# Patient Record
Sex: MALE | Race: WHITE | NOT HISPANIC OR LATINO
[De-identification: names, ages, dates, MRNs, and addresses within clinical notes are randomized per-mention and may not be internally consistent; named-entity substitution may affect disease eponyms.]

---

## 2018-08-10 ENCOUNTER — RESULT REVIEW (OUTPATIENT)
Age: 63
End: 2018-08-10

## 2018-08-30 ENCOUNTER — APPOINTMENT (OUTPATIENT)
Dept: SURGERY | Facility: CLINIC | Age: 63
End: 2018-08-30
Payer: COMMERCIAL

## 2018-08-30 ENCOUNTER — TRANSCRIPTION ENCOUNTER (OUTPATIENT)
Age: 63
End: 2018-08-30

## 2018-08-30 VITALS
SYSTOLIC BLOOD PRESSURE: 142 MMHG | DIASTOLIC BLOOD PRESSURE: 84 MMHG | WEIGHT: 265 LBS | HEIGHT: 72 IN | BODY MASS INDEX: 35.89 KG/M2

## 2018-08-30 DIAGNOSIS — Z82.49 FAMILY HISTORY OF ISCHEMIC HEART DISEASE AND OTHER DISEASES OF THE CIRCULATORY SYSTEM: ICD-10-CM

## 2018-08-30 DIAGNOSIS — Z86.79 PERSONAL HISTORY OF OTHER DISEASES OF THE CIRCULATORY SYSTEM: ICD-10-CM

## 2018-08-30 DIAGNOSIS — Z87.39 PERSONAL HISTORY OF OTHER DISEASES OF THE MUSCULOSKELETAL SYSTEM AND CONNECTIVE TISSUE: ICD-10-CM

## 2018-08-30 DIAGNOSIS — F17.290 NICOTINE DEPENDENCE, OTHER TOBACCO PRODUCT, UNCOMPLICATED: ICD-10-CM

## 2018-08-30 DIAGNOSIS — Z86.39 PERSONAL HISTORY OF OTHER ENDOCRINE, NUTRITIONAL AND METABOLIC DISEASE: ICD-10-CM

## 2018-08-30 PROBLEM — Z00.00 ENCOUNTER FOR PREVENTIVE HEALTH EXAMINATION: Status: ACTIVE | Noted: 2018-08-30

## 2018-08-30 PROCEDURE — 99203 OFFICE O/P NEW LOW 30 MIN: CPT

## 2018-08-31 PROBLEM — Z86.39 HISTORY OF HYPOTHYROIDISM: Status: RESOLVED | Noted: 2018-08-30 | Resolved: 2018-08-31

## 2018-08-31 PROBLEM — Z82.49 FAMILY HISTORY OF CONGESTIVE HEART FAILURE: Status: ACTIVE | Noted: 2018-08-30

## 2018-08-31 PROBLEM — Z82.49 FAMILY HISTORY OF CARDIAC DISORDER: Status: ACTIVE | Noted: 2018-08-30

## 2018-08-31 PROBLEM — Z87.39 HISTORY OF ARTHRITIS: Status: RESOLVED | Noted: 2018-08-30 | Resolved: 2018-08-31

## 2018-08-31 PROBLEM — Z86.79 HISTORY OF HYPERTENSION: Status: RESOLVED | Noted: 2018-08-30 | Resolved: 2018-08-31

## 2018-08-31 PROBLEM — F17.290 CIGAR SMOKER: Status: ACTIVE | Noted: 2018-08-30

## 2018-08-31 RX ORDER — LEVOTHYROXINE SODIUM 150 UG/1
150 TABLET ORAL
Refills: 0 | Status: ACTIVE | COMMUNITY

## 2018-08-31 RX ORDER — LOSARTAN POTASSIUM 50 MG/1
50 TABLET, FILM COATED ORAL
Refills: 0 | Status: ACTIVE | COMMUNITY

## 2018-08-31 RX ORDER — TRAMADOL HYDROCHLORIDE 50 MG/1
50 TABLET, COATED ORAL
Refills: 0 | Status: ACTIVE | COMMUNITY

## 2018-09-19 ENCOUNTER — OUTPATIENT (OUTPATIENT)
Dept: OUTPATIENT SERVICES | Facility: HOSPITAL | Age: 63
LOS: 1 days | Discharge: HOME | End: 2018-09-19

## 2018-09-19 DIAGNOSIS — C43.9 MALIGNANT MELANOMA OF SKIN, UNSPECIFIED: ICD-10-CM

## 2018-09-25 ENCOUNTER — RESULT REVIEW (OUTPATIENT)
Age: 63
End: 2018-09-25

## 2018-10-02 ENCOUNTER — FORM ENCOUNTER (OUTPATIENT)
Age: 63
End: 2018-10-02

## 2018-10-03 ENCOUNTER — OUTPATIENT (OUTPATIENT)
Dept: OUTPATIENT SERVICES | Facility: HOSPITAL | Age: 63
LOS: 1 days | Discharge: HOME | End: 2018-10-03

## 2018-10-03 DIAGNOSIS — D03.8 MELANOMA IN SITU OF OTHER SITES: ICD-10-CM

## 2018-10-04 ENCOUNTER — APPOINTMENT (OUTPATIENT)
Dept: SURGERY | Facility: CLINIC | Age: 63
End: 2018-10-04
Payer: COMMERCIAL

## 2018-10-04 VITALS
BODY MASS INDEX: 36.44 KG/M2 | DIASTOLIC BLOOD PRESSURE: 86 MMHG | WEIGHT: 269 LBS | HEIGHT: 72 IN | SYSTOLIC BLOOD PRESSURE: 158 MMHG

## 2018-10-04 PROCEDURE — 99213 OFFICE O/P EST LOW 20 MIN: CPT

## 2018-10-05 ENCOUNTER — LABORATORY RESULT (OUTPATIENT)
Age: 63
End: 2018-10-05

## 2018-10-05 ENCOUNTER — OUTPATIENT (OUTPATIENT)
Dept: OUTPATIENT SERVICES | Facility: HOSPITAL | Age: 63
LOS: 1 days | Discharge: HOME | End: 2018-10-05

## 2018-10-05 DIAGNOSIS — R89.7 ABNORMAL HISTOLOGICAL FINDINGS IN SPECIMENS FROM OTHER ORGANS, SYSTEMS AND TISSUES: ICD-10-CM

## 2018-10-11 ENCOUNTER — FORM ENCOUNTER (OUTPATIENT)
Age: 63
End: 2018-10-11

## 2018-10-12 ENCOUNTER — OUTPATIENT (OUTPATIENT)
Dept: OUTPATIENT SERVICES | Facility: HOSPITAL | Age: 63
LOS: 1 days | Discharge: HOME | End: 2018-10-12

## 2018-10-12 VITALS
HEART RATE: 63 BPM | DIASTOLIC BLOOD PRESSURE: 62 MMHG | HEIGHT: 72 IN | WEIGHT: 268.96 LBS | OXYGEN SATURATION: 97 % | SYSTOLIC BLOOD PRESSURE: 122 MMHG | RESPIRATION RATE: 18 BRPM | TEMPERATURE: 98 F

## 2018-10-12 DIAGNOSIS — Z90.01 ACQUIRED ABSENCE OF EYE: Chronic | ICD-10-CM

## 2018-10-12 DIAGNOSIS — Z90.49 ACQUIRED ABSENCE OF OTHER SPECIFIED PARTS OF DIGESTIVE TRACT: Chronic | ICD-10-CM

## 2018-10-12 DIAGNOSIS — Z01.818 ENCOUNTER FOR OTHER PREPROCEDURAL EXAMINATION: ICD-10-CM

## 2018-10-12 DIAGNOSIS — Z98.890 OTHER SPECIFIED POSTPROCEDURAL STATES: Chronic | ICD-10-CM

## 2018-10-12 DIAGNOSIS — C43.61 MALIGNANT MELANOMA OF RIGHT UPPER LIMB, INCLUDING SHOULDER: ICD-10-CM

## 2018-10-12 LAB
ALBUMIN SERPL ELPH-MCNC: 4.2 G/DL — SIGNIFICANT CHANGE UP (ref 3.5–5.2)
ALP SERPL-CCNC: 60 U/L — SIGNIFICANT CHANGE UP (ref 30–115)
ALT FLD-CCNC: 14 U/L — SIGNIFICANT CHANGE UP (ref 0–41)
ANION GAP SERPL CALC-SCNC: 14 MMOL/L — SIGNIFICANT CHANGE UP (ref 7–14)
APTT BLD: 33.7 SEC — SIGNIFICANT CHANGE UP (ref 27–39.2)
AST SERPL-CCNC: 18 U/L — SIGNIFICANT CHANGE UP (ref 0–41)
BASOPHILS # BLD AUTO: 0.06 K/UL — SIGNIFICANT CHANGE UP (ref 0–0.2)
BASOPHILS NFR BLD AUTO: 0.7 % — SIGNIFICANT CHANGE UP (ref 0–1)
BILIRUB SERPL-MCNC: 0.4 MG/DL — SIGNIFICANT CHANGE UP (ref 0.2–1.2)
BUN SERPL-MCNC: 21 MG/DL — HIGH (ref 10–20)
CALCIUM SERPL-MCNC: 9.5 MG/DL — SIGNIFICANT CHANGE UP (ref 8.5–10.1)
CHLORIDE SERPL-SCNC: 101 MMOL/L — SIGNIFICANT CHANGE UP (ref 98–110)
CO2 SERPL-SCNC: 26 MMOL/L — SIGNIFICANT CHANGE UP (ref 17–32)
CREAT SERPL-MCNC: 0.8 MG/DL — SIGNIFICANT CHANGE UP (ref 0.7–1.5)
EOSINOPHIL # BLD AUTO: 0.08 K/UL — SIGNIFICANT CHANGE UP (ref 0–0.7)
EOSINOPHIL NFR BLD AUTO: 1 % — SIGNIFICANT CHANGE UP (ref 0–8)
GLUCOSE SERPL-MCNC: 82 MG/DL — SIGNIFICANT CHANGE UP (ref 70–99)
HCT VFR BLD CALC: 42.9 % — SIGNIFICANT CHANGE UP (ref 42–52)
HGB BLD-MCNC: 14.4 G/DL — SIGNIFICANT CHANGE UP (ref 14–18)
IMM GRANULOCYTES NFR BLD AUTO: 0.2 % — SIGNIFICANT CHANGE UP (ref 0.1–0.3)
INR BLD: 1 RATIO — SIGNIFICANT CHANGE UP (ref 0.65–1.3)
LYMPHOCYTES # BLD AUTO: 1.84 K/UL — SIGNIFICANT CHANGE UP (ref 1.2–3.4)
LYMPHOCYTES # BLD AUTO: 22.8 % — SIGNIFICANT CHANGE UP (ref 20.5–51.1)
MCHC RBC-ENTMCNC: 29.8 PG — SIGNIFICANT CHANGE UP (ref 27–31)
MCHC RBC-ENTMCNC: 33.6 G/DL — SIGNIFICANT CHANGE UP (ref 32–37)
MCV RBC AUTO: 88.6 FL — SIGNIFICANT CHANGE UP (ref 80–94)
MONOCYTES # BLD AUTO: 0.6 K/UL — SIGNIFICANT CHANGE UP (ref 0.1–0.6)
MONOCYTES NFR BLD AUTO: 7.4 % — SIGNIFICANT CHANGE UP (ref 1.7–9.3)
NEUTROPHILS # BLD AUTO: 5.46 K/UL — SIGNIFICANT CHANGE UP (ref 1.4–6.5)
NEUTROPHILS NFR BLD AUTO: 67.9 % — SIGNIFICANT CHANGE UP (ref 42.2–75.2)
NRBC # BLD: 0 /100 WBCS — SIGNIFICANT CHANGE UP (ref 0–0)
PLATELET # BLD AUTO: 228 K/UL — SIGNIFICANT CHANGE UP (ref 130–400)
POTASSIUM SERPL-MCNC: 4.9 MMOL/L — SIGNIFICANT CHANGE UP (ref 3.5–5)
POTASSIUM SERPL-SCNC: 4.9 MMOL/L — SIGNIFICANT CHANGE UP (ref 3.5–5)
PROT SERPL-MCNC: 7.1 G/DL — SIGNIFICANT CHANGE UP (ref 6–8)
PROTHROM AB SERPL-ACNC: 11.5 SEC — SIGNIFICANT CHANGE UP (ref 9.95–12.87)
RBC # BLD: 4.84 M/UL — SIGNIFICANT CHANGE UP (ref 4.7–6.1)
RBC # FLD: 12.8 % — SIGNIFICANT CHANGE UP (ref 11.5–14.5)
SODIUM SERPL-SCNC: 141 MMOL/L — SIGNIFICANT CHANGE UP (ref 135–146)
WBC # BLD: 8.06 K/UL — SIGNIFICANT CHANGE UP (ref 4.8–10.8)
WBC # FLD AUTO: 8.06 K/UL — SIGNIFICANT CHANGE UP (ref 4.8–10.8)

## 2018-10-12 NOTE — H&P PST ADULT - VISION (WITH CORRECTIVE LENSES IF THE PATIENT USUALLY WEARS THEM):
Normal vision: sees adequately in most situations; can see medication labels, newsprint/Left eye reddened due to allergy. Right eye removal the age of 12

## 2018-10-12 NOTE — H&P PST ADULT - NSANTHOSAYNRD_GEN_A_CORE
No. RAJI screening performed.  STOP BANG Legend: 0-2 = LOW Risk; 3-4 = INTERMEDIATE Risk; 5-8 = HIGH Risk

## 2018-10-12 NOTE — H&P PST ADULT - REASON FOR ADMISSION
64 yo male presents to PAST for wide excision of melanoma site right shoulder, right axillary sentinel node biopsy, possible right axillary node dissection under GA on 10/19/2018 at [facility] OR by DR. Melissa.  Right Eye artificial.

## 2018-10-12 NOTE — H&P PST ADULT - HISTORY OF PRESENT ILLNESS
Patient was diagnosed melanoma on left shoulder on 09/2018 and removal and biopsy done ([place] node lymphoscintigraphy for   melanoma; sentinel node injection procedure; marking of overlying skin). Patient is scheduled the above procedure. Patient denies any c/o cp, sob, palpitations fever, cough or dysuria. Ex tolerance of 2 fos walk with out sob. NO RAJI.

## 2018-10-12 NOTE — H&P PST ADULT - FAMILY HISTORY
CAD (coronary artery disease), Stent     Mother  Still living? No  CAD (coronary artery disease), Age at diagnosis: Age Unknown

## 2018-10-12 NOTE — H&P PST ADULT - PMH
HTN (hypertension)    Hypothyroidism, unspecified type    Obesity    Other secondary osteoarthritis of right hand

## 2018-10-18 ENCOUNTER — FORM ENCOUNTER (OUTPATIENT)
Age: 63
End: 2018-10-18

## 2018-10-19 ENCOUNTER — RESULT REVIEW (OUTPATIENT)
Age: 63
End: 2018-10-19

## 2018-10-19 ENCOUNTER — OUTPATIENT (OUTPATIENT)
Dept: OUTPATIENT SERVICES | Facility: HOSPITAL | Age: 63
LOS: 1 days | Discharge: HOME | End: 2018-10-19
Payer: MEDICARE

## 2018-10-19 ENCOUNTER — APPOINTMENT (OUTPATIENT)
Dept: SURGERY | Facility: AMBULATORY SURGERY CENTER | Age: 63
End: 2018-10-19

## 2018-10-19 VITALS
HEIGHT: 72 IN | RESPIRATION RATE: 18 BRPM | OXYGEN SATURATION: 97 % | WEIGHT: 268.96 LBS | SYSTOLIC BLOOD PRESSURE: 137 MMHG | HEART RATE: 66 BPM | DIASTOLIC BLOOD PRESSURE: 65 MMHG | TEMPERATURE: 98 F

## 2018-10-19 VITALS — HEART RATE: 71 BPM | DIASTOLIC BLOOD PRESSURE: 80 MMHG | RESPIRATION RATE: 918 BRPM | SYSTOLIC BLOOD PRESSURE: 121 MMHG

## 2018-10-19 DIAGNOSIS — Z90.01 ACQUIRED ABSENCE OF EYE: Chronic | ICD-10-CM

## 2018-10-19 DIAGNOSIS — Z98.890 OTHER SPECIFIED POSTPROCEDURAL STATES: Chronic | ICD-10-CM

## 2018-10-19 DIAGNOSIS — Z90.49 ACQUIRED ABSENCE OF OTHER SPECIFIED PARTS OF DIGESTIVE TRACT: Chronic | ICD-10-CM

## 2018-10-19 PROCEDURE — 11606 EXC TR-EXT MAL+MARG >4 CM: CPT

## 2018-10-19 PROCEDURE — 38900 IO MAP OF SENT LYMPH NODE: CPT | Mod: RT

## 2018-10-19 PROCEDURE — 38525 BIOPSY/REMOVAL LYMPH NODES: CPT | Mod: RT

## 2018-10-19 PROCEDURE — 12034 INTMD RPR S/TR/EXT 7.6-12.5: CPT | Mod: 59

## 2018-10-19 RX ORDER — SODIUM CHLORIDE 9 MG/ML
1000 INJECTION, SOLUTION INTRAVENOUS
Qty: 0 | Refills: 0 | Status: DISCONTINUED | OUTPATIENT
Start: 2018-10-19 | End: 2018-11-03

## 2018-10-19 RX ORDER — ONDANSETRON 8 MG/1
4 TABLET, FILM COATED ORAL ONCE
Qty: 0 | Refills: 0 | Status: DISCONTINUED | OUTPATIENT
Start: 2018-10-19 | End: 2018-11-03

## 2018-10-19 RX ORDER — OXYCODONE AND ACETAMINOPHEN 5; 325 MG/1; MG/1
1 TABLET ORAL
Qty: 15 | Refills: 0
Start: 2018-10-19

## 2018-10-19 RX ORDER — MEPERIDINE HYDROCHLORIDE 50 MG/ML
12.5 INJECTION INTRAMUSCULAR; INTRAVENOUS; SUBCUTANEOUS ONCE
Qty: 0 | Refills: 0 | Status: DISCONTINUED | OUTPATIENT
Start: 2018-10-19 | End: 2018-10-19

## 2018-10-19 RX ORDER — OXYCODONE AND ACETAMINOPHEN 5; 325 MG/1; MG/1
2 TABLET ORAL ONCE
Qty: 0 | Refills: 0 | Status: DISCONTINUED | OUTPATIENT
Start: 2018-10-19 | End: 2018-10-19

## 2018-10-19 RX ORDER — MORPHINE SULFATE 50 MG/1
4 CAPSULE, EXTENDED RELEASE ORAL
Qty: 0 | Refills: 0 | Status: DISCONTINUED | OUTPATIENT
Start: 2018-10-19 | End: 2018-10-19

## 2018-10-19 RX ADMIN — SODIUM CHLORIDE 100 MILLILITER(S): 9 INJECTION, SOLUTION INTRAVENOUS at 13:44

## 2018-10-19 NOTE — ASU DISCHARGE PLAN (ADULT/PEDIATRIC). - NOTIFY
Bleeding that does not stop/Swelling that continues/Persistent Nausea and Vomiting/Fever greater than 101

## 2018-10-19 NOTE — BRIEF OPERATIVE NOTE - PROCEDURE
Right axillary lymph node sampling  10/19/2018    Active  [id number]  Melanoma excision  10/19/2018    Active  [id number] <<-----Click on this checkbox to enter Procedure

## 2018-10-19 NOTE — CHART NOTE - NSCHARTNOTEFT_GEN_A_CORE
PACU ANESTHESIA ADMISSION NOTE      Procedure:   Post op diagnosis:  Melanoma of shoulder, right      ____  Intubated  TV:______       Rate: ______      FiO2: ______    __x__  Patent Airway    ____  Full return of protective reflexes    __x__  Full recovery from anesthesia / back to baseline status    Vitals:  T(F): 98.2  HR: 76  BP: 133/76  RR: 16  O2 sat: 96%    Mental Status:  __x__ Awake   __x___ Alert   _____ Drowsy   _____ Sedated    Nausea/Vomiting:  ___x_ NO  ______Yes,   See Post - Op Orders          Pain Scale (0-10):  _____    Treatment: ____ None    __x__ See Post - Op/PCA Orders    Post - Operative Fluids:   ____ Oral   _x__ See Post - Op Orders    Plan: Discharge:   __x__Home       _____Floor     _____Critical Care    _____  Other:_________________    Comments: patient tolerated procedure  No anesthesia complications  Transfer to PACU

## 2018-10-23 PROBLEM — I10 ESSENTIAL (PRIMARY) HYPERTENSION: Chronic | Status: ACTIVE | Noted: 2018-10-12

## 2018-10-23 PROBLEM — E66.9 OBESITY, UNSPECIFIED: Chronic | Status: ACTIVE | Noted: 2018-10-12

## 2018-10-23 PROBLEM — M19.241: Chronic | Status: ACTIVE | Noted: 2018-10-12

## 2018-10-23 PROBLEM — E03.9 HYPOTHYROIDISM, UNSPECIFIED: Chronic | Status: ACTIVE | Noted: 2018-10-12

## 2018-10-24 DIAGNOSIS — Z79.890 HORMONE REPLACEMENT THERAPY: ICD-10-CM

## 2018-10-24 DIAGNOSIS — C43.61 MALIGNANT MELANOMA OF RIGHT UPPER LIMB, INCLUDING SHOULDER: ICD-10-CM

## 2018-10-24 DIAGNOSIS — Z88.0 ALLERGY STATUS TO PENICILLIN: ICD-10-CM

## 2018-10-26 LAB — SURGICAL PATHOLOGY STUDY: SIGNIFICANT CHANGE UP

## 2018-11-08 ENCOUNTER — APPOINTMENT (OUTPATIENT)
Dept: SURGERY | Facility: CLINIC | Age: 63
End: 2018-11-08
Payer: MEDICARE

## 2018-11-08 VITALS
SYSTOLIC BLOOD PRESSURE: 129 MMHG | HEIGHT: 72 IN | BODY MASS INDEX: 36.7 KG/M2 | WEIGHT: 271 LBS | DIASTOLIC BLOOD PRESSURE: 78 MMHG

## 2018-11-08 PROCEDURE — 99024 POSTOP FOLLOW-UP VISIT: CPT

## 2019-01-31 ENCOUNTER — APPOINTMENT (OUTPATIENT)
Dept: SURGERY | Facility: CLINIC | Age: 64
End: 2019-01-31

## 2019-03-07 ENCOUNTER — TRANSCRIPTION ENCOUNTER (OUTPATIENT)
Age: 64
End: 2019-03-07

## 2021-02-16 NOTE — BRIEF OPERATIVE NOTE - ASSISTANT(S)
"Called pt via CC request Pt states exposed to covid 1/31. rcvd first vaccine  2/2. Has been sick x 2 weeks. Several underlying health conditions. Very weak, vomiting and unable to walk. States oxygen is 90 on RA when awake. Discussed care and advised to go to ER. Pt unable to drive and states too weak. Pt hesitant to go to ER d/t not wanting to sit in ER for several hours and dog relies on pt for medications. Discussed benefits and consequences if does not seek immediate medical care. Pt agreed to allow this nurse to call ambulance to get to ER. [facility] dispatched.    Reason for Disposition  • Patient sounds very sick or weak to the triager    Additional Information  • Negative: SEVERE difficulty breathing (e.g., struggling for each breath, speaks in single words)  • Negative: Difficult to awaken or acting confused (e.g., disoriented, slurred speech)  • Negative: Bluish (or gray) lips or face now  • Negative: Shock suspected (e.g., cold/pale/clammy skin, too weak to stand, low BP, rapid pulse)  • Negative: Sounds like a life-threatening emergency to the triager  • Negative: Chest pain  • Negative: MILD difficulty breathing (e.g., minimal/no SOB at rest, SOB with walking, pulse <100)  • Negative: SEVERE or constant chest pain (Exception: mild central chest pain, present only when coughing)  • Negative: MODERATE difficulty breathing (e.g., speaks in phrases, SOB even at rest, pulse 100-120)  • Negative: [1] COVID-19 suspected (e.g., cough, fever, shortness of breath) AND [2] public health department recommends testing  • Negative: [1] COVID-19 exposure AND [2] no symptoms  • Negative: COVID-19 and Breastfeeding, questions about    Answer Assessment - Initial Assessment Questions  1. SYMPTOMS: \"What is the main symptom?\" (e.g., redness, swelling, pain)       vomitting  2. ONSET: \"When was the vaccine (shot) given?\" \"How much later did the vomiting begin?\" (e.g., hours, days ago)       2 weeks  3. SEVERITY: \"How bad is " "it?\"       severe  4. FEVER: \"Is there a fever?\" If so, ask: \"What is it, how was it measured, and when did it start?\"       fever  5. IMMUNIZATIONS GIVEN: \"What shots have you recently received?\"      covid  6. PAST REACTIONS: \"Have you reacted to immunizations before?\" If so, ask: \"What happened?\"      Yes, not as bad  7. OTHER SYMPTOMS: \"Do you have any other symptoms?\"      Fatigue,    Answer Assessment - Initial Assessment Questions  1. COVID-19 DIAGNOSIS: \"Who made your Coronavirus (COVID-19) diagnosis?\" \"Was it confirmed by a positive lab test?\" If not diagnosed by a HCP, ask \"Are there lots of cases (community spread) where you live?\" (See public health department website, if unsure)    * MAJOR community spread: high number of cases; numbers of cases are increasing; many people hospitalized.    * MINOR community spread: low number of cases; not increasing; few or no people hospitalized      Major, exposed   2. ONSET: \"When did the COVID-19 symptoms start?\"       2/2  3. WORST SYMPTOM: \"What is your worst symptom?\" (e.g., cough, fever, shortness of breath, muscle aches)      Vomitting, fatigue  4. COUGH: \"How bad is the cough?\"        cough  5. FEVER: \"Do you have a fever?\" If so, ask: \"What is your temperature, how was it measured, and when did it start?\"      yes  6. RESPIRATORY STATUS: \"Describe your breathing?\" (e.g., shortness of breath, wheezing, unable to speak)       sob  7. BETTER-SAME-WORSE: \"Are you getting better, staying the same or getting worse compared to yesterday?\"  If getting worse, ask, \"In what way?\"      Same and worse  8. HIGH RISK DISEASE: \"Do you have any chronic medical problems?\" (e.g., asthma, heart or lung disease, weak immune system, etc.)      Cancer and weakened immunue system  9. PREGNANCY: \"Is there any chance you are pregnant?\" \"When was your last menstrual period?\"      No   10. OTHER SYMPTOMS: \"Do you have any other symptoms?\"  (e.g., runny nose, headache, sore throat, " loss of smell)       no    Protocols used: CORONAVIRUS (COVID-19) DIAGNOSED OR [id number], IMMUNIZATION [id number]       Jeremy Babin M.D., Orion Kimble M.D.

## 2021-04-16 ENCOUNTER — TRANSCRIPTION ENCOUNTER (OUTPATIENT)
Age: 66
End: 2021-04-16

## 2022-08-17 ENCOUNTER — EMERGENCY (EMERGENCY)
Facility: HOSPITAL | Age: 67
LOS: 0 days | Discharge: HOME | End: 2022-08-18
Attending: EMERGENCY MEDICINE | Admitting: EMERGENCY MEDICINE

## 2022-08-17 DIAGNOSIS — I10 ESSENTIAL (PRIMARY) HYPERTENSION: ICD-10-CM

## 2022-08-17 DIAGNOSIS — Z98.890 OTHER SPECIFIED POSTPROCEDURAL STATES: Chronic | ICD-10-CM

## 2022-08-17 DIAGNOSIS — R06.02 SHORTNESS OF BREATH: ICD-10-CM

## 2022-08-17 DIAGNOSIS — E66.9 OBESITY, UNSPECIFIED: ICD-10-CM

## 2022-08-17 DIAGNOSIS — M19.241 SECONDARY OSTEOARTHRITIS, RIGHT HAND: ICD-10-CM

## 2022-08-17 DIAGNOSIS — J34.89 OTHER SPECIFIED DISORDERS OF NOSE AND NASAL SINUSES: ICD-10-CM

## 2022-08-17 DIAGNOSIS — Z20.822 CONTACT WITH AND (SUSPECTED) EXPOSURE TO COVID-19: ICD-10-CM

## 2022-08-17 DIAGNOSIS — Z88.0 ALLERGY STATUS TO PENICILLIN: ICD-10-CM

## 2022-08-17 DIAGNOSIS — E03.9 HYPOTHYROIDISM, UNSPECIFIED: ICD-10-CM

## 2022-08-17 DIAGNOSIS — Z90.01 ACQUIRED ABSENCE OF EYE: Chronic | ICD-10-CM

## 2022-08-17 DIAGNOSIS — Z90.49 ACQUIRED ABSENCE OF OTHER SPECIFIED PARTS OF DIGESTIVE TRACT: Chronic | ICD-10-CM

## 2022-08-17 DIAGNOSIS — Z90.49 ACQUIRED ABSENCE OF OTHER SPECIFIED PARTS OF DIGESTIVE TRACT: ICD-10-CM

## 2022-08-17 PROCEDURE — 99285 EMERGENCY DEPT VISIT HI MDM: CPT

## 2022-08-18 VITALS
RESPIRATION RATE: 18 BRPM | SYSTOLIC BLOOD PRESSURE: 153 MMHG | WEIGHT: 270.07 LBS | DIASTOLIC BLOOD PRESSURE: 70 MMHG | HEART RATE: 89 BPM | TEMPERATURE: 99 F | HEIGHT: 72 IN | OXYGEN SATURATION: 92 %

## 2022-08-18 VITALS — RESPIRATION RATE: 18 BRPM | OXYGEN SATURATION: 98 %

## 2022-08-18 LAB
ALBUMIN SERPL ELPH-MCNC: 3.7 G/DL — SIGNIFICANT CHANGE UP (ref 3.5–5.2)
ALP SERPL-CCNC: 241 U/L — HIGH (ref 30–115)
ALT FLD-CCNC: 12 U/L — SIGNIFICANT CHANGE UP (ref 0–41)
ANION GAP SERPL CALC-SCNC: 9 MMOL/L — SIGNIFICANT CHANGE UP (ref 7–14)
AST SERPL-CCNC: 16 U/L — SIGNIFICANT CHANGE UP (ref 0–41)
BASOPHILS # BLD AUTO: 0.05 K/UL — SIGNIFICANT CHANGE UP (ref 0–0.2)
BASOPHILS NFR BLD AUTO: 0.4 % — SIGNIFICANT CHANGE UP (ref 0–1)
BILIRUB SERPL-MCNC: 0.8 MG/DL — SIGNIFICANT CHANGE UP (ref 0.2–1.2)
BUN SERPL-MCNC: 17 MG/DL — SIGNIFICANT CHANGE UP (ref 10–20)
CALCIUM SERPL-MCNC: 9 MG/DL — SIGNIFICANT CHANGE UP (ref 8.5–10.1)
CHLORIDE SERPL-SCNC: 101 MMOL/L — SIGNIFICANT CHANGE UP (ref 98–110)
CO2 SERPL-SCNC: 24 MMOL/L — SIGNIFICANT CHANGE UP (ref 17–32)
CREAT SERPL-MCNC: 0.8 MG/DL — SIGNIFICANT CHANGE UP (ref 0.7–1.5)
EGFR: 96 ML/MIN/1.73M2 — SIGNIFICANT CHANGE UP
EOSINOPHIL # BLD AUTO: 0.02 K/UL — SIGNIFICANT CHANGE UP (ref 0–0.7)
EOSINOPHIL NFR BLD AUTO: 0.2 % — SIGNIFICANT CHANGE UP (ref 0–8)
FLUAV AG NPH QL: SIGNIFICANT CHANGE UP
FLUBV AG NPH QL: SIGNIFICANT CHANGE UP
GLUCOSE SERPL-MCNC: 126 MG/DL — HIGH (ref 70–99)
HCT VFR BLD CALC: 40.8 % — LOW (ref 42–52)
HGB BLD-MCNC: 13.9 G/DL — LOW (ref 14–18)
IMM GRANULOCYTES NFR BLD AUTO: 0.3 % — SIGNIFICANT CHANGE UP (ref 0.1–0.3)
LYMPHOCYTES # BLD AUTO: 1.28 K/UL — SIGNIFICANT CHANGE UP (ref 1.2–3.4)
LYMPHOCYTES # BLD AUTO: 10.2 % — LOW (ref 20.5–51.1)
MCHC RBC-ENTMCNC: 30.7 PG — SIGNIFICANT CHANGE UP (ref 27–31)
MCHC RBC-ENTMCNC: 34.1 G/DL — SIGNIFICANT CHANGE UP (ref 32–37)
MCV RBC AUTO: 90.1 FL — SIGNIFICANT CHANGE UP (ref 80–94)
MONOCYTES # BLD AUTO: 1.03 K/UL — HIGH (ref 0.1–0.6)
MONOCYTES NFR BLD AUTO: 8.2 % — SIGNIFICANT CHANGE UP (ref 1.7–9.3)
NEUTROPHILS # BLD AUTO: 10.16 K/UL — HIGH (ref 1.4–6.5)
NEUTROPHILS NFR BLD AUTO: 80.7 % — HIGH (ref 42.2–75.2)
NRBC # BLD: 0 /100 WBCS — SIGNIFICANT CHANGE UP (ref 0–0)
NT-PROBNP SERPL-SCNC: 122 PG/ML — SIGNIFICANT CHANGE UP (ref 0–300)
PLATELET # BLD AUTO: 160 K/UL — SIGNIFICANT CHANGE UP (ref 130–400)
POTASSIUM SERPL-MCNC: 4.6 MMOL/L — SIGNIFICANT CHANGE UP (ref 3.5–5)
POTASSIUM SERPL-SCNC: 4.6 MMOL/L — SIGNIFICANT CHANGE UP (ref 3.5–5)
PROT SERPL-MCNC: 6.1 G/DL — SIGNIFICANT CHANGE UP (ref 6–8)
RBC # BLD: 4.53 M/UL — LOW (ref 4.7–6.1)
RBC # FLD: 12.6 % — SIGNIFICANT CHANGE UP (ref 11.5–14.5)
RSV RNA NPH QL NAA+NON-PROBE: SIGNIFICANT CHANGE UP
SARS-COV-2 RNA SPEC QL NAA+PROBE: SIGNIFICANT CHANGE UP
SODIUM SERPL-SCNC: 134 MMOL/L — LOW (ref 135–146)
TROPONIN T SERPL-MCNC: <0.01 NG/ML — SIGNIFICANT CHANGE UP
WBC # BLD: 12.58 K/UL — HIGH (ref 4.8–10.8)
WBC # FLD AUTO: 12.58 K/UL — HIGH (ref 4.8–10.8)

## 2022-08-18 PROCEDURE — 93010 ELECTROCARDIOGRAM REPORT: CPT

## 2022-08-18 PROCEDURE — 71045 X-RAY EXAM CHEST 1 VIEW: CPT | Mod: 26

## 2022-08-18 RX ORDER — SODIUM CHLORIDE 9 MG/ML
1000 INJECTION INTRAMUSCULAR; INTRAVENOUS; SUBCUTANEOUS ONCE
Refills: 0 | Status: COMPLETED | OUTPATIENT
Start: 2022-08-18 | End: 2022-08-18

## 2022-08-18 RX ORDER — ACETAMINOPHEN 500 MG
975 TABLET ORAL ONCE
Refills: 0 | Status: COMPLETED | OUTPATIENT
Start: 2022-08-18 | End: 2022-08-18

## 2022-08-18 RX ADMIN — SODIUM CHLORIDE 1000 MILLILITER(S): 9 INJECTION INTRAMUSCULAR; INTRAVENOUS; SUBCUTANEOUS at 00:58

## 2022-08-18 RX ADMIN — Medication 975 MILLIGRAM(S): at 00:58

## 2022-08-18 NOTE — ED PROVIDER NOTE - PHYSICAL EXAMINATION
CONSTITUTIONAL: Well-appearing; well-nourished; in no apparent distress.   EYES: PERRL; EOM intact.   ENT: + rhinorrhea; normal pharynx with no tonsillar hypertrophy.   CARDIOVASCULAR: Normal S1, S2; no murmurs, rubs, or gallops.   RESPIRATORY: Normal chest excursion with respiration; breath sounds clear and equal bilaterally; no wheezes, rhonchi, or rales.  GI/: Normal bowel sounds; non-distended; non-tender; no palpable organomegaly.   SKIN: Normal for age and race; warm; dry; good turgor; no apparent lesions or exudate.   NEURO/PSYCH: A & O x 4; grossly unremarkable.

## 2022-08-18 NOTE — ED ADULT NURSE NOTE - NSICDXPASTSURGICALHX_GEN_ALL_CORE_FT
INTERVAL HPI/OVERNIGHT EVENTS:    events noted  remains agitated        MEDICATIONS  (STANDING):  aspirin enteric coated 81 milliGRAM(s) Oral daily  chlorhexidine 2% Cloths 1 Application(s) Topical <User Schedule>  collagenase Ointment 1 Application(s) Topical daily  furosemide    Tablet 80 milliGRAM(s) Oral every 8 hours  insulin glargine Injectable (LANTUS) 16 Unit(s) SubCutaneous at bedtime  insulin lispro (HumaLOG) corrective regimen sliding scale   SubCutaneous three times a day before meals  insulin lispro (HumaLOG) corrective regimen sliding scale   SubCutaneous at bedtime  insulin lispro Injectable (HumaLOG) 10 Unit(s) SubCutaneous before dinner  insulin lispro Injectable (HumaLOG) 8 Unit(s) SubCutaneous before breakfast  insulin lispro Injectable (HumaLOG) 8 Unit(s) SubCutaneous before lunch  linezolid  IVPB 600 milliGRAM(s) IV Intermittent every 12 hours  magnesium oxide 800 milliGRAM(s) Oral two times a day with meals  meropenem  IVPB 1000 milliGRAM(s) IV Intermittent every 8 hours  nystatin    Suspension 628321 Unit(s) Swish and Swallow four times a day  nystatin Cream 1 Application(s) Topical two times a day  pantoprazole  Injectable 40 milliGRAM(s) IV Push every 12 hours  polyethylene glycol 3350 17 Gram(s) Oral daily  predniSONE   Tablet 5 milliGRAM(s) Oral daily  senna 2 Tablet(s) Oral at bedtime  tacrolimus 3 milliGRAM(s) Oral <User Schedule>  tacrolimus 2 milliGRAM(s) Oral <User Schedule>  tamsulosin 0.8 milliGRAM(s) Oral at bedtime  trimethoprim   80 mG/sulfamethoxazole 400 mG 1 Tablet(s) Oral daily  valGANciclovir 450 milliGRAM(s) Oral daily    MEDICATIONS  (PRN):  glucagon  Injectable 1 milliGRAM(s) IntraMuscular once PRN Glucose <70 milliGRAM(s)/deciLiter      Allergies    codeine (Anaphylaxis)    Intolerances        Review of Systems:    General:  No wt loss, fevers, chills, night sweats,fatigue,   Eyes:  Good vision, no reported pain  ENT:  No sore throat, pain, runny nose, dysphagia  CV:  No pain, palpitatioins, hypo/hypertension  Resp:  No dyspnea, cough, tachypnea, wheezing  GI:  No pain, No nausea, No vomiting, No diarrhea, No constipatiion, No weight loss, No fever, No pruritis, No rectal bleeding, No tarry stools, No dysphagia,  :  No pain, bleeding, incontinence, nocturia  Muscle:  No pain, weakness  Neuro:  No weakness, tingling, memory problems  Psych:  No fatigue, insomnia, mood problems, depression  Endocrine:  No polyuria, polydypsia, cold/heat intolerance  Heme:  No petechiae, ecchymosis, easy bruisability  Skin:  No rash, tattoos, scars, edema      Vital Signs Last 24 Hrs  T(C): 36.3 (12 Jul 2020 15:00), Max: 36.8 (11 Jul 2020 19:00)  T(F): 97.3 (12 Jul 2020 15:00), Max: 98.2 (11 Jul 2020 19:00)  HR: 80 (12 Jul 2020 15:00) (72 - 83)  BP: 109/59 (12 Jul 2020 15:00) (94/55 - 154/68)  BP(mean): 78 (12 Jul 2020 15:00) (70 - 125)  RR: 15 (12 Jul 2020 15:00) (9 - 28)  SpO2: 96% (12 Jul 2020 15:00) (96% - 100%)    PHYSICAL EXAM:    Constitutional: NAD  HEENT: sclera anicteric   Neck: No LAD, supple  Gastrointestinal: BS+, soft, ND, +incisional tenderness, incisions c/d/i  Extremities: +b/l peripheral edema  Neurological: awake, alert, +tremors - improving     LABS:                        9.3    5.51  )-----------( 98       ( 12 Jul 2020 01:54 )             28.6     07-12    131<L>  |  95<L>  |  31<H>  ----------------------------<  217<H>  5.0   |  27  |  1.26    Ca    8.1<L>      12 Jul 2020 12:22  Phos  4.0     07-12  Mg     2.0     07-12    TPro  4.4<L>  /  Alb  2.6<L>  /  TBili  1.1  /  DBili  0.5<H>  /  AST  64<H>  /  ALT  98<H>  /  AlkPhos  212<H>  07-12    PT/INR - ( 12 Jul 2020 01:54 )   PT: 12.7 sec;   INR: 1.12 ratio         PTT - ( 12 Jul 2020 01:54 )  PTT:28.7 sec      RADIOLOGY & ADDITIONAL TESTS: PAST SURGICAL HISTORY:  H/O melanoma excision     History of eye removal Right eye    S/P appendectomy 14 yrs old

## 2022-08-18 NOTE — ED PROVIDER NOTE - PATIENT PORTAL LINK FT
You can access the FollowMyHealth Patient Portal offered by [facility] by registering at the following website: [email or web address]. By joining [facility]’s FollowMyHealth portal, you will also be able to view your health information using other applications (apps) compatible with our system.

## 2022-08-18 NOTE — ED PROVIDER NOTE - NSFOLLOWUPINSTRUCTIONS_ED_ALL_ED_FT
follow up with your  primary doctor  2-3 day s    RETURN TO ED  FOR ANY NEW WORSENING OR CONCERNING SYMPTOMS TO YOU, ALSO AS WE DISCUSSED. WE ARE HERE AND HAPPY TO TAKE CARE OF YOU      Shortness of Breath, Adult  ImageShortness of breath is when a person has trouble breathing enough air, or when a person feels like she or he is having trouble breathing in enough air. Shortness of breath could be a sign of medical problem.    Follow these instructions at home:  Pay attention to any changes in your symptoms. Take these actions to help with your condition:    Do not smoke. Smoking is a common cause of shortness of breath. If you smoke and you need help quitting, ask your health care provider.  Avoid things that can irritate your airways, such as:    Mold.  Dust.  Air pollution.  Chemical fumes.  Things that can cause allergy symptoms (allergens), if you have allergies.    Keep your living space clean and free of mold and dust.  Rest as needed. Slowly return to your usual activities.  Take over-the-counter and prescription medicines, including oxygen and inhaled medicines, only as told by your health care provider.  Keep all follow-up visits as told by your health care provider. This is important.    Contact a health care provider if:  Your condition does not improve as soon as expected.  You have a hard time doing your normal activities, even after you rest.  You have new symptoms.  Get help right away if:  Your shortness of breath gets worse.  You have shortness of breath when you are resting.  You feel light-headed or you faint.  You have a cough that is not controlled with medicines.  You cough up blood.  You have pain with breathing.  You have pain in your chest, arms, shoulders, or abdomen.  You have a fever.  You cannot walk up stairs or exercise the way that you normally do.  This information is not intended to replace advice given to you by your health care provider. Make sure you discuss any questions you have with your health care provider

## 2022-08-18 NOTE — ED PROVIDER NOTE - CLINICAL SUMMARY MEDICAL DECISION MAKING FREE TEXT BOX
67yM htn hypothyroid  pw chills  tonight  while in bed,  checked temporal  temperature at home was febrile and felt sob.  Pt admits no longer feels  sob feels well. did not take antipyretic for fever at home.  no cp leg pain swelling or immobilization. no sick contacts  no nasal congestion  sneezing or sore throat.   Alert nontoxic, perrl eomi, no pallor CVS RRR, Resp CTA no tachypnea no hyperpnea, Abd soft nontender   nondistended , No le edema  ekg no ischemia  trop negative  cxr no opacity no ptx,  bnp negative, wbc 12. covid  negative pt 98% ra   Patient to be discharged from ED in well appearing condition. Any available test results were discussed with and printed  for patient.  Verbal instructions given, including instructions to return to ED immediately for any new, worsening, or concerning symptoms. Limitations of ED work up discussed.  Patient reports understanding of above with capacity and insight. Written discharge instructions additionally given, including follow-up plan.

## 2022-08-18 NOTE — ED PROVIDER NOTE - OBJECTIVE STATEMENT
67 years old male history of hypertension and hypothyroidism presenting complaint of fever, chills, and feeling shortness of breath that woke him up from sleep this evening.  Denies symptoms prior to sleep.  Denies sick contacts or recent travel.  Further denies headache, dizziness, chest pain, shortness of breath, coughing, sore throat, ear pain, abdominal pain, vomiting, diarrhea, urinary symptoms.

## 2022-08-18 NOTE — ED ADULT NURSE NOTE - NSICDXFAMILYHX_GEN_ALL_CORE_FT
FAMILY HISTORY:  CAD (coronary artery disease), Stent    Mother  Still living? No  CAD (coronary artery disease), Age at diagnosis: Age Unknown

## 2022-08-18 NOTE — ED PROVIDER NOTE - NSICDXPASTSURGICALHX_GEN_ALL_CORE_FT
PAST SURGICAL HISTORY:  H/O melanoma excision     History of eye removal Right eye    S/P appendectomy 14 yrs old

## 2022-08-18 NOTE — ED ADULT NURSE NOTE - NSICDXPASTMEDICALHX_GEN_ALL_CORE_FT
PAST MEDICAL HISTORY:  HTN (hypertension)     Hypothyroidism, unspecified type     Obesity     Other secondary osteoarthritis of right hand

## 2023-01-17 NOTE — ED PROVIDER NOTE - NS ED ROS FT
Constitutional: + fever, chills, no recent weight loss, change in appetite or malaise  Eyes: no redness/discharge/pain/vision changes  ENT: + rhinorrhea No ear pain/sore throat  Cardiac: No chest pain, SOB or edema.  Respiratory: See HPI  GI: No nausea, vomiting, diarrhea or abdominal pain.  : No dysuria, frequency, urgency or hematuria  MS: no pain to back or extremities, no loss of ROM, no weakness  Neuro: No headache or weakness. No LOC.  Skin: No skin rash.  Endocrine: No history of thyroid disease or diabetes.
No

## 2023-10-12 ENCOUNTER — APPOINTMENT (OUTPATIENT)
Dept: UROLOGY | Facility: CLINIC | Age: 68
End: 2023-10-12
Payer: MEDICARE

## 2023-10-12 DIAGNOSIS — N50.89 OTHER SPECIFIED DISORDERS OF THE MALE GENITAL ORGANS: ICD-10-CM

## 2023-10-12 DIAGNOSIS — E07.9 DISORDER OF THYROID, UNSPECIFIED: ICD-10-CM

## 2023-10-12 DIAGNOSIS — E78.00 PURE HYPERCHOLESTEROLEMIA, UNSPECIFIED: ICD-10-CM

## 2023-10-12 PROCEDURE — 99204 OFFICE O/P NEW MOD 45 MIN: CPT

## 2023-11-24 ENCOUNTER — OUTPATIENT (OUTPATIENT)
Dept: OUTPATIENT SERVICES | Facility: HOSPITAL | Age: 68
LOS: 1 days | End: 2023-11-24
Payer: MEDICARE

## 2023-11-24 VITALS
RESPIRATION RATE: 16 BRPM | OXYGEN SATURATION: 98 % | WEIGHT: 270.95 LBS | SYSTOLIC BLOOD PRESSURE: 152 MMHG | TEMPERATURE: 98 F | DIASTOLIC BLOOD PRESSURE: 75 MMHG | HEART RATE: 70 BPM | HEIGHT: 72 IN

## 2023-11-24 DIAGNOSIS — Z98.890 OTHER SPECIFIED POSTPROCEDURAL STATES: Chronic | ICD-10-CM

## 2023-11-24 DIAGNOSIS — N50.89 OTHER SPECIFIED DISORDERS OF THE MALE GENITAL ORGANS: ICD-10-CM

## 2023-11-24 DIAGNOSIS — Z90.49 ACQUIRED ABSENCE OF OTHER SPECIFIED PARTS OF DIGESTIVE TRACT: Chronic | ICD-10-CM

## 2023-11-24 DIAGNOSIS — Z90.01 ACQUIRED ABSENCE OF EYE: Chronic | ICD-10-CM

## 2023-11-24 DIAGNOSIS — Z01.818 ENCOUNTER FOR OTHER PREPROCEDURAL EXAMINATION: ICD-10-CM

## 2023-11-24 LAB
ALBUMIN SERPL ELPH-MCNC: 4 G/DL — SIGNIFICANT CHANGE UP (ref 3.5–5.2)
ALBUMIN SERPL ELPH-MCNC: 4 G/DL — SIGNIFICANT CHANGE UP (ref 3.5–5.2)
ALP SERPL-CCNC: 100 U/L — SIGNIFICANT CHANGE UP (ref 30–115)
ALP SERPL-CCNC: 100 U/L — SIGNIFICANT CHANGE UP (ref 30–115)
ALT FLD-CCNC: 10 U/L — SIGNIFICANT CHANGE UP (ref 0–41)
ALT FLD-CCNC: 10 U/L — SIGNIFICANT CHANGE UP (ref 0–41)
ANION GAP SERPL CALC-SCNC: 12 MMOL/L — SIGNIFICANT CHANGE UP (ref 7–14)
ANION GAP SERPL CALC-SCNC: 12 MMOL/L — SIGNIFICANT CHANGE UP (ref 7–14)
APPEARANCE UR: CLEAR — SIGNIFICANT CHANGE UP
APPEARANCE UR: CLEAR — SIGNIFICANT CHANGE UP
AST SERPL-CCNC: 16 U/L — SIGNIFICANT CHANGE UP (ref 0–41)
AST SERPL-CCNC: 16 U/L — SIGNIFICANT CHANGE UP (ref 0–41)
BASOPHILS # BLD AUTO: 0.04 K/UL — SIGNIFICANT CHANGE UP (ref 0–0.2)
BASOPHILS # BLD AUTO: 0.04 K/UL — SIGNIFICANT CHANGE UP (ref 0–0.2)
BASOPHILS NFR BLD AUTO: 0.8 % — SIGNIFICANT CHANGE UP (ref 0–1)
BASOPHILS NFR BLD AUTO: 0.8 % — SIGNIFICANT CHANGE UP (ref 0–1)
BILIRUB SERPL-MCNC: 0.5 MG/DL — SIGNIFICANT CHANGE UP (ref 0.2–1.2)
BILIRUB SERPL-MCNC: 0.5 MG/DL — SIGNIFICANT CHANGE UP (ref 0.2–1.2)
BILIRUB UR-MCNC: NEGATIVE — SIGNIFICANT CHANGE UP
BILIRUB UR-MCNC: NEGATIVE — SIGNIFICANT CHANGE UP
BUN SERPL-MCNC: 16 MG/DL — SIGNIFICANT CHANGE UP (ref 10–20)
BUN SERPL-MCNC: 16 MG/DL — SIGNIFICANT CHANGE UP (ref 10–20)
CALCIUM SERPL-MCNC: 9.7 MG/DL — SIGNIFICANT CHANGE UP (ref 8.4–10.5)
CALCIUM SERPL-MCNC: 9.7 MG/DL — SIGNIFICANT CHANGE UP (ref 8.4–10.5)
CHLORIDE SERPL-SCNC: 102 MMOL/L — SIGNIFICANT CHANGE UP (ref 98–110)
CHLORIDE SERPL-SCNC: 102 MMOL/L — SIGNIFICANT CHANGE UP (ref 98–110)
CO2 SERPL-SCNC: 25 MMOL/L — SIGNIFICANT CHANGE UP (ref 17–32)
CO2 SERPL-SCNC: 25 MMOL/L — SIGNIFICANT CHANGE UP (ref 17–32)
COLOR SPEC: YELLOW — SIGNIFICANT CHANGE UP
COLOR SPEC: YELLOW — SIGNIFICANT CHANGE UP
CREAT SERPL-MCNC: 0.8 MG/DL — SIGNIFICANT CHANGE UP (ref 0.7–1.5)
CREAT SERPL-MCNC: 0.8 MG/DL — SIGNIFICANT CHANGE UP (ref 0.7–1.5)
DIFF PNL FLD: NEGATIVE — SIGNIFICANT CHANGE UP
DIFF PNL FLD: NEGATIVE — SIGNIFICANT CHANGE UP
EGFR: 95 ML/MIN/1.73M2 — SIGNIFICANT CHANGE UP
EGFR: 95 ML/MIN/1.73M2 — SIGNIFICANT CHANGE UP
EOSINOPHIL # BLD AUTO: 0.09 K/UL — SIGNIFICANT CHANGE UP (ref 0–0.7)
EOSINOPHIL # BLD AUTO: 0.09 K/UL — SIGNIFICANT CHANGE UP (ref 0–0.7)
EOSINOPHIL NFR BLD AUTO: 1.7 % — SIGNIFICANT CHANGE UP (ref 0–8)
EOSINOPHIL NFR BLD AUTO: 1.7 % — SIGNIFICANT CHANGE UP (ref 0–8)
GLUCOSE SERPL-MCNC: 85 MG/DL — SIGNIFICANT CHANGE UP (ref 70–99)
GLUCOSE SERPL-MCNC: 85 MG/DL — SIGNIFICANT CHANGE UP (ref 70–99)
GLUCOSE UR QL: NEGATIVE MG/DL — SIGNIFICANT CHANGE UP
GLUCOSE UR QL: NEGATIVE MG/DL — SIGNIFICANT CHANGE UP
HCT VFR BLD CALC: 42.9 % — SIGNIFICANT CHANGE UP (ref 42–52)
HCT VFR BLD CALC: 42.9 % — SIGNIFICANT CHANGE UP (ref 42–52)
HGB BLD-MCNC: 14.3 G/DL — SIGNIFICANT CHANGE UP (ref 14–18)
HGB BLD-MCNC: 14.3 G/DL — SIGNIFICANT CHANGE UP (ref 14–18)
IMM GRANULOCYTES NFR BLD AUTO: 0.2 % — SIGNIFICANT CHANGE UP (ref 0.1–0.3)
IMM GRANULOCYTES NFR BLD AUTO: 0.2 % — SIGNIFICANT CHANGE UP (ref 0.1–0.3)
KETONES UR-MCNC: ABNORMAL MG/DL
KETONES UR-MCNC: ABNORMAL MG/DL
LEUKOCYTE ESTERASE UR-ACNC: NEGATIVE — SIGNIFICANT CHANGE UP
LEUKOCYTE ESTERASE UR-ACNC: NEGATIVE — SIGNIFICANT CHANGE UP
LYMPHOCYTES # BLD AUTO: 1.09 K/UL — LOW (ref 1.2–3.4)
LYMPHOCYTES # BLD AUTO: 1.09 K/UL — LOW (ref 1.2–3.4)
LYMPHOCYTES # BLD AUTO: 20.8 % — SIGNIFICANT CHANGE UP (ref 20.5–51.1)
LYMPHOCYTES # BLD AUTO: 20.8 % — SIGNIFICANT CHANGE UP (ref 20.5–51.1)
MCHC RBC-ENTMCNC: 30.3 PG — SIGNIFICANT CHANGE UP (ref 27–31)
MCHC RBC-ENTMCNC: 30.3 PG — SIGNIFICANT CHANGE UP (ref 27–31)
MCHC RBC-ENTMCNC: 33.3 G/DL — SIGNIFICANT CHANGE UP (ref 32–37)
MCHC RBC-ENTMCNC: 33.3 G/DL — SIGNIFICANT CHANGE UP (ref 32–37)
MCV RBC AUTO: 90.9 FL — SIGNIFICANT CHANGE UP (ref 80–94)
MCV RBC AUTO: 90.9 FL — SIGNIFICANT CHANGE UP (ref 80–94)
MONOCYTES # BLD AUTO: 0.44 K/UL — SIGNIFICANT CHANGE UP (ref 0.1–0.6)
MONOCYTES # BLD AUTO: 0.44 K/UL — SIGNIFICANT CHANGE UP (ref 0.1–0.6)
MONOCYTES NFR BLD AUTO: 8.4 % — SIGNIFICANT CHANGE UP (ref 1.7–9.3)
MONOCYTES NFR BLD AUTO: 8.4 % — SIGNIFICANT CHANGE UP (ref 1.7–9.3)
NEUTROPHILS # BLD AUTO: 3.58 K/UL — SIGNIFICANT CHANGE UP (ref 1.4–6.5)
NEUTROPHILS # BLD AUTO: 3.58 K/UL — SIGNIFICANT CHANGE UP (ref 1.4–6.5)
NEUTROPHILS NFR BLD AUTO: 68.1 % — SIGNIFICANT CHANGE UP (ref 42.2–75.2)
NEUTROPHILS NFR BLD AUTO: 68.1 % — SIGNIFICANT CHANGE UP (ref 42.2–75.2)
NITRITE UR-MCNC: NEGATIVE — SIGNIFICANT CHANGE UP
NITRITE UR-MCNC: NEGATIVE — SIGNIFICANT CHANGE UP
NRBC # BLD: 0 /100 WBCS — SIGNIFICANT CHANGE UP (ref 0–0)
NRBC # BLD: 0 /100 WBCS — SIGNIFICANT CHANGE UP (ref 0–0)
PH UR: 5.5 — SIGNIFICANT CHANGE UP (ref 5–8)
PH UR: 5.5 — SIGNIFICANT CHANGE UP (ref 5–8)
PLATELET # BLD AUTO: 255 K/UL — SIGNIFICANT CHANGE UP (ref 130–400)
PLATELET # BLD AUTO: 255 K/UL — SIGNIFICANT CHANGE UP (ref 130–400)
PMV BLD: 10.1 FL — SIGNIFICANT CHANGE UP (ref 7.4–10.4)
PMV BLD: 10.1 FL — SIGNIFICANT CHANGE UP (ref 7.4–10.4)
POTASSIUM SERPL-MCNC: 4.4 MMOL/L — SIGNIFICANT CHANGE UP (ref 3.5–5)
POTASSIUM SERPL-MCNC: 4.4 MMOL/L — SIGNIFICANT CHANGE UP (ref 3.5–5)
POTASSIUM SERPL-SCNC: 4.4 MMOL/L — SIGNIFICANT CHANGE UP (ref 3.5–5)
POTASSIUM SERPL-SCNC: 4.4 MMOL/L — SIGNIFICANT CHANGE UP (ref 3.5–5)
PROT SERPL-MCNC: 6.9 G/DL — SIGNIFICANT CHANGE UP (ref 6–8)
PROT SERPL-MCNC: 6.9 G/DL — SIGNIFICANT CHANGE UP (ref 6–8)
PROT UR-MCNC: NEGATIVE MG/DL — SIGNIFICANT CHANGE UP
PROT UR-MCNC: NEGATIVE MG/DL — SIGNIFICANT CHANGE UP
RBC # BLD: 4.72 M/UL — SIGNIFICANT CHANGE UP (ref 4.7–6.1)
RBC # BLD: 4.72 M/UL — SIGNIFICANT CHANGE UP (ref 4.7–6.1)
RBC # FLD: 12.8 % — SIGNIFICANT CHANGE UP (ref 11.5–14.5)
RBC # FLD: 12.8 % — SIGNIFICANT CHANGE UP (ref 11.5–14.5)
SODIUM SERPL-SCNC: 139 MMOL/L — SIGNIFICANT CHANGE UP (ref 135–146)
SODIUM SERPL-SCNC: 139 MMOL/L — SIGNIFICANT CHANGE UP (ref 135–146)
SP GR SPEC: 1.02 — SIGNIFICANT CHANGE UP (ref 1–1.03)
SP GR SPEC: 1.02 — SIGNIFICANT CHANGE UP (ref 1–1.03)
UROBILINOGEN FLD QL: 0.2 MG/DL — SIGNIFICANT CHANGE UP (ref 0.2–1)
UROBILINOGEN FLD QL: 0.2 MG/DL — SIGNIFICANT CHANGE UP (ref 0.2–1)
WBC # BLD: 5.25 K/UL — SIGNIFICANT CHANGE UP (ref 4.8–10.8)
WBC # BLD: 5.25 K/UL — SIGNIFICANT CHANGE UP (ref 4.8–10.8)
WBC # FLD AUTO: 5.25 K/UL — SIGNIFICANT CHANGE UP (ref 4.8–10.8)
WBC # FLD AUTO: 5.25 K/UL — SIGNIFICANT CHANGE UP (ref 4.8–10.8)

## 2023-11-24 PROCEDURE — 36415 COLL VENOUS BLD VENIPUNCTURE: CPT

## 2023-11-24 PROCEDURE — 87086 URINE CULTURE/COLONY COUNT: CPT

## 2023-11-24 PROCEDURE — 99214 OFFICE O/P EST MOD 30 MIN: CPT | Mod: 25

## 2023-11-24 PROCEDURE — 81003 URINALYSIS AUTO W/O SCOPE: CPT

## 2023-11-24 PROCEDURE — 93005 ELECTROCARDIOGRAM TRACING: CPT

## 2023-11-24 PROCEDURE — 85025 COMPLETE CBC W/AUTO DIFF WBC: CPT

## 2023-11-24 PROCEDURE — 80053 COMPREHEN METABOLIC PANEL: CPT

## 2023-11-24 PROCEDURE — 93010 ELECTROCARDIOGRAM REPORT: CPT

## 2023-11-24 RX ORDER — LEVOTHYROXINE SODIUM 125 MCG
1 TABLET ORAL
Qty: 0 | Refills: 0 | DISCHARGE

## 2023-11-24 NOTE — H&P PST ADULT - REASON FOR ADMISSION
69 yo male presents for PAST in preparation for right radical orchiectomy on 11/29/2023 under general anesthesia by Dr. Dixon ([facility]). 67 yo male presents for PAST in preparation for right radical orchiectomy on 11/29/2023 under general anesthesia by Dr. Dixon ([facility]). 67 yo male presents for PAST in preparation for right radical orchiectomy on 11/29/2023 under general anesthesia by Dr. Dixon ([facility]).

## 2023-11-24 NOTE — H&P PST ADULT - HISTORY OF PRESENT ILLNESS
Pt states over the last few months his right testicle has been swollen. Went for additional f/u & imaging which revealed a testicular mass. Denies any symptoms/concerns. Advised to proceed with removal.    Denies any chest pain, difficulty breathing, SOB, palpitations, dysuria, URI, or any other infections in the last 2 weeks. Denies any recent travel, contact, or exposure to any persons with known or suspected COVID-19. Denies any suicidal or homicidal ideations. RAJI reviewed with patient.     Endorses this is their full medical & surgical history including medications prescribed. Pt verbalized understanding of all pre-operative instructions and was given the opportunity to ask questions and have them answered. They were instructed to follow up with their surgeon/proceduralists office with any additional questions regarding their procedure.    Anesthesia Alert  NO--Difficult Airway  NO--History of neck surgery or radiation  NO--Limited ROM of neck  NO--History of Malignant hyperthermia  NO--No personal or family history of Pseudocholinesterase deficiency.  NO--Prior Anesthesia Complication  NO--Latex Allergy  NO--Loose teeth  NO--History of Rheumatoid Arthritis  NO--RAJI  NO--Bleeding Risk  NO--Other_____    Revised Cardiac Risk Index for Pre-Operative Risk  RESULT SUMMARY:  0 points  Class I Risk    3.9 %  30-day risk of death, MI, or cardiac arrest    From Ducpe 2017. These numbers are higher than those from the original study (Gaston 1999). See Evidence for details.    INPUTS:  Elevated-risk surgery —> 0 = No  History of ischemic heart disease —> 0 = No  History of congestive heart failure —> 0 = No  History of cerebrovascular disease —> 0 = No  Pre-operative treatment with insulin —> 0 = No  Pre-operative creatinine >2 mg/dL / 176.8 µmol/L —> 0 = No    Duke Activity Status Index (DASI)  RESULT SUMMARY:  58.2 points  The higher the score (maximum 58.2), the higher the functional status.    9.89 METs    INPUTS:  Take care of self —> 2.75 = Yes  Walk indoors —> 1.75 = Yes  Walk 1&ndash;2 blocks on level ground —> 2.75 = Yes  Climb a flight of stairs or walk up a hill —> 5.5 = Yes  Run a short distance —> 8 = Yes  Do light work around the house —> 2.7 = Yes  Do moderate work around the house —> 3.5 = Yes  Do heavy work around the house —> 8 = Yes  Do yardwork —> 4.5 = Yes  Have sexual relations —> 5.25 = Yes  Participate in moderate recreational activities —> 6 = Yes  Participate in strenuous sports —> 7.5 = Yes

## 2023-11-25 DIAGNOSIS — Z01.818 ENCOUNTER FOR OTHER PREPROCEDURAL EXAMINATION: ICD-10-CM

## 2023-11-25 DIAGNOSIS — N50.89 OTHER SPECIFIED DISORDERS OF THE MALE GENITAL ORGANS: ICD-10-CM

## 2023-11-25 LAB
CULTURE RESULTS: NO GROWTH — SIGNIFICANT CHANGE UP
CULTURE RESULTS: NO GROWTH — SIGNIFICANT CHANGE UP
SPECIMEN SOURCE: SIGNIFICANT CHANGE UP
SPECIMEN SOURCE: SIGNIFICANT CHANGE UP

## 2023-12-02 ENCOUNTER — OUTPATIENT (OUTPATIENT)
Dept: OUTPATIENT SERVICES | Facility: HOSPITAL | Age: 68
LOS: 1 days | End: 2023-12-02
Payer: MEDICARE

## 2023-12-02 ENCOUNTER — RESULT REVIEW (OUTPATIENT)
Age: 68
End: 2023-12-02

## 2023-12-02 DIAGNOSIS — Z98.890 OTHER SPECIFIED POSTPROCEDURAL STATES: Chronic | ICD-10-CM

## 2023-12-02 DIAGNOSIS — Z90.49 ACQUIRED ABSENCE OF OTHER SPECIFIED PARTS OF DIGESTIVE TRACT: Chronic | ICD-10-CM

## 2023-12-02 DIAGNOSIS — Z90.01 ACQUIRED ABSENCE OF EYE: Chronic | ICD-10-CM

## 2023-12-02 DIAGNOSIS — Z00.8 ENCOUNTER FOR OTHER GENERAL EXAMINATION: ICD-10-CM

## 2023-12-02 DIAGNOSIS — N50.89 OTHER SPECIFIED DISORDERS OF THE MALE GENITAL ORGANS: ICD-10-CM

## 2023-12-02 PROCEDURE — 71250 CT THORAX DX C-: CPT

## 2023-12-02 PROCEDURE — 74177 CT ABD & PELVIS W/CONTRAST: CPT

## 2023-12-02 PROCEDURE — 74177 CT ABD & PELVIS W/CONTRAST: CPT | Mod: 26

## 2023-12-02 PROCEDURE — 71250 CT THORAX DX C-: CPT | Mod: 26

## 2023-12-03 DIAGNOSIS — N50.89 OTHER SPECIFIED DISORDERS OF THE MALE GENITAL ORGANS: ICD-10-CM

## 2023-12-04 LAB
ANION GAP SERPL CALC-SCNC: 12 MMOL/L
BUN SERPL-MCNC: 13 MG/DL
CALCIUM SERPL-MCNC: 9.7 MG/DL
CHLORIDE SERPL-SCNC: 103 MMOL/L
CO2 SERPL-SCNC: 27 MMOL/L
CREAT SERPL-MCNC: 0.8 MG/DL
EGFR: 95 ML/MIN/1.73M2
GLUCOSE SERPL-MCNC: 117 MG/DL
LDH SERPL-CCNC: 161 U/L
POTASSIUM SERPL-SCNC: 5.8 MMOL/L
SODIUM SERPL-SCNC: 142 MMOL/L

## 2023-12-05 LAB
AFP-TM SERPL-MCNC: 2.9 NG/ML
HCG-TM SERPL-MCNC: 1 MIU/ML
PSA FREE FLD-MCNC: 27 %
PSA FREE SERPL-MCNC: 0.26 NG/ML
PSA SERPL-MCNC: 0.96 NG/ML

## 2023-12-06 NOTE — ASU PATIENT PROFILE, ADULT - MEDICATION ADMINISTRATION INFO, PROFILE
[facility] Occupational Therapy Group Intervention Note     10/18/22 1425   Group Therapy Session   Group Attendance attended group session   Time Session Began 1115   Time Session Ended 1200   Total Time (minutes) 45   Total # Attendees 2   Group Type task skill;psychoeducation   Group Topic Covered coping skills/lifestyle management;relapse prevention   Group Session Detail Topic group for general health and coping: Collage focused on insight development specifically personal development, supports/barriers, goals, and interests related to recovery.    Patient Response/Contribution cooperative with task;organized;discussed personal experience with topic   Patient Participation Detail Congruent affect. Able to organize and apply concepts to collage, sequence activity, and clean-up. Demonstrated openness with peers; Pt identified personal supports, goals, interests, 'good reminders', and coping activities related to recovery.     Madeline Rosenberg OT on 10/18/2022 at 2:26 PM       no concerns

## 2023-12-06 NOTE — ASU PATIENT PROFILE, ADULT - FALL HARM RISK - UNIVERSAL INTERVENTIONS
Bed in lowest position, wheels locked, appropriate side rails in place/Call bell, personal items and telephone in reach/Instruct patient to call for assistance before getting out of bed or chair/Non-slip footwear when patient is out of bed/[place] to call system/Physically safe environment - no spills, clutter or unnecessary equipment/Purposeful Proactive Rounding/Room/bathroom lighting operational, light cord in reach Bed in lowest position, wheels locked, appropriate side rails in place/Call bell, personal items and telephone in reach/Instruct patient to call for assistance before getting out of bed or chair/Non-slip footwear when patient is out of bed/[place] to call system/Physically safe environment - no spills, clutter or unnecessary equipment/Purposeful Proactive Rounding/Room/bathroom lighting operational, light cord in reach

## 2023-12-07 ENCOUNTER — TRANSCRIPTION ENCOUNTER (OUTPATIENT)
Age: 68
End: 2023-12-07

## 2023-12-07 ENCOUNTER — OUTPATIENT (OUTPATIENT)
Dept: OUTPATIENT SERVICES | Facility: HOSPITAL | Age: 68
LOS: 1 days | Discharge: HOME | End: 2023-12-07
Payer: MEDICARE

## 2023-12-07 ENCOUNTER — RESULT REVIEW (OUTPATIENT)
Age: 68
End: 2023-12-07

## 2023-12-07 ENCOUNTER — APPOINTMENT (OUTPATIENT)
Dept: UROLOGY | Facility: HOSPITAL | Age: 68
End: 2023-12-07

## 2023-12-07 VITALS
DIASTOLIC BLOOD PRESSURE: 68 MMHG | WEIGHT: 270.95 LBS | HEART RATE: 86 BPM | HEIGHT: 72 IN | OXYGEN SATURATION: 98 % | TEMPERATURE: 98 F | SYSTOLIC BLOOD PRESSURE: 149 MMHG | RESPIRATION RATE: 13 BRPM

## 2023-12-07 VITALS
RESPIRATION RATE: 16 BRPM | OXYGEN SATURATION: 94 % | SYSTOLIC BLOOD PRESSURE: 131 MMHG | HEART RATE: 61 BPM | DIASTOLIC BLOOD PRESSURE: 72 MMHG

## 2023-12-07 DIAGNOSIS — Z98.890 OTHER SPECIFIED POSTPROCEDURAL STATES: Chronic | ICD-10-CM

## 2023-12-07 DIAGNOSIS — Z90.49 ACQUIRED ABSENCE OF OTHER SPECIFIED PARTS OF DIGESTIVE TRACT: Chronic | ICD-10-CM

## 2023-12-07 DIAGNOSIS — Z90.01 ACQUIRED ABSENCE OF EYE: Chronic | ICD-10-CM

## 2023-12-07 DIAGNOSIS — N50.89 OTHER SPECIFIED DISORDERS OF THE MALE GENITAL ORGANS: ICD-10-CM

## 2023-12-07 PROCEDURE — 88309 TISSUE EXAM BY PATHOLOGIST: CPT

## 2023-12-07 PROCEDURE — C1713: CPT

## 2023-12-07 PROCEDURE — 88309 TISSUE EXAM BY PATHOLOGIST: CPT | Mod: 26

## 2023-12-07 PROCEDURE — 54530 REMOVAL OF TESTIS: CPT | Mod: RT

## 2023-12-07 PROCEDURE — 54530 REMOVAL OF TESTIS: CPT | Mod: AS,RT

## 2023-12-07 RX ORDER — ONDANSETRON 8 MG/1
4 TABLET, FILM COATED ORAL ONCE
Refills: 0 | Status: DISCONTINUED | OUTPATIENT
Start: 2023-12-07 | End: 2023-12-08

## 2023-12-07 RX ORDER — SODIUM CHLORIDE 9 MG/ML
1000 INJECTION, SOLUTION INTRAVENOUS
Refills: 0 | Status: DISCONTINUED | OUTPATIENT
Start: 2023-12-07 | End: 2023-12-08

## 2023-12-07 RX ORDER — HYDROMORPHONE HYDROCHLORIDE 2 MG/ML
0.5 INJECTION INTRAMUSCULAR; INTRAVENOUS; SUBCUTANEOUS
Refills: 0 | Status: DISCONTINUED | OUTPATIENT
Start: 2023-12-07 | End: 2023-12-08

## 2023-12-07 RX ORDER — OMEGA-3 ACID ETHYL ESTERS 1 G
1 CAPSULE ORAL
Qty: 0 | Refills: 0 | DISCHARGE

## 2023-12-07 NOTE — BRIEF OPERATIVE NOTE - COMMENTS
[facility] # [id number] [facility] # [id number] Dict # [id number]    Neena Harrington PA-C, performed the duties of first assist during the above listed surgery which includes, but is not limited to, retraction, suctioning and suturing. There was no competent resident available for the case. Please mirror the billing codes of the attending on file. Dict # [id number]    Neena Harrington PA-C, performed the duties of first assist during the above listed surgery which includes, but is not limited to, retraction, suctioning and suturing. There was no competent resident available for the case. Please mirror the billing codes of the attending on file.

## 2023-12-07 NOTE — CHART NOTE - NSCHARTNOTEFT_GEN_A_CORE
PACU ANESTHESIA ADMISSION NOTE      Procedure: Radical orchiectomy by inguinal approach      Post op diagnosis:  Right testicular cancer        ____  Intubated  TV:______       Rate: ______      FiO2: ______    _X___  Patent Airway    _X___  Full return of protective reflexes    ____  Full recovery from anesthesia / back to baseline status    Vitals:  T: 97.8F  HR: 77  BP: 122/70  RR: 19  SpO2: 96%    Mental Status:  __X__ Awake   _____ Alert   _____ Drowsy   _____ Sedated    Nausea/Vomiting:  _X___ NO  ______Yes,   See Post - Op Orders          Pain Scale (0-10):  __0___    Treatment: ____ None    ____ See Post - Op/PCA Orders    Post - Operative Fluids:   ____ Oral   __X__ See Post - Op Orders    Plan: Discharge:   _X___Home       _____Floor     _____Critical Care    _____  Other:_________________    Comments: NO anesthetic related complications noted. pt. transported to PACU, report endorsed to Rn

## 2023-12-07 NOTE — ASU DISCHARGE PLAN (ADULT/PEDIATRIC) - NS MD DC FALL RISK RISK
For information on Fall & Injury Prevention, visit: [email or web address]://www.[facility]/news/fall-prevention-protects-and-maintains-health-and-mobility OR  https://www.[facility]/news/fall-prevention-tips-to-avoid-injury OR  https://www.cdc.gov/steadi/patient.html For information on Fall & Injury Prevention, visit: https://www.[facility]/news/fall-prevention-protects-and-maintains-health-and-mobility OR  https://www.[facility]/news/fall-prevention-tips-to-avoid-injury OR  https://www.cdc.gov/steadi/patient.html

## 2023-12-07 NOTE — BRIEF OPERATIVE NOTE - NSICDXBRIEFOPLAUNCH_GEN_ALL_CORE
This ONN met w/ the pt while here for tx.  Pt denied any issue eating/drinking or N/V/D.  Pt shared emotional concerns, \"I am scared to go to sleep b/c I'm afraid I won't wake up.\"  Pt shared that she is seeing a Psychiatrist and has an appt later today.  This ONN spoke w/ the pt about speaking with a , [facility] [place] or [facility] and pt gently declined at this time.  Encouraged the pt to reach out if changed her mind and she verbalized understanding.   <--- Click to Launch ICDx for PreOp, PostOp and Procedure

## 2023-12-07 NOTE — ASU PREOP CHECKLIST - 1.
Type & screen and confirmation specimens needed as per blood bank; anesthesia made aware. Type & screen and confirmation specimens needed as per blood bank; anesthesia made aware, also notified of right eye prosthesis.

## 2023-12-12 LAB
SURGICAL PATHOLOGY STUDY: SIGNIFICANT CHANGE UP
SURGICAL PATHOLOGY STUDY: SIGNIFICANT CHANGE UP

## 2023-12-13 DIAGNOSIS — C62.91 MALIGNANT NEOPLASM OF RIGHT TESTIS, UNSPECIFIED WHETHER DESCENDED OR UNDESCENDED: ICD-10-CM

## 2023-12-13 DIAGNOSIS — Z85.820 PERSONAL HISTORY OF MALIGNANT MELANOMA OF SKIN: ICD-10-CM

## 2023-12-13 DIAGNOSIS — E66.9 OBESITY, UNSPECIFIED: ICD-10-CM

## 2023-12-13 DIAGNOSIS — F17.290 NICOTINE DEPENDENCE, OTHER TOBACCO PRODUCT, UNCOMPLICATED: ICD-10-CM

## 2023-12-13 DIAGNOSIS — Z88.0 ALLERGY STATUS TO PENICILLIN: ICD-10-CM

## 2023-12-13 DIAGNOSIS — E03.9 HYPOTHYROIDISM, UNSPECIFIED: ICD-10-CM

## 2023-12-13 DIAGNOSIS — I10 ESSENTIAL (PRIMARY) HYPERTENSION: ICD-10-CM

## 2023-12-18 ENCOUNTER — APPOINTMENT (OUTPATIENT)
Dept: UROLOGY | Facility: CLINIC | Age: 68
End: 2023-12-18
Payer: MEDICARE

## 2023-12-18 PROCEDURE — 99214 OFFICE O/P EST MOD 30 MIN: CPT | Mod: 24

## 2023-12-26 ENCOUNTER — LABORATORY RESULT (OUTPATIENT)
Age: 68
End: 2023-12-26

## 2023-12-26 ENCOUNTER — APPOINTMENT (OUTPATIENT)
Dept: HEMATOLOGY ONCOLOGY | Facility: CLINIC | Age: 68
End: 2023-12-26
Payer: MEDICARE

## 2023-12-26 ENCOUNTER — OUTPATIENT (OUTPATIENT)
Dept: OUTPATIENT SERVICES | Facility: HOSPITAL | Age: 68
LOS: 1 days | End: 2023-12-26
Payer: MEDICARE

## 2023-12-26 VITALS
HEART RATE: 67 BPM | TEMPERATURE: 98.2 F | HEIGHT: 72 IN | WEIGHT: 270 LBS | BODY MASS INDEX: 36.57 KG/M2 | DIASTOLIC BLOOD PRESSURE: 77 MMHG | OXYGEN SATURATION: 98 % | SYSTOLIC BLOOD PRESSURE: 153 MMHG

## 2023-12-26 DIAGNOSIS — C61 MALIGNANT NEOPLASM OF PROSTATE: ICD-10-CM

## 2023-12-26 DIAGNOSIS — Z90.01 ACQUIRED ABSENCE OF EYE: Chronic | ICD-10-CM

## 2023-12-26 DIAGNOSIS — Z80.8 FAMILY HISTORY OF MALIGNANT NEOPLASM OF OTHER ORGANS OR SYSTEMS: ICD-10-CM

## 2023-12-26 DIAGNOSIS — Z90.49 ACQUIRED ABSENCE OF OTHER SPECIFIED PARTS OF DIGESTIVE TRACT: Chronic | ICD-10-CM

## 2023-12-26 DIAGNOSIS — Z80.3 FAMILY HISTORY OF MALIGNANT NEOPLASM OF BREAST: ICD-10-CM

## 2023-12-26 DIAGNOSIS — Z98.890 OTHER SPECIFIED POSTPROCEDURAL STATES: Chronic | ICD-10-CM

## 2023-12-26 LAB
HCT VFR BLD CALC: 41 %
HGB BLD-MCNC: 13.5 G/DL
MCHC RBC-ENTMCNC: 29.3 PG
MCHC RBC-ENTMCNC: 32.9 G/DL
MCV RBC AUTO: 88.9 FL
PLATELET # BLD AUTO: 240 K/UL
PMV BLD: 9.1 FL
RBC # BLD: 4.61 M/UL
RBC # FLD: 12.8 %
WBC # FLD AUTO: 6.34 K/UL

## 2023-12-26 PROCEDURE — 80048 BASIC METABOLIC PNL TOTAL CA: CPT

## 2023-12-26 PROCEDURE — 99205 OFFICE O/P NEW HI 60 MIN: CPT

## 2023-12-26 PROCEDURE — 85610 PROTHROMBIN TIME: CPT

## 2023-12-26 PROCEDURE — 83550 IRON BINDING TEST: CPT

## 2023-12-26 PROCEDURE — 85730 THROMBOPLASTIN TIME PARTIAL: CPT

## 2023-12-26 PROCEDURE — 83540 ASSAY OF IRON: CPT

## 2023-12-26 PROCEDURE — 82105 ALPHA-FETOPROTEIN SERUM: CPT

## 2023-12-26 PROCEDURE — 85027 COMPLETE CBC AUTOMATED: CPT

## 2023-12-26 PROCEDURE — 80076 HEPATIC FUNCTION PANEL: CPT

## 2023-12-26 PROCEDURE — 84704 HCG FREE BETACHAIN TEST: CPT

## 2023-12-26 PROCEDURE — 82728 ASSAY OF FERRITIN: CPT

## 2023-12-26 PROCEDURE — 83615 LACTATE (LD) (LDH) ENZYME: CPT

## 2023-12-26 RX ORDER — PROCHLORPERAZINE MALEATE 10 MG/1
10 TABLET ORAL EVERY 6 HOURS
Qty: 120 | Refills: 1 | Status: ACTIVE | COMMUNITY
Start: 2023-12-26 | End: 1900-01-01

## 2023-12-26 RX ORDER — ONDANSETRON 8 MG/1
8 TABLET ORAL
Qty: 90 | Refills: 2 | Status: ACTIVE | COMMUNITY
Start: 2023-12-26 | End: 1900-01-01

## 2023-12-26 RX ORDER — APREPITANT 125MG-80MG
80 & 125 KIT ORAL
Qty: 1 | Refills: 5 | Status: ACTIVE | COMMUNITY
Start: 2023-12-26 | End: 1900-01-01

## 2023-12-26 NOTE — HISTORY OF PRESENT ILLNESS
[FreeTextEntry1] : 67 yo s/p radical orchiectomy 12/7/2023  Final Diagnosis Right testicle, right radical orchiectomy: -   Seminoma,  multifocal, 6 cm in maximum dimension, with additional 2 cm nodule. -  Tumor infiltrates tunica albuginea with extension to the surface of  inner leaflet of tunica vaginalis. -  Tumor infiltrates rete testis, epididymis and extends into the root of spermatic cord. -  Rare focus of vascular invasion identified (block 1C).   offers no post operative complaints feels well   CT scan 12/3/2023  COMPARISON CT: None. Correlation with PET/CT from 9/19/2018.  FINDINGS:  LOWER CHEST: Dictated separately. HEPATOBILIARY: Unremarkable. SPLEEN: Unremarkable. PANCREAS: Unremarkable. ADRENAL GLANDS: Right 1.5 cm adrenal nodule was present on the PET/CT however not directly compared due to artifacts and differences in slice thickness. KIDNEYS: Symmetric enhancement. No hydronephrosis. Bilateral cysts. Additional subcentimeter hypodensities, too small to characterize. ABDOMINOPELVIC NODES: Para-aortic and pericaval lymphadenopathy with largest conglomerate measuring approximately 5.3 x 3.4 cm on series 307 image 285. PELVIC ORGANS: Right scrotal fluid/hydrocele. Testicles not definitely well evaluated on CT. PERITONEUM/MESENTERY/BOWEL: No bowel obstruction, pneumatosis, pneumoperitoneum, or ascites. BONES/SOFT TISSUES: No acute osseous abnormality. OTHER: Normal caliber aorta.   IMPRESSION:  Intra-abdominal lymphadenopathy concerning for metastatic disease. Consider complete evaluation with a PET/CT. Large right hydrocele noted.  CT chest 12/3/2023  IMPRESSION:  3 mm left lower lobe pulmonary nodule of indeterminate clinical significance. Otherwise, no CT evidence of thoracic metastatic disease.  STM - wnl [Urinary Retention] : no urinary retention [Urinary Urgency] : no urinary urgency [Urinary Frequency] : no urinary frequency [Nocturia] : no nocturia [Straining] : no straining [Weak Stream] : no weak stream [Dysuria] : no dysuria [Hematuria - Gross] : no gross hematuria

## 2023-12-26 NOTE — HISTORY OF PRESENT ILLNESS
[de-identified] : Mr. MALIA GAUTHIER is a 68 year old male here today for evaluation and management of Testicular Seminoma and history of melanoma.     MALIA is a 68 year old M with PMHx including malignant melanoma of skin, HTN, hypothyroid, OA who presents to clinic to establish care, accompanied by sister at initial visit.  Patient states he went to PCP regarding testicular swelling a few months ago and was subsequently sent for additional workup including MR imaging which noted large right testicular mass.  CT imaging raised suspicion for enlarged lymphadenopathy.  He is presently feeling well with no new complaints.  Patient denies fever, chills, nausea, vomiting, dyspnea, unintentional weight loss or bleeding.  Patient reports family history of malignancy in his father (stomach, skin cancer), paternal uncle (skin cancer), maternal aunt + maternal grandmother (breast cancer).  Smokes cigars occasionally for social use.     RADIOLOGIC WORKUP  CT C/A/P (12.2.2023) IMPRESSION:3 mm left lower lobe pulmonary nodule of indeterminate clinical significance. Otherwise, no CT evidence of thoracic metastatic disease.Intra-abdominal lymphadenopathy concerning for metastatic disease. Consider complete evaluation with a PET/CT.Large right hydrocele noted. MR Pelvis (10.3.2023 - R) IMPRESSION: 1. Large heterogeneous mass, likely centered in the right testicle and extending into the right epididymis and right spermatic cord, highly suspicious for malignancy.  The tumor appears to extend beyond the testicle into the scrotal sac.  Surgical resection is advised.  2.  Large right hydrocele.  3 Limited views of the abdomen without definite evidence for metastatic disease, however, incomplete on the scrotal examination recommend complete characterization with CT chest abdomen and pelvis with contrast. US Scrotal (9.15.2023 - [facility]) IMPRESSION: Enlarged right testes with multiple masses highly suspicious for neoplasm.  Associated large right hydrocele.  On several images the mass in the upper pole of the right testes appears to be extended superiorly beyond the testicle.  Further evaluation with an MRI of the scrotum with and without contrast may be of diagnostic benefit. NM Lymphoscintigraphy (10.19.2018) Impression: 1. Definite demonstration of one sentinel lymph node uptake in  right axilla, by 5 minutes after injection.2. The overlying skin was marked in the  anterior position.3. The patient left the radiology department in good condition without any incident. 4. This is a preoperative marking for sentinel lymph node right axilla for wide excision of melanoma, right shoulder.  PET/CT WB FDG (9.19.2018) IMPRESSION: No evidence of pathologic FDG uptake.  LAB WORKUP (11.24.2023) WBC 5.25, Hgb 14.3, , Cr 0.8, eGFR 95, PSA 0.96, normal LFTs, , AFP 2.9, hCG TM 1   PATHOLOGY (see results section)   HCM Colonoscopy overdue   Upper Endoscopy never done

## 2023-12-26 NOTE — PHYSICAL EXAM
[Normal Appearance] : normal appearance [Well Groomed] : well groomed [General Appearance - In No Acute Distress] : no acute distress [Edema] : no peripheral edema [Respiration, Rhythm And Depth] : normal respiratory rhythm and effort [Exaggerated Use Of Accessory Muscles For Inspiration] : no accessory muscle use [Abdomen Soft] : soft [Abdomen Tenderness] : non-tender [Costovertebral Angle Tenderness] : no ~M costovertebral angle tenderness [Urinary Bladder Findings] : the bladder was normal on palpation [Normal Station and Gait] : the gait and station were normal for the patient's age [] : no rash [No Focal Deficits] : no focal deficits [Oriented To Time, Place, And Person] : oriented to person, place, and time [Affect] : the affect was normal [Mood] : the mood was normal [No Palpable Adenopathy] : no palpable adenopathy [de-identified] : right inguinal incision - well healed

## 2023-12-26 NOTE — PHYSICAL EXAM
[Restricted in physically strenuous activity but ambulatory and able to carry out work of a light or sedentary nature] : Status 1- Restricted in physically strenuous activity but ambulatory and able to carry out work of a light or sedentary nature, e.g., light house work, office work [Normal] : affect appropriate [Obese] : obese [de-identified] : wearing glasses  [de-identified] : taoos

## 2023-12-26 NOTE — ASSESSMENT
[FreeTextEntry1] : 69 yo with Stage IIC - pending brain imaging - Seminoma the pathology was reviewed the CT scan was reviewed  discussed the findings in detail discussed clearly the need for medical oncology assessment all questions answered   - I reviewed the pathology and CT scan in detail - I outlined the plan of care in detail -  f/u in 6 months

## 2023-12-26 NOTE — ASSESSMENT
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  Written information provided.  Anti-emetics (zofran + compazine) prescribed to pharmacy on file, as well as Emend which was recommended to be taken with every cycle.   - MR Brain ordered to complete initial staging, Rx given  - CT Chest with IVC ordered to complete staging, Rx given - refer to ENT for baseline audiogram prior to chemotherapy using Cisplatin; E-referral in system - refer to PULM for baseline PFTs prior to chemotherapy using Bleomycin; E-referral in system  - Faxed script from [employer] and most recent office note to Interventional Radiology at [phone] and [employer] form submitted on 12/26/2023 for port insertion for chemotherapy.  Tasked Navigators for assistance with scheduling + Rx given.  - Labwork today: CBC, BMP, LFTs, LDH, hCG TM, AFP, PT/INR, aPTT, iron studies, ferritin level   # History of Melanoma of skin, right shoulder, dx 10/2018  - requested to obtain records including surgical pathology for our review  - followup with DERM for surveillance skin exams at least every 6-12 months   Encouraged to keep up to date with age appropriate screenings including but not limited to colonoscopy and upper endoscopy.  RTC in 1-2 weeks for further discussion  seen/ examined w/ NP Demetra; note reviewed; case discussed; agree w/ plan 69 yo man with ECOG 0-1 has seminoma, good risk, [id number], stage IIC at this time 1. explained the diagnosis 2. explained the extent and the goal of therapy is cure 3. need to complete the staging : MRI DEYANIRA W/WO IVC and CT CHEST IVC 4. if stage IIC is confirmed, given the current assessment he would need THREE cycles of BEP or 4 cycles of EP - refer to PFT ASAP - audiogram - sperm banking is deferred - port placement by IR side effects of chemo expalined:  Plan NOT to utilize growth factor support plan NOT to exchange cisplatin for carboplatin plan NOT to decrease the dose of medication or change the schedule

## 2023-12-26 NOTE — REASON FOR VISIT
[Initial Consultation] : an initial consultation [Other: _____] : [unfilled] [FreeTextEntry2] : Testicular Seminoma, history of Melanoma

## 2023-12-26 NOTE — LETTER BODY
[Dear  ___] : Dear  [unfilled], [Consult Letter:] : I had the pleasure of evaluating your patient, [unfilled]. [Please see my note below.] : Please see my note below. [Sincerely,] : Sincerely, [FreeTextEntry3] : Florentino Dixon MD, FACS

## 2023-12-26 NOTE — CONSULT LETTER
[Dear  ___] : Dear  [unfilled], [Please see my note below.] : Please see my note below. [Sincerely,] : Sincerely, [FreeTextEntry3] : Demetrio Stokes NP

## 2023-12-27 DIAGNOSIS — C61 MALIGNANT NEOPLASM OF PROSTATE: ICD-10-CM

## 2023-12-27 LAB
AFP-TM SERPL-MCNC: 2.9 NG/ML
ALBUMIN SERPL ELPH-MCNC: 4 G/DL
ALP BLD-CCNC: 94 U/L
ALT SERPL-CCNC: 9 U/L
ANION GAP SERPL CALC-SCNC: 9 MMOL/L
APTT BLD: 32.7 SEC
AST SERPL-CCNC: 14 U/L
BILIRUB DIRECT SERPL-MCNC: <0.2 MG/DL
BILIRUB INDIRECT SERPL-MCNC: NORMAL MG/DL
BILIRUB SERPL-MCNC: 0.2 MG/DL
BUN SERPL-MCNC: 17 MG/DL
CALCIUM SERPL-MCNC: 9.6 MG/DL
CHLORIDE SERPL-SCNC: 105 MMOL/L
CO2 SERPL-SCNC: 28 MMOL/L
CREAT SERPL-MCNC: 0.8 MG/DL
EGFR: 95 ML/MIN/1.73M2
FERRITIN SERPL-MCNC: 214 NG/ML
GLUCOSE SERPL-MCNC: 90 MG/DL
HCG-TM SERPL-MCNC: <1 MIU/ML
INR PPP: 0.97 RATIO
IRON SATN MFR SERPL: 18 %
IRON SERPL-MCNC: 47 UG/DL
LDH SERPL-CCNC: 156 U/L
POTASSIUM SERPL-SCNC: 4.8 MMOL/L
PROT SERPL-MCNC: 7 G/DL
PT BLD: 11.1 SEC
SODIUM SERPL-SCNC: 142 MMOL/L
TIBC SERPL-MCNC: 266 UG/DL
UIBC SERPL-MCNC: 219 UG/DL

## 2023-12-28 ENCOUNTER — APPOINTMENT (OUTPATIENT)
Dept: OTOLARYNGOLOGY | Facility: CLINIC | Age: 68
End: 2023-12-28
Payer: MEDICARE

## 2023-12-28 ENCOUNTER — NON-APPOINTMENT (OUTPATIENT)
Age: 68
End: 2023-12-28

## 2023-12-28 VITALS — BODY MASS INDEX: 36.62 KG/M2 | WEIGHT: 270 LBS

## 2023-12-28 DIAGNOSIS — H93.8X3 OTHER SPECIFIED DISORDERS OF EAR, BILATERAL: ICD-10-CM

## 2023-12-28 DIAGNOSIS — H90.3 SENSORINEURAL HEARING LOSS, BILATERAL: ICD-10-CM

## 2023-12-28 DIAGNOSIS — H61.23 IMPACTED CERUMEN, BILATERAL: ICD-10-CM

## 2023-12-28 PROCEDURE — 92550 TYMPANOMETRY & REFLEX THRESH: CPT

## 2023-12-28 PROCEDURE — 92557 COMPREHENSIVE HEARING TEST: CPT

## 2023-12-28 PROCEDURE — 92588 EVOKED AUDITORY TST COMPLETE: CPT

## 2023-12-28 PROCEDURE — G0268 REMOVAL OF IMPACTED WAX MD: CPT

## 2023-12-28 PROCEDURE — 99203 OFFICE O/P NEW LOW 30 MIN: CPT | Mod: 25

## 2023-12-28 NOTE — PHYSICAL EXAM
[Normal] : mucosa is normal [Midline] : trachea located in midline position [de-identified] : B/L cerumen impaction removed with curette

## 2023-12-28 NOTE — HISTORY OF PRESENT ILLNESS
[de-identified] : Patient presents today c/o hearing test and clearance for Chemotherapy, bilateral clogged ears. Denies previous exposure to loud noises or ear injury. C/o of L ear ringing. Denies further hearing changes.

## 2023-12-28 NOTE — ASSESSMENT
[FreeTextEntry1] : Audio reviewed showing asymmetric SNHL May be noise related, but needs MRI IAC to rule out intracranial process. Ordered by Dr. Atwood and scheduled for January, will follow up after Okay for cisplatin

## 2023-12-28 NOTE — REASON FOR VISIT
[Initial Evaluation] : an initial evaluation for [FreeTextEntry2] : hearing test and clearance for Chemotherapy  , bilateral clogged ears,

## 2024-01-03 ENCOUNTER — OUTPATIENT (OUTPATIENT)
Dept: OUTPATIENT SERVICES | Facility: HOSPITAL | Age: 69
LOS: 1 days | Discharge: HOME | End: 2024-01-03
Payer: MEDICARE

## 2024-01-03 ENCOUNTER — RESULT REVIEW (OUTPATIENT)
Age: 69
End: 2024-01-03

## 2024-01-03 ENCOUNTER — TRANSCRIPTION ENCOUNTER (OUTPATIENT)
Age: 69
End: 2024-01-03

## 2024-01-03 VITALS
TEMPERATURE: 98 F | SYSTOLIC BLOOD PRESSURE: 151 MMHG | DIASTOLIC BLOOD PRESSURE: 70 MMHG | RESPIRATION RATE: 16 BRPM | HEART RATE: 65 BPM | OXYGEN SATURATION: 97 %

## 2024-01-03 VITALS
OXYGEN SATURATION: 99 % | DIASTOLIC BLOOD PRESSURE: 59 MMHG | RESPIRATION RATE: 18 BRPM | SYSTOLIC BLOOD PRESSURE: 115 MMHG | TEMPERATURE: 98 F | HEART RATE: 66 BPM

## 2024-01-03 DIAGNOSIS — Z90.01 ACQUIRED ABSENCE OF EYE: Chronic | ICD-10-CM

## 2024-01-03 DIAGNOSIS — Z98.890 OTHER SPECIFIED POSTPROCEDURAL STATES: Chronic | ICD-10-CM

## 2024-01-03 DIAGNOSIS — C62.90 MALIGNANT NEOPLASM OF UNSPECIFIED TESTIS, UNSPECIFIED WHETHER DESCENDED OR UNDESCENDED: ICD-10-CM

## 2024-01-03 DIAGNOSIS — Z90.49 ACQUIRED ABSENCE OF OTHER SPECIFIED PARTS OF DIGESTIVE TRACT: Chronic | ICD-10-CM

## 2024-01-03 DIAGNOSIS — C43.9 MALIGNANT MELANOMA OF SKIN, UNSPECIFIED: ICD-10-CM

## 2024-01-03 DIAGNOSIS — Z45.2 ENCOUNTER FOR ADJUSTMENT AND MANAGEMENT OF VASCULAR ACCESS DEVICE: ICD-10-CM

## 2024-01-03 PROCEDURE — 76937 US GUIDE VASCULAR ACCESS: CPT

## 2024-01-03 PROCEDURE — 76937 US GUIDE VASCULAR ACCESS: CPT | Mod: 26

## 2024-01-03 PROCEDURE — 36561 INSERT TUNNELED CV CATH: CPT | Mod: RT

## 2024-01-03 PROCEDURE — 77001 FLUOROGUIDE FOR VEIN DEVICE: CPT | Mod: 26

## 2024-01-03 PROCEDURE — 77001 FLUOROGUIDE FOR VEIN DEVICE: CPT

## 2024-01-03 PROCEDURE — 36561 INSERT TUNNELED CV CATH: CPT

## 2024-01-03 PROCEDURE — C1769: CPT

## 2024-01-03 PROCEDURE — C1788: CPT

## 2024-01-03 RX ORDER — VANCOMYCIN HCL 1 G
1000 VIAL (EA) INTRAVENOUS ONCE
Refills: 0 | Status: DISCONTINUED | OUTPATIENT
Start: 2024-01-03 | End: 2024-01-03

## 2024-01-03 NOTE — PROGRESS NOTE ADULT - SUBJECTIVE AND OBJECTIVE BOX
INTERVENTIONAL RADIOLOGY BRIEF PROCEDURE NOTE    Procedure: Image guided right chest port placement  Pre-op diagnosis: Melanoma, seminoma  Post-op diagnosis: Same  Attending: Kvng Johnson MD  Resident: Damion Hamilton MD    Anesthesia:  [ ] General anesthesia  [x] Deep sedation  [ ] Conscious sedation  [x] Local    Total face-to-face sedation time: See separate anesthesia record.  Total sedation: Provided by anesthesiology  Contrast: None  Estimated blood loss: Minimal    Condition:   [ ] Critical  [ ] Serious  [ ] Fair   [x] Good    Findings: Right chest port placed via right IJ access. Closed with 3-0 Vicryl, dermabond, steristrips.  Specimens removed: As above.  Implants: As above.  Complications: None immediate.  Disposition: Back to IR recovery then home.    Please call Interventional Radiology [phone] (M-F, until 5PM) or [phone] (all other hours) if questions.        INTERVENTIONAL RADIOLOGY BRIEF PROCEDURE NOTE    Procedure: Image guided right chest port placement  Pre-op diagnosis: Melanoma, seminoma  Post-op diagnosis: Same  Attending: Kvng Johnson MD  Resident: Damion Hamilton MD    Anesthesia:  [ ] General anesthesia  [x] Deep sedation  [ ] Conscious sedation  [x] Local    Total face-to-face sedation time: See separate anesthesia record.  Total sedation: Provided by anesthesiology  Contrast: None  Estimated blood loss: Minimal    Condition:   [ ] Critical  [ ] Serious  [ ] Fair   [x] Good    Findings: Right chest port placed via right IJ access. Closed with 3-0 Vicryl, dermabond, steristrips.  Specimens removed: As above.  Implants: As above.  Complications: None immediate.  Disposition: Back to IR recovery then home.    Please call Interventional Radiology [phone] (M-F, until 5PM) or [phone] (all other hours) if questions.

## 2024-01-03 NOTE — ASU DISCHARGE PLAN (ADULT/PEDIATRIC) - ASU DC SPECIAL INSTRUCTIONSFT
Do not pick at skin glue or steristrips (white tape); let them fall off on their own. We will contact you with an appointment date/time.

## 2024-01-03 NOTE — PROGRESS NOTE ADULT - SUBJECTIVE AND OBJECTIVE BOX
PREOPERATIVE DAY OF PROCEDURE EVALUATION:     I have personally seen and examined this patient. I agree with the history and physical which I have reviewed and noted any changes below:     Plan is for imgae guided right port catheter placement with conscious sedation / anesthesia today 1/3  H+P from [place] outpatient visit: 12/28   all changes noted in ASU patient profile 1/3    Procedure/ risks/ benefits/ goals/ alternatives were explained. All questions answered. Informed content obtained from patient. Consent placed in chart.  PREOPERATIVE DAY OF PROCEDURE EVALUATION:     I have personally seen and examined this patient. I agree with the history and physical which I have reviewed and noted any changes below:     Plan is for imgae guided right port catheter placement with conscious sedation / anesthesia today 1/3  H+P from [place] outpatient visit: 12/28   all changes noted in ASU patient profile 1/3    Procedure/ risks/ benefits/ goals/ alternatives were explained. All questions answered. Informed content obtained from patient. Consent placed in chart.  PREOPERATIVE DAY OF PROCEDURE EVALUATION:     I have personally seen and examined this patient. I agree with the history and physical which I have reviewed and noted any changes below:     Plan is for imgae guided right port catheter placement with conscious sedation / anesthesia today 1/3  H+P from [place] outpatient visit: 12/28   all changes noted in ASU patient profile 1/3    Procedure/ risks/ benefits/ goals/ alternatives were explained. All questions answered. Informed content obtained from patient. Consent placed in chart..  PREOPERATIVE DAY OF PROCEDURE EVALUATION:     I have personally seen and examined this patient. I agree with the history and physical which I have reviewed and noted any changes below:     Plan is for imgae guided right port catheter placement with conscious sedation / anesthesia today 1/3  H+P from [place] outpatient visit: 12/28   all changes noted in ASU patient profile 1/3    Procedure/ risks/ benefits/ goals/ alternatives were explained. All questions answered. Informed content obtained from patient. Consent placed in chart..

## 2024-01-03 NOTE — ASU DISCHARGE PLAN (ADULT/PEDIATRIC) - NS MD DC FALL RISK RISK
For information on Fall & Injury Prevention, visit: https://www.[facility]/[email or web address]-prevention-protects-and-maintains-health-and-mobility OR  https://www.[facility]/news/fall-prevention-tips-[email or web address]-injury OR  https://www.cdc.gov/steadi/patient.html For information on Fall & Injury Prevention, visit: https://www.Health system.[facility]/news/fall-prevention-protects-and-maintains-health-and-mobility OR  https://www.Health [email or web address]/news/fall-prevention-tips-to-avoid-injury OR  https://www.cdc.gov/steadi/patient.html

## 2024-01-03 NOTE — ASU PATIENT PROFILE, ADULT - FALL HARM RISK - HARM RISK INTERVENTIONS
Communicate Risk of Fall with Harm to all staff/Reinforce activity limits and safety measures with patient and family/Tailored Fall Risk Interventions/Visual Cue: Yellow wristband and red socks/Bed in lowest position, wheels locked, appropriate side rails in place/Call bell, personal items and telephone in reach/Instruct patient to call for assistance before getting out of bed or chair/Non-slip footwear when patient is out of bed/[place] to call system/Physically safe environment - no spills, clutter or unnecessary equipment/Purposeful Proactive Rounding/Room/bathroom lighting operational, light cord in reach Communicate Risk of Fall with Harm to all staff/Reinforce activity limits and safety measures with patient and family/Tailored Fall Risk Interventions/Visual Cue: Yellow wristband and red socks/Bed in lowest position, wheels locked, appropriate side rails in place/Call bell, personal items and telephone in reach/Instruct patient to call for assistance before getting out of bed or chair/Non-slip footwear when patient is out of bed/[place] to call system/Physically safe environment - no spills, clutter or unnecessary equipment/Purposeful Proactive Rounding/Room/bathroom lighting operational, light cord in reach

## 2024-01-05 DIAGNOSIS — C62.90 MALIGNANT NEOPLASM OF UNSPECIFIED TESTIS, UNSPECIFIED WHETHER DESCENDED OR UNDESCENDED: ICD-10-CM

## 2024-01-09 ENCOUNTER — OUTPATIENT (OUTPATIENT)
Dept: OUTPATIENT SERVICES | Facility: HOSPITAL | Age: 69
LOS: 1 days | End: 2024-01-09
Payer: MEDICARE

## 2024-01-09 ENCOUNTER — RESULT REVIEW (OUTPATIENT)
Age: 69
End: 2024-01-09

## 2024-01-09 DIAGNOSIS — Z90.01 ACQUIRED ABSENCE OF EYE: Chronic | ICD-10-CM

## 2024-01-09 DIAGNOSIS — Z90.49 ACQUIRED ABSENCE OF OTHER SPECIFIED PARTS OF DIGESTIVE TRACT: Chronic | ICD-10-CM

## 2024-01-09 DIAGNOSIS — Z00.8 ENCOUNTER FOR OTHER GENERAL EXAMINATION: ICD-10-CM

## 2024-01-09 DIAGNOSIS — N50.89 OTHER SPECIFIED DISORDERS OF THE MALE GENITAL ORGANS: ICD-10-CM

## 2024-01-09 PROCEDURE — 71260 CT THORAX DX C+: CPT

## 2024-01-09 PROCEDURE — 74177 CT ABD & PELVIS W/CONTRAST: CPT | Mod: 26

## 2024-01-09 PROCEDURE — 74177 CT ABD & PELVIS W/CONTRAST: CPT

## 2024-01-09 PROCEDURE — 71260 CT THORAX DX C+: CPT | Mod: 26

## 2024-01-10 ENCOUNTER — APPOINTMENT (OUTPATIENT)
Dept: PULMONOLOGY | Facility: HOSPITAL | Age: 69
End: 2024-01-10
Payer: MEDICARE

## 2024-01-10 ENCOUNTER — OUTPATIENT (OUTPATIENT)
Dept: OUTPATIENT SERVICES | Facility: HOSPITAL | Age: 69
LOS: 1 days | End: 2024-01-10
Payer: MEDICARE

## 2024-01-10 DIAGNOSIS — Z98.890 OTHER SPECIFIED POSTPROCEDURAL STATES: Chronic | ICD-10-CM

## 2024-01-10 DIAGNOSIS — N50.89 OTHER SPECIFIED DISORDERS OF THE MALE GENITAL ORGANS: ICD-10-CM

## 2024-01-10 DIAGNOSIS — C62.90 MALIGNANT NEOPLASM OF UNSPECIFIED TESTIS, UNSPECIFIED WHETHER DESCENDED OR UNDESCENDED: ICD-10-CM

## 2024-01-10 DIAGNOSIS — Z90.49 ACQUIRED ABSENCE OF OTHER SPECIFIED PARTS OF DIGESTIVE TRACT: Chronic | ICD-10-CM

## 2024-01-10 DIAGNOSIS — Z90.01 ACQUIRED ABSENCE OF EYE: Chronic | ICD-10-CM

## 2024-01-10 PROCEDURE — 94727 GAS DIL/WSHOT DETER LNG VOL: CPT | Mod: 26

## 2024-01-10 PROCEDURE — 94664 DEMO&/EVAL PT USE INHALER: CPT

## 2024-01-10 PROCEDURE — 94060 EVALUATION OF WHEEZING: CPT | Mod: 26

## 2024-01-10 PROCEDURE — 94729 DIFFUSING CAPACITY: CPT | Mod: 26

## 2024-01-10 PROCEDURE — 94070 EVALUATION OF WHEEZING: CPT

## 2024-01-10 PROCEDURE — 94726 PLETHYSMOGRAPHY LUNG VOLUMES: CPT

## 2024-01-10 PROCEDURE — 94729 DIFFUSING CAPACITY: CPT

## 2024-01-11 DIAGNOSIS — C62.90 MALIGNANT NEOPLASM OF UNSPECIFIED TESTIS, UNSPECIFIED WHETHER DESCENDED OR UNDESCENDED: ICD-10-CM

## 2024-01-12 ENCOUNTER — OUTPATIENT (OUTPATIENT)
Dept: OUTPATIENT SERVICES | Facility: HOSPITAL | Age: 69
LOS: 1 days | End: 2024-01-12
Payer: MEDICARE

## 2024-01-12 ENCOUNTER — RESULT REVIEW (OUTPATIENT)
Age: 69
End: 2024-01-12

## 2024-01-12 ENCOUNTER — APPOINTMENT (OUTPATIENT)
Dept: HEMATOLOGY ONCOLOGY | Facility: CLINIC | Age: 69
End: 2024-01-12
Payer: MEDICARE

## 2024-01-12 VITALS
TEMPERATURE: 97.9 F | SYSTOLIC BLOOD PRESSURE: 148 MMHG | WEIGHT: 267 LBS | HEIGHT: 72 IN | HEART RATE: 71 BPM | DIASTOLIC BLOOD PRESSURE: 76 MMHG | RESPIRATION RATE: 16 BRPM | BODY MASS INDEX: 36.16 KG/M2

## 2024-01-12 DIAGNOSIS — Z90.01 ACQUIRED ABSENCE OF EYE: Chronic | ICD-10-CM

## 2024-01-12 DIAGNOSIS — Z98.890 OTHER SPECIFIED POSTPROCEDURAL STATES: Chronic | ICD-10-CM

## 2024-01-12 DIAGNOSIS — C62.90 MALIGNANT NEOPLASM OF UNSPECIFIED TESTIS, UNSPECIFIED WHETHER DESCENDED OR UNDESCENDED: ICD-10-CM

## 2024-01-12 DIAGNOSIS — Z00.8 ENCOUNTER FOR OTHER GENERAL EXAMINATION: ICD-10-CM

## 2024-01-12 DIAGNOSIS — Z90.49 ACQUIRED ABSENCE OF OTHER SPECIFIED PARTS OF DIGESTIVE TRACT: Chronic | ICD-10-CM

## 2024-01-12 PROCEDURE — A9579: CPT

## 2024-01-12 PROCEDURE — 70553 MRI BRAIN STEM W/O & W/DYE: CPT | Mod: 26

## 2024-01-12 PROCEDURE — 99215 OFFICE O/P EST HI 40 MIN: CPT

## 2024-01-12 PROCEDURE — 70553 MRI BRAIN STEM W/O & W/DYE: CPT

## 2024-01-12 NOTE — REASON FOR VISIT
[Follow-Up Visit] : a follow-up [Other: _____] : [unfilled] [FreeTextEntry2] : Testicular Seminoma, history of Melanoma

## 2024-01-12 NOTE — ASSESSMENT
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  - followed by ENT; baseline audiogram completed 12.28.2023 prior to chemotherapy using Cisplatin - followed by PULM; baseline PFTs completed since we were considering using Bleomycin but opted to avoid  - MR Brain ordered to complete initial staging, performed this AM (not yet resulted) - s/p Right chest port placed via right IJ access with IR on 1.3.2024 - Discussed risks of chemotherapy using etoposide/cisplatin including but not limited to fatigue, nausea, vomiting, diarrhea, elevated liver enzymes, loss of appetite, mucositis, hair loss, neuropathy, allergic reactions, cytopenias, and infection to name a few.  Written information provided.  Anti-emetics (zofran + compazine) prescribed to pharmacy on file, as well as Emend which was recommended to be taken with every cycle.    Consent obtained.    # History of Melanoma of skin, right shoulder, dx 10/2018  - requested to obtain records including surgical pathology for our review  - followup with DERM for surveillance skin exams at least every 6-12 months   Encouraged to keep up to date with age appropriate screenings including but not limited to colonoscopy and upper endoscopy.  RTC in 3 weeks with CBC, BMP, LFTs, AFP, HCG (tumor marker), Mg level 2 days prior   seen/ examined w/ NP [facility]; note reviewed; case discussed; agree w/ plan  1. diagnosis:  testicular seminoma: pT3, cN2m, likely Stage IIC. An evaluation of M status is not completed and MRI brain w/wo IVC is done 1/12/24 and not read: contacted neuro radiology to expedite the read; so far, pt has GOOD risk disease and  the goal of treatment is to cure - MRI brain pending - PFT read pending - ENT eval of hearing is pending MRI report 2. the choice of systemic therapy is between BEP x3 vs EP x4; given the pt's stage and age, and the concern over pulmonary side effects, agreed to avoid using bleomycin and proceed with etoposide/ cisplatin for 4 cycles; supposed to start 1/15/24

## 2024-01-12 NOTE — PHYSICAL EXAM
[Restricted in physically strenuous activity but ambulatory and able to carry out work of a light or sedentary nature] : Status 1- Restricted in physically strenuous activity but ambulatory and able to carry out work of a light or sedentary nature, e.g., light house work, office work [Obese] : obese [Normal] : affect appropriate [de-identified] : wearing glasses  [de-identified] : tattoos ; s/p Right chest port placed via right IJ access on 1.3.2024

## 2024-01-13 DIAGNOSIS — C62.90 MALIGNANT NEOPLASM OF UNSPECIFIED TESTIS, UNSPECIFIED WHETHER DESCENDED OR UNDESCENDED: ICD-10-CM

## 2024-01-15 ENCOUNTER — NON-APPOINTMENT (OUTPATIENT)
Age: 69
End: 2024-01-15

## 2024-01-15 ENCOUNTER — OUTPATIENT (OUTPATIENT)
Dept: OUTPATIENT SERVICES | Facility: HOSPITAL | Age: 69
LOS: 1 days | End: 2024-01-15
Payer: MEDICARE

## 2024-01-15 ENCOUNTER — LABORATORY RESULT (OUTPATIENT)
Age: 69
End: 2024-01-15

## 2024-01-15 ENCOUNTER — APPOINTMENT (OUTPATIENT)
Dept: INFUSION THERAPY | Facility: CLINIC | Age: 69
End: 2024-01-15

## 2024-01-15 VITALS
HEART RATE: 67 BPM | RESPIRATION RATE: 13 BRPM | TEMPERATURE: 98 F | SYSTOLIC BLOOD PRESSURE: 146 MMHG | DIASTOLIC BLOOD PRESSURE: 67 MMHG

## 2024-01-15 VITALS — WEIGHT: 267.64 LBS | BODY MASS INDEX: 36.25 KG/M2 | HEIGHT: 72 IN

## 2024-01-15 DIAGNOSIS — C62.90 MALIGNANT NEOPLASM OF UNSPECIFIED TESTIS, UNSPECIFIED WHETHER DESCENDED OR UNDESCENDED: ICD-10-CM

## 2024-01-15 DIAGNOSIS — Z90.49 ACQUIRED ABSENCE OF OTHER SPECIFIED PARTS OF DIGESTIVE TRACT: Chronic | ICD-10-CM

## 2024-01-15 DIAGNOSIS — Z90.01 ACQUIRED ABSENCE OF EYE: Chronic | ICD-10-CM

## 2024-01-15 LAB
ALBUMIN SERPL ELPH-MCNC: 3.9 G/DL
ALP BLD-CCNC: 87 U/L
ALT SERPL-CCNC: 10 U/L
ANION GAP SERPL CALC-SCNC: 9 MMOL/L
AST SERPL-CCNC: 15 U/L
BILIRUB DIRECT SERPL-MCNC: <0.2 MG/DL
BILIRUB INDIRECT SERPL-MCNC: >0.3 MG/DL
BILIRUB SERPL-MCNC: 0.5 MG/DL
BUN SERPL-MCNC: 15 MG/DL
CALCIUM SERPL-MCNC: 9.2 MG/DL
CHLORIDE SERPL-SCNC: 106 MMOL/L
CO2 SERPL-SCNC: 26 MMOL/L
CREAT SERPL-MCNC: 0.8 MG/DL
EGFR: 95 ML/MIN/1.73M2
GLUCOSE SERPL-MCNC: 93 MG/DL
HCG SERPL-MCNC: <1 MIU/ML
HCT VFR BLD CALC: 38.8 %
HGB BLD-MCNC: 13.5 G/DL
LDH SERPL-CCNC: 163 U/L
MAGNESIUM SERPL-MCNC: 1.7 MG/DL
MCHC RBC-ENTMCNC: 30.5 PG
MCHC RBC-ENTMCNC: 34.8 G/DL
MCV RBC AUTO: 87.6 FL
PHOSPHATE SERPL-MCNC: 3.6 MG/DL
PLATELET # BLD AUTO: 156 K/UL
PMV BLD: 10.4 FL
POTASSIUM SERPL-SCNC: 4.3 MMOL/L
PROT SERPL-MCNC: 6.6 G/DL
RBC # BLD: 4.43 M/UL
RBC # FLD: 13 %
SODIUM SERPL-SCNC: 141 MMOL/L
WBC # FLD AUTO: 6.56 K/UL

## 2024-01-15 PROCEDURE — 84702 CHORIONIC GONADOTROPIN TEST: CPT

## 2024-01-15 PROCEDURE — 82105 ALPHA-FETOPROTEIN SERUM: CPT

## 2024-01-15 PROCEDURE — 96361 HYDRATE IV INFUSION ADD-ON: CPT

## 2024-01-15 PROCEDURE — 96375 TX/PRO/DX INJ NEW DRUG ADDON: CPT

## 2024-01-15 PROCEDURE — 83615 LACTATE (LD) (LDH) ENZYME: CPT

## 2024-01-15 PROCEDURE — 83735 ASSAY OF MAGNESIUM: CPT

## 2024-01-15 PROCEDURE — 80048 BASIC METABOLIC PNL TOTAL CA: CPT

## 2024-01-15 PROCEDURE — 96417 CHEMO IV INFUS EACH ADDL SEQ: CPT

## 2024-01-15 PROCEDURE — 96367 TX/PROPH/DG ADDL SEQ IV INF: CPT

## 2024-01-15 PROCEDURE — 96413 CHEMO IV INFUSION 1 HR: CPT

## 2024-01-15 PROCEDURE — 80076 HEPATIC FUNCTION PANEL: CPT

## 2024-01-15 PROCEDURE — 85027 COMPLETE CBC AUTOMATED: CPT

## 2024-01-15 PROCEDURE — 36415 COLL VENOUS BLD VENIPUNCTURE: CPT

## 2024-01-15 PROCEDURE — 84100 ASSAY OF PHOSPHORUS: CPT

## 2024-01-15 RX ORDER — SODIUM CHLORIDE 9 MG/ML
1000 INJECTION, SOLUTION INTRAVENOUS
Refills: 0 | Status: COMPLETED | OUTPATIENT
Start: 2024-01-15 | End: 2024-01-15

## 2024-01-15 RX ORDER — CISPLATIN 1 MG/ML
48 INJECTION, SOLUTION INTRAVENOUS ONCE
Refills: 0 | Status: COMPLETED | OUTPATIENT
Start: 2024-01-15 | End: 2024-01-15

## 2024-01-15 RX ORDER — FAMOTIDINE 10 MG/ML
20 INJECTION INTRAVENOUS ONCE
Refills: 0 | Status: COMPLETED | OUTPATIENT
Start: 2024-01-15 | End: 2024-01-15

## 2024-01-15 RX ORDER — DIPHENHYDRAMINE HCL 50 MG
50 CAPSULE ORAL ONCE
Refills: 0 | Status: COMPLETED | OUTPATIENT
Start: 2024-01-15 | End: 2024-01-15

## 2024-01-15 RX ORDER — ETOPOSIDE 20 MG/ML
240 VIAL (ML) INTRAVENOUS ONCE
Refills: 0 | Status: COMPLETED | OUTPATIENT
Start: 2024-01-15 | End: 2024-01-15

## 2024-01-15 RX ORDER — DEXAMETHASONE 0.5 MG/5ML
12 ELIXIR ORAL ONCE
Refills: 0 | Status: COMPLETED | OUTPATIENT
Start: 2024-01-15 | End: 2024-01-15

## 2024-01-15 RX ADMIN — CISPLATIN 48 MILLIGRAM(S): 1 INJECTION, SOLUTION INTRAVENOUS at 13:10

## 2024-01-15 RX ADMIN — CISPLATIN 48 MILLIGRAM(S): 1 INJECTION, SOLUTION INTRAVENOUS at 12:54

## 2024-01-15 RX ADMIN — Medication 50 MILLIGRAM(S): at 11:00

## 2024-01-15 RX ADMIN — Medication 12 MILLIGRAM(S): at 10:20

## 2024-01-15 RX ADMIN — Medication 102 MILLIGRAM(S): at 10:14

## 2024-01-15 RX ADMIN — SODIUM CHLORIDE 1000 MILLILITER(S): 9 INJECTION, SOLUTION INTRAVENOUS at 11:00

## 2024-01-15 RX ADMIN — FAMOTIDINE 104 MILLIGRAM(S): 10 INJECTION INTRAVENOUS at 10:15

## 2024-01-15 RX ADMIN — SODIUM CHLORIDE 500 MILLILITER(S): 9 INJECTION, SOLUTION INTRAVENOUS at 12:55

## 2024-01-15 RX ADMIN — SODIUM CHLORIDE 500 MILLILITER(S): 9 INJECTION, SOLUTION INTRAVENOUS at 09:00

## 2024-01-15 RX ADMIN — Medication 122 MILLIGRAM(S): at 10:15

## 2024-01-15 RX ADMIN — Medication 240 MILLIGRAM(S): at 12:38

## 2024-01-15 RX ADMIN — FAMOTIDINE 20 MILLIGRAM(S): 10 INJECTION INTRAVENOUS at 10:40

## 2024-01-15 RX ADMIN — Medication 240 MILLIGRAM(S): at 11:37

## 2024-01-15 RX ADMIN — SODIUM CHLORIDE 1000 MILLILITER(S): 9 INJECTION, SOLUTION INTRAVENOUS at 15:15

## 2024-01-16 ENCOUNTER — APPOINTMENT (OUTPATIENT)
Dept: INFUSION THERAPY | Facility: CLINIC | Age: 69
End: 2024-01-16

## 2024-01-16 ENCOUNTER — OUTPATIENT (OUTPATIENT)
Dept: OUTPATIENT SERVICES | Facility: HOSPITAL | Age: 69
LOS: 1 days | End: 2024-01-16
Payer: MEDICARE

## 2024-01-16 DIAGNOSIS — C62.90 MALIGNANT NEOPLASM OF UNSPECIFIED TESTIS, UNSPECIFIED WHETHER DESCENDED OR UNDESCENDED: ICD-10-CM

## 2024-01-16 DIAGNOSIS — Z90.01 ACQUIRED ABSENCE OF EYE: Chronic | ICD-10-CM

## 2024-01-16 DIAGNOSIS — Z90.49 ACQUIRED ABSENCE OF OTHER SPECIFIED PARTS OF DIGESTIVE TRACT: Chronic | ICD-10-CM

## 2024-01-16 DIAGNOSIS — Z98.890 OTHER SPECIFIED POSTPROCEDURAL STATES: Chronic | ICD-10-CM

## 2024-01-16 LAB — AFP-TM SERPL-MCNC: 2.6 NG/ML

## 2024-01-16 PROCEDURE — 96417 CHEMO IV INFUS EACH ADDL SEQ: CPT

## 2024-01-16 PROCEDURE — 96413 CHEMO IV INFUSION 1 HR: CPT

## 2024-01-16 PROCEDURE — 96367 TX/PROPH/DG ADDL SEQ IV INF: CPT

## 2024-01-16 PROCEDURE — 96361 HYDRATE IV INFUSION ADD-ON: CPT

## 2024-01-16 RX ORDER — SODIUM CHLORIDE 9 MG/ML
1000 INJECTION, SOLUTION INTRAVENOUS
Refills: 0 | Status: COMPLETED | OUTPATIENT
Start: 2024-01-16 | End: 2024-01-16

## 2024-01-16 RX ORDER — DEXAMETHASONE 0.5 MG/5ML
12 ELIXIR ORAL ONCE
Refills: 0 | Status: COMPLETED | OUTPATIENT
Start: 2024-01-16 | End: 2024-01-16

## 2024-01-16 RX ORDER — DIPHENHYDRAMINE HCL 50 MG
50 CAPSULE ORAL ONCE
Refills: 0 | Status: COMPLETED | OUTPATIENT
Start: 2024-01-16 | End: 2024-01-16

## 2024-01-16 RX ORDER — FAMOTIDINE 10 MG/ML
20 INJECTION INTRAVENOUS ONCE
Refills: 0 | Status: COMPLETED | OUTPATIENT
Start: 2024-01-16 | End: 2024-01-16

## 2024-01-16 RX ORDER — CISPLATIN 1 MG/ML
48 INJECTION, SOLUTION INTRAVENOUS ONCE
Refills: 0 | Status: COMPLETED | OUTPATIENT
Start: 2024-01-16 | End: 2024-01-16

## 2024-01-16 RX ORDER — ETOPOSIDE 20 MG/ML
240 VIAL (ML) INTRAVENOUS ONCE
Refills: 0 | Status: COMPLETED | OUTPATIENT
Start: 2024-01-16 | End: 2024-01-16

## 2024-01-16 RX ADMIN — Medication 240 MILLIGRAM(S): at 13:10

## 2024-01-16 RX ADMIN — FAMOTIDINE 104 MILLIGRAM(S): 10 INJECTION INTRAVENOUS at 11:31

## 2024-01-16 RX ADMIN — SODIUM CHLORIDE 500 MILLILITER(S): 9 INJECTION, SOLUTION INTRAVENOUS at 16:10

## 2024-01-16 RX ADMIN — Medication 50 MILLIGRAM(S): at 12:00

## 2024-01-16 RX ADMIN — Medication 122 MILLIGRAM(S): at 11:16

## 2024-01-16 RX ADMIN — SODIUM CHLORIDE 1000 MILLILITER(S): 9 INJECTION, SOLUTION INTRAVENOUS at 11:15

## 2024-01-16 RX ADMIN — SODIUM CHLORIDE 1000 MILLILITER(S): 9 INJECTION, SOLUTION INTRAVENOUS at 16:10

## 2024-01-16 RX ADMIN — Medication 12 MILLIGRAM(S): at 11:30

## 2024-01-16 RX ADMIN — Medication 240 MILLIGRAM(S): at 12:01

## 2024-01-16 RX ADMIN — CISPLATIN 48 MILLIGRAM(S): 1 INJECTION, SOLUTION INTRAVENOUS at 13:11

## 2024-01-16 RX ADMIN — CISPLATIN 48 MILLIGRAM(S): 1 INJECTION, SOLUTION INTRAVENOUS at 14:10

## 2024-01-16 RX ADMIN — FAMOTIDINE 20 MILLIGRAM(S): 10 INJECTION INTRAVENOUS at 11:45

## 2024-01-16 RX ADMIN — Medication 102 MILLIGRAM(S): at 11:46

## 2024-01-16 RX ADMIN — SODIUM CHLORIDE 500 MILLILITER(S): 9 INJECTION, SOLUTION INTRAVENOUS at 09:13

## 2024-01-17 ENCOUNTER — OUTPATIENT (OUTPATIENT)
Dept: OUTPATIENT SERVICES | Facility: HOSPITAL | Age: 69
LOS: 1 days | End: 2024-01-17
Payer: MEDICARE

## 2024-01-17 ENCOUNTER — APPOINTMENT (OUTPATIENT)
Dept: INFUSION THERAPY | Facility: CLINIC | Age: 69
End: 2024-01-17

## 2024-01-17 VITALS
RESPIRATION RATE: 13 BRPM | DIASTOLIC BLOOD PRESSURE: 64 MMHG | SYSTOLIC BLOOD PRESSURE: 141 MMHG | TEMPERATURE: 99 F | HEART RATE: 63 BPM

## 2024-01-17 DIAGNOSIS — C62.90 MALIGNANT NEOPLASM OF UNSPECIFIED TESTIS, UNSPECIFIED WHETHER DESCENDED OR UNDESCENDED: ICD-10-CM

## 2024-01-17 DIAGNOSIS — Z90.01 ACQUIRED ABSENCE OF EYE: Chronic | ICD-10-CM

## 2024-01-17 DIAGNOSIS — Z98.890 OTHER SPECIFIED POSTPROCEDURAL STATES: Chronic | ICD-10-CM

## 2024-01-17 DIAGNOSIS — Z90.49 ACQUIRED ABSENCE OF OTHER SPECIFIED PARTS OF DIGESTIVE TRACT: Chronic | ICD-10-CM

## 2024-01-17 PROCEDURE — 96417 CHEMO IV INFUS EACH ADDL SEQ: CPT

## 2024-01-17 PROCEDURE — 96413 CHEMO IV INFUSION 1 HR: CPT

## 2024-01-17 PROCEDURE — 96367 TX/PROPH/DG ADDL SEQ IV INF: CPT

## 2024-01-17 PROCEDURE — 96361 HYDRATE IV INFUSION ADD-ON: CPT

## 2024-01-17 RX ORDER — ETOPOSIDE 20 MG/ML
240 VIAL (ML) INTRAVENOUS ONCE
Refills: 0 | Status: COMPLETED | OUTPATIENT
Start: 2024-01-17 | End: 2024-01-17

## 2024-01-17 RX ORDER — DEXAMETHASONE 0.5 MG/5ML
12 ELIXIR ORAL ONCE
Refills: 0 | Status: COMPLETED | OUTPATIENT
Start: 2024-01-17 | End: 2024-01-17

## 2024-01-17 RX ORDER — CISPLATIN 1 MG/ML
48 INJECTION, SOLUTION INTRAVENOUS ONCE
Refills: 0 | Status: COMPLETED | OUTPATIENT
Start: 2024-01-17 | End: 2024-01-17

## 2024-01-17 RX ORDER — FAMOTIDINE 10 MG/ML
20 INJECTION INTRAVENOUS ONCE
Refills: 0 | Status: COMPLETED | OUTPATIENT
Start: 2024-01-17 | End: 2024-01-17

## 2024-01-17 RX ORDER — SODIUM CHLORIDE 9 MG/ML
1000 INJECTION, SOLUTION INTRAVENOUS
Refills: 0 | Status: COMPLETED | OUTPATIENT
Start: 2024-01-17 | End: 2024-01-17

## 2024-01-17 RX ORDER — DIPHENHYDRAMINE HCL 50 MG
50 CAPSULE ORAL ONCE
Refills: 0 | Status: COMPLETED | OUTPATIENT
Start: 2024-01-17 | End: 2024-01-17

## 2024-01-17 RX ADMIN — SODIUM CHLORIDE 1000 MILLILITER(S): 9 INJECTION, SOLUTION INTRAVENOUS at 14:45

## 2024-01-17 RX ADMIN — SODIUM CHLORIDE 1000 MILLILITER(S): 9 INJECTION, SOLUTION INTRAVENOUS at 11:20

## 2024-01-17 RX ADMIN — FAMOTIDINE 20 MILLIGRAM(S): 10 INJECTION INTRAVENOUS at 11:00

## 2024-01-17 RX ADMIN — Medication 102 MILLIGRAM(S): at 09:19

## 2024-01-17 RX ADMIN — CISPLATIN 48 MILLIGRAM(S): 1 INJECTION, SOLUTION INTRAVENOUS at 13:30

## 2024-01-17 RX ADMIN — SODIUM CHLORIDE 500 MILLILITER(S): 9 INJECTION, SOLUTION INTRAVENOUS at 09:19

## 2024-01-17 RX ADMIN — Medication 12 MILLIGRAM(S): at 10:40

## 2024-01-17 RX ADMIN — FAMOTIDINE 104 MILLIGRAM(S): 10 INJECTION INTRAVENOUS at 09:19

## 2024-01-17 RX ADMIN — Medication 240 MILLIGRAM(S): at 10:15

## 2024-01-17 RX ADMIN — Medication 240 MILLIGRAM(S): at 12:25

## 2024-01-17 RX ADMIN — Medication 50 MILLIGRAM(S): at 11:20

## 2024-01-17 RX ADMIN — SODIUM CHLORIDE 500 MILLILITER(S): 9 INJECTION, SOLUTION INTRAVENOUS at 09:18

## 2024-01-17 RX ADMIN — CISPLATIN 48 MILLIGRAM(S): 1 INJECTION, SOLUTION INTRAVENOUS at 10:16

## 2024-01-17 RX ADMIN — Medication 122 MILLIGRAM(S): at 09:20

## 2024-01-18 ENCOUNTER — APPOINTMENT (OUTPATIENT)
Dept: INFUSION THERAPY | Facility: CLINIC | Age: 69
End: 2024-01-18

## 2024-01-18 ENCOUNTER — OUTPATIENT (OUTPATIENT)
Dept: OUTPATIENT SERVICES | Facility: HOSPITAL | Age: 69
LOS: 1 days | End: 2024-01-18
Payer: MEDICARE

## 2024-01-18 VITALS — DIASTOLIC BLOOD PRESSURE: 63 MMHG | HEART RATE: 58 BPM | TEMPERATURE: 98 F | SYSTOLIC BLOOD PRESSURE: 145 MMHG

## 2024-01-18 DIAGNOSIS — C62.90 MALIGNANT NEOPLASM OF UNSPECIFIED TESTIS, UNSPECIFIED WHETHER DESCENDED OR UNDESCENDED: ICD-10-CM

## 2024-01-18 DIAGNOSIS — Z90.01 ACQUIRED ABSENCE OF EYE: Chronic | ICD-10-CM

## 2024-01-18 DIAGNOSIS — Z90.49 ACQUIRED ABSENCE OF OTHER SPECIFIED PARTS OF DIGESTIVE TRACT: Chronic | ICD-10-CM

## 2024-01-18 DIAGNOSIS — Z98.890 OTHER SPECIFIED POSTPROCEDURAL STATES: Chronic | ICD-10-CM

## 2024-01-18 PROCEDURE — 96367 TX/PROPH/DG ADDL SEQ IV INF: CPT

## 2024-01-18 PROCEDURE — 96413 CHEMO IV INFUSION 1 HR: CPT

## 2024-01-18 PROCEDURE — 96375 TX/PRO/DX INJ NEW DRUG ADDON: CPT

## 2024-01-18 PROCEDURE — 96361 HYDRATE IV INFUSION ADD-ON: CPT

## 2024-01-18 PROCEDURE — 96417 CHEMO IV INFUS EACH ADDL SEQ: CPT

## 2024-01-18 RX ORDER — ETOPOSIDE 20 MG/ML
240 VIAL (ML) INTRAVENOUS ONCE
Refills: 0 | Status: COMPLETED | OUTPATIENT
Start: 2024-01-18 | End: 2024-01-18

## 2024-01-18 RX ORDER — SODIUM CHLORIDE 9 MG/ML
1000 INJECTION, SOLUTION INTRAVENOUS
Refills: 0 | Status: DISCONTINUED | OUTPATIENT
Start: 2024-01-18 | End: 2024-04-18

## 2024-01-18 RX ORDER — FAMOTIDINE 10 MG/ML
20 INJECTION INTRAVENOUS ONCE
Refills: 0 | Status: COMPLETED | OUTPATIENT
Start: 2024-01-18 | End: 2024-01-18

## 2024-01-18 RX ORDER — CISPLATIN 1 MG/ML
48 INJECTION, SOLUTION INTRAVENOUS ONCE
Refills: 0 | Status: COMPLETED | OUTPATIENT
Start: 2024-01-18 | End: 2024-01-18

## 2024-01-18 RX ORDER — SODIUM CHLORIDE 9 MG/ML
1000 INJECTION, SOLUTION INTRAVENOUS
Refills: 0 | Status: COMPLETED | OUTPATIENT
Start: 2024-01-18 | End: 2024-01-18

## 2024-01-18 RX ORDER — DEXAMETHASONE 0.5 MG/5ML
12 ELIXIR ORAL ONCE
Refills: 0 | Status: COMPLETED | OUTPATIENT
Start: 2024-01-18 | End: 2024-01-18

## 2024-01-18 RX ORDER — DIPHENHYDRAMINE HCL 50 MG
50 CAPSULE ORAL ONCE
Refills: 0 | Status: COMPLETED | OUTPATIENT
Start: 2024-01-18 | End: 2024-01-18

## 2024-01-18 RX ADMIN — SODIUM CHLORIDE 500 MILLILITER(S): 9 INJECTION, SOLUTION INTRAVENOUS at 08:55

## 2024-01-18 RX ADMIN — SODIUM CHLORIDE 1000 MILLILITER(S): 9 INJECTION, SOLUTION INTRAVENOUS at 11:00

## 2024-01-18 RX ADMIN — SODIUM CHLORIDE 1000 MILLILITER(S): 9 INJECTION, SOLUTION INTRAVENOUS at 15:45

## 2024-01-18 RX ADMIN — Medication 50 MILLIGRAM(S): at 11:45

## 2024-01-18 RX ADMIN — Medication 102 MILLIGRAM(S): at 11:15

## 2024-01-18 RX ADMIN — FAMOTIDINE 20 MILLIGRAM(S): 10 INJECTION INTRAVENOUS at 11:15

## 2024-01-18 RX ADMIN — CISPLATIN 48 MILLIGRAM(S): 1 INJECTION, SOLUTION INTRAVENOUS at 13:47

## 2024-01-18 RX ADMIN — Medication 12 MILLIGRAM(S): at 11:00

## 2024-01-18 RX ADMIN — CISPLATIN 48 MILLIGRAM(S): 1 INJECTION, SOLUTION INTRAVENOUS at 12:45

## 2024-01-18 RX ADMIN — Medication 240 MILLIGRAM(S): at 12:45

## 2024-01-18 RX ADMIN — SODIUM CHLORIDE 500 MILLILITER(S): 9 INJECTION, SOLUTION INTRAVENOUS at 15:45

## 2024-01-18 RX ADMIN — Medication 240 MILLIGRAM(S): at 11:45

## 2024-01-18 RX ADMIN — FAMOTIDINE 104 MILLIGRAM(S): 10 INJECTION INTRAVENOUS at 11:00

## 2024-01-18 RX ADMIN — Medication 122 MILLIGRAM(S): at 10:45

## 2024-01-19 ENCOUNTER — OUTPATIENT (OUTPATIENT)
Dept: OUTPATIENT SERVICES | Facility: HOSPITAL | Age: 69
LOS: 1 days | End: 2024-01-19
Payer: MEDICARE

## 2024-01-19 ENCOUNTER — APPOINTMENT (OUTPATIENT)
Dept: INFUSION THERAPY | Facility: CLINIC | Age: 69
End: 2024-01-19

## 2024-01-19 VITALS — HEART RATE: 56 BPM | SYSTOLIC BLOOD PRESSURE: 140 MMHG | DIASTOLIC BLOOD PRESSURE: 80 MMHG | TEMPERATURE: 98 F

## 2024-01-19 DIAGNOSIS — Z90.01 ACQUIRED ABSENCE OF EYE: Chronic | ICD-10-CM

## 2024-01-19 DIAGNOSIS — Z90.49 ACQUIRED ABSENCE OF OTHER SPECIFIED PARTS OF DIGESTIVE TRACT: Chronic | ICD-10-CM

## 2024-01-19 DIAGNOSIS — C62.90 MALIGNANT NEOPLASM OF UNSPECIFIED TESTIS, UNSPECIFIED WHETHER DESCENDED OR UNDESCENDED: ICD-10-CM

## 2024-01-19 DIAGNOSIS — Z98.890 OTHER SPECIFIED POSTPROCEDURAL STATES: Chronic | ICD-10-CM

## 2024-01-19 PROCEDURE — 96417 CHEMO IV INFUS EACH ADDL SEQ: CPT

## 2024-01-19 PROCEDURE — 96361 HYDRATE IV INFUSION ADD-ON: CPT

## 2024-01-19 PROCEDURE — 96367 TX/PROPH/DG ADDL SEQ IV INF: CPT

## 2024-01-19 PROCEDURE — 96413 CHEMO IV INFUSION 1 HR: CPT

## 2024-01-19 RX ORDER — SODIUM CHLORIDE 9 MG/ML
1000 INJECTION, SOLUTION INTRAVENOUS
Refills: 0 | Status: COMPLETED | OUTPATIENT
Start: 2024-01-19 | End: 2024-01-19

## 2024-01-19 RX ORDER — FAMOTIDINE 10 MG/ML
20 INJECTION INTRAVENOUS ONCE
Refills: 0 | Status: COMPLETED | OUTPATIENT
Start: 2024-01-19 | End: 2024-01-19

## 2024-01-19 RX ORDER — CISPLATIN 1 MG/ML
48 INJECTION, SOLUTION INTRAVENOUS ONCE
Refills: 0 | Status: COMPLETED | OUTPATIENT
Start: 2024-01-19 | End: 2024-01-19

## 2024-01-19 RX ORDER — DEXAMETHASONE 0.5 MG/5ML
12 ELIXIR ORAL ONCE
Refills: 0 | Status: COMPLETED | OUTPATIENT
Start: 2024-01-19 | End: 2024-01-19

## 2024-01-19 RX ORDER — ETOPOSIDE 20 MG/ML
240 VIAL (ML) INTRAVENOUS ONCE
Refills: 0 | Status: COMPLETED | OUTPATIENT
Start: 2024-01-19 | End: 2024-01-19

## 2024-01-19 RX ORDER — DIPHENHYDRAMINE HCL 50 MG
50 CAPSULE ORAL ONCE
Refills: 0 | Status: COMPLETED | OUTPATIENT
Start: 2024-01-19 | End: 2024-01-19

## 2024-01-19 RX ADMIN — Medication 122 MILLIGRAM(S): at 11:10

## 2024-01-19 RX ADMIN — Medication 240 MILLIGRAM(S): at 13:15

## 2024-01-19 RX ADMIN — SODIUM CHLORIDE 500 MILLILITER(S): 9 INJECTION, SOLUTION INTRAVENOUS at 09:07

## 2024-01-19 RX ADMIN — Medication 102 MILLIGRAM(S): at 11:50

## 2024-01-19 RX ADMIN — SODIUM CHLORIDE 1000 MILLILITER(S): 9 INJECTION, SOLUTION INTRAVENOUS at 16:25

## 2024-01-19 RX ADMIN — Medication 240 MILLIGRAM(S): at 12:15

## 2024-01-19 RX ADMIN — Medication 50 MILLIGRAM(S): at 12:10

## 2024-01-19 RX ADMIN — FAMOTIDINE 20 MILLIGRAM(S): 10 INJECTION INTRAVENOUS at 11:50

## 2024-01-19 RX ADMIN — CISPLATIN 48 MILLIGRAM(S): 1 INJECTION, SOLUTION INTRAVENOUS at 13:20

## 2024-01-19 RX ADMIN — FAMOTIDINE 104 MILLIGRAM(S): 10 INJECTION INTRAVENOUS at 11:30

## 2024-01-19 RX ADMIN — Medication 12 MILLIGRAM(S): at 11:30

## 2024-01-19 RX ADMIN — SODIUM CHLORIDE 1000 MILLILITER(S): 9 INJECTION, SOLUTION INTRAVENOUS at 11:07

## 2024-01-22 ENCOUNTER — APPOINTMENT (OUTPATIENT)
Dept: INTERVENTIONAL RADIOLOGY/VASCULAR | Facility: CLINIC | Age: 69
End: 2024-01-22
Payer: MEDICARE

## 2024-01-22 VITALS
HEART RATE: 75 BPM | OXYGEN SATURATION: 92 % | SYSTOLIC BLOOD PRESSURE: 128 MMHG | TEMPERATURE: 98.7 F | DIASTOLIC BLOOD PRESSURE: 85 MMHG

## 2024-01-22 PROCEDURE — 99212 OFFICE O/P EST SF 10 MIN: CPT

## 2024-01-22 NOTE — HISTORY OF PRESENT ILLNESS
[FreeTextEntry1] : 68-year-old male with melanoma and seminoma status post right-sided chest port placement on 1/3/2024. Patient reports that he has used report multiple times the port has functioned appropriately. On physical examination there is no swelling, erythema, tenderness to palpation. The chest wall scar as well as a neck scar healing as expected. The port may continue to be used. Return to clinic as needed.

## 2024-01-22 NOTE — PHYSICAL EXAM
[0] : ~His/Her~ pain was 0 out of 10 [Fever] : no fever [N/A] : N/A [No] : no [A & O x 3] : alert and oriented to person, place, and time [Even/ Unlabored] : even and unlabored [Right] : right [No Erythema] : no erythema [No Swelling] : no swelling [No Eccymosis] : no eccymosis [No Discharge] : no discharge [No Hematoma] : no hematoma [No Increased Warmth] : no increased warmth [FreeTextEntry1] : seminoma [FreeTextEntry2] : 1/3/24

## 2024-01-31 ENCOUNTER — APPOINTMENT (OUTPATIENT)
Dept: HEMATOLOGY ONCOLOGY | Facility: CLINIC | Age: 69
End: 2024-01-31

## 2024-01-31 ENCOUNTER — OUTPATIENT (OUTPATIENT)
Dept: OUTPATIENT SERVICES | Facility: HOSPITAL | Age: 69
LOS: 1 days | End: 2024-01-31
Payer: MEDICARE

## 2024-01-31 DIAGNOSIS — Z98.890 OTHER SPECIFIED POSTPROCEDURAL STATES: Chronic | ICD-10-CM

## 2024-01-31 DIAGNOSIS — Z90.01 ACQUIRED ABSENCE OF EYE: Chronic | ICD-10-CM

## 2024-01-31 DIAGNOSIS — C43.61 MALIGNANT MELANOMA OF RIGHT UPPER LIMB, INCLUDING SHOULDER: ICD-10-CM

## 2024-01-31 DIAGNOSIS — Z90.49 ACQUIRED ABSENCE OF OTHER SPECIFIED PARTS OF DIGESTIVE TRACT: Chronic | ICD-10-CM

## 2024-01-31 DIAGNOSIS — C62.90 MALIGNANT NEOPLASM OF UNSPECIFIED TESTIS, UNSPECIFIED WHETHER DESCENDED OR UNDESCENDED: ICD-10-CM

## 2024-01-31 LAB
ALBUMIN SERPL ELPH-MCNC: 3 G/DL
ALP BLD-CCNC: 66 U/L
ALT SERPL-CCNC: 28 U/L
ANION GAP SERPL CALC-SCNC: 12 MMOL/L
AST SERPL-CCNC: 35 U/L
BILIRUB DIRECT SERPL-MCNC: 0.2 MG/DL
BILIRUB INDIRECT SERPL-MCNC: 0.1 MG/DL
BILIRUB SERPL-MCNC: 0.3 MG/DL
BUN SERPL-MCNC: 38 MG/DL
CALCIUM SERPL-MCNC: 8.8 MG/DL
CHLORIDE SERPL-SCNC: 101 MMOL/L
CO2 SERPL-SCNC: 25 MMOL/L
CREAT SERPL-MCNC: 1.2 MG/DL
EGFR: 65 ML/MIN/1.73M2
GLUCOSE SERPL-MCNC: 164 MG/DL
HCG SERPL-MCNC: <1 MIU/ML
LDH SERPL-CCNC: 318 U/L
MAGNESIUM SERPL-MCNC: 2.1 MG/DL
PHOSPHATE SERPL-MCNC: 1.9 MG/DL
POTASSIUM SERPL-SCNC: 4.2 MMOL/L
PROT SERPL-MCNC: 5.5 G/DL
SODIUM SERPL-SCNC: 138 MMOL/L

## 2024-01-31 PROCEDURE — 80048 BASIC METABOLIC PNL TOTAL CA: CPT

## 2024-01-31 PROCEDURE — 83615 LACTATE (LD) (LDH) ENZYME: CPT

## 2024-01-31 PROCEDURE — 83735 ASSAY OF MAGNESIUM: CPT

## 2024-01-31 PROCEDURE — 36415 COLL VENOUS BLD VENIPUNCTURE: CPT

## 2024-01-31 PROCEDURE — 84100 ASSAY OF PHOSPHORUS: CPT

## 2024-01-31 PROCEDURE — 84702 CHORIONIC GONADOTROPIN TEST: CPT

## 2024-01-31 PROCEDURE — 80076 HEPATIC FUNCTION PANEL: CPT

## 2024-01-31 PROCEDURE — 82105 ALPHA-FETOPROTEIN SERUM: CPT

## 2024-02-01 DIAGNOSIS — C43.61 MALIGNANT MELANOMA OF RIGHT UPPER LIMB, INCLUDING SHOULDER: ICD-10-CM

## 2024-02-01 LAB — AFP-TM SERPL-MCNC: 3.6 NG/ML

## 2024-02-02 ENCOUNTER — OUTPATIENT (OUTPATIENT)
Dept: OUTPATIENT SERVICES | Facility: HOSPITAL | Age: 69
LOS: 1 days | End: 2024-02-02
Payer: MEDICARE

## 2024-02-02 ENCOUNTER — LABORATORY RESULT (OUTPATIENT)
Age: 69
End: 2024-02-02

## 2024-02-02 ENCOUNTER — APPOINTMENT (OUTPATIENT)
Dept: HEMATOLOGY ONCOLOGY | Facility: CLINIC | Age: 69
End: 2024-02-02
Payer: MEDICARE

## 2024-02-02 VITALS
DIASTOLIC BLOOD PRESSURE: 70 MMHG | HEIGHT: 72 IN | TEMPERATURE: 98.6 F | HEART RATE: 73 BPM | BODY MASS INDEX: 36.03 KG/M2 | SYSTOLIC BLOOD PRESSURE: 145 MMHG | RESPIRATION RATE: 16 BRPM | WEIGHT: 266 LBS

## 2024-02-02 DIAGNOSIS — Z98.890 OTHER SPECIFIED POSTPROCEDURAL STATES: Chronic | ICD-10-CM

## 2024-02-02 DIAGNOSIS — C43.61 MALIGNANT MELANOMA OF RIGHT UPPER LIMB, INCLUDING SHOULDER: ICD-10-CM

## 2024-02-02 DIAGNOSIS — Z90.49 ACQUIRED ABSENCE OF OTHER SPECIFIED PARTS OF DIGESTIVE TRACT: Chronic | ICD-10-CM

## 2024-02-02 DIAGNOSIS — Z90.01 ACQUIRED ABSENCE OF EYE: Chronic | ICD-10-CM

## 2024-02-02 LAB
ALBUMIN SERPL ELPH-MCNC: 3.3 G/DL
ALP BLD-CCNC: 79 U/L
ALT SERPL-CCNC: 27 U/L
ANION GAP SERPL CALC-SCNC: 11 MMOL/L
AST SERPL-CCNC: 30 U/L
BILIRUB DIRECT SERPL-MCNC: <0.2 MG/DL
BILIRUB INDIRECT SERPL-MCNC: NORMAL MG/DL
BILIRUB SERPL-MCNC: <0.2 MG/DL
BUN SERPL-MCNC: 21 MG/DL
CALCIUM SERPL-MCNC: 8.9 MG/DL
CHLORIDE SERPL-SCNC: 101 MMOL/L
CO2 SERPL-SCNC: 27 MMOL/L
CREAT SERPL-MCNC: 0.9 MG/DL
EGFR: 92 ML/MIN/1.73M2
GLUCOSE SERPL-MCNC: 128 MG/DL
HCT VFR BLD CALC: 36.8 %
HGB BLD-MCNC: 12.7 G/DL
MCHC RBC-ENTMCNC: 29.7 PG
MCHC RBC-ENTMCNC: 34.5 G/DL
MCV RBC AUTO: 86 FL
PLATELET # BLD AUTO: 352 K/UL
PMV BLD: 9.9 FL
POTASSIUM SERPL-SCNC: 3.8 MMOL/L
PROT SERPL-MCNC: 5.9 G/DL
RBC # BLD: 4.28 M/UL
RBC # FLD: 12.2 %
SODIUM SERPL-SCNC: 139 MMOL/L
WBC # FLD AUTO: 15.54 K/UL

## 2024-02-02 PROCEDURE — 80048 BASIC METABOLIC PNL TOTAL CA: CPT

## 2024-02-02 PROCEDURE — 99214 OFFICE O/P EST MOD 30 MIN: CPT

## 2024-02-02 PROCEDURE — 80076 HEPATIC FUNCTION PANEL: CPT

## 2024-02-02 PROCEDURE — 85027 COMPLETE CBC AUTOMATED: CPT

## 2024-02-02 PROCEDURE — 84704 HCG FREE BETACHAIN TEST: CPT

## 2024-02-02 RX ORDER — ESOMEPRAZOLE MAGNESIUM 40 MG/1
40 CAPSULE, DELAYED RELEASE ORAL
Qty: 30 | Refills: 2 | Status: COMPLETED | COMMUNITY
Start: 2024-02-02 | End: 2024-05-02

## 2024-02-02 NOTE — PHYSICAL EXAM
[Restricted in physically strenuous activity but ambulatory and able to carry out work of a light or sedentary nature] : Status 1- Restricted in physically strenuous activity but ambulatory and able to carry out work of a light or sedentary nature, e.g., light house work, office work [Obese] : obese [Normal] : affect appropriate [de-identified] : wearing glasses  [de-identified] : tattoos ; s/p Right chest port placed via right IJ access on 1.3.2024

## 2024-02-02 NOTE — ASSESSMENT
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  - followed by ENT; baseline audiogram completed 12.28.2023 prior to chemotherapy using Cisplatin - followed by PULM; baseline PFTs completed since we were considering using Bleomycin but opted to avoid  - MR Brain 1.12.2024 no evidence of intracranial metastasis or acute intracranial pathology, paranasal sinus mucosal disease  - s/p Right chest port placed via right IJ  - c/w cycle 2 of Cisplatin + Etoposide on 2/5, initiated on 1.15.2024  - c/w Emend Tri-Bradley to be taken as follows: Emend 125mg by mouth prior to chemotherapy on Day 1 and 80mg on Days 2 and 3 of each cycle of chemotherapy.  - Labwork today: CBC, BMP STAT, LFTs, Mg, HCG TM level   # History of Melanoma of skin, right shoulder, dx 10/2018  - requested to obtain records including surgical pathology for our review  - followup with DERM for surveillance skin exams at least every 6-12 months   Will send Nexium 40mg once daily due to gassiness/discomfort, Rx sent + possible side effects discussed.    Encouraged to keep up to date with age appropriate screenings including but not limited to colonoscopy and upper endoscopy.  RTC in 3 weeks with CBC, BMP, LFTs, AFP, HCG (tumor marker), Mg level 2 days prior   seen/ examined w/ NP C.Smart; note reviewed; case discussed; agree w/ plan  1. diagnosis:  testicular seminoma: pT3, cN2m, likely Stage IIC. An evaluation of M status is not completed and MRI brain w/wo IVC is done 1/12/24 and not read: contacted neuro radiology to expedite the read; so far, pt has GOOD risk disease and  the goal of treatment is to cure - MRI brain: reviewed; no brain mets; in addition, revieweed IC with ENT - PFT read pending - ENT eval of hearing is pending MRI report 2. the choice of systemic therapy is  EP x4; PROCEED w/ c2 on 2/5/24

## 2024-02-02 NOTE — REVIEW OF SYSTEMS
[Diarrhea: Grade 0] : Diarrhea: Grade 0 [Negative] : Allergic/Immunologic [Recent Change In Weight] : ~T recent weight change [Vomiting] : no vomiting [FreeTextEntry2] : lost about 10lbs since last visit

## 2024-02-02 NOTE — HISTORY OF PRESENT ILLNESS
[Therapy: ___] : Therapy: [unfilled] [Cycle: ___] : Cycle: [unfilled] [de-identified] : Mr. MALIA GAUTHIER is a 68 year old male here today for evaluation and management of Testicular Seminoma and history of melanoma.     MALIA is a 68 year old M with PMHx including malignant melanoma of skin, HTN, hypothyroid, OA who presents to clinic to establish care, accompanied by sister at initial visit.  Patient states he went to PCP regarding testicular swelling a few months ago and was subsequently sent for additional workup including MR imaging which noted large right testicular mass.  CT imaging raised suspicion for enlarged lymphadenopathy.  He is presently feeling well with no new complaints.  Patient denies fever, chills, nausea, vomiting, dyspnea, unintentional weight loss or bleeding.  Patient reports family history of malignancy in his father (stomach, skin cancer), paternal uncle (skin cancer), maternal aunt + maternal grandmother (breast cancer).  Smokes cigars occasionally for social use.     RADIOLOGIC WORKUP  CT C/A/P (12.2.2023) IMPRESSION:3 mm left lower lobe pulmonary nodule of indeterminate clinical significance. Otherwise, no CT evidence of thoracic metastatic disease.Intra-abdominal lymphadenopathy concerning for metastatic disease. Consider complete evaluation with a PET/CT.Large right hydrocele noted. MR Pelvis (10.3.2023 - R) IMPRESSION: 1. Large heterogeneous mass, likely centered in the right testicle and extending into the right epididymis and right spermatic cord, highly suspicious for malignancy.  The tumor appears to extend beyond the testicle into the scrotal sac.  Surgical resection is advised.  2.  Large right hydrocele.  3 Limited views of the abdomen without definite evidence for metastatic disease, however, incomplete on the scrotal examination recommend complete characterization with CT chest abdomen and pelvis with contrast. US Scrotal (9.15.2023 - [facility]) IMPRESSION: Enlarged right testes with multiple masses highly suspicious for neoplasm.  Associated large right hydrocele.  On several images the mass in the upper pole of the right testes appears to be extended superiorly beyond the testicle.  Further evaluation with an MRI of the scrotum with and without contrast may be of diagnostic benefit. NM Lymphoscintigraphy (10.19.2018) Impression: 1. Definite demonstration of one sentinel lymph node uptake in  right axilla, by 5 minutes after injection.2. The overlying skin was marked in the  anterior position.3. The patient left the radiology department in good condition without any incident. 4. This is a preoperative marking for sentinel lymph node right axilla for wide excision of melanoma, right shoulder.  PET/CT WB FDG (9.19.2018) IMPRESSION: No evidence of pathologic FDG uptake.  LAB WORKUP (11.24.2023) WBC 5.25, Hgb 14.3, , Cr 0.8, eGFR 95, PSA 0.96, normal LFTs, , AFP 2.9, hCG TM 1   PATHOLOGY (see results section)   HCM Colonoscopy overdue   Upper Endoscopy never done  [FreeTextEntry1] : Started EP (Etoposide, Cisplatin) on 1.15.2023 [de-identified] : 1/12/24 Patient is here for a follow-up visit for Testicular Seminoma and history of melanoma.  He is feeling well with no new complaints.  Reviewed most recent CBC, which is stable with mild anemia, hgb 13.5g/dL.  Patient denies fever, chills, nausea, vomiting, dyspnea or bleeding.  He completed PFTs on Wednesday.  He also completed audiogram recently.  Patient went for MR imaging this morning; not yet resulted.  Patient is s/p Right chest port placed via right IJ access on 1.3.2024.  Reviewed most recent CT imaging which shows increase in size of abdominal pelvic lymph nodes, stable left lung nodule and indeterminate right adrenal gland lesion.  CT C/A/P (1.9.2024) Impression:Stable left lung nodule. No new nodules or lymphadenopathy.Increase in size of abdominal pelvic lymph nodes as described.Stable indeterminate right adrenal gland lesion.  2/2/24 Patient is here for a follow-up visit for Testicular Seminoma and history of melanoma.  He is due for cycle 2 of Cisplatin + Etoposide on 2/5, initiation on 1.15.2024.  He lost about 10lbs since last visit since he has been adjusting portion size.  He reports some hair thinning/loss.  Patient denies fever, chills, nausea, vomiting, dyspnea, worsening of tinnitus (present prior to initiation of chemo) or bleeding.  Reviewed most recent MR imaging which shows no evidence of intracranial metastasis or acute intracranial pathology, paranasal sinus mucosal disease.   MR Head (1.12.2024) IMPRESSION:No evidence of intracranial metastasis or acute intracranial pathology.Mild chronic microvascular ischemic changes.Paranasal sinus mucosal disease.

## 2024-02-03 DIAGNOSIS — C43.61 MALIGNANT MELANOMA OF RIGHT UPPER LIMB, INCLUDING SHOULDER: ICD-10-CM

## 2024-02-05 ENCOUNTER — APPOINTMENT (OUTPATIENT)
Dept: INFUSION THERAPY | Facility: CLINIC | Age: 69
End: 2024-02-05

## 2024-02-05 ENCOUNTER — OUTPATIENT (OUTPATIENT)
Dept: OUTPATIENT SERVICES | Facility: HOSPITAL | Age: 69
LOS: 1 days | End: 2024-02-05
Payer: MEDICARE

## 2024-02-05 VITALS
HEART RATE: 84 BPM | TEMPERATURE: 98 F | DIASTOLIC BLOOD PRESSURE: 79 MMHG | SYSTOLIC BLOOD PRESSURE: 130 MMHG | RESPIRATION RATE: 14 BRPM

## 2024-02-05 DIAGNOSIS — C43.61 MALIGNANT MELANOMA OF RIGHT UPPER LIMB, INCLUDING SHOULDER: ICD-10-CM

## 2024-02-05 DIAGNOSIS — Z90.01 ACQUIRED ABSENCE OF EYE: Chronic | ICD-10-CM

## 2024-02-05 DIAGNOSIS — Z98.890 OTHER SPECIFIED POSTPROCEDURAL STATES: Chronic | ICD-10-CM

## 2024-02-05 DIAGNOSIS — Z90.49 ACQUIRED ABSENCE OF OTHER SPECIFIED PARTS OF DIGESTIVE TRACT: Chronic | ICD-10-CM

## 2024-02-05 LAB — HCG-TM SERPL-MCNC: <1 MIU/ML

## 2024-02-05 PROCEDURE — 96367 TX/PROPH/DG ADDL SEQ IV INF: CPT

## 2024-02-05 PROCEDURE — 83735 ASSAY OF MAGNESIUM: CPT

## 2024-02-05 PROCEDURE — 96361 HYDRATE IV INFUSION ADD-ON: CPT

## 2024-02-05 PROCEDURE — 96417 CHEMO IV INFUS EACH ADDL SEQ: CPT

## 2024-02-05 PROCEDURE — 96413 CHEMO IV INFUSION 1 HR: CPT

## 2024-02-05 PROCEDURE — 36415 COLL VENOUS BLD VENIPUNCTURE: CPT

## 2024-02-05 RX ORDER — DEXAMETHASONE 0.5 MG/5ML
12 ELIXIR ORAL ONCE
Refills: 0 | Status: COMPLETED | OUTPATIENT
Start: 2024-02-05 | End: 2024-02-05

## 2024-02-05 RX ORDER — FAMOTIDINE 10 MG/ML
20 INJECTION INTRAVENOUS ONCE
Refills: 0 | Status: COMPLETED | OUTPATIENT
Start: 2024-02-05 | End: 2024-02-05

## 2024-02-05 RX ORDER — CISPLATIN 1 MG/ML
47 INJECTION, SOLUTION INTRAVENOUS ONCE
Refills: 0 | Status: COMPLETED | OUTPATIENT
Start: 2024-02-05 | End: 2024-02-05

## 2024-02-05 RX ORDER — SODIUM CHLORIDE 9 MG/ML
1000 INJECTION, SOLUTION INTRAVENOUS
Refills: 0 | Status: COMPLETED | OUTPATIENT
Start: 2024-02-05 | End: 2024-02-05

## 2024-02-05 RX ORDER — DIPHENHYDRAMINE HCL 50 MG
50 CAPSULE ORAL ONCE
Refills: 0 | Status: COMPLETED | OUTPATIENT
Start: 2024-02-05 | End: 2024-02-05

## 2024-02-05 RX ORDER — ETOPOSIDE 20 MG/ML
235 VIAL (ML) INTRAVENOUS ONCE
Refills: 0 | Status: COMPLETED | OUTPATIENT
Start: 2024-02-05 | End: 2024-02-05

## 2024-02-05 RX ORDER — SODIUM CHLORIDE 9 MG/ML
1000 INJECTION, SOLUTION INTRAVENOUS
Refills: 0 | Status: DISCONTINUED | OUTPATIENT
Start: 2024-02-05 | End: 2024-05-06

## 2024-02-05 RX ADMIN — CISPLATIN 47 MILLIGRAM(S): 1 INJECTION, SOLUTION INTRAVENOUS at 11:39

## 2024-02-05 RX ADMIN — SODIUM CHLORIDE 500 MILLILITER(S): 9 INJECTION, SOLUTION INTRAVENOUS at 09:43

## 2024-02-05 RX ADMIN — Medication 235 MILLIGRAM(S): at 13:05

## 2024-02-05 RX ADMIN — Medication 235 MILLIGRAM(S): at 11:39

## 2024-02-05 RX ADMIN — CISPLATIN 47 MILLIGRAM(S): 1 INJECTION, SOLUTION INTRAVENOUS at 14:10

## 2024-02-05 RX ADMIN — Medication 122 MILLIGRAM(S): at 09:43

## 2024-02-05 RX ADMIN — SODIUM CHLORIDE 1000 MILLILITER(S): 9 INJECTION, SOLUTION INTRAVENOUS at 15:20

## 2024-02-05 RX ADMIN — Medication 50 MILLIGRAM(S): at 12:00

## 2024-02-05 RX ADMIN — FAMOTIDINE 104 MILLIGRAM(S): 10 INJECTION INTRAVENOUS at 09:43

## 2024-02-05 RX ADMIN — FAMOTIDINE 20 MILLIGRAM(S): 10 INJECTION INTRAVENOUS at 11:40

## 2024-02-05 RX ADMIN — SODIUM CHLORIDE 1000 MILLILITER(S): 9 INJECTION, SOLUTION INTRAVENOUS at 11:45

## 2024-02-05 RX ADMIN — Medication 102 MILLIGRAM(S): at 09:44

## 2024-02-05 RX ADMIN — Medication 12 MILLIGRAM(S): at 11:20

## 2024-02-06 ENCOUNTER — OUTPATIENT (OUTPATIENT)
Dept: OUTPATIENT SERVICES | Facility: HOSPITAL | Age: 69
LOS: 1 days | End: 2024-02-06
Payer: MEDICARE

## 2024-02-06 ENCOUNTER — APPOINTMENT (OUTPATIENT)
Dept: INFUSION THERAPY | Facility: CLINIC | Age: 69
End: 2024-02-06

## 2024-02-06 ENCOUNTER — NON-APPOINTMENT (OUTPATIENT)
Age: 69
End: 2024-02-06

## 2024-02-06 VITALS — HEART RATE: 81 BPM | TEMPERATURE: 98 F | DIASTOLIC BLOOD PRESSURE: 58 MMHG | SYSTOLIC BLOOD PRESSURE: 137 MMHG

## 2024-02-06 DIAGNOSIS — Z90.01 ACQUIRED ABSENCE OF EYE: Chronic | ICD-10-CM

## 2024-02-06 DIAGNOSIS — Z90.49 ACQUIRED ABSENCE OF OTHER SPECIFIED PARTS OF DIGESTIVE TRACT: Chronic | ICD-10-CM

## 2024-02-06 DIAGNOSIS — C43.61 MALIGNANT MELANOMA OF RIGHT UPPER LIMB, INCLUDING SHOULDER: ICD-10-CM

## 2024-02-06 DIAGNOSIS — Z98.890 OTHER SPECIFIED POSTPROCEDURAL STATES: Chronic | ICD-10-CM

## 2024-02-06 LAB — MAGNESIUM SERPL-MCNC: 1.7 MG/DL

## 2024-02-06 PROCEDURE — 96367 TX/PROPH/DG ADDL SEQ IV INF: CPT

## 2024-02-06 PROCEDURE — 96417 CHEMO IV INFUS EACH ADDL SEQ: CPT

## 2024-02-06 PROCEDURE — 96413 CHEMO IV INFUSION 1 HR: CPT

## 2024-02-06 PROCEDURE — 96361 HYDRATE IV INFUSION ADD-ON: CPT

## 2024-02-06 RX ORDER — DIPHENHYDRAMINE HCL 50 MG
50 CAPSULE ORAL ONCE
Refills: 0 | Status: COMPLETED | OUTPATIENT
Start: 2024-02-06 | End: 2024-02-06

## 2024-02-06 RX ORDER — ETOPOSIDE 20 MG/ML
235 VIAL (ML) INTRAVENOUS ONCE
Refills: 0 | Status: COMPLETED | OUTPATIENT
Start: 2024-02-06 | End: 2024-02-06

## 2024-02-06 RX ORDER — CISPLATIN 1 MG/ML
47 INJECTION, SOLUTION INTRAVENOUS ONCE
Refills: 0 | Status: COMPLETED | OUTPATIENT
Start: 2024-02-06 | End: 2024-02-06

## 2024-02-06 RX ORDER — FAMOTIDINE 10 MG/ML
20 INJECTION INTRAVENOUS ONCE
Refills: 0 | Status: COMPLETED | OUTPATIENT
Start: 2024-02-06 | End: 2024-02-06

## 2024-02-06 RX ORDER — DEXAMETHASONE 0.5 MG/5ML
12 ELIXIR ORAL ONCE
Refills: 0 | Status: COMPLETED | OUTPATIENT
Start: 2024-02-06 | End: 2024-02-06

## 2024-02-06 RX ORDER — SODIUM CHLORIDE 9 MG/ML
1000 INJECTION, SOLUTION INTRAVENOUS
Refills: 0 | Status: COMPLETED | OUTPATIENT
Start: 2024-02-06 | End: 2024-02-06

## 2024-02-06 RX ADMIN — Medication 50 MILLIGRAM(S): at 11:00

## 2024-02-06 RX ADMIN — FAMOTIDINE 104 MILLIGRAM(S): 10 INJECTION INTRAVENOUS at 10:20

## 2024-02-06 RX ADMIN — SODIUM CHLORIDE 1000 MILLILITER(S): 9 INJECTION, SOLUTION INTRAVENOUS at 14:35

## 2024-02-06 RX ADMIN — SODIUM CHLORIDE 1000 MILLILITER(S): 9 INJECTION, SOLUTION INTRAVENOUS at 10:54

## 2024-02-06 RX ADMIN — Medication 235 MILLIGRAM(S): at 12:00

## 2024-02-06 RX ADMIN — Medication 235 MILLIGRAM(S): at 11:00

## 2024-02-06 RX ADMIN — FAMOTIDINE 20 MILLIGRAM(S): 10 INJECTION INTRAVENOUS at 10:40

## 2024-02-06 RX ADMIN — CISPLATIN 47 MILLIGRAM(S): 1 INJECTION, SOLUTION INTRAVENOUS at 13:05

## 2024-02-06 RX ADMIN — SODIUM CHLORIDE 500 MILLILITER(S): 9 INJECTION, SOLUTION INTRAVENOUS at 12:05

## 2024-02-06 RX ADMIN — Medication 122 MILLIGRAM(S): at 10:00

## 2024-02-06 RX ADMIN — SODIUM CHLORIDE 500 MILLILITER(S): 9 INJECTION, SOLUTION INTRAVENOUS at 08:54

## 2024-02-06 RX ADMIN — CISPLATIN 47 MILLIGRAM(S): 1 INJECTION, SOLUTION INTRAVENOUS at 12:05

## 2024-02-06 RX ADMIN — Medication 102 MILLIGRAM(S): at 10:40

## 2024-02-06 RX ADMIN — Medication 12 MILLIGRAM(S): at 10:20

## 2024-02-07 ENCOUNTER — APPOINTMENT (OUTPATIENT)
Dept: INFUSION THERAPY | Facility: CLINIC | Age: 69
End: 2024-02-07

## 2024-02-07 ENCOUNTER — OUTPATIENT (OUTPATIENT)
Dept: OUTPATIENT SERVICES | Facility: HOSPITAL | Age: 69
LOS: 1 days | End: 2024-02-07
Payer: MEDICARE

## 2024-02-07 DIAGNOSIS — Z90.01 ACQUIRED ABSENCE OF EYE: Chronic | ICD-10-CM

## 2024-02-07 DIAGNOSIS — Z98.890 OTHER SPECIFIED POSTPROCEDURAL STATES: Chronic | ICD-10-CM

## 2024-02-07 DIAGNOSIS — Z90.49 ACQUIRED ABSENCE OF OTHER SPECIFIED PARTS OF DIGESTIVE TRACT: Chronic | ICD-10-CM

## 2024-02-07 DIAGNOSIS — C43.61 MALIGNANT MELANOMA OF RIGHT UPPER LIMB, INCLUDING SHOULDER: ICD-10-CM

## 2024-02-07 PROCEDURE — 96367 TX/PROPH/DG ADDL SEQ IV INF: CPT

## 2024-02-07 PROCEDURE — 96361 HYDRATE IV INFUSION ADD-ON: CPT

## 2024-02-07 PROCEDURE — 96413 CHEMO IV INFUSION 1 HR: CPT

## 2024-02-07 PROCEDURE — 96417 CHEMO IV INFUS EACH ADDL SEQ: CPT

## 2024-02-07 PROCEDURE — 96375 TX/PRO/DX INJ NEW DRUG ADDON: CPT

## 2024-02-07 RX ORDER — FAMOTIDINE 10 MG/ML
20 INJECTION INTRAVENOUS ONCE
Refills: 0 | Status: COMPLETED | OUTPATIENT
Start: 2024-02-07 | End: 2024-02-07

## 2024-02-07 RX ORDER — DIPHENHYDRAMINE HCL 50 MG
50 CAPSULE ORAL ONCE
Refills: 0 | Status: COMPLETED | OUTPATIENT
Start: 2024-02-07 | End: 2024-02-07

## 2024-02-07 RX ORDER — SODIUM CHLORIDE 9 MG/ML
1000 INJECTION, SOLUTION INTRAVENOUS
Refills: 0 | Status: COMPLETED | OUTPATIENT
Start: 2024-02-07 | End: 2024-02-07

## 2024-02-07 RX ORDER — CISPLATIN 1 MG/ML
47 INJECTION, SOLUTION INTRAVENOUS ONCE
Refills: 0 | Status: COMPLETED | OUTPATIENT
Start: 2024-02-07 | End: 2024-02-07

## 2024-02-07 RX ORDER — DEXAMETHASONE 0.5 MG/5ML
12 ELIXIR ORAL ONCE
Refills: 0 | Status: COMPLETED | OUTPATIENT
Start: 2024-02-07 | End: 2024-02-07

## 2024-02-07 RX ORDER — ETOPOSIDE 20 MG/ML
235 VIAL (ML) INTRAVENOUS ONCE
Refills: 0 | Status: COMPLETED | OUTPATIENT
Start: 2024-02-07 | End: 2024-02-07

## 2024-02-07 RX ADMIN — Medication 235 MILLIGRAM(S): at 11:40

## 2024-02-07 RX ADMIN — Medication 235 MILLIGRAM(S): at 12:40

## 2024-02-07 RX ADMIN — Medication 50 MILLIGRAM(S): at 11:15

## 2024-02-07 RX ADMIN — FAMOTIDINE 20 MILLIGRAM(S): 10 INJECTION INTRAVENOUS at 10:50

## 2024-02-07 RX ADMIN — SODIUM CHLORIDE 1000 MILLILITER(S): 9 INJECTION, SOLUTION INTRAVENOUS at 11:05

## 2024-02-07 RX ADMIN — Medication 102 MILLIGRAM(S): at 10:50

## 2024-02-07 RX ADMIN — Medication 122 MILLIGRAM(S): at 10:20

## 2024-02-07 RX ADMIN — SODIUM CHLORIDE 1000 MILLILITER(S): 9 INJECTION, SOLUTION INTRAVENOUS at 16:00

## 2024-02-07 RX ADMIN — FAMOTIDINE 104 MILLIGRAM(S): 10 INJECTION INTRAVENOUS at 10:35

## 2024-02-07 RX ADMIN — Medication 12 MILLIGRAM(S): at 10:35

## 2024-02-07 RX ADMIN — CISPLATIN 47 MILLIGRAM(S): 1 INJECTION, SOLUTION INTRAVENOUS at 13:50

## 2024-02-07 RX ADMIN — CISPLATIN 47 MILLIGRAM(S): 1 INJECTION, SOLUTION INTRAVENOUS at 12:50

## 2024-02-07 RX ADMIN — SODIUM CHLORIDE 500 MILLILITER(S): 9 INJECTION, SOLUTION INTRAVENOUS at 09:05

## 2024-02-07 RX ADMIN — SODIUM CHLORIDE 500 MILLILITER(S): 9 INJECTION, SOLUTION INTRAVENOUS at 16:00

## 2024-02-08 ENCOUNTER — OUTPATIENT (OUTPATIENT)
Dept: OUTPATIENT SERVICES | Facility: HOSPITAL | Age: 69
LOS: 1 days | End: 2024-02-08
Payer: MEDICARE

## 2024-02-08 ENCOUNTER — APPOINTMENT (OUTPATIENT)
Dept: INFUSION THERAPY | Facility: CLINIC | Age: 69
End: 2024-02-08

## 2024-02-08 VITALS — TEMPERATURE: 98 F | SYSTOLIC BLOOD PRESSURE: 161 MMHG | HEART RATE: 57 BPM | DIASTOLIC BLOOD PRESSURE: 70 MMHG

## 2024-02-08 VITALS — TEMPERATURE: 98.3 F | HEART RATE: 57 BPM | SYSTOLIC BLOOD PRESSURE: 161 MMHG | DIASTOLIC BLOOD PRESSURE: 70 MMHG

## 2024-02-08 DIAGNOSIS — C43.61 MALIGNANT MELANOMA OF RIGHT UPPER LIMB, INCLUDING SHOULDER: ICD-10-CM

## 2024-02-08 DIAGNOSIS — Z90.01 ACQUIRED ABSENCE OF EYE: Chronic | ICD-10-CM

## 2024-02-08 DIAGNOSIS — Z90.49 ACQUIRED ABSENCE OF OTHER SPECIFIED PARTS OF DIGESTIVE TRACT: Chronic | ICD-10-CM

## 2024-02-08 DIAGNOSIS — Z98.890 OTHER SPECIFIED POSTPROCEDURAL STATES: Chronic | ICD-10-CM

## 2024-02-08 PROCEDURE — 96375 TX/PRO/DX INJ NEW DRUG ADDON: CPT

## 2024-02-08 PROCEDURE — 96367 TX/PROPH/DG ADDL SEQ IV INF: CPT

## 2024-02-08 PROCEDURE — 96361 HYDRATE IV INFUSION ADD-ON: CPT

## 2024-02-08 PROCEDURE — 96413 CHEMO IV INFUSION 1 HR: CPT

## 2024-02-08 PROCEDURE — 96417 CHEMO IV INFUS EACH ADDL SEQ: CPT

## 2024-02-08 RX ORDER — SODIUM CHLORIDE 9 MG/ML
1000 INJECTION, SOLUTION INTRAVENOUS
Refills: 0 | Status: COMPLETED | OUTPATIENT
Start: 2024-02-08 | End: 2024-02-08

## 2024-02-08 RX ORDER — SODIUM CHLORIDE 9 MG/ML
1000 INJECTION, SOLUTION INTRAVENOUS
Refills: 0 | Status: DISCONTINUED | OUTPATIENT
Start: 2024-02-08 | End: 2024-02-08

## 2024-02-08 RX ORDER — DEXAMETHASONE 0.5 MG/5ML
12 ELIXIR ORAL ONCE
Refills: 0 | Status: COMPLETED | OUTPATIENT
Start: 2024-02-08 | End: 2024-02-08

## 2024-02-08 RX ORDER — SODIUM CHLORIDE 9 MG/ML
1000 INJECTION, SOLUTION INTRAVENOUS
Refills: 0 | Status: DISCONTINUED | OUTPATIENT
Start: 2024-02-08 | End: 2024-05-09

## 2024-02-08 RX ORDER — CISPLATIN 1 MG/ML
47 INJECTION, SOLUTION INTRAVENOUS ONCE
Refills: 0 | Status: COMPLETED | OUTPATIENT
Start: 2024-02-08 | End: 2024-02-08

## 2024-02-08 RX ORDER — ETOPOSIDE 20 MG/ML
235 VIAL (ML) INTRAVENOUS ONCE
Refills: 0 | Status: COMPLETED | OUTPATIENT
Start: 2024-02-08 | End: 2024-02-08

## 2024-02-08 RX ORDER — DIPHENHYDRAMINE HCL 50 MG
50 CAPSULE ORAL ONCE
Refills: 0 | Status: COMPLETED | OUTPATIENT
Start: 2024-02-08 | End: 2024-02-08

## 2024-02-08 RX ORDER — FAMOTIDINE 10 MG/ML
20 INJECTION INTRAVENOUS ONCE
Refills: 0 | Status: COMPLETED | OUTPATIENT
Start: 2024-02-08 | End: 2024-02-08

## 2024-02-08 RX ADMIN — Medication 235 MILLIGRAM(S): at 13:15

## 2024-02-08 RX ADMIN — Medication 102 MILLIGRAM(S): at 11:30

## 2024-02-08 RX ADMIN — Medication 122 MILLIGRAM(S): at 11:00

## 2024-02-08 RX ADMIN — SODIUM CHLORIDE 1000 MILLILITER(S): 9 INJECTION, SOLUTION INTRAVENOUS at 16:20

## 2024-02-08 RX ADMIN — Medication 235 MILLIGRAM(S): at 12:15

## 2024-02-08 RX ADMIN — SODIUM CHLORIDE 500 MILLILITER(S): 9 INJECTION, SOLUTION INTRAVENOUS at 10:00

## 2024-02-08 RX ADMIN — Medication 50 MILLIGRAM(S): at 12:00

## 2024-02-08 RX ADMIN — CISPLATIN 47 MILLIGRAM(S): 1 INJECTION, SOLUTION INTRAVENOUS at 13:20

## 2024-02-08 RX ADMIN — Medication 12 MILLIGRAM(S): at 11:15

## 2024-02-08 RX ADMIN — FAMOTIDINE 20 MILLIGRAM(S): 10 INJECTION INTRAVENOUS at 11:30

## 2024-02-08 RX ADMIN — CISPLATIN 47 MILLIGRAM(S): 1 INJECTION, SOLUTION INTRAVENOUS at 14:20

## 2024-02-08 RX ADMIN — FAMOTIDINE 104 MILLIGRAM(S): 10 INJECTION INTRAVENOUS at 11:15

## 2024-02-08 RX ADMIN — SODIUM CHLORIDE 500 MILLILITER(S): 9 INJECTION, SOLUTION INTRAVENOUS at 16:20

## 2024-02-08 RX ADMIN — SODIUM CHLORIDE 1000 MILLILITER(S): 9 INJECTION, SOLUTION INTRAVENOUS at 12:00

## 2024-02-09 ENCOUNTER — OUTPATIENT (OUTPATIENT)
Dept: OUTPATIENT SERVICES | Facility: HOSPITAL | Age: 69
LOS: 1 days | End: 2024-02-09
Payer: MEDICARE

## 2024-02-09 ENCOUNTER — APPOINTMENT (OUTPATIENT)
Dept: INFUSION THERAPY | Facility: CLINIC | Age: 69
End: 2024-02-09

## 2024-02-09 VITALS — HEART RATE: 63 BPM | SYSTOLIC BLOOD PRESSURE: 130 MMHG | TEMPERATURE: 98 F | DIASTOLIC BLOOD PRESSURE: 76 MMHG

## 2024-02-09 DIAGNOSIS — Z90.01 ACQUIRED ABSENCE OF EYE: Chronic | ICD-10-CM

## 2024-02-09 DIAGNOSIS — C43.61 MALIGNANT MELANOMA OF RIGHT UPPER LIMB, INCLUDING SHOULDER: ICD-10-CM

## 2024-02-09 DIAGNOSIS — Z90.49 ACQUIRED ABSENCE OF OTHER SPECIFIED PARTS OF DIGESTIVE TRACT: Chronic | ICD-10-CM

## 2024-02-09 DIAGNOSIS — Z98.890 OTHER SPECIFIED POSTPROCEDURAL STATES: Chronic | ICD-10-CM

## 2024-02-09 PROCEDURE — 96417 CHEMO IV INFUS EACH ADDL SEQ: CPT

## 2024-02-09 PROCEDURE — 96361 HYDRATE IV INFUSION ADD-ON: CPT

## 2024-02-09 PROCEDURE — 96413 CHEMO IV INFUSION 1 HR: CPT

## 2024-02-09 PROCEDURE — 96367 TX/PROPH/DG ADDL SEQ IV INF: CPT

## 2024-02-09 RX ORDER — FAMOTIDINE 10 MG/ML
20 INJECTION INTRAVENOUS ONCE
Refills: 0 | Status: COMPLETED | OUTPATIENT
Start: 2024-02-09 | End: 2024-02-09

## 2024-02-09 RX ORDER — ETOPOSIDE 20 MG/ML
235 VIAL (ML) INTRAVENOUS ONCE
Refills: 0 | Status: COMPLETED | OUTPATIENT
Start: 2024-02-09 | End: 2024-02-09

## 2024-02-09 RX ORDER — DIPHENHYDRAMINE HCL 50 MG
50 CAPSULE ORAL ONCE
Refills: 0 | Status: COMPLETED | OUTPATIENT
Start: 2024-02-09 | End: 2024-02-09

## 2024-02-09 RX ORDER — DEXAMETHASONE 0.5 MG/5ML
12 ELIXIR ORAL ONCE
Refills: 0 | Status: COMPLETED | OUTPATIENT
Start: 2024-02-09 | End: 2024-02-09

## 2024-02-09 RX ORDER — SODIUM CHLORIDE 9 MG/ML
1000 INJECTION, SOLUTION INTRAVENOUS
Refills: 0 | Status: COMPLETED | OUTPATIENT
Start: 2024-02-09 | End: 2024-02-09

## 2024-02-09 RX ORDER — CISPLATIN 1 MG/ML
47 INJECTION, SOLUTION INTRAVENOUS ONCE
Refills: 0 | Status: COMPLETED | OUTPATIENT
Start: 2024-02-09 | End: 2024-02-09

## 2024-02-09 RX ADMIN — SODIUM CHLORIDE 1000 MILLILITER(S): 9 INJECTION, SOLUTION INTRAVENOUS at 15:55

## 2024-02-09 RX ADMIN — Medication 12 MILLIGRAM(S): at 10:58

## 2024-02-09 RX ADMIN — SODIUM CHLORIDE 500 MILLILITER(S): 9 INJECTION, SOLUTION INTRAVENOUS at 13:55

## 2024-02-09 RX ADMIN — Medication 50 MILLIGRAM(S): at 11:38

## 2024-02-09 RX ADMIN — Medication 235 MILLIGRAM(S): at 12:48

## 2024-02-09 RX ADMIN — CISPLATIN 47 MILLIGRAM(S): 1 INJECTION, SOLUTION INTRAVENOUS at 13:55

## 2024-02-09 RX ADMIN — Medication 102 MILLIGRAM(S): at 11:18

## 2024-02-09 RX ADMIN — FAMOTIDINE 104 MILLIGRAM(S): 10 INJECTION INTRAVENOUS at 10:58

## 2024-02-09 RX ADMIN — FAMOTIDINE 20 MILLIGRAM(S): 10 INJECTION INTRAVENOUS at 11:18

## 2024-02-09 RX ADMIN — CISPLATIN 47 MILLIGRAM(S): 1 INJECTION, SOLUTION INTRAVENOUS at 12:55

## 2024-02-09 RX ADMIN — SODIUM CHLORIDE 1000 MILLILITER(S): 9 INJECTION, SOLUTION INTRAVENOUS at 11:32

## 2024-02-09 RX ADMIN — SODIUM CHLORIDE 500 MILLILITER(S): 9 INJECTION, SOLUTION INTRAVENOUS at 09:32

## 2024-02-09 RX ADMIN — Medication 122 MILLIGRAM(S): at 10:32

## 2024-02-09 RX ADMIN — Medication 235 MILLIGRAM(S): at 11:38

## 2024-02-11 ENCOUNTER — INPATIENT (INPATIENT)
Facility: HOSPITAL | Age: 69
LOS: 2 days | Discharge: HOME HEALTH | DRG: 872 | End: 2024-02-14
Attending: INTERNAL MEDICINE | Admitting: HOSPITALIST
Payer: MEDICARE

## 2024-02-11 VITALS
HEART RATE: 102 BPM | DIASTOLIC BLOOD PRESSURE: 88 MMHG | WEIGHT: 270.07 LBS | RESPIRATION RATE: 18 BRPM | TEMPERATURE: 98 F | SYSTOLIC BLOOD PRESSURE: 130 MMHG | OXYGEN SATURATION: 95 %

## 2024-02-11 DIAGNOSIS — Z90.01 ACQUIRED ABSENCE OF EYE: Chronic | ICD-10-CM

## 2024-02-11 DIAGNOSIS — L03.90 CELLULITIS, UNSPECIFIED: ICD-10-CM

## 2024-02-11 DIAGNOSIS — Z98.890 OTHER SPECIFIED POSTPROCEDURAL STATES: Chronic | ICD-10-CM

## 2024-02-11 DIAGNOSIS — Z90.49 ACQUIRED ABSENCE OF OTHER SPECIFIED PARTS OF DIGESTIVE TRACT: Chronic | ICD-10-CM

## 2024-02-11 LAB
ALBUMIN SERPL ELPH-MCNC: 3.2 G/DL — LOW (ref 3.5–5.2)
ALP SERPL-CCNC: 77 U/L — SIGNIFICANT CHANGE UP (ref 30–115)
ALT FLD-CCNC: 11 U/L — SIGNIFICANT CHANGE UP (ref 0–41)
ANION GAP SERPL CALC-SCNC: 10 MMOL/L — SIGNIFICANT CHANGE UP (ref 7–14)
AST SERPL-CCNC: 13 U/L — SIGNIFICANT CHANGE UP (ref 0–41)
BASOPHILS # BLD AUTO: 0.06 K/UL — SIGNIFICANT CHANGE UP (ref 0–0.2)
BASOPHILS NFR BLD AUTO: 0.4 % — SIGNIFICANT CHANGE UP (ref 0–1)
BILIRUB SERPL-MCNC: 0.4 MG/DL — SIGNIFICANT CHANGE UP (ref 0.2–1.2)
BUN SERPL-MCNC: 19 MG/DL — SIGNIFICANT CHANGE UP (ref 10–20)
CALCIUM SERPL-MCNC: 8.3 MG/DL — LOW (ref 8.4–10.5)
CHLORIDE SERPL-SCNC: 94 MMOL/L — LOW (ref 98–110)
CO2 SERPL-SCNC: 29 MMOL/L — SIGNIFICANT CHANGE UP (ref 17–32)
CREAT SERPL-MCNC: 0.9 MG/DL — SIGNIFICANT CHANGE UP (ref 0.7–1.5)
EGFR: 92 ML/MIN/1.73M2 — SIGNIFICANT CHANGE UP
EOSINOPHIL # BLD AUTO: 0 K/UL — SIGNIFICANT CHANGE UP (ref 0–0.7)
EOSINOPHIL NFR BLD AUTO: 0 % — SIGNIFICANT CHANGE UP (ref 0–8)
GLUCOSE SERPL-MCNC: 135 MG/DL — HIGH (ref 70–99)
HCT VFR BLD CALC: 33.7 % — LOW (ref 42–52)
HGB BLD-MCNC: 11.8 G/DL — LOW (ref 14–18)
IMM GRANULOCYTES NFR BLD AUTO: 2.4 % — HIGH (ref 0.1–0.3)
LACTATE SERPL-SCNC: 1.5 MMOL/L — SIGNIFICANT CHANGE UP (ref 0.7–2)
LYMPHOCYTES # BLD AUTO: 0.51 K/UL — LOW (ref 1.2–3.4)
LYMPHOCYTES # BLD AUTO: 3.3 % — LOW (ref 20.5–51.1)
MCHC RBC-ENTMCNC: 30.1 PG — SIGNIFICANT CHANGE UP (ref 27–31)
MCHC RBC-ENTMCNC: 35 G/DL — SIGNIFICANT CHANGE UP (ref 32–37)
MCV RBC AUTO: 86 FL — SIGNIFICANT CHANGE UP (ref 80–94)
MONOCYTES # BLD AUTO: 0.01 K/UL — LOW (ref 0.1–0.6)
MONOCYTES NFR BLD AUTO: 0.1 % — LOW (ref 1.7–9.3)
MRSA PCR RESULT.: POSITIVE
NEUTROPHILS # BLD AUTO: 14.33 K/UL — HIGH (ref 1.4–6.5)
NEUTROPHILS NFR BLD AUTO: 93.8 % — HIGH (ref 42.2–75.2)
NRBC # BLD: 0 /100 WBCS — SIGNIFICANT CHANGE UP (ref 0–0)
PLATELET # BLD AUTO: 420 K/UL — HIGH (ref 130–400)
PMV BLD: 9.3 FL — SIGNIFICANT CHANGE UP (ref 7.4–10.4)
POTASSIUM SERPL-MCNC: 4.7 MMOL/L — SIGNIFICANT CHANGE UP (ref 3.5–5)
POTASSIUM SERPL-SCNC: 4.7 MMOL/L — SIGNIFICANT CHANGE UP (ref 3.5–5)
PROT SERPL-MCNC: 5.6 G/DL — LOW (ref 6–8)
RBC # BLD: 3.92 M/UL — LOW (ref 4.7–6.1)
RBC # FLD: 12.3 % — SIGNIFICANT CHANGE UP (ref 11.5–14.5)
SODIUM SERPL-SCNC: 133 MMOL/L — LOW (ref 135–146)
WBC # BLD: 15.28 K/UL — HIGH (ref 4.8–10.8)
WBC # FLD AUTO: 15.28 K/UL — HIGH (ref 4.8–10.8)

## 2024-02-11 PROCEDURE — 85027 COMPLETE CBC AUTOMATED: CPT

## 2024-02-11 PROCEDURE — 86140 C-REACTIVE PROTEIN: CPT

## 2024-02-11 PROCEDURE — 85025 COMPLETE CBC W/AUTO DIFF WBC: CPT

## 2024-02-11 PROCEDURE — 88304 TISSUE EXAM BY PATHOLOGIST: CPT

## 2024-02-11 PROCEDURE — 80048 BASIC METABOLIC PNL TOTAL CA: CPT

## 2024-02-11 PROCEDURE — 87070 CULTURE OTHR SPECIMN AEROBIC: CPT

## 2024-02-11 PROCEDURE — 99222 1ST HOSP IP/OBS MODERATE 55: CPT

## 2024-02-11 PROCEDURE — 87077 CULTURE AEROBIC IDENTIFY: CPT

## 2024-02-11 PROCEDURE — 80053 COMPREHEN METABOLIC PANEL: CPT

## 2024-02-11 PROCEDURE — 87640 STAPH A DNA AMP PROBE: CPT

## 2024-02-11 PROCEDURE — 99285 EMERGENCY DEPT VISIT HI MDM: CPT

## 2024-02-11 PROCEDURE — 36415 COLL VENOUS BLD VENIPUNCTURE: CPT

## 2024-02-11 PROCEDURE — 87641 MR-STAPH DNA AMP PROBE: CPT

## 2024-02-11 PROCEDURE — 86803 HEPATITIS C AB TEST: CPT

## 2024-02-11 PROCEDURE — 87186 SC STD MICRODIL/AGAR DIL: CPT

## 2024-02-11 RX ORDER — SODIUM CHLORIDE 9 MG/ML
1000 INJECTION INTRAMUSCULAR; INTRAVENOUS; SUBCUTANEOUS ONCE
Refills: 0 | Status: COMPLETED | OUTPATIENT
Start: 2024-02-11 | End: 2024-02-11

## 2024-02-11 RX ORDER — LEVOTHYROXINE SODIUM 125 MCG
1 TABLET ORAL
Refills: 0 | DISCHARGE

## 2024-02-11 RX ORDER — SODIUM CHLORIDE 9 MG/ML
1000 INJECTION INTRAMUSCULAR; INTRAVENOUS; SUBCUTANEOUS
Refills: 0 | Status: DISCONTINUED | OUTPATIENT
Start: 2024-02-11 | End: 2024-02-12

## 2024-02-11 RX ORDER — ENOXAPARIN SODIUM 100 MG/ML
40 INJECTION SUBCUTANEOUS EVERY 24 HOURS
Refills: 0 | Status: DISCONTINUED | OUTPATIENT
Start: 2024-02-11 | End: 2024-02-13

## 2024-02-11 RX ORDER — TRAMADOL HYDROCHLORIDE 50 MG/1
0 TABLET ORAL
Qty: 0 | Refills: 0 | DISCHARGE

## 2024-02-11 RX ORDER — LOSARTAN POTASSIUM 100 MG/1
50 TABLET, FILM COATED ORAL AT BEDTIME
Refills: 0 | Status: DISCONTINUED | OUTPATIENT
Start: 2024-02-11 | End: 2024-02-13

## 2024-02-11 RX ORDER — VANCOMYCIN HCL 1 G
1750 VIAL (EA) INTRAVENOUS EVERY 12 HOURS
Refills: 0 | Status: DISCONTINUED | OUTPATIENT
Start: 2024-02-11 | End: 2024-02-12

## 2024-02-11 RX ORDER — VANCOMYCIN HCL 1 G
1000 VIAL (EA) INTRAVENOUS ONCE
Refills: 0 | Status: COMPLETED | OUTPATIENT
Start: 2024-02-11 | End: 2024-02-11

## 2024-02-11 RX ORDER — AZTREONAM 2 G
1000 VIAL (EA) INJECTION EVERY 8 HOURS
Refills: 0 | Status: DISCONTINUED | OUTPATIENT
Start: 2024-02-11 | End: 2024-02-13

## 2024-02-11 RX ORDER — LOSARTAN POTASSIUM 100 MG/1
1 TABLET, FILM COATED ORAL
Refills: 0 | DISCHARGE

## 2024-02-11 RX ORDER — CHOLECALCIFEROL (VITAMIN D3) 125 MCG
1000 CAPSULE ORAL DAILY
Refills: 0 | Status: DISCONTINUED | OUTPATIENT
Start: 2024-02-12 | End: 2024-02-13

## 2024-02-11 RX ORDER — CHOLECALCIFEROL (VITAMIN D3) 125 MCG
1 CAPSULE ORAL
Qty: 0 | Refills: 0 | DISCHARGE

## 2024-02-11 RX ORDER — LEVOTHYROXINE SODIUM 125 MCG
137 TABLET ORAL DAILY
Refills: 0 | Status: DISCONTINUED | OUTPATIENT
Start: 2024-02-12 | End: 2024-02-13

## 2024-02-11 RX ORDER — ACETAMINOPHEN 500 MG
650 TABLET ORAL EVERY 6 HOURS
Refills: 0 | Status: DISCONTINUED | OUTPATIENT
Start: 2024-02-11 | End: 2024-02-13

## 2024-02-11 RX ORDER — SIMETHICONE 80 MG/1
80 TABLET, CHEWABLE ORAL ONCE
Refills: 0 | Status: COMPLETED | OUTPATIENT
Start: 2024-02-11 | End: 2024-02-11

## 2024-02-11 RX ADMIN — SIMETHICONE 80 MILLIGRAM(S): 80 TABLET, CHEWABLE ORAL at 20:36

## 2024-02-11 RX ADMIN — Medication 50 MILLIGRAM(S): at 17:46

## 2024-02-11 RX ADMIN — Medication 250 MILLIGRAM(S): at 09:04

## 2024-02-11 RX ADMIN — Medication 50 MILLIGRAM(S): at 21:13

## 2024-02-11 RX ADMIN — SODIUM CHLORIDE 75 MILLILITER(S): 9 INJECTION INTRAMUSCULAR; INTRAVENOUS; SUBCUTANEOUS at 13:03

## 2024-02-11 RX ADMIN — SODIUM CHLORIDE 2000 MILLILITER(S): 9 INJECTION INTRAMUSCULAR; INTRAVENOUS; SUBCUTANEOUS at 12:58

## 2024-02-11 RX ADMIN — SODIUM CHLORIDE 75 MILLILITER(S): 9 INJECTION INTRAMUSCULAR; INTRAVENOUS; SUBCUTANEOUS at 21:13

## 2024-02-11 RX ADMIN — ENOXAPARIN SODIUM 40 MILLIGRAM(S): 100 INJECTION SUBCUTANEOUS at 12:57

## 2024-02-11 RX ADMIN — Medication 250 MILLIGRAM(S): at 17:53

## 2024-02-11 RX ADMIN — Medication 100 MILLIGRAM(S): at 08:12

## 2024-02-11 RX ADMIN — Medication 1 TABLET(S): at 12:58

## 2024-02-11 RX ADMIN — LOSARTAN POTASSIUM 50 MILLIGRAM(S): 100 TABLET, FILM COATED ORAL at 21:14

## 2024-02-11 NOTE — ED PROVIDER NOTE - PHYSICAL EXAMINATION
Gen: Alert, NAD, well appearing  Head: NC, AT, +right eye prosthesis  ENT: normal hearing  Mskel:  Right upper extremity: +redness and swelling to radial aspect of distal wrist/forearm with drainage. FROM intact. n/v intact. No cyanosis  Skin: See MSK.  warm/dry  Neuro: AAOx3, no sensory/motor deficits

## 2024-02-11 NOTE — ED ADULT NURSE NOTE - NSICDXPASTMEDICALHX_GEN_ALL_CORE_FT
no paresthesias/no generalized seizures/no vertigo/no loss of consciousness/no tremors/no headache/no hemiparesis/no weakness PAST MEDICAL HISTORY:  HTN (hypertension)     Hypothyroidism, unspecified type     Obesity     Other secondary osteoarthritis of right hand

## 2024-02-11 NOTE — H&P ADULT - NSHPPHYSICALEXAM_GEN_ALL_CORE
VITALS:     ICU Vital Signs Last 24 Hrs  T(C): 36.8 (11 Feb 2024 07:07), Max: 36.8 (11 Feb 2024 07:07)  T(F): 98.3 (11 Feb 2024 07:07), Max: 98.3 (11 Feb 2024 07:07)  HR: 102 (11 Feb 2024 07:07) (102 - 102)  BP: 130/88 (11 Feb 2024 07:07) (130/88 - 130/88)  RR: 18 (11 Feb 2024 07:07) (18 - 18)  SpO2: 95% (11 Feb 2024 07:07) (95% - 95%)    O2 Parameters below as of 11 Feb 2024 07:07  Patient On (Oxygen Delivery Method): room air      GENERAL: NAD, lying in bed comfortably  HEAD:  Atraumatic, Normocephalic  EYES: EOMI,  L PERRLA, conjunctiva and sclera clear, R eye prosthesis   ENT: Moist mucous membranes  NECK: Supple, No JVD  CHEST/LUNG: Clear to auscultation bilaterally; No rales, rhonchi, wheezing, or rubs. Unlabored respirations, R side chemo port in place , no tenderness   HEART: Regular rate and rhythm; No murmurs, rubs, or gallops  ABDOMEN: Bowel sounds present; Soft, Nontender, Nondistended. No hepatomegally  EXTREMITIES:  pitting edema, L dorsal foot blister s/p skin removal   NERVOUS SYSTEM:  Alert & Oriented X3, speech clear. No deficits   MSK: R forearm carbuncle with  2-3 small abscess , puss expressed no bleeding ,erythema, hand edema, sensation intact, radial pulse present hand  5/5

## 2024-02-11 NOTE — ED PROVIDER NOTE - ATTENDING APP SHARED VISIT CONTRIBUTION OF CARE
Pt has a history of testicular cancer about two months ago. S/p orchiectomy and currently on chemo. Last chemo(second cycle) completed this past Friday. Pt reports two days of infection right arm. On exam three abscess and erythema to the radial aspect of the right distal forearm. Drainage noted. S1S2 rrr, lungs clear, right chest portacath. right hand is swollen, basilateral lower ext edema. AAO3.

## 2024-02-11 NOTE — H&P ADULT - ASSESSMENT
A 68-year-old male history of Melanoma, R eye removal,  hypothyroid, hypertension, testicular CA S/p R  orchiectomy  12/7/23 currently on chemo sine 1/15/23  completed last chemo  Etoposide and Cisplatin (second cycle) past Friday presents with R arm infection.    #R  forearm cellulitis  vs abscess   Sepsis on admission   -cw IV abx   -follow up bcx   -monitor for fever  -ID  consult   -pain meds prn      #hx testicular CA S/p R  orchiectomy  12/7/23  -on chemo since 1/15/23  completed last chemo  Etoposide and Cisplatin   -outpt follow up with Dr. Burton      #hx Hypothyroidism   cw levothyroxine    #hx HTN  cw losartan   -dash diet    DVT prophylaxis   Code statu FULL           A 68-year-old male history of Melanoma, R eye removal,  hypothyroid, hypertension, testicular CA S/p R  orchiectomy  12/7/23 currently on chemo sine 1/15/23  completed last chemo  Etoposide and Cisplatin (second cycle) past Friday presents with R arm infection.    #R  forearm cellulitis  vs abscess   Sepsis on admission   -cw IV abx   -follow up bcx   -monitor for fever  -ID  consult   -pain meds prn     # L foot blister s.p skin removal in ED   -local wound care   -wound care nurse consult      #hx testicular CA S/p R  orchiectomy  12/7/23  -on chemo since 1/15/23  completed last chemo  Etoposide and Cisplatin   -outpt follow up with Dr. Burton      #hx Hypothyroidism   cw levothyroxine    #hx HTN  cw losartan   -dash diet    DVT prophylaxis   Code statu FULL           A 68-year-old male history of Melanoma, R eye removal,  hypothyroid, hypertension, testicular CA S/p R  orchiectomy  12/7/23 currently on chemo sine 1/15/23  completed last chemo  Etoposide and Cisplatin (second cycle) past Friday presents with R arm infection.    #R  forearm cellulitis  vs abscess   Sepsis on admission   will give 1 L bolus and maintenance fluids   -cw IV abx   -follow up bcx   -monitor for fever  - will  consider ID  consult  if cellulitis does not improve   -wound culture   -pain meds prn   -MRSA    # L foot blister s.p skin removal in ED   -local wound care   -wound care nurse consult      #hx testicular CA S/p R  orchiectomy  12/7/23  -on chemo since 1/15/23  completed last chemo  Etoposide and Cisplatin   -outpt follow up with Dr. Burton      #hx Hypothyroidism   cw levothyroxine    #hx HTN  cw losartan   -dash diet    DVT prophylaxis   Code statu FULL           A 68-year-old male history of Melanoma, R eye removal,  hypothyroid, hypertension, testicular CA S/p R  orchiectomy  12/7/23 currently on chemo sine 1/15/23  completed last chemo  Etoposide and Cisplatin (second cycle) past Friday presents with R arm infection.    #R  forearm cellulitis  vs abscess   Sepsis on admission   will give 1 L bolus and maintenance fluids   -cw IV abx   -follow up bcx   -monitor for fever  - will  consider ID  consult  if cellulitis does not improve   -wound culture   -pain meds prn   -MRSA    # L foot blister s.p skin removal in ED   -local wound care   -wound care nurse consult      #hx testicular CA S/p R  orchiectomy  12/7/23  -on chemo since 1/15/23  completed last chemo  Etoposide and Cisplatin   -outpt follow up with Dr. Atwood       #hx Hypothyroidism   cw levothyroxine    #hx HTN  cw losartan   -dash diet    DVT prophylaxis   Code statu FULL

## 2024-02-11 NOTE — ED ADULT NURSE NOTE - NSFALLHARMRISKINTERV_ED_ALL_ED

## 2024-02-11 NOTE — H&P ADULT - NS ATTEND AMEND GEN_ALL_CORE FT
A 68-year-old male history of Melanoma, R eye removal,  hypothyroid, hypertension, testicular CA S/p R  orchiectomy  12/7/23 currently on chemo sine 1/15/23  completed last chemo  Etoposide and Cisplatin (second cycle) past Friday presents with R arm infection.    1. Sepsis (POA) secondary to R  forearm cellulitis and small abscess spontaneously draining  in an immunocompromise pt (on chemo)   - Admit to medicine   - Baseline CRP         -LA:1.5    - Received 1 liter bolus and maintanace IVF NS at 75ml/hr   - Cont vancomycin   - hx of PCN allergy. Start Aztreonam   - Blood cx:pending              -Wound cx:pending            - MRSA:Pending   - Keep right arm elevated /Warm compress to help drainage   - ID consult:pending     2.  L foot blister s.p skin removal in ED   -local wound care   -wound care nurse consult     3.hx testicular CA S/p R  orchiectomy  12/7/23  -on chemo since 1/15/23  completed last chemo  Etoposide and Cisplatin   -outpt follow up with Dr. Atwood       4. hx Hypothyroidism   - cw levothyroxine    5. hx HTN  - cw losartan   -dash diet    DVT prophylaxis   Code statu FULL

## 2024-02-11 NOTE — PATIENT PROFILE ADULT - TRANSPORTATION
[email or web address]
no

## 2024-02-11 NOTE — PATIENT PROFILE ADULT - FALL HARM RISK - HARM RISK INTERVENTIONS

## 2024-02-11 NOTE — H&P ADULT - HISTORY OF PRESENT ILLNESS
A 68-year-old male history of Melanoma, R eye removal,  hypothyroid, hypertension, testicular CA S/p R  orchiectomy  12/7/23 currently on chemo since 1/15/23  completed last chemo  Etoposide and Cisplatin (second cycle) past Friday presents with R arm infection. Pt states had a boil on his R forearm 2 days ago, applied war compress it opened up, puss and bleeding yesterday.  PT states swelling of his R hand since yesterday. Pt denies pain on his hand. Pt denies fever at home.  Pt reports had a blister on his L foot dorsal side, skin removed in ED. PT denies nausea, vomiting, diarrhea. PT follows up with  oncology Dr. CARUSO. PT denies chest pain, shortness of breath, burning with urination,

## 2024-02-11 NOTE — H&P ADULT - NSHPLABSRESULTS_GEN_ALL_CORE
11.8   15.28 )-----------( 420      ( 11 Feb 2024 07:30 )             33.7       02-11    133<L>  |  94<L>  |  19  ----------------------------<  135<H>  4.7   |  29  |  0.9    Ca    8.3<L>      11 Feb 2024 07:30    TPro  5.6<L>  /  Alb  3.2<L>  /  TBili  0.4  /  DBili  x   /  AST  13  /  ALT  11  /  AlkPhos  77  02-11                  Urinalysis Basic - ( 11 Feb 2024 07:30 )    Color: x / Appearance: x / SG: x / pH: x  Gluc: 135 mg/dL / Ketone: x  / Bili: x / Urobili: x   Blood: x / Protein: x / Nitrite: x   Leuk Esterase: x / RBC: x / WBC x   Sq Epi: x / Non Sq Epi: x / Bacteria: x    Lactate Trend  02-11 @ 07:30 Lactate:1.5

## 2024-02-11 NOTE — PATIENT PROFILE ADULT - LEGAL HELP
Attempted to contact the patient to make sure the fax number was correct or that there was another number, each time the fax has tried, its busy or no answer. LMOVM with all of this information and to let us know.
no

## 2024-02-11 NOTE — ED PROVIDER NOTE - OBJECTIVE STATEMENT
68-year-old male history of hypothyroid, hypertension, testicular CA on chemo complaining of right wrist infection.  Patient states the boil started 2 days ago and got worse.  Patient finished his second round of chemo on Friday.  No fevers.

## 2024-02-12 LAB
ALBUMIN SERPL ELPH-MCNC: 2.7 G/DL — LOW (ref 3.5–5.2)
ALP SERPL-CCNC: 70 U/L — SIGNIFICANT CHANGE UP (ref 30–115)
ALT FLD-CCNC: 9 U/L — SIGNIFICANT CHANGE UP (ref 0–41)
ANION GAP SERPL CALC-SCNC: 9 MMOL/L — SIGNIFICANT CHANGE UP (ref 7–14)
AST SERPL-CCNC: 13 U/L — SIGNIFICANT CHANGE UP (ref 0–41)
BASOPHILS # BLD AUTO: 0.07 K/UL — SIGNIFICANT CHANGE UP (ref 0–0.2)
BASOPHILS NFR BLD AUTO: 0.7 % — SIGNIFICANT CHANGE UP (ref 0–1)
BILIRUB SERPL-MCNC: 0.4 MG/DL — SIGNIFICANT CHANGE UP (ref 0.2–1.2)
BUN SERPL-MCNC: 15 MG/DL — SIGNIFICANT CHANGE UP (ref 10–20)
CALCIUM SERPL-MCNC: 8 MG/DL — LOW (ref 8.4–10.5)
CHLORIDE SERPL-SCNC: 98 MMOL/L — SIGNIFICANT CHANGE UP (ref 98–110)
CO2 SERPL-SCNC: 27 MMOL/L — SIGNIFICANT CHANGE UP (ref 17–32)
CREAT SERPL-MCNC: 0.9 MG/DL — SIGNIFICANT CHANGE UP (ref 0.7–1.5)
CRP SERPL-MCNC: 92.6 MG/L — HIGH
EGFR: 92 ML/MIN/1.73M2 — SIGNIFICANT CHANGE UP
EOSINOPHIL # BLD AUTO: 0 K/UL — SIGNIFICANT CHANGE UP (ref 0–0.7)
EOSINOPHIL NFR BLD AUTO: 0 % — SIGNIFICANT CHANGE UP (ref 0–8)
GLUCOSE SERPL-MCNC: 115 MG/DL — HIGH (ref 70–99)
HCT VFR BLD CALC: 30.2 % — LOW (ref 42–52)
HCV AB S/CO SERPL IA: 0.03 COI — SIGNIFICANT CHANGE UP
HCV AB SERPL-IMP: SIGNIFICANT CHANGE UP
HGB BLD-MCNC: 10.6 G/DL — LOW (ref 14–18)
IMM GRANULOCYTES NFR BLD AUTO: 4.8 % — HIGH (ref 0.1–0.3)
LYMPHOCYTES # BLD AUTO: 0.59 K/UL — LOW (ref 1.2–3.4)
LYMPHOCYTES # BLD AUTO: 6.3 % — LOW (ref 20.5–51.1)
MCHC RBC-ENTMCNC: 29.9 PG — SIGNIFICANT CHANGE UP (ref 27–31)
MCHC RBC-ENTMCNC: 35.1 G/DL — SIGNIFICANT CHANGE UP (ref 32–37)
MCV RBC AUTO: 85.1 FL — SIGNIFICANT CHANGE UP (ref 80–94)
MONOCYTES # BLD AUTO: 0.03 K/UL — LOW (ref 0.1–0.6)
MONOCYTES NFR BLD AUTO: 0.3 % — LOW (ref 1.7–9.3)
NEUTROPHILS # BLD AUTO: 8.3 K/UL — HIGH (ref 1.4–6.5)
NEUTROPHILS NFR BLD AUTO: 87.9 % — HIGH (ref 42.2–75.2)
NRBC # BLD: 0 /100 WBCS — SIGNIFICANT CHANGE UP (ref 0–0)
PLATELET # BLD AUTO: 303 K/UL — SIGNIFICANT CHANGE UP (ref 130–400)
PMV BLD: 9.7 FL — SIGNIFICANT CHANGE UP (ref 7.4–10.4)
POTASSIUM SERPL-MCNC: 4.1 MMOL/L — SIGNIFICANT CHANGE UP (ref 3.5–5)
POTASSIUM SERPL-SCNC: 4.1 MMOL/L — SIGNIFICANT CHANGE UP (ref 3.5–5)
PROT SERPL-MCNC: 5 G/DL — LOW (ref 6–8)
RBC # BLD: 3.55 M/UL — LOW (ref 4.7–6.1)
RBC # FLD: 12.2 % — SIGNIFICANT CHANGE UP (ref 11.5–14.5)
SODIUM SERPL-SCNC: 134 MMOL/L — LOW (ref 135–146)
WBC # BLD: 9.44 K/UL — SIGNIFICANT CHANGE UP (ref 4.8–10.8)
WBC # FLD AUTO: 9.44 K/UL — SIGNIFICANT CHANGE UP (ref 4.8–10.8)

## 2024-02-12 PROCEDURE — 99223 1ST HOSP IP/OBS HIGH 75: CPT | Mod: 57

## 2024-02-12 PROCEDURE — 99232 SBSQ HOSP IP/OBS MODERATE 35: CPT

## 2024-02-12 PROCEDURE — 99221 1ST HOSP IP/OBS SF/LOW 40: CPT

## 2024-02-12 RX ORDER — CHLORHEXIDINE GLUCONATE 213 G/1000ML
1 SOLUTION TOPICAL
Refills: 0 | Status: DISCONTINUED | OUTPATIENT
Start: 2024-02-12 | End: 2024-02-13

## 2024-02-12 RX ORDER — SIMETHICONE 80 MG/1
80 TABLET, CHEWABLE ORAL THREE TIMES A DAY
Refills: 0 | Status: DISCONTINUED | OUTPATIENT
Start: 2024-02-12 | End: 2024-02-13

## 2024-02-12 RX ORDER — LINEZOLID 600 MG/300ML
600 INJECTION, SOLUTION INTRAVENOUS EVERY 12 HOURS
Refills: 0 | Status: DISCONTINUED | OUTPATIENT
Start: 2024-02-12 | End: 2024-02-13

## 2024-02-12 RX ADMIN — Medication 1000 UNIT(S): at 11:07

## 2024-02-12 RX ADMIN — SODIUM CHLORIDE 75 MILLILITER(S): 9 INJECTION INTRAMUSCULAR; INTRAVENOUS; SUBCUTANEOUS at 10:43

## 2024-02-12 RX ADMIN — LINEZOLID 600 MILLIGRAM(S): 600 INJECTION, SOLUTION INTRAVENOUS at 18:22

## 2024-02-12 RX ADMIN — Medication 50 MILLIGRAM(S): at 21:25

## 2024-02-12 RX ADMIN — Medication 1 TABLET(S): at 11:07

## 2024-02-12 RX ADMIN — Medication 50 MILLIGRAM(S): at 14:11

## 2024-02-12 RX ADMIN — SODIUM CHLORIDE 75 MILLILITER(S): 9 INJECTION INTRAMUSCULAR; INTRAVENOUS; SUBCUTANEOUS at 16:12

## 2024-02-12 RX ADMIN — Medication 50 MILLIGRAM(S): at 05:36

## 2024-02-12 RX ADMIN — Medication 137 MICROGRAM(S): at 05:43

## 2024-02-12 RX ADMIN — Medication 250 MILLIGRAM(S): at 05:36

## 2024-02-12 RX ADMIN — ENOXAPARIN SODIUM 40 MILLIGRAM(S): 100 INJECTION SUBCUTANEOUS at 11:07

## 2024-02-12 RX ADMIN — SODIUM CHLORIDE 75 MILLILITER(S): 9 INJECTION INTRAMUSCULAR; INTRAVENOUS; SUBCUTANEOUS at 05:36

## 2024-02-12 RX ADMIN — LOSARTAN POTASSIUM 50 MILLIGRAM(S): 100 TABLET, FILM COATED ORAL at 21:26

## 2024-02-12 NOTE — PHARMACOTHERAPY INTERVENTION NOTE - COMMENTS
Consistent with ID note, recommended to change vancomycin to linezolid for the treatment of SSTI.    Thomas Bourgeois, PharmD, BCIDP  Clinical Pharmacy Specialist, Infectious Diseases  Tele-Antimicrobial Stewardship Program (Tele-ASP)  Tele-ASP Phone: [phone]

## 2024-02-12 NOTE — CONSULT NOTE ADULT - ASSESSMENT
68-year-old male history of Melanoma, R eye removal,  hypothyroid, hypertension, testicular CA S/p R  orchiectomy  12/7/23 currently on chemo sine 1/15/23  completed last chemo  Etoposide and Cisplatin (second cycle) past Friday presents with R arm infection.    Sepsis (POA) secondary to R  forearm cellulitis and small abscess spontaneously draining  in an immunocompromise pt (on chemo) seems MRSA related   - WILL NEED BEDSIDE I+d  - WILL D/W SURGERY TEAM      68-year-old male history of Melanoma, R eye removal,  hypothyroid, hypertension, testicular CA S/p R  orchiectomy  12/7/23 currently on chemo sine 1/15/23  completed last chemo  Etoposide and Cisplatin (second cycle) past Friday presents with R arm infection.    Sepsis (POA) secondary to R  forearm cellulitis and small abscess spontaneously draining  in an immunocompromise pt (on chemo) seems MRSA related   - I&D in OR per Dr. Tylor LAINEZ D/W SURGERY TEAM

## 2024-02-12 NOTE — CONSULT NOTE ADULT - SUBJECTIVE AND OBJECTIVE BOX
68-year-old male history of Melanoma, R eye removal,  hypothyroid, hypertension, testicular CA S/p R  orchiectomy  12/7/23 currently on chemo since 1/15/23  completed last chemo  Etoposide and Cisplatin (second cycle) past Friday presents with R arm infection. Pt states had a boil on his R forearm 2 days ago, applied war compress it opened up, puss and bleeding yesterday.  PT states swelling of his R hand since yesterday. Pt denies pain on his hand. Pt denies fever at home.  Pt reports had a blister on his L foot dorsal side, skin removed in ED. PT denies nausea, vomiting, diarrhea. PT follows up with  oncology Dr. CARUSO. PT denies chest pain, shortness of breath, burning with urination,     SURGERY EVAL FOR THE NEED OF I&D OF RIGHT FOREARM FLATULANCE REGION   DENIES ASA/ PLAVIX OR DOAC   GETTING BETTER WHILE INPT.     PAST MEDICAL & SURGICAL HISTORY:  HTN (hypertension)  Hypothyroidism, unspecified type  Obesity  Other secondary osteoarthritis of right hand  History of eye removal  Right eye  H/O melanoma excision  S/P appendectomy  14 yrs old    REVIEW OF SYSTEMS:  All other systems negative, unless indicated in  HPI    MEDICATIONS  (STANDING):  aztreonam  IVPB 1000 milliGRAM(s) IV Intermittent every 8 hours  chlorhexidine 2% Cloths 1 Application(s) Topical <User Schedule>  cholecalciferol 1000 Unit(s) Oral daily  enoxaparin Injectable 40 milliGRAM(s) SubCutaneous every 24 hours  levothyroxine 137 MICROGram(s) Oral daily  losartan 50 milliGRAM(s) Oral at bedtime  multivitamin 1 Tablet(s) Oral daily  sodium chloride 0.9%. 1000 milliLiter(s) (75 mL/Hr) IV Continuous <Continuous>  vancomycin  IVPB 1750 milliGRAM(s) IV Intermittent every 12 hours    MEDICATIONS  (PRN):  acetaminophen     Tablet .. 650 milliGRAM(s) Oral every 6 hours PRN Temp greater or equal to 38C (100.4F), Mild Pain (1 - 3)      Allergies  penicillin (Rash)  penicillin G benzathine (Rash)  Intolerances    SOCIAL HISTORY:   From Home     FAMILY HISTORY:  CAD (coronary artery disease) (Mother)  CAD (coronary artery disease)  Stent       PHYSICAL EXAM:  Vital Signs Last 24 Hrs  T(C): 36.6 (12 Feb 2024 04:59), Max: 36.6 (12 Feb 2024 04:59)  T(F): 97.9 (12 Feb 2024 04:59), Max: 97.9 (12 Feb 2024 04:59)  HR: 73 (12 Feb 2024 04:59) (73 - 76)  BP: 136/81 (12 Feb 2024 04:59) (136/67 - 136/81)  BP(mean): --  RR: 18 (12 Feb 2024 04:59) (18 - 18)  SpO2: 96% (12 Feb 2024 04:59) (96% - 96%)    Parameters below as of 12 Feb 2024 04:59  Patient On (Oxygen Delivery Method): room air      General : NAD    HEENT:  NC/AT, PERRL, EOMI, sclera clear, mucosa moist, throat clear, trachea midline, neck supple  Cardiovascular: RRR   Respiratory:  Equal chest rise   Gastrointestinal:   Soft NT/ND (+)BS    Neurology:  Weakened strength & sensation  Psych: Calm   Musculoskeletal:  From   Vascular: B/ L LE equally warm  Skin:  L E wound        LABS/ CULTURES/ RADIOLOGY:                        10.6   9.44  )-----------( 303      ( 12 Feb 2024 06:44 )             30.2       134  |  98  |  15  ----------------------------<  115      [02-12-24 @ 06:44]  4.1   |  27  |  0.9        Ca     8.0     [02-12-24 @ 06:44]    TPro  5.0  /  Alb  2.7  /  TBili  0.4  /  DBili  x   /  AST  13  /  ALT  9   /  AlkPhos  70  [02-12-24 @ 06:44]             68-year-old male history of Melanoma, R eye removal,  hypothyroid, hypertension, testicular CA S/p R  orchiectomy  12/7/23 currently on chemo since 1/15/23  completed last chemo  Etoposide and Cisplatin (second cycle) past Friday presents with R arm infection. Pt states had a boil on his R forearm 2 days ago, application of warm compress and it opened up, with pus and bloody drainage yesterday.  Pt states swelling of his R hand since yesterday. Pt denies pain or disability of his hand function. Pt denies fever at home.  Pt reports had a blister on his L foot dorsal side, skin removed in ED. PT denies nausea, vomiting, diarrhea. PT follows up with  oncology Dr. CARUSO. PT denies chest pain, shortness of breath, burning with urination,     SURGERY EVAL FOR THE NEED OF I&D OF RIGHT FOREARM abscess  DENIES ASA/ PLAVIX OR DOAC   GETTING BETTER WHILE INPT.     PAST MEDICAL & SURGICAL HISTORY:  HTN (hypertension)  Hypothyroidism, unspecified type  Obesity  Other secondary osteoarthritis of right hand  History of eye removal  Right eye  H/O melanoma excision  S/P appendectomy  14 yrs old    REVIEW OF SYSTEMS:  All other systems negative, unless indicated in  HPI    MEDICATIONS  (STANDING):  aztreonam  IVPB 1000 milliGRAM(s) IV Intermittent every 8 hours  chlorhexidine 2% Cloths 1 Application(s) Topical <User Schedule>  cholecalciferol 1000 Unit(s) Oral daily  enoxaparin Injectable 40 milliGRAM(s) SubCutaneous every 24 hours  levothyroxine 137 MICROGram(s) Oral daily  losartan 50 milliGRAM(s) Oral at bedtime  multivitamin 1 Tablet(s) Oral daily  sodium chloride 0.9%. 1000 milliLiter(s) (75 mL/Hr) IV Continuous <Continuous>  vancomycin  IVPB 1750 milliGRAM(s) IV Intermittent every 12 hours    MEDICATIONS  (PRN):  acetaminophen     Tablet .. 650 milliGRAM(s) Oral every 6 hours PRN Temp greater or equal to 38C (100.4F), Mild Pain (1 - 3)      Allergies  penicillin (Rash)  penicillin G benzathine (Rash)  Intolerances    SOCIAL HISTORY:   From Home     FAMILY HISTORY:  CAD (coronary artery disease) (Mother)  CAD (coronary artery disease)  Stent       PHYSICAL EXAM:  Vital Signs Last 24 Hrs  T(C): 36.6 (12 Feb 2024 04:59), Max: 36.6 (12 Feb 2024 04:59)  T(F): 97.9 (12 Feb 2024 04:59), Max: 97.9 (12 Feb 2024 04:59)  HR: 73 (12 Feb 2024 04:59) (73 - 76)  BP: 136/81 (12 Feb 2024 04:59) (136/67 - 136/81)  BP(mean): --  RR: 18 (12 Feb 2024 04:59) (18 - 18)  SpO2: 96% (12 Feb 2024 04:59) (96% - 96%)    Parameters below as of 12 Feb 2024 04:59  Patient On (Oxygen Delivery Method): room air      General : NAD    HEENT:  NC/AT, PERRL, EOMI, sclera clear, mucosa moist, throat clear, trachea midline, neck supple  Cardiovascular: RRR   Respiratory:  Equal chest rise   Gastrointestinal:   Soft NT/ND (+)BS    Neurology:  Weakened strength & sensation  Psych: Calm   Musculoskeletal:  From   Vascular: B/ L LE equally warm. palp pulses  Skin:  R forearm wound        LABS/ CULTURES/ RADIOLOGY:                        10.6   9.44  )-----------( 303      ( 12 Feb 2024 06:44 )             30.2       134  |  98  |  15  ----------------------------<  115      [02-12-24 @ 06:44]  4.1   |  27  |  0.9        Ca     8.0     [02-12-24 @ 06:44]    TPro  5.0  /  Alb  2.7  /  TBili  0.4  /  DBili  x   /  AST  13  /  ALT  9   /  AlkPhos  70  [02-12-24 @ 06:44]

## 2024-02-12 NOTE — CONSULT NOTE ADULT - ASSESSMENT
ASSESSMENT  This is a 68-year-old male history of Melanoma, R eye removal,  hypothyroid, hypertension, testicular CA S/p R  orchiectomy  12/7/23 currently on chemo since 1/15/23  completed last chemo  Etoposide and Cisplatin (second cycle) past Friday presents with R arm infection.    IMPRESSION  #Right forearm abscess  #Lower extremity blistering. (Likely from chemo)   #Sepsis on admission  #Right shoulder melanoma and Testicular CA s/p Right orchiectomy (12/2023) on Chemo (Immunocompromised)  #History of hypothyroidism, HTN  (Mild penicillin allergy- Rash). Should completely be okay to use cephalosporins   #MRSA nares positive.     RECOMMENDATIONS  -Follow up with wound cultures. Recommend surgical evaluation to see if he can have I&D.  -Continue with local wound care. Can switch Vancomycin to IV/PO Linezolid 600mg BID.  -For now continue with IV aztreonam 1 gram q 8 hours.   -Will consider PO antibiotics closer to discharge.   -Offloading. Aspiration precautions. CHG washes. Can consider mupirocin BID for 5 days.     If any questions, please send a message or call on [employer]  Please continue to update ID with any pertinent new laboratory or radiographic findings.    Mitra Nieves M.D  Infectious Diseases Attending/   [employer] at \[facility]\""/[facility]

## 2024-02-12 NOTE — PROGRESS NOTE ADULT - SUBJECTIVE AND OBJECTIVE BOX
ULICES GAUTHIERS  68y  [employer] [employer] [facility] [employer]-S 4I 024 B      Patient is a 68y old  Male who presents with a chief complaint of R forearm cellulitis (11 Feb 2024 09:25)      INTERVAL HPI/OVERNIGHT EVENTS:        REVIEW OF SYSTEMS:        FAMILY HISTORY:  CAD (coronary artery disease) (Mother)    CAD (coronary artery disease)  Stent      T(C): 36.6 (02-12-24 @ 04:59), Max: 37.1 (02-11-24 @ 09:52)  HR: 73 (02-12-24 @ 04:59) (73 - 77)  BP: 136/81 (02-12-24 @ 04:59) (130/67 - 136/81)  RR: 18 (02-12-24 @ 04:59) (16 - 18)  SpO2: 96% (02-12-24 @ 04:59) (96% - 97%)  Wt(kg): --Vital Signs Last 24 Hrs  T(C): 36.6 (12 Feb 2024 04:59), Max: 37.1 (11 Feb 2024 09:52)  T(F): 97.9 (12 Feb 2024 04:59), Max: 98.7 (11 Feb 2024 09:52)  HR: 73 (12 Feb 2024 04:59) (73 - 77)  BP: 136/81 (12 Feb 2024 04:59) (130/67 - 136/81)  BP(mean): --  RR: 18 (12 Feb 2024 04:59) (16 - 18)  SpO2: 96% (12 Feb 2024 04:59) (96% - 97%)    Parameters below as of 12 Feb 2024 04:59  Patient On (Oxygen Delivery Method): room air        PHYSICAL EXAM:  GENERAL: NAD, well-groomed, well-developed  HEAD:  Atraumatic, Normocephalic  EYES: EOMI, PERRLA, conjunctiva and sclera clear  ENMT: No tonsillar erythema, exudates, or enlargement; Moist mucous membranes, Good dentition, No lesions  NECK: Supple, No JVD, Normal thyroid  NERVOUS SYSTEM:  Alert & Oriented X3, Good concentration; Motor Strength 5/5 B/L upper and lower extremities; DTRs 2+ intact and symmetric  PULM: Clear to auscultation bilaterally  CARDIAC: Regular rate and rhythm; No murmurs, rubs, or gallops  GI: Soft, Nontender, Nondistended; Bowel sounds present  EXTREMITIES:  2+ Peripheral Pulses, No clubbing, cyanosis, or edema  LYMPH: No lymphadenopathy noted  SKIN: No rashes or lesions    Consultant(s) Notes Reviewed:  [x ] YES  [ ] NO  Care Discussed with Consultants/Other Providers [ x] YES  [ ] NO    LABS:                            10.6   9.44  )-----------( 303      ( 12 Feb 2024 06:44 )             30.2   02-12    134<L>  |  98  |  15  ----------------------------<  115<H>  4.1   |  27  |  0.9    Ca    8.0<L>      12 Feb 2024 06:44    TPro  5.0<L>  /  Alb  2.7<L>  /  TBili  0.4  /  DBili  x   /  AST  13  /  ALT  9   /  AlkPhos  70  02-12            acetaminophen     Tablet .. 650 milliGRAM(s) Oral every 6 hours PRN  aztreonam  IVPB 1000 milliGRAM(s) IV Intermittent every 8 hours  chlorhexidine 2% Cloths 1 Application(s) Topical <User Schedule>  cholecalciferol 1000 Unit(s) Oral daily  enoxaparin Injectable 40 milliGRAM(s) SubCutaneous every 24 hours  levothyroxine 137 MICROGram(s) Oral daily  losartan 50 milliGRAM(s) Oral at bedtime  multivitamin 1 Tablet(s) Oral daily  sodium chloride 0.9%. 1000 milliLiter(s) IV Continuous <Continuous>  vancomycin  IVPB 1750 milliGRAM(s) IV Intermittent every 12 hours    68-year-old male history of Melanoma, R eye removal,  hypothyroid, hypertension, testicular CA S/p R  orchiectomy  12/7/23 currently on chemo sine 1/15/23  completed last chemo  Etoposide and Cisplatin (second cycle) past Friday presents with R arm infection.    1. Sepsis (POA) secondary to R  forearm cellulitis and small abscess spontaneously draining  in an immunocompromise pt (on chemo) seems MRSA related   - Admit to medicine   - Baseline CRP         -LA:1.5    - Received 1 liter bolus and maintanace IVF NS at 75ml/hr   - Cont vancomycin   - hx of PCN allergy. Start Aztreonam   - Blood cx:pending              -Wound cx:pending            - MRSA:Positive/ CHG activated   - Keep right arm elevated /Warm compress to help drainage   - ID consult:pending     2.  L foot blister s.p skin removal in ED   -local wound care   -wound care nurse consult     3.hx testicular CA S/p R  orchiectomy  12/7/23  -on chemo since 1/15/23  completed last chemo  Etoposide and Cisplatin   -outpt follow up with Dr. Atwood       4. hx Hypothyroidism   - cw levothyroxine    5. hx HTN  - cw losartan   -dash diet    DVT prophylaxis   Code statu FULL.     MALIA GAUTHIER  68y  [facility]-S 4I 024 B      Patient is a 68y old  Male who presents with a chief complaint of R forearm cellulitis (11 Feb 2024 09:25)      INTERVAL HPI/OVERNIGHT EVENTS:    pt feels better.   he has no complains  his cellulitis is improving. no overnight events         FAMILY HISTORY:  CAD (coronary artery disease) (Mother)    CAD (coronary artery disease)  Stent      T(C): 36.6 (02-12-24 @ 04:59), Max: 37.1 (02-11-24 @ 09:52)  HR: 73 (02-12-24 @ 04:59) (73 - 77)  BP: 136/81 (02-12-24 @ 04:59) (130/67 - 136/81)  RR: 18 (02-12-24 @ 04:59) (16 - 18)  SpO2: 96% (02-12-24 @ 04:59) (96% - 97%)  Wt(kg): --Vital Signs Last 24 Hrs  T(C): 36.6 (12 Feb 2024 04:59), Max: 37.1 (11 Feb 2024 09:52)  T(F): 97.9 (12 Feb 2024 04:59), Max: 98.7 (11 Feb 2024 09:52)  HR: 73 (12 Feb 2024 04:59) (73 - 77)  BP: 136/81 (12 Feb 2024 04:59) (130/67 - 136/81)  BP(mean): --  RR: 18 (12 Feb 2024 04:59) (16 - 18)  SpO2: 96% (12 Feb 2024 04:59) (96% - 97%)    Parameters below as of 12 Feb 2024 04:59  Patient On (Oxygen Delivery Method): room air        PHYSICAL EXAM:  GENERAL: NAD, well-groomed, well-developed  NERVOUS SYSTEM:  Alert & Oriented X3  PULM: Clear to auscultation bilaterally  CARDIAC: Regular rate and rhythm; No murmurs, rubs, or gallops  GI: Soft, Nontender, Nondistended; Bowel sounds present  EXTREMITIES:  2+ Peripheral Pulses,   Right forearm erythema resolving. abscess is forming     Consultant(s) Notes Reviewed:  [x ] YES  [ ] NO  Care Discussed with Consultants/Other Providers [ x] YES  [ ] NO    LABS:                            10.6   9.44  )-----------( 303      ( 12 Feb 2024 06:44 )             30.2   02-12    134<L>  |  98  |  15  ----------------------------<  115<H>  4.1   |  27  |  0.9    Ca    8.0<L>      12 Feb 2024 06:44    TPro  5.0<L>  /  Alb  2.7<L>  /  TBili  0.4  /  DBili  x   /  AST  13  /  ALT  9   /  AlkPhos  70  02-12            acetaminophen     Tablet .. 650 milliGRAM(s) Oral every 6 hours PRN  aztreonam  IVPB 1000 milliGRAM(s) IV Intermittent every 8 hours  chlorhexidine 2% Cloths 1 Application(s) Topical <User Schedule>  cholecalciferol 1000 Unit(s) Oral daily  enoxaparin Injectable 40 milliGRAM(s) SubCutaneous every 24 hours  levothyroxine 137 MICROGram(s) Oral daily  losartan 50 milliGRAM(s) Oral at bedtime  multivitamin 1 Tablet(s) Oral daily  sodium chloride 0.9%. 1000 milliLiter(s) IV Continuous <Continuous>  vancomycin  IVPB 1750 milliGRAM(s) IV Intermittent every 12 hours    68-year-old male history of Melanoma, R eye removal,  hypothyroid, hypertension, testicular CA S/p R  orchiectomy  12/7/23 currently on chemo sine 1/15/23  completed last chemo  Etoposide and Cisplatin (second cycle) past Friday presents with R arm infection.    1. Sepsis (POA) secondary to R  forearm cellulitis and abscess forming now in an immunocompromise pt (on chemo) seems MRSA related   - Admit to medicine   - Baseline CRP         -LA:1.5    - Received 1 liter bolus followed by maintenance IVF NS at 75ml/hr   - Cont vancomycin   - hx of PCN allergy. Cont Aztreonam   - Blood cx:pending              -Wound cx:pending            - MRSA:Positive/ CHG activated   - Keep right arm elevated /Warm compress to help drainage   - ID consult:pending   - Gen surgery for I& D:pending    2.  L foot blister s.p skin removal in ED   -local wound care   -wound care nurse consult     3.hx testicular CA S/p R  orchiectomy  12/7/23  -on chemo since 1/15/23  completed last chemo  Etoposide and Cisplatin   -outpt follow up with Dr. Atwood       4. hx Hypothyroidism   - cw levothyroxine    5. hx HTN  - cw losartan   -dash diet    DVT prophylaxis   Code statu FULL.

## 2024-02-12 NOTE — CONSULT NOTE ADULT - SUBJECTIVE AND OBJECTIVE BOX
HPI:   Patient is a  68-year-old male history of Melanoma, R eye removal,  hypothyroid, hypertension, testicular CA S/p R  orchiectomy  12/7/23 currently on chemo since 1/15/23  completed last chemo  Etoposide and Cisplatin (second cycle) past Friday presents with R arm infection. Pt states had a boil on his R forearm 2 days ago, applied war compress it opened up, puss and bleeding yesterday.  PT states swelling of his R hand since yesterday. Pt denies pain on his hand. Pt denies fever at home.  Pt reports had a blister on his L foot dorsal side, skin removed in ED. PT denies nausea, vomiting, diarrhea. PT follows up with  oncology Dr. CARUSO. PT denies chest pain, shortness of breath, burning with urination,      PAST MEDICAL & SURGICAL HISTORY:  HTN (hypertension)  Hypothyroidism, unspecified type  Obesity  Other secondary osteoarthritis of right hand  History of eye removal  Right eye  H/O melanoma excision  S/P appendectomy  14 yrs old    REVIEW OF SYSTEMS:  All other systems negative, unless indicated in  HPI    MEDICATIONS  (STANDING):  aztreonam  IVPB 1000 milliGRAM(s) IV Intermittent every 8 hours  chlorhexidine 2% Cloths 1 Application(s) Topical <User Schedule>  cholecalciferol 1000 Unit(s) Oral daily  enoxaparin Injectable 40 milliGRAM(s) SubCutaneous every 24 hours  levothyroxine 137 MICROGram(s) Oral daily  losartan 50 milliGRAM(s) Oral at bedtime  multivitamin 1 Tablet(s) Oral daily  sodium chloride 0.9%. 1000 milliLiter(s) (75 mL/Hr) IV Continuous <Continuous>  vancomycin  IVPB 1750 milliGRAM(s) IV Intermittent every 12 hours    MEDICATIONS  (PRN):  acetaminophen     Tablet .. 650 milliGRAM(s) Oral every 6 hours PRN Temp greater or equal to 38C (100.4F), Mild Pain (1 - 3)      Allergies  penicillin (Rash)  penicillin G benzathine (Rash)  Intolerances    SOCIAL HISTORY:   From Home     FAMILY HISTORY:  CAD (coronary artery disease) (Mother)  CAD (coronary artery disease)  Stent       PHYSICAL EXAM:  Vital Signs Last 24 Hrs  T(C): 36.6 (12 Feb 2024 04:59), Max: 36.6 (12 Feb 2024 04:59)  T(F): 97.9 (12 Feb 2024 04:59), Max: 97.9 (12 Feb 2024 04:59)  HR: 73 (12 Feb 2024 04:59) (73 - 76)  BP: 136/81 (12 Feb 2024 04:59) (136/67 - 136/81)  BP(mean): --  RR: 18 (12 Feb 2024 04:59) (18 - 18)  SpO2: 96% (12 Feb 2024 04:59) (96% - 96%)    Parameters below as of 12 Feb 2024 04:59  Patient On (Oxygen Delivery Method): room air        General : NAD    HEENT:  NC/AT, PERRL, EOMI, sclera clear, mucosa moist, throat clear, trachea midline, neck supple  Cardiovascular: RRR   Respiratory:  Equal chest rise   Gastrointestinal:   Soft NT/ND (+)BS    Neurology:  Weakened strength & sensation  Psych: Calm   Musculoskeletal:  From   Vascular: B/ L LE equally warm  Skin:  L E wound        LABS/ CULTURES/ RADIOLOGY:                        10.6   9.44  )-----------( 303      ( 12 Feb 2024 06:44 )             30.2       134  |  98  |  15  ----------------------------<  115      [02-12-24 @ 06:44]  4.1   |  27  |  0.9        Ca     8.0     [02-12-24 @ 06:44]    TPro  5.0  /  Alb  2.7  /  TBili  0.4  /  DBili  x   /  AST  13  /  ALT  9   /  AlkPhos  70  [02-12-24 @ 06:44]

## 2024-02-12 NOTE — CONSULT NOTE ADULT - SUBJECTIVE AND OBJECTIVE BOX
MALIA GAUTHIER  68y, Male  Allergies    penicillin (Rash)  penicillin G benzathine (Rash)    Intolerances        LOS  1d    HPI  HPI:  A 68-year-old male history of Melanoma, R eye removal,  hypothyroid, hypertension, testicular CA S/p R  orchiectomy  12/7/23 currently on chemo since 1/15/23  completed last chemo  Etoposide and Cisplatin (second cycle) past Friday presents with R arm infection. Pt states had a boil on his R forearm 2 days ago, applied war compress it opened up, puss and bleeding yesterday.  PT states swelling of his R hand since yesterday. Pt denies pain on his hand. Pt denies fever at home.  Pt reports had a blister on his L foot dorsal side, skin removed in ED. PT denies nausea, vomiting, diarrhea. PT follows up with  oncology Dr. CARUSO. PT denies chest pain, shortness of breath, burning with urination,      (11 Feb 2024 09:25)      INFECTIOUS DISEASE HISTORY:  Hospital course-  ID consulted for antimicrobial recommendations.     Prior hospital charts reviewed [Yes]  Primary team notes reviewed [Yes]  Other consultant notes reviewed [Yes]    REVIEW OF SYSTEMS:  CONSTITUTIONAL: No fever or chills  HEENT: No sore throat  RESPIRATORY: No cough, no shortness of breath  CARDIOVASCULAR: No chest pain or palpitations  GASTROINTESTINAL: No abdominal or epigastric pain  GENITOURINARY: No dysuria  NEUROLOGICAL: No headache/dizziness  MSK: No joint pain, erythema, or swelling; no back pain  SKIN: No itching, rashes  All other ROS negative except noted above    PAST MEDICAL & SURGICAL HISTORY:  HTN (hypertension)      Hypothyroidism, unspecified type      Obesity      Other secondary osteoarthritis of right hand      History of eye removal  Right eye      H/O melanoma excision      S/P appendectomy  14 yrs old    SOCIAL HISTORY:  - No recent travel      FAMILY HISTORY:  CAD (coronary artery disease) (Mother)    CAD (coronary artery disease)  Stent    ANTIMICROBIALS:  aztreonam  IVPB 1000 every 8 hours  vancomycin  IVPB 1750 every 12 hours      ANTIMICROBIALS (past 90 days):  MEDICATIONS  (STANDING):  aztreonam  IVPB   50 mL/Hr IV Intermittent (02-12-24 @ 05:36)   50 mL/Hr IV Intermittent (02-11-24 @ 21:13)   50 mL/Hr IV Intermittent (02-11-24 @ 17:46)    clindamycin IVPB   100 mL/Hr IV Intermittent (02-11-24 @ 08:12)    vancomycin  IVPB   250 mL/Hr IV Intermittent (02-12-24 @ 05:36)   250 mL/Hr IV Intermittent (02-11-24 @ 17:53)    vancomycin  IVPB.   250 mL/Hr IV Intermittent (02-11-24 @ 09:04)        OTHER MEDS:   MEDICATIONS  (STANDING):  acetaminophen     Tablet .. 650 every 6 hours PRN  enoxaparin Injectable 40 every 24 hours  levothyroxine 137 daily  losartan 50 at bedtime      VITALS:  Vital Signs Last 24 Hrs  T(F): 97.9 (02-12-24 @ 04:59), Max: 98.7 (02-11-24 @ 09:52)    Vital Signs Last 24 Hrs  HR: 73 (02-12-24 @ 04:59) (73 - 76)  BP: 136/81 (02-12-24 @ 04:59) (136/67 - 136/81)  RR: 18 (02-12-24 @ 04:59)  SpO2: 96% (02-12-24 @ 04:59) (96% - 96%)  Wt(kg): --    EXAM:  GENERAL: NAD, lying in bed, Obese.   HEAD: No head lesions  NECK: Supple, nontender to palpation  CHEST/LUNG: Clear to auscultation bilaterally  HEART: S1 S2  ABDOMEN: Soft, nontender  EXTREMITIES: No clubbing, cyanosis, or petal edema  NERVOUS SYSTEM: Alert and oriented to person   MSK: No joint erythema, swelling or pain, right arm swelling with purulence noted. Redness has subsided from yesterday reportedly.   SKIN: No rashes or lesions, no superficial thrombophlebitis    Labs:                        10.6   9.44  )-----------( 303      ( 12 Feb 2024 06:44 )             30.2     02-12    134<L>  |  98  |  15  ----------------------------<  115<H>  4.1   |  27  |  0.9    Ca    8.0<L>      12 Feb 2024 06:44    TPro  5.0<L>  /  Alb  2.7<L>  /  TBili  0.4  /  DBili  x   /  AST  13  /  ALT  9   /  AlkPhos  70  02-12      WBC Trend:  WBC Count: 9.44 (02-12-24 @ 06:44)  WBC Count: 15.28 (02-11-24 @ 07:30)      Auto Neutrophil #: 8.30 K/uL (02-12-24 @ 06:44)  Auto Neutrophil #: 14.33 K/uL (02-11-24 @ 07:30)  Auto Neutrophil #: 3.58 K/uL (11-24-23 @ 11:01)      Creatine Trend:  Creatinine: 0.9 (02-12)  Creatinine: 0.9 (02-11)      Liver Biochemical Testing Trend:  Alanine Aminotransferase (ALT/SGPT): 9 (02-12)  Alanine Aminotransferase (ALT/SGPT): 11 (02-11)  Alanine Aminotransferase (ALT/SGPT): 10 (11-24)  Aspartate Aminotransferase (AST/SGOT): 13 (02-12-24 @ 06:44)  Aspartate Aminotransferase (AST/SGOT): 13 (02-11-24 @ 07:30)  Aspartate Aminotransferase (AST/SGOT): 16 (11-24-23 @ 11:01)  Bilirubin Total: 0.4 (02-12)  Bilirubin Total: 0.4 (02-11)  Bilirubin Total: 0.5 (11-24)      Trend LDH      Auto Eosinophil %: 0.0 % (02-12-24 @ 06:44)  Auto Eosinophil %: 0.0 % (02-11-24 @ 07:30)      Urinalysis Basic - ( 12 Feb 2024 06:44 )    Color: x / Appearance: x / SG: x / pH: x  Gluc: 115 mg/dL / Ketone: x  / Bili: x / Urobili: x   Blood: x / Protein: x / Nitrite: x   Leuk Esterase: x / RBC: x / WBC x   Sq Epi: x / Non Sq Epi: x / Bacteria: x        MICROBIOLOGY:    Male    C-Reactive Protein, Serum: 92.6 (02-12)    Lactate, Blood: 1.5 (02-11 @ 07:30)    INFLAMMATORY MARKERS  C-Reactive Protein, Serum: 92.6 mg/L (02-12-24 @ 06:44)      RADIOLOGY & ADDITIONAL TESTS:  I have personally reviewed the imagings.  CXR      CT    < from: MR Head w/wo IV Cont (01.12.24 @ 09:17) >    IMPRESSION:    No evidence of intracranial metastasis or acute intracranial pathology.    Mild chronic microvascular ischemic changes.    Paranasal sinus mucosal disease.    < end of copied text >  < from: CT Chest w/ IV Cont (01.09.24 @ 09:27) >  Impression:    Stable left lung nodule. No new nodules or lymphadenopathy.    Thank you for the courtesy of this consult.    < end of copied text >    CARDIOLOGY TESTING

## 2024-02-12 NOTE — CONSULT NOTE ADULT - ASSESSMENT
Skin assessed-   L feet ruptured blister -  Full thickness wound , wound base pink no drainage or foul smell noted                                  R upper arm bile/ skin infection - per pt awaiting  surgical team for I &D                                                High risk for pressure injury development or progression           Plan:  Wound and skin care recs  Clean L feet with saline , pat dry then apply xeroform with Kerlix dressing daily   Pressure injury  preventive  measures  Skin and incontinence care   Assess skin  and inform primary provider of any changes   Case discussed with primary Rn  Wound/ ostomy specialist  to f/u as needed     Offloading: [ x] Frequent position changes [ ] Devices/Equipment  Cleansing: [ ] Saline [x ] Soap/Water [ ] Other: ______  Topicals: [x ] Barrier Cream [ ] Antimicrobial [ ] Enzymatic Wound Debridement  Dressings: [ ] Dry, sterile [ ] Allevyn  Foam [ ] Absorbant Pads [ ] Collagenase    Other Recs.    Per primary team      Total time for bedside assessment , review of medical records  and  discussion of plan of care with primary team greater than 35 min

## 2024-02-12 NOTE — CONSULT NOTE ADULT - NSCONSULTADDITIONALINFOA_GEN_ALL_CORE
I saw and examined the patient.  I agree with above assessment plan.  The patient has a carbuncle with some abscess likely underneath.  We will plan to drain it in the OR the following day.  Risks and benefits were described at length to the patient.  He will have some mild sedation and local anesthesia.  I will plan to obtain cultures and likely send the biopsy specimen as well.  All his questions were answered.  N.p.o. at midnight.  Continue IV antibiotics.

## 2024-02-13 ENCOUNTER — RESULT REVIEW (OUTPATIENT)
Age: 69
End: 2024-02-13

## 2024-02-13 ENCOUNTER — TRANSCRIPTION ENCOUNTER (OUTPATIENT)
Age: 69
End: 2024-02-13

## 2024-02-13 LAB
ANION GAP SERPL CALC-SCNC: 10 MMOL/L — SIGNIFICANT CHANGE UP (ref 7–14)
BUN SERPL-MCNC: 14 MG/DL — SIGNIFICANT CHANGE UP (ref 10–20)
CALCIUM SERPL-MCNC: 8 MG/DL — LOW (ref 8.4–10.5)
CHLORIDE SERPL-SCNC: 99 MMOL/L — SIGNIFICANT CHANGE UP (ref 98–110)
CO2 SERPL-SCNC: 27 MMOL/L — SIGNIFICANT CHANGE UP (ref 17–32)
CREAT SERPL-MCNC: 0.7 MG/DL — SIGNIFICANT CHANGE UP (ref 0.7–1.5)
EGFR: 99 ML/MIN/1.73M2 — SIGNIFICANT CHANGE UP
GLUCOSE SERPL-MCNC: 106 MG/DL — HIGH (ref 70–99)
HCT VFR BLD CALC: 30.5 % — LOW (ref 42–52)
HGB BLD-MCNC: 10.4 G/DL — LOW (ref 14–18)
MCHC RBC-ENTMCNC: 30 PG — SIGNIFICANT CHANGE UP (ref 27–31)
MCHC RBC-ENTMCNC: 34.1 G/DL — SIGNIFICANT CHANGE UP (ref 32–37)
MCV RBC AUTO: 87.9 FL — SIGNIFICANT CHANGE UP (ref 80–94)
NRBC # BLD: 0 /100 WBCS — SIGNIFICANT CHANGE UP (ref 0–0)
PLATELET # BLD AUTO: 239 K/UL — SIGNIFICANT CHANGE UP (ref 130–400)
PMV BLD: 9.6 FL — SIGNIFICANT CHANGE UP (ref 7.4–10.4)
POTASSIUM SERPL-MCNC: 4.4 MMOL/L — SIGNIFICANT CHANGE UP (ref 3.5–5)
POTASSIUM SERPL-SCNC: 4.4 MMOL/L — SIGNIFICANT CHANGE UP (ref 3.5–5)
RBC # BLD: 3.47 M/UL — LOW (ref 4.7–6.1)
RBC # FLD: 12.3 % — SIGNIFICANT CHANGE UP (ref 11.5–14.5)
SODIUM SERPL-SCNC: 136 MMOL/L — SIGNIFICANT CHANGE UP (ref 135–146)
WBC # BLD: 3.43 K/UL — LOW (ref 4.8–10.8)
WBC # FLD AUTO: 3.43 K/UL — LOW (ref 4.8–10.8)

## 2024-02-13 PROCEDURE — 99232 SBSQ HOSP IP/OBS MODERATE 35: CPT

## 2024-02-13 PROCEDURE — 88304 TISSUE EXAM BY PATHOLOGIST: CPT | Mod: 26

## 2024-02-13 PROCEDURE — 10060 I&D ABSCESS SIMPLE/SINGLE: CPT

## 2024-02-13 RX ORDER — ONDANSETRON 8 MG/1
4 TABLET, FILM COATED ORAL ONCE
Refills: 0 | Status: DISCONTINUED | OUTPATIENT
Start: 2024-02-13 | End: 2024-02-13

## 2024-02-13 RX ORDER — SIMETHICONE 80 MG/1
80 TABLET, CHEWABLE ORAL THREE TIMES A DAY
Refills: 0 | Status: DISCONTINUED | OUTPATIENT
Start: 2024-02-13 | End: 2024-02-14

## 2024-02-13 RX ORDER — HYDROMORPHONE HYDROCHLORIDE 2 MG/ML
0.5 INJECTION INTRAMUSCULAR; INTRAVENOUS; SUBCUTANEOUS
Refills: 0 | Status: DISCONTINUED | OUTPATIENT
Start: 2024-02-13 | End: 2024-02-13

## 2024-02-13 RX ORDER — ACETAMINOPHEN 500 MG
650 TABLET ORAL EVERY 6 HOURS
Refills: 0 | Status: DISCONTINUED | OUTPATIENT
Start: 2024-02-13 | End: 2024-02-14

## 2024-02-13 RX ORDER — LEVOTHYROXINE SODIUM 125 MCG
137 TABLET ORAL DAILY
Refills: 0 | Status: DISCONTINUED | OUTPATIENT
Start: 2024-02-13 | End: 2024-02-14

## 2024-02-13 RX ORDER — SODIUM CHLORIDE 9 MG/ML
1000 INJECTION, SOLUTION INTRAVENOUS
Refills: 0 | Status: DISCONTINUED | OUTPATIENT
Start: 2024-02-13 | End: 2024-02-13

## 2024-02-13 RX ORDER — CHOLECALCIFEROL (VITAMIN D3) 125 MCG
1000 CAPSULE ORAL DAILY
Refills: 0 | Status: DISCONTINUED | OUTPATIENT
Start: 2024-02-13 | End: 2024-02-14

## 2024-02-13 RX ORDER — LOSARTAN POTASSIUM 100 MG/1
50 TABLET, FILM COATED ORAL AT BEDTIME
Refills: 0 | Status: DISCONTINUED | OUTPATIENT
Start: 2024-02-13 | End: 2024-02-14

## 2024-02-13 RX ORDER — ENOXAPARIN SODIUM 100 MG/ML
40 INJECTION SUBCUTANEOUS EVERY 24 HOURS
Refills: 0 | Status: DISCONTINUED | OUTPATIENT
Start: 2024-02-13 | End: 2024-02-14

## 2024-02-13 RX ORDER — LINEZOLID 600 MG/300ML
600 INJECTION, SOLUTION INTRAVENOUS EVERY 12 HOURS
Refills: 0 | Status: DISCONTINUED | OUTPATIENT
Start: 2024-02-13 | End: 2024-02-14

## 2024-02-13 RX ADMIN — CHLORHEXIDINE GLUCONATE 1 APPLICATION(S): 213 SOLUTION TOPICAL at 05:28

## 2024-02-13 RX ADMIN — ENOXAPARIN SODIUM 40 MILLIGRAM(S): 100 INJECTION SUBCUTANEOUS at 20:45

## 2024-02-13 RX ADMIN — Medication 137 MICROGRAM(S): at 05:28

## 2024-02-13 RX ADMIN — SIMETHICONE 80 MILLIGRAM(S): 80 TABLET, CHEWABLE ORAL at 17:47

## 2024-02-13 RX ADMIN — LINEZOLID 600 MILLIGRAM(S): 600 INJECTION, SOLUTION INTRAVENOUS at 05:28

## 2024-02-13 RX ADMIN — Medication 50 MILLIGRAM(S): at 05:28

## 2024-02-13 RX ADMIN — LOSARTAN POTASSIUM 50 MILLIGRAM(S): 100 TABLET, FILM COATED ORAL at 20:45

## 2024-02-13 RX ADMIN — SODIUM CHLORIDE 75 MILLILITER(S): 9 INJECTION, SOLUTION INTRAVENOUS at 13:24

## 2024-02-13 RX ADMIN — LINEZOLID 600 MILLIGRAM(S): 600 INJECTION, SOLUTION INTRAVENOUS at 17:29

## 2024-02-13 NOTE — PROGRESS NOTE ADULT - ASSESSMENT
This is a 68-year-old male history of Melanoma, R eye removal,  hypothyroid, hypertension, testicular CA S/p R  orchiectomy  12/7/23 currently on chemo since 1/15/23  completed last chemo  Etoposide and Cisplatin (second cycle) past Friday presents with R arm infection.    IMPRESSION  #Right forearm abscess  #Lower extremity blistering. (Likely from chemo)   #Sepsis on admission  #Right shoulder melanoma and Testicular CA s/p Right orchiectomy (12/2023) on Chemo (Immunocompromised)  #History of hypothyroidism, HTN  (Mild penicillin allergy- Rash). Should completely be okay to use cephalosporins   #MRSA nares positive.     RECOMMENDATIONS  -Follow up with wound cultures- Showing Staph aureus.  -Incision and drainage of forearm abscess done 2/13.   -Continue with local wound care. Continue with Linezolid 600mg BID.  -D/C Aztreonam.   -Duration of antibiotics 10 days. If cost prohibitory for linezolid, will consider bactrim vs doxycyline based on the susceptibilities.   -Offloading. Aspiration precautions. CHG washes. Consider mupirocin BID for 5 days.     If any questions, please send a message or call on [employer] Teams  Please continue to update ID with any pertinent new laboratory or radiographic findings.    Mitra Nieves M.D  Infectious Diseases Attending/   [employer] at [facility]   This is a 68-year-old male history of Melanoma, R eye removal,  hypothyroid, hypertension, testicular CA S/p R  orchiectomy  12/7/23 currently on chemo since 1/15/23  completed last chemo  Etoposide and Cisplatin (second cycle) past Friday presents with R arm infection.    IMPRESSION  #Right forearm abscess  #Lower extremity blistering. (Likely from chemo)   #Sepsis on admission  #Right shoulder melanoma and Testicular CA s/p Right orchiectomy (12/2023) on Chemo (Immunocompromised)  #History of hypothyroidism, HTN  (Mild penicillin allergy- Rash). Should completely be okay to use cephalosporins   #MRSA nares positive.     RECOMMENDATIONS  -Follow up with wound cultures- Showing Staph aureus.  -Incision and drainage of forearm abscess done 2/13.   -Continue with Linezolid 600mg BID.  -D/C Aztreonam.   -Duration of antibiotics 10 days. If cost prohibitory for linezolid, will consider bactrim vs doxycyline based on the susceptibilities.  -Local wound care. Outpatient follow up with surgery.   -Patient can be advised to have a follow up outpatient with me in 2 weeks, [address] [place] [id number] Phone [phone].   -Offloading. Aspiration precautions. CHG washes. Consider mupirocin BID for 5 days.     If any questions, please send a message or call on [employer] Teams  Please continue to update ID with any pertinent new laboratory or radiographic findings.    Mitra Nieves M.D  Infectious Diseases Attending/   [employer] at [facility]

## 2024-02-13 NOTE — PROGRESS NOTE ADULT - SUBJECTIVE AND OBJECTIVE BOX
S; Pt seen earlier, no new complaints. For OR today for right arm debridment  O; Vital Signs Last 24 Hrs  T(C): 36.1 (13 Feb 2024 10:33), Max: 36.4 (12 Feb 2024 21:09)  T(F): 96.9 (13 Feb 2024 05:32), Max: 97.6 (12 Feb 2024 21:09)  HR: 71 (13 Feb 2024 10:33) (71 - 86)  BP: 136/71 (13 Feb 2024 10:33) (129/75 - 160/82)  BP(mean): --  RR: 18 (13 Feb 2024 10:33) (18 - 18)  SpO2: 97% (13 Feb 2024 10:33) (97% - 97%)    Parameters below as of 12 Feb 2024 21:09  Patient On (Oxygen Delivery Method): room air    MEDICATIONS  (STANDING):  chlorhexidine 2% Cloths 1 Application(s) Topical <User Schedule>  cholecalciferol 1000 Unit(s) Oral daily  enoxaparin Injectable 40 milliGRAM(s) SubCutaneous every 24 hours  levothyroxine 137 MICROGram(s) Oral daily  linezolid    Tablet 600 milliGRAM(s) Oral every 12 hours  losartan 50 milliGRAM(s) Oral at bedtime  multivitamin 1 Tablet(s) Oral daily    MEDICATIONS  (PRN):  acetaminophen     Tablet .. 650 milliGRAM(s) Oral every 6 hours PRN Temp greater or equal to 38C (100.4F), Mild Pain (1 - 3)  simethicone 80 milliGRAM(s) Chew three times a day PRN Indigestion      EXAM:  Ext:  RUE w/dressing C/D/I

## 2024-02-13 NOTE — PROGRESS NOTE ADULT - SUBJECTIVE AND OBJECTIVE BOX
MALIA GAUTHIER  68y, Male    LOS  2d    INTERVAL EVENTS/HPI  - No acute events overnight  - T(F): , Max: 97.9 (02-13-24 @ 12:48)  - Denies any worsening symptoms  - Tolerating medication  - WBC Count: 3.43 (02-13-24 @ 08:20)  WBC Count: 9.44 (02-12-24 @ 06:44)  - Creatinine: 0.7 (02-13-24 @ 08:20)  Creatinine: 0.9 (02-12-24 @ 06:44)     REVIEW OF SYSTEMS:  CONSTITUTIONAL: No fever or chills  HEENT: No sore throat  RESPIRATORY: No cough, no shortness of breath  CARDIOVASCULAR: No chest pain or palpitations  GASTROINTESTINAL: No abdominal or epigastric pain  GENITOURINARY: No dysuria  NEUROLOGICAL: No headache/dizziness  MSK: No joint pain, erythema, or swelling; no back pain  SKIN: No itching, rashes  All other ROS negative except noted above    Prior hospital charts reviewed [Yes]  Primary team notes reviewed [Yes]  Other consultant notes reviewed [Yes]    ANTIMICROBIALS:   linezolid    Tablet 600 every 12 hours      OTHER MEDS: MEDICATIONS  (STANDING):  acetaminophen     Tablet .. 650 every 6 hours PRN  enoxaparin Injectable 40 every 24 hours  HYDROmorphone  Injectable 0.5 every 10 minutes PRN  levothyroxine 137 daily  losartan 50 at bedtime  ondansetron Injectable 4 once PRN  simethicone 80 three times a day PRN      Vital Signs Last 24 Hrs  T(F): 97.9 (02-13-24 @ 12:48), Max: 98.7 (02-11-24 @ 09:52)    Vital Signs Last 24 Hrs  HR: 75 (02-13-24 @ 13:03) (69 - 86)  BP: 156/74 (02-13-24 @ 13:03) (128/63 - 161/81)  RR: 16 (02-13-24 @ 13:03)  SpO2: 98% (02-13-24 @ 13:03) (96% - 98%)  Wt(kg): --    EXAM:  GENERAL: NAD, lying in bed, Obese.   HEAD: No head lesions  NECK: Supple, nontender to palpation  CHEST/LUNG: Clear to auscultation bilaterally  HEART: S1 S2  ABDOMEN: Soft, nontender  EXTREMITIES: No clubbing, cyanosis, or petal edema  NERVOUS SYSTEM: Alert and oriented to person   MSK: No joint erythema, swelling or pain improving.   SKIN: No rashes or lesions, no superficial thrombophlebitis      Labs:                        10.4   3.43  )-----------( 239      ( 13 Feb 2024 08:20 )             30.5     02-13    136  |  99  |  14  ----------------------------<  106<H>  4.4   |  27  |  0.7    Ca    8.0<L>      13 Feb 2024 08:20    TPro  5.0<L>  /  Alb  2.7<L>  /  TBili  0.4  /  DBili  x   /  AST  13  /  ALT  9   /  AlkPhos  70  02-12      WBC Trend:  WBC Count: 3.43 (02-13-24 @ 08:20)  WBC Count: 9.44 (02-12-24 @ 06:44)  WBC Count: 15.28 (02-11-24 @ 07:30)      Creatine Trend:  Creatinine: 0.7 (02-13)  Creatinine: 0.9 (02-12)  Creatinine: 0.9 (02-11)      Liver Biochemical Testing Trend:  Alanine Aminotransferase (ALT/SGPT): 9 (02-12)  Alanine Aminotransferase (ALT/SGPT): 11 (02-11)  Alanine Aminotransferase (ALT/SGPT): 10 (11-24)  Aspartate Aminotransferase (AST/SGOT): 13 (02-12-24 @ 06:44)  Aspartate Aminotransferase (AST/SGOT): 13 (02-11-24 @ 07:30)  Aspartate Aminotransferase (AST/SGOT): 16 (11-24-23 @ 11:01)  Bilirubin Total: 0.4 (02-12)  Bilirubin Total: 0.4 (02-11)  Bilirubin Total: 0.5 (11-24)      Trend LDH      Urinalysis Basic - ( 13 Feb 2024 08:20 )    Color: x / Appearance: x / SG: x / pH: x  Gluc: 106 mg/dL / Ketone: x  / Bili: x / Urobili: x   Blood: x / Protein: x / Nitrite: x   Leuk Esterase: x / RBC: x / WBC x   Sq Epi: x / Non Sq Epi: x / Bacteria: x        MICROBIOLOGY:    Male    Culture - Other (collected 11 Feb 2024 16:00)  Source: Drainage Drainage  Preliminary Report:    Numerous Staphylococcus aureus    Culture - Blood (collected 11 Feb 2024 07:30)  Source: .Blood Blood  Preliminary Report:    No growth at 24 hours    Culture - Blood (collected 11 Feb 2024 07:30)  Source: .Blood Blood  Preliminary Report:    No growth at 24 hours    Culture - Urine (collected 24 Nov 2023 12:24)  Source: Clean Catch Clean Catch (Midstream)  Final Report:    No growth    C-Reactive Protein, Serum: 92.6 (02-12)      Lactate, Blood: 1.5 (02-11 @ 07:30)        RADIOLOGY & ADDITIONAL TESTS:  I have personally reviewed the relevant images.   CXR      CT        WEIGHT  Weight (kg): 122.5 (02-13-24 @ 10:33)  Creatinine: 0.7 mg/dL (02-13-24 @ 08:20)      All available historical records have been reviewed

## 2024-02-13 NOTE — PROGRESS NOTE ADULT - SUBJECTIVE AND OBJECTIVE BOX
MALIA GAUTHIER  68y  [facility]-S 4I 024 B      Patient is a 68y old  Male who presents with a chief complaint of R forearm cellulitis (11 Feb 2024 09:25)      INTERVAL HPI/OVERNIGHT EVENTS:    pt feels better.   he has no complains  his cellulitis is improving. no overnight events         FAMILY HISTORY:  CAD (coronary artery disease) (Mother)    CAD (coronary artery disease)  Stent      T(C): 36.6 (02-12-24 @ 04:59), Max: 37.1 (02-11-24 @ 09:52)  HR: 73 (02-12-24 @ 04:59) (73 - 77)  BP: 136/81 (02-12-24 @ 04:59) (130/67 - 136/81)  RR: 18 (02-12-24 @ 04:59) (16 - 18)  SpO2: 96% (02-12-24 @ 04:59) (96% - 97%)  Wt(kg): --Vital Signs Last 24 Hrs  T(C): 36.6 (12 Feb 2024 04:59), Max: 37.1 (11 Feb 2024 09:52)  T(F): 97.9 (12 Feb 2024 04:59), Max: 98.7 (11 Feb 2024 09:52)  HR: 73 (12 Feb 2024 04:59) (73 - 77)  BP: 136/81 (12 Feb 2024 04:59) (130/67 - 136/81)  BP(mean): --  RR: 18 (12 Feb 2024 04:59) (16 - 18)  SpO2: 96% (12 Feb 2024 04:59) (96% - 97%)    Parameters below as of 12 Feb 2024 04:59  Patient On (Oxygen Delivery Method): room air        PHYSICAL EXAM:  GENERAL: NAD, well-groomed, well-developed  NERVOUS SYSTEM:  Alert & Oriented X3  PULM: Clear to auscultation bilaterally  CARDIAC: Regular rate and rhythm; No murmurs, rubs, or gallops  GI: Soft, Nontender, Nondistended; Bowel sounds present  EXTREMITIES:  2+ Peripheral Pulses,   Right forearm erythema resolving. abscess is forming     Consultant(s) Notes Reviewed:  [x ] YES  [ ] NO  Care Discussed with Consultants/Other Providers [ x] YES  [ ] NO    LABS:                            10.6   9.44  )-----------( 303      ( 12 Feb 2024 06:44 )             30.2   02-12    134<L>  |  98  |  15  ----------------------------<  115<H>  4.1   |  27  |  0.9    Ca    8.0<L>      12 Feb 2024 06:44    TPro  5.0<L>  /  Alb  2.7<L>  /  TBili  0.4  /  DBili  x   /  AST  13  /  ALT  9   /  AlkPhos  70  02-12            acetaminophen     Tablet .. 650 milliGRAM(s) Oral every 6 hours PRN  aztreonam  IVPB 1000 milliGRAM(s) IV Intermittent every 8 hours  chlorhexidine 2% Cloths 1 Application(s) Topical <User Schedule>  cholecalciferol 1000 Unit(s) Oral daily  enoxaparin Injectable 40 milliGRAM(s) SubCutaneous every 24 hours  levothyroxine 137 MICROGram(s) Oral daily  losartan 50 milliGRAM(s) Oral at bedtime  multivitamin 1 Tablet(s) Oral daily  sodium chloride 0.9%. 1000 milliLiter(s) IV Continuous <Continuous>  vancomycin  IVPB 1750 milliGRAM(s) IV Intermittent every 12 hours    68-year-old male history of Melanoma, R eye removal,  hypothyroid, hypertension, testicular CA S/p R  orchiectomy  12/7/23 currently on chemo sine 1/15/23  completed last chemo  Etoposide and Cisplatin (second cycle) past Friday presents with R arm infection.    1. Sepsis (POA) secondary to R  forearm cellulitis and abscess forming now in an immunocompromise pt (on chemo) seems MRSA related   - Admit to medicine   - Baseline CRP         -LA:1.5    - Received 1 liter bolus followed by maintenance IVF NS at 75ml/hr   - Was on vancomycin that was switched to zyvox 600mg po bid d:1   - hx of PCN allergy. Was on Aztreonam that I will stop   - Blood cx:NTD               -Wound cx:Staph aureus             - MRSA:Positive/ CHG activated   - Keep right arm elevated /Warm compress to help drainage   - ID consult appreciated  - Gen surgery for I& D:pending    2.  L foot blister s.p skin removal in ED   -local wound care   -wound care nurse consult     3.hx testicular CA S/p R  orchiectomy  12/7/23  -on chemo since 1/15/23  completed last chemo  Etoposide and Cisplatin   -outpt follow up with Dr. Atwood       4. hx Hypothyroidism   - cw levothyroxine    5. hx HTN  - cw losartan   -dash diet    DVT prophylaxis   Code statu FULL.     ROSA MARIAJACYFRANKMALIA  68y  [facility]-S 4I 024 B      Patient is a 68y old  Male who presents with a chief complaint of R forearm cellulitis (11 Feb 2024 09:25)      INTERVAL HPI/OVERNIGHT EVENTS:    pt feels better.   he has no complains  his cellulitis is improving. still draining a lot of pus         FAMILY HISTORY:  CAD (coronary artery disease) (Mother)    CAD (coronary artery disease)  Stent      T(C): 36.6 (02-12-24 @ 04:59), Max: 37.1 (02-11-24 @ 09:52)  HR: 73 (02-12-24 @ 04:59) (73 - 77)  BP: 136/81 (02-12-24 @ 04:59) (130/67 - 136/81)  RR: 18 (02-12-24 @ 04:59) (16 - 18)  SpO2: 96% (02-12-24 @ 04:59) (96% - 97%)  Wt(kg): --Vital Signs Last 24 Hrs  T(C): 36.6 (12 Feb 2024 04:59), Max: 37.1 (11 Feb 2024 09:52)  T(F): 97.9 (12 Feb 2024 04:59), Max: 98.7 (11 Feb 2024 09:52)  HR: 73 (12 Feb 2024 04:59) (73 - 77)  BP: 136/81 (12 Feb 2024 04:59) (130/67 - 136/81)  BP(mean): --  RR: 18 (12 Feb 2024 04:59) (16 - 18)  SpO2: 96% (12 Feb 2024 04:59) (96% - 97%)    Parameters below as of 12 Feb 2024 04:59  Patient On (Oxygen Delivery Method): room air        PHYSICAL EXAM:  GENERAL: NAD, well-groomed, well-developed  NERVOUS SYSTEM:  Alert & Oriented X3  PULM: Clear to auscultation bilaterally  CARDIAC: Regular rate and rhythm; No murmurs, rubs, or gallops  GI: Soft, Nontender, Nondistended; Bowel sounds present  EXTREMITIES:  2+ Peripheral Pulses,   Right forearm erythema resolving. abscess is forming     Consultant(s) Notes Reviewed:  [x ] YES  [ ] NO  Care Discussed with Consultants/Other Providers [ x] YES  [ ] NO    LABS:                            10.6   9.44  )-----------( 303      ( 12 Feb 2024 06:44 )             30.2   02-12    134<L>  |  98  |  15  ----------------------------<  115<H>  4.1   |  27  |  0.9    Ca    8.0<L>      12 Feb 2024 06:44    TPro  5.0<L>  /  Alb  2.7<L>  /  TBili  0.4  /  DBili  x   /  AST  13  /  ALT  9   /  AlkPhos  70  02-12            acetaminophen     Tablet .. 650 milliGRAM(s) Oral every 6 hours PRN  aztreonam  IVPB 1000 milliGRAM(s) IV Intermittent every 8 hours  chlorhexidine 2% Cloths 1 Application(s) Topical <User Schedule>  cholecalciferol 1000 Unit(s) Oral daily  enoxaparin Injectable 40 milliGRAM(s) SubCutaneous every 24 hours  levothyroxine 137 MICROGram(s) Oral daily  losartan 50 milliGRAM(s) Oral at bedtime  multivitamin 1 Tablet(s) Oral daily  sodium chloride 0.9%. 1000 milliLiter(s) IV Continuous <Continuous>  vancomycin  IVPB 1750 milliGRAM(s) IV Intermittent every 12 hours    68-year-old male history of Melanoma, R eye removal,  hypothyroid, hypertension, testicular CA S/p R  orchiectomy  12/7/23 currently on chemo sine 1/15/23  completed last chemo  Etoposide and Cisplatin (second cycle) past Friday presents with R arm infection.    1. Sepsis (POA) secondary to R  forearm cellulitis and abscess forming now in an immunocompromise pt (on chemo) seems MRSA related   - Admit to medicine   - Baseline CRP         -LA:1.5    - Received 1 liter bolus followed by maintenance IVF NS at 75ml/hr   - Was on vancomycin that was switched to zyvox 600mg po bid d:1 (leukopenia noted. Watch wbc)   - hx of PCN allergy. Was on Aztreonam that I will stop   - Blood cx:NTD               -Wound cx:Staph aureus             - MRSA:Positive/ CHG activated   - Keep right arm elevated /Warm compress to help drainage   - ID consult appreciated  - Gen surgery for I& D:pending    2.  L foot blister s.p skin removal in ED   -local wound care   -wound care nurse consult     3.hx testicular CA S/p R  orchiectomy  12/7/23  -on chemo since 1/15/23  completed last chemo  Etoposide and Cisplatin   -outpt follow up with Dr. Awtood       4. hx Hypothyroidism   - cw levothyroxine    5. hx HTN  - cw losartan   -dash diet    DVT prophylaxis   Code statu FULL.    Sepsis (POA) secondary to R  forearm cellulitis and abscess forming now in an immunocompromise pt (on chemo) resolving. plan for I&D today. dc once sensitivity in back likely in 24 hours

## 2024-02-13 NOTE — PROGRESS NOTE ADULT - ASSESSMENT
68-year-old male history of Melanoma, R eye removal,  hypothyroid, hypertension, testicular CA S/p R  orchiectomy  12/7/23 currently on chemo sine 1/15/23  completed last chemo  Etoposide and Cisplatin (second cycle) past Friday presents with R arm infection.    Sepsis (POA) secondary to R  forearm cellulitis and small abscess spontaneously draining  in an immunocompromise pt (on chemo) seems MRSA related   -for I&D today  keep NPO

## 2024-02-13 NOTE — BRIEF OPERATIVE NOTE - OPERATION/FINDINGS
Incision and drainage of forearm abscess. Approximately 10ml of purulent fluid drainage. Wound cultures and random biopsy was taken. Copious irrigation performed. Hemostasis achieved.

## 2024-02-14 ENCOUNTER — TRANSCRIPTION ENCOUNTER (OUTPATIENT)
Age: 69
End: 2024-02-14

## 2024-02-14 VITALS
HEART RATE: 89 BPM | SYSTOLIC BLOOD PRESSURE: 116 MMHG | RESPIRATION RATE: 18 BRPM | TEMPERATURE: 97 F | DIASTOLIC BLOOD PRESSURE: 66 MMHG

## 2024-02-14 LAB
-  AMPICILLIN/SULBACTAM: SIGNIFICANT CHANGE UP
-  CEFAZOLIN: SIGNIFICANT CHANGE UP
-  CLINDAMYCIN: SIGNIFICANT CHANGE UP
-  DAPTOMYCIN: SIGNIFICANT CHANGE UP
-  ERYTHROMYCIN: SIGNIFICANT CHANGE UP
-  GENTAMICIN: SIGNIFICANT CHANGE UP
-  LINEZOLID: SIGNIFICANT CHANGE UP
-  OXACILLIN: SIGNIFICANT CHANGE UP
-  PENICILLIN: SIGNIFICANT CHANGE UP
-  RIFAMPIN: SIGNIFICANT CHANGE UP
-  TETRACYCLINE: SIGNIFICANT CHANGE UP
-  TRIMETHOPRIM/SULFAMETHOXAZOLE: SIGNIFICANT CHANGE UP
-  VANCOMYCIN: SIGNIFICANT CHANGE UP
ANION GAP SERPL CALC-SCNC: 8 MMOL/L — SIGNIFICANT CHANGE UP (ref 7–14)
BUN SERPL-MCNC: 15 MG/DL — SIGNIFICANT CHANGE UP (ref 10–20)
CALCIUM SERPL-MCNC: 8.2 MG/DL — LOW (ref 8.4–10.5)
CHLORIDE SERPL-SCNC: 99 MMOL/L — SIGNIFICANT CHANGE UP (ref 98–110)
CO2 SERPL-SCNC: 28 MMOL/L — SIGNIFICANT CHANGE UP (ref 17–32)
CREAT SERPL-MCNC: 0.8 MG/DL — SIGNIFICANT CHANGE UP (ref 0.7–1.5)
EGFR: 95 ML/MIN/1.73M2 — SIGNIFICANT CHANGE UP
GLUCOSE SERPL-MCNC: 128 MG/DL — HIGH (ref 70–99)
HCT VFR BLD CALC: 29.6 % — LOW (ref 42–52)
HGB BLD-MCNC: 10.3 G/DL — LOW (ref 14–18)
MCHC RBC-ENTMCNC: 29.6 PG — SIGNIFICANT CHANGE UP (ref 27–31)
MCHC RBC-ENTMCNC: 34.8 G/DL — SIGNIFICANT CHANGE UP (ref 32–37)
MCV RBC AUTO: 85.1 FL — SIGNIFICANT CHANGE UP (ref 80–94)
METHOD TYPE: SIGNIFICANT CHANGE UP
NRBC # BLD: 0 /100 WBCS — SIGNIFICANT CHANGE UP (ref 0–0)
PLATELET # BLD AUTO: 214 K/UL — SIGNIFICANT CHANGE UP (ref 130–400)
PMV BLD: 9.5 FL — SIGNIFICANT CHANGE UP (ref 7.4–10.4)
POTASSIUM SERPL-MCNC: 4.2 MMOL/L — SIGNIFICANT CHANGE UP (ref 3.5–5)
POTASSIUM SERPL-SCNC: 4.2 MMOL/L — SIGNIFICANT CHANGE UP (ref 3.5–5)
RBC # BLD: 3.48 M/UL — LOW (ref 4.7–6.1)
RBC # FLD: 12.1 % — SIGNIFICANT CHANGE UP (ref 11.5–14.5)
SODIUM SERPL-SCNC: 135 MMOL/L — SIGNIFICANT CHANGE UP (ref 135–146)
WBC # BLD: 1.18 K/UL — LOW (ref 4.8–10.8)
WBC # FLD AUTO: 1.18 K/UL — LOW (ref 4.8–10.8)

## 2024-02-14 PROCEDURE — 99239 HOSP IP/OBS DSCHRG MGMT >30: CPT

## 2024-02-14 RX ORDER — MUPIROCIN 20 MG/G
1 OINTMENT TOPICAL
Refills: 0 | Status: DISCONTINUED | OUTPATIENT
Start: 2024-02-14 | End: 2024-02-14

## 2024-02-14 RX ORDER — LINEZOLID 600 MG/300ML
1 INJECTION, SOLUTION INTRAVENOUS
Qty: 16 | Refills: 0
Start: 2024-02-14 | End: 2024-02-21

## 2024-02-14 RX ORDER — MUPIROCIN 20 MG/G
1 OINTMENT TOPICAL
Qty: 1 | Refills: 0
Start: 2024-02-14 | End: 2024-02-18

## 2024-02-14 RX ADMIN — Medication 1000 UNIT(S): at 12:48

## 2024-02-14 RX ADMIN — Medication 137 MICROGRAM(S): at 05:08

## 2024-02-14 RX ADMIN — Medication 1 TABLET(S): at 12:48

## 2024-02-14 RX ADMIN — LINEZOLID 600 MILLIGRAM(S): 600 INJECTION, SOLUTION INTRAVENOUS at 05:08

## 2024-02-14 NOTE — PROGRESS NOTE ADULT - NSPROGADDITIONALINFOA_GEN_ALL_CORE
I saw and examined the patient today.  I changed the dressing.  The wound is much improved with less necrotic slough.  Awaiting cultures for final antibiotic recommendations.  I would recommend DC planning with home nursing and [facility] ayoub for gentle packing daily.  Patient may shower with soap and water and get the arm wet for packing removal.  Follow-up my office 2 weeks

## 2024-02-14 NOTE — DISCHARGE NOTE PROVIDER - PROVIDER TOKENS
PROVIDER:[TOKEN:[[id number]]],PROVIDER:[TOKEN:[[id number]]],PROVIDER:[TOKEN:[[id number]]],PROVIDER:[TOKEN:[[id number]]]

## 2024-02-14 NOTE — PROGRESS NOTE ADULT - SUBJECTIVE AND OBJECTIVE BOX
MALIA GAUTHIER  68y, Male    LOS  3d    INTERVAL EVENTS/HPI  - No acute events overnight  - T(F): , Max: 97.9 (02-13-24 @ 12:48)  - Denies any worsening symptoms  - Tolerating medication  - WBC Count: 1.18 (02-14-24 @ 08:17)  WBC Count: 3.43 (02-13-24 @ 08:20)  - Creatinine: 0.8 (02-14-24 @ 08:17)  Creatinine: 0.7 (02-13-24 @ 08:20)    REVIEW OF SYSTEMS:  CONSTITUTIONAL: No fever or chills  HEENT: No sore throat  RESPIRATORY: No cough, no shortness of breath  CARDIOVASCULAR: No chest pain or palpitations  GASTROINTESTINAL: No abdominal or epigastric pain  GENITOURINARY: No dysuria  NEUROLOGICAL: No headache/dizziness  MSK: No joint pain, erythema, or swelling; no back pain  SKIN: No itching, rashes  All other ROS negative except noted above    Prior hospital charts reviewed [Yes]  Primary team notes reviewed [Yes]  Other consultant notes reviewed [Yes]    ANTIMICROBIALS:   linezolid    Tablet 600 every 12 hours      OTHER MEDS: MEDICATIONS  (STANDING):  acetaminophen     Tablet .. 650 every 6 hours PRN  enoxaparin Injectable 40 every 24 hours  levothyroxine 137 daily  losartan 50 at bedtime  simethicone 80 three times a day PRN      Vital Signs Last 24 Hrs  T(F): 95.9 (02-14-24 @ 05:01), Max: 98.7 (02-11-24 @ 09:52)    Vital Signs Last 24 Hrs  HR: 69 (02-14-24 @ 05:01) (69 - 82)  BP: 116/63 (02-14-24 @ 05:01) (116/63 - 161/81)  RR: 18 (02-14-24 @ 05:01)  SpO2: 96% (02-14-24 @ 05:01) (96% - 98%)  Wt(kg): --    EXAM:  GENERAL: NAD, lying in bed, Obese.   HEAD: No head lesions  NECK: Supple, nontender to palpation  CHEST/LUNG: Clear to auscultation bilaterally  HEART: S1 S2  ABDOMEN: Soft, nontender  EXTREMITIES: No clubbing, cyanosis, or petal edema  NERVOUS SYSTEM: Alert and oriented to person   MSK: No joint erythema, swelling or pain improving.   SKIN: No rashes or lesions, no superficial thrombophlebitis    Labs:                        10.3   1.18  )-----------( 214      ( 14 Feb 2024 08:17 )             29.6     02-14    135  |  99  |  15  ----------------------------<  128<H>  4.2   |  28  |  0.8    Ca    8.2<L>      14 Feb 2024 08:17        WBC Trend:  WBC Count: 1.18 (02-14-24 @ 08:17)  WBC Count: 3.43 (02-13-24 @ 08:20)  WBC Count: 9.44 (02-12-24 @ 06:44)  WBC Count: 15.28 (02-11-24 @ 07:30)      Creatine Trend:  Creatinine: 0.8 (02-14)  Creatinine: 0.7 (02-13)  Creatinine: 0.9 (02-12)  Creatinine: 0.9 (02-11)      Liver Biochemical Testing Trend:  Alanine Aminotransferase (ALT/SGPT): 9 (02-12)  Alanine Aminotransferase (ALT/SGPT): 11 (02-11)  Alanine Aminotransferase (ALT/SGPT): 10 (11-24)  Aspartate Aminotransferase (AST/SGOT): 13 (02-12-24 @ 06:44)  Aspartate Aminotransferase (AST/SGOT): 13 (02-11-24 @ 07:30)  Aspartate Aminotransferase (AST/SGOT): 16 (11-24-23 @ 11:01)  Bilirubin Total: 0.4 (02-12)  Bilirubin Total: 0.4 (02-11)  Bilirubin Total: 0.5 (11-24)      Trend LDH      Urinalysis Basic - ( 14 Feb 2024 08:17 )    Color: x / Appearance: x / SG: x / pH: x  Gluc: 128 mg/dL / Ketone: x  / Bili: x / Urobili: x   Blood: x / Protein: x / Nitrite: x   Leuk Esterase: x / RBC: x / WBC x   Sq Epi: x / Non Sq Epi: x / Bacteria: x        MICROBIOLOGY:    Male    Culture - Other (collected 11 Feb 2024 16:00)  Source: Drainage Drainage  Preliminary Report:    Numerous Methicillin Resistant Staphylococcus aureus  Organism: Methicillin resistant Staphylococcus aureus  Organism: Methicillin resistant Staphylococcus aureus    Sensitivities:      Method Type: HANH      -  Ampicillin/Sulbactam: R <=8/4      -  Cefazolin: R <=4      -  Clindamycin: S <=0.25      -  Daptomycin: S 0.5      -  Erythromycin: S <=0.25      -  Gentamicin: S <=1 Should not be used as monotherapy      -  Linezolid: S 2      -  Oxacillin: R >2      -  Penicillin: R 1      -  Rifampin: S <=1 Should not be used as monotherapy      -  Tetracycline: S <=1      -  Trimethoprim/Sulfamethoxazole: S <=0.5/9.5      -  Vancomycin: S 2    Culture - Blood (collected 11 Feb 2024 07:30)  Source: .Blood Blood  Preliminary Report:    No growth at 48 Hours    Culture - Blood (collected 11 Feb 2024 07:30)  Source: .Blood Blood  Preliminary Report:    No growth at 48 Hours    Culture - Urine (collected 24 Nov 2023 12:24)  Source: Clean Catch Clean Catch (Midstream)  Final Report:    No growth      C-Reactive Protein, Serum: 92.6 (02-12)      RADIOLOGY & ADDITIONAL TESTS:  I have personally reviewed the relevant images.   CXR      CT        WEIGHT  Weight (kg): 122.5 (02-13-24 @ 10:33)  Creatinine: 0.8 mg/dL (02-14-24 @ 08:17)      All available historical records have been reviewed

## 2024-02-14 NOTE — DISCHARGE NOTE PROVIDER - NSDCADMDATE_GEN_ALL_CORE_FT
[facility] Pharmacy Note:  Age Based Dose Adjustment    Johanna DAVID Arvizu has been prescribed ketorolac (TORADOL) 15 mg - 30 mg IV every 6 hours prn. Patient is >71 years old therefore the dose has been adjusted to 15 mg IV every 6 hours prn.       Thank you,  Ana Purdy 11-Feb-2024 08:43

## 2024-02-14 NOTE — DISCHARGE NOTE NURSING/CASE MANAGEMENT/SOCIAL WORK - PATIENT PORTAL LINK FT
You can access the FollowMyHealth Patient Portal offered by [facility] by registering at the following website: [email or web address]. By joining [employer]’s FollowMyHealth portal, you will also be able to view your health information using other applications (apps) compatible with our system.

## 2024-02-14 NOTE — PROGRESS NOTE ADULT - ASSESSMENT
This is a 68-year-old male history of Melanoma, R eye removal,  hypothyroid, hypertension, testicular CA S/p R  orchiectomy  12/7/23 currently on chemo since 1/15/23  completed last chemo  Etoposide and Cisplatin (second cycle) past Friday presents with R arm infection.    IMPRESSION  #Right forearm abscess  #Leukopenia- New  #Lower extremity blistering. (Likely from chemo)   #Sepsis on admission  #Right shoulder melanoma and Testicular CA s/p Right orchiectomy (12/2023) on Chemo (Immunocompromised)  #History of hypothyroidism, HTN  (Mild penicillin allergy- Rash). Should completely be okay to use cephalosporins   #MRSA nares positive.     RECOMMENDATIONS  -Incision and drainage of forearm abscess done 2/13.   -Continue with Linezolid 600mg BID.  -Duration of antibiotics 10 days.  -Local wound care. Outpatient follow up with surgery.   -Patient can be advised to have a follow up outpatient with me in 2 weeks, [address] [address] Phone [phone].   -Offloading. Aspiration precautions. CHG washes. Consider mupirocin BID for 5 days.     If any questions, please send a message or call on [employer] Teams  Please continue to update ID with any pertinent new laboratory or radiographic findings.    Mitra Nieves M.D  Infectious Diseases Attending/   [employer] at [facility]     This is a 68-year-old male history of Melanoma, R eye removal,  hypothyroid, hypertension, testicular CA S/p R  orchiectomy  12/7/23 currently on chemo since 1/15/23  completed last chemo  Etoposide and Cisplatin (second cycle) past Friday presents with R arm infection.    IMPRESSION  #Right forearm abscess  #Leukopenia- New  #Lower extremity blistering. (Likely from chemo)   #Sepsis on admission  #Right shoulder melanoma and Testicular CA s/p Right orchiectomy (12/2023) on Chemo (Immunocompromised)  #History of hypothyroidism, HTN  (Mild penicillin allergy- Rash). Should completely be okay to use cephalosporins   #MRSA nares positive.     RECOMMENDATIONS  -Incision and drainage of forearm abscess done 2/13.   -Continue with Linezolid 600mg BID. Other option is Doxycycline 100mg BID (to avoid any myelosuppression though doubting it is from antibiotics.)  -Duration of antibiotics 10 days.  -Local wound care for both the arm and lower extremity blister. (Can cover with xeroform guaze) Outpatient follow up with surgery.   -Patient can be advised to have a follow up outpatient with me in 2 weeks, [address] [place] [address] Phone [phone].   -Offloading. Aspiration precautions. CHG washes. Consider mupirocin BID for 5 days.     If any questions, please send a message or call on [employer] Teams  Please continue to update ID with any pertinent new laboratory or radiographic findings.    Mitra Nieves M.D  Infectious Diseases Attending/   [employer] at \[facility]\""/[facility]     This is a 68-year-old male history of Melanoma, R eye removal,  hypothyroid, hypertension, testicular CA S/p R  orchiectomy  12/7/23 currently on chemo since 1/15/23  completed last chemo  Etoposide and Cisplatin (second cycle) past Friday presents with R arm infection.    IMPRESSION  #Right forearm abscess  #Leukopenia- New (Possibility from chemo)   #Lower extremity blistering. (Likely from chemo)   #Sepsis on admission  #Right shoulder melanoma and Testicular CA s/p Right orchiectomy (12/2023) on Chemo (Immunocompromised)  #History of hypothyroidism, HTN  (Mild penicillin allergy- Rash). Should completely be okay to use cephalosporins   #MRSA nares positive.     RECOMMENDATIONS  -Incision and drainage of forearm abscess done 2/13.   -Continue with Linezolid 600mg BID. Other option is Doxycycline 100mg BID (to avoid any myelosuppression though doubting it is from antibiotics.)  -Duration of antibiotics 10 days.  -Local wound care for both the arm and lower extremity blister. (Can cover with xeroform guaze) Outpatient follow up with surgery.   -Patient can be advised to have a follow up outpatient with me in 2 weeks, [address] [address] Phone [phone].   -Offloading. Aspiration precautions. CHG washes. Consider mupirocin BID for 5 days.     If any questions, please send a message or call on [employer] Teams  Please continue to update ID with any pertinent new laboratory or radiographic findings.    Mitra Nieves M.D  Infectious Diseases Attending/   [employer] at [facility]

## 2024-02-14 NOTE — DISCHARGE NOTE PROVIDER - CARE PROVIDERS DIRECT ADDRESSES
[email or web address]

## 2024-02-14 NOTE — DISCHARGE NOTE PROVIDER - HOSPITAL COURSE
A 68-year-old male history of Melanoma, R eye removal,  hypothyroid, hypertension, testicular CA S/p R  orchiectomy  12/7/23 currently on chemo since 1/15/23  completed last chemo  Etoposide and Cisplatin (second cycle) past Friday presents with R arm infection. Pt states had a boil on his R forearm 2 days ago, applied war compress it opened up, puss and bleeding yesterday.  PT states swelling of his R hand since yesterday. Pt denies pain on his hand. Pt denies fever at home.  Pt reports had a blister on his L foot dorsal side, skin removed in ED. PT denies nausea, vomiting, diarrhea. PT follows up with  oncology Dr. ATWOOD. PT denies chest pain, shortness of breath, burning with urination.    1. Sepsis (POA) secondary to R  forearm cellulitis and abscess forming now in an immunocompromise pt (on chemo) seems MRSA related   - Admit to medicine   - Baseline CRP         -LA:1.5    - Received 1 liter bolus followed by maintenance IVF NS at 75ml/hr   - Was on vancomycin that was switched to zyvox 600mg po bid d:2. D/w ID, leokopenia likely 2/2 recent chemo therapy, linezolid leukopenia takes roughly ~2 weeks to develop gradually. Either way, patient will be following up with outpatient oncologist and knows to get repeat blood work to monitor WBC.  - hx of PCN allergy. Was on Aztreonam that I will stop   - Blood cx:NTD, -Wound cx:Staph aureus- MRSA:Positive/ CHG activated   - Keep right arm elevated /Warm compress to help drainage   - ID consult appreciated  - Gen surgery for I& D on 2/14/24    2.  L foot blister s.p skin removal in ED   -local wound care   -wound care nurse consult     3.hx testicular CA S/p R  orchiectomy  12/7/23  -on chemo since 1/15/23  completed last chemo  Etoposide and Cisplatin   -outpt follow up with Dr. Atwood       4. hx Hypothyroidism   - cw levothyroxine    5. hx HTN  - cw losartan   -dash diet    DVT prophylaxis   Code statu FULL.

## 2024-02-14 NOTE — DISCHARGE NOTE PROVIDER - NS AS DC PROVIDER CONTACT Y/N MULTI
SURVEY:    You may be receiving a survey from [employer] regarding your visit today. Please complete the survey to enable us to provide the highest quality of care to you and your family. If you cannot score us a very good on any question, please call the office to discuss how we could have made your experience a very good one. Thank you. Your Provider today: Dr. Sweeney Been  Your LPN today: Joshuakarla Macias  Patient Education        Well Visit, Ages 25 to 48: Care Instructions  Your Care Instructions     Physical exams can help you stay healthy. Your doctor has checked your overall health and may have suggested ways to take good care of yourself. He or she also may have recommended tests. At home, you can help prevent illness with healthy eating, regular exercise, and other steps. Follow-up care is a key part of your treatment and safety. Be sure to make and go to all appointments, and call your doctor if you are having problems. It's also a good idea to know your test results and keep a list of the medicines you take. How can you care for yourself at home? · Reach and stay at a healthy weight. This will lower your risk for many problems, such as obesity, diabetes, heart disease, and high blood pressure. · Get at least 30 minutes of physical activity on most days of the week. Walking is a good choice. You also may want to do other activities, such as running, swimming, cycling, or playing tennis or team sports. Discuss any changes in your exercise program with your doctor. · Do not smoke or allow others to smoke around you. If you need help quitting, talk to your doctor about stop-smoking programs and medicines. These can increase your chances of quitting for good. · Talk to your doctor about whether you have any risk factors for sexually transmitted infections (STIs).  Having one sex partner (who does not have STIs and does not have sex with anyone else) is a good way to avoid these infections. · Use birth control if you do not want to have children at this time. Talk with your doctor about the choices available and what might be best for you. · Protect your skin from too much sun. When you're outdoors from 10 a.m. to 4 p.m., stay in the shade or cover up with clothing and a hat with a wide brim. Wear sunglasses that block UV rays. Even when it's cloudy, put broad-spectrum sunscreen (SPF 30 or higher) on any exposed skin. · See a dentist one or two times a year for checkups and to have your teeth cleaned. · Wear a seat belt in the car. Follow your doctor's advice about when to have certain tests. These tests can spot problems early. For everyone  · Cholesterol. Have the fat (cholesterol) in your blood tested after age 21. Your doctor will tell you how often to have this done based on your age, family history, or other things that can increase your risk for heart disease. · Blood pressure. Have your blood pressure checked during a routine doctor visit. Your doctor will tell you how often to check your blood pressure based on your age, your blood pressure results, and other factors. · Vision. Talk with your doctor about how often to have a glaucoma test.  · Diabetes. Ask your doctor whether you should have tests for diabetes. · Colon cancer. Your risk for colorectal cancer gets higher as you get older. Some experts say that adults should start regular screening at age 48 and stop at age 76. Others say to start before age 48 or continue after age 76. Talk with your doctor about your risk and when to start and stop screening. For women  · Breast exam and mammogram. Talk to your doctor about when you should have a clinical breast exam and a mammogram. Medical experts differ on whether and how often women under 50 should have these tests. Your doctor can help you decide what is right for you. · Cervical cancer screening test and pelvic exam. Begin with a Pap test at age 24.  The test often is part of a pelvic exam. Starting at age 27, you may choose to have a Pap test, an HPV test, or both tests at the same time (called co-testing). Talk with your doctor about how often to have testing. · Tests for sexually transmitted infections (STIs). Ask whether you should have tests for STIs. You may be at risk if you have sex with more than one person, especially if your partners do not wear condoms. For men  · Tests for sexually transmitted infections (STIs). Ask whether you should have tests for STIs. You may be at risk if you have sex with more than one person, especially if you do not wear a condom. · Testicular cancer exam. Ask your doctor whether you should check your testicles regularly. · Prostate exam. Talk to your doctor about whether you should have a blood test (called a PSA test) for prostate cancer. Experts differ on whether and when men should have this test. Some experts suggest it if you are older than 39 and are -American or have a father or brother who got prostate cancer when he was younger than 72. When should you call for help? Watch closely for changes in your health, and be sure to contact your doctor if you have any problems or symptoms that concern you. Where can you learn more? Go to [email or web address]://[employer].[email or web address] org and sign in to your [facility] account. Enter P072 in the [employer] box to learn more about \"Well Visit, Ages 25 to 48: Care Instructions. \"     If you do not have an account, please click on the \"Sign Up Now\" link. Current as of: August 22, 2019               Content Version: 12.5  © 3171-1766 Healthwise, Incorporated. Care instructions adapted under license by [facility] ([facility]). If you have questions about a medical condition or this instruction, always ask your healthcare professional. [place] [address] any warranty or liability for your use of this information. Yes

## 2024-02-14 NOTE — PROGRESS NOTE ADULT - REASON FOR ADMISSION
R forearm cellulitis

## 2024-02-14 NOTE — DISCHARGE NOTE PROVIDER - CARE PROVIDER_API CALL
Liam Aguilar  Family Medicine  [address] [facility] [place] [address]  Phone: [phone]  Fax: [phone]  Follow Up Time:     Mitra Nieves  Infectious Disease  [address]  [place] [address]  Phone: [phone]  Fax: [phone]  Follow Up Time:     Giancarlo Snider  Surgery  [address] [place] [address], [address]  [place] [address]  Phone: [phone]  Fax: [phone]  Follow Up Time:     Demetrio Atwood  Medical Oncology  [address] [place] [address]  Phone: [phone]  Fax: [phone]  Follow Up Time:

## 2024-02-14 NOTE — DISCHARGE NOTE PROVIDER - NSDCFUADDAPPT_GEN_ALL_CORE_FT
APPTS ARE READY TO BE MADE: [ ] YES    Best Family or Patient Contact (if needed):    Additional Information about above appointments (if needed):    1: Dr. Nieves  2: Dr. Atwood  3:     Other comments or requests:

## 2024-02-14 NOTE — DISCHARGE NOTE PROVIDER - NSDCMRMEDTOKEN_GEN_ALL_CORE_FT
Levothroid 137 mcg (0.137 mg) oral tablet: 1 tab(s) orally once a day  linezolid 600 mg oral tablet: 1 tab(s) orally every 12 hours  losartan 50 mg oral tablet: 1 tab(s) orally once a day (at bedtime)  Multiple Vitamins oral tablet: 1 tab(s) orally once a day  mupirocin 2% topical ointment: Apply topically to affected area 2 times a day  Vitamin D3 1000 intl units oral tablet: 1 tab(s) orally once a day

## 2024-02-14 NOTE — DISCHARGE NOTE PROVIDER - NSDCCPCAREPLAN_GEN_ALL_CORE_FT
PRINCIPAL DISCHARGE DIAGNOSIS  Diagnosis: Cellulitis  Assessment and Plan of Treatment: You were admitted for cellulitis. You were found to need an incision and drainage by surgery. Your cultures grew MRSA. Infectious disease was following you and reocmmended linezolid. Please continue taking your Linezolid to complete the course as prescribed. Please follow up with your hematologist within the next few days to monitor your WBC count. If you WBC levels do drop, infectious disease is also recommending an alternative to linezolid which is to take  Doxycycline 100mg BID instead. This can be prescribed by your outpatient providers.     PRINCIPAL DISCHARGE DIAGNOSIS  Diagnosis: Cellulitis  Assessment and Plan of Treatment: You were admitted for cellulitis. You were found to need an incision and drainage by surgery. Your cultures grew MRSA. Infectious disease was following you and reocmmended linezolid. Please continue taking your Linezolid to complete the course as prescribed. Please follow up with your hematologist within the next few days to repeat a CBC and to monitor your WBC count. If you WBC levels do drop, infectious disease is also recommending an alternative to linezolid which is to take  Doxycycline 100mg BID instead. This can be prescribed by your outpatient providers.

## 2024-02-14 NOTE — PROGRESS NOTE ADULT - ASSESSMENT
68-year-old male history of Melanoma, R eye removal,  hypothyroid, hypertension, testicular CA S/p R  orchiectomy  12/7/23 currently on chemo sine 1/15/23  completed last chemo  Etoposide and Cisplatin (second cycle) past Friday presents with R arm infection.    Sepsis (POA) secondary to R  forearm cellulitis and small abscess spontaneously draining  in an immunocompromise pt (on chemo) seems MRSA related   -I&D 2/13   -Wound cx- numerous staph aureus prelim   -C/w linezolid  68-year-old male history of Melanoma, R eye removal,  hypothyroid, hypertension, testicular CA S/p R  orchiectomy  12/7/23 currently on chemo sine 1/15/23  completed last chemo  Etoposide and Cisplatin (second cycle) past Friday presents with R arm infection.    Sepsis (POA) secondary to R  forearm cellulitis and small abscess spontaneously draining  in an immunocompromise pt (on chemo) seems MRSA related   -I&D 2/13   -Wound cx- numerous staph aureus prelim MRSA  -Abx per ID  68-year-old male history of Melanoma, R eye removal,  hypothyroid, hypertension, testicular CA S/p R  orchiectomy  12/7/23 currently on chemo sine 1/15/23  completed last chemo  Etoposide and Cisplatin (second cycle) past Friday presents with R arm infection.    Sepsis (POA) secondary to R  forearm cellulitis and small abscess spontaneously draining  in an immunocompromise pt (on chemo) seems MRSA related   -I&D 2/13   -Wound cx- numerous staph aureus prelim MRSA  -Abx per ID   -Recommend right arm elevation   -Daily dressing: Aquacel Ag rope for gentle packing daily.  Patient may shower with soap and water and get the arm wet for packing removal.  -Follow-up outpatient with Dr. Snider in 2 weeks

## 2024-02-14 NOTE — PROGRESS NOTE ADULT - SUBJECTIVE AND OBJECTIVE BOX
Patient seen & examined.  No acute events noted overnight.  Patient denies right arm pain, swelling, numbness, and tingling.   Patient tolerates diet well  Patient denies subjective fever, chills, tremors, N/V/D, CP or SOB.         I&O's Detail    13 Feb 2024 07:01  -  14 Feb 2024 07:00  --------------------------------------------------------  IN:    Lactated Ringers: 250 mL  Total IN: 250 mL    OUT:  Total OUT: 0 mL    Total NET: 250 mL            MEDICATIONS  (STANDING):  cholecalciferol 1000 Unit(s) Oral daily  enoxaparin Injectable 40 milliGRAM(s) SubCutaneous every 24 hours  levothyroxine 137 MICROGram(s) Oral daily  linezolid    Tablet 600 milliGRAM(s) Oral every 12 hours  losartan 50 milliGRAM(s) Oral at bedtime  multivitamin 1 Tablet(s) Oral daily    MEDICATIONS  (PRN):  acetaminophen     Tablet .. 650 milliGRAM(s) Oral every 6 hours PRN Temp greater or equal to 38C (100.4F), Mild Pain (1 - 3)  simethicone 80 milliGRAM(s) Chew three times a day PRN Indigestion        Vital Signs Last 24 Hrs  T(C): 35.5 (14 Feb 2024 05:01), Max: 36.6 (13 Feb 2024 12:48)  T(F): 95.9 (14 Feb 2024 05:01), Max: 97.9 (13 Feb 2024 12:48)  HR: 69 (14 Feb 2024 05:01) (69 - 82)  BP: 116/63 (14 Feb 2024 05:01) (116/63 - 161/81)  BP(mean): --  RR: 18 (14 Feb 2024 05:01) (15 - 22)  SpO2: 96% (14 Feb 2024 05:01) (96% - 98%)    Parameters below as of 14 Feb 2024 05:01  Patient On (Oxygen Delivery Method): room air          Physical Exam:  General:  A&O X3, NAD.   Chest:  Clear to auscultation bilaterally, Equal expansion bilaterally  CV:  S1 & S2, RRR  Abdomen:  Soft, nondistended, non-tender  RUE w/dressing C/D/I, RUE cap refill <2 seconds, sensation intact         LABS:                        10.3   1.18  )-----------( 214      ( 14 Feb 2024 08:17 )             29.6     02-13    136  |  99  |  14  ----------------------------<  106<H>  4.4   |  27  |  0.7    Ca    8.0<L>      13 Feb 2024 08:20        Urinalysis Basic - ( 13 Feb 2024 08:20 )    Color: x / Appearance: x / SG: x / pH: x  Gluc: 106 mg/dL / Ketone: x  / Bili: x / Urobili: x   Blood: x / Protein: x / Nitrite: x   Leuk Esterase: x / RBC: x / WBC x   Sq Epi: x / Non Sq Epi: x / Bacteria: x        Culture - Other (collected 11 Feb 2024 16:00)  Source: Drainage Drainage  Preliminary Report (14 Feb 2024 08:20):    Numerous Methicillin Resistant Staphylococcus aureus  Organism: Methicillin resistant Staphylococcus aureus (14 Feb 2024 08:18)  Organism: Methicillin resistant Staphylococcus aureus (14 Feb 2024 08:18)      . Patient seen & examined.  No acute events noted overnight.  Patient denies right arm pain, swelling, numbness, and tingling.   Patient tolerates diet well  Patient denies subjective fever, chills, tremors, N/V/D, CP or SOB.         I&O's Detail    13 Feb 2024 07:01  -  14 Feb 2024 07:00  --------------------------------------------------------  IN:    Lactated Ringers: 250 mL  Total IN: 250 mL    OUT:  Total OUT: 0 mL    Total NET: 250 mL            MEDICATIONS  (STANDING):  cholecalciferol 1000 Unit(s) Oral daily  enoxaparin Injectable 40 milliGRAM(s) SubCutaneous every 24 hours  levothyroxine 137 MICROGram(s) Oral daily  linezolid    Tablet 600 milliGRAM(s) Oral every 12 hours  losartan 50 milliGRAM(s) Oral at bedtime  multivitamin 1 Tablet(s) Oral daily    MEDICATIONS  (PRN):  acetaminophen     Tablet .. 650 milliGRAM(s) Oral every 6 hours PRN Temp greater or equal to 38C (100.4F), Mild Pain (1 - 3)  simethicone 80 milliGRAM(s) Chew three times a day PRN Indigestion        Vital Signs Last 24 Hrs  T(C): 35.5 (14 Feb 2024 05:01), Max: 36.6 (13 Feb 2024 12:48)  T(F): 95.9 (14 Feb 2024 05:01), Max: 97.9 (13 Feb 2024 12:48)  HR: 69 (14 Feb 2024 05:01) (69 - 82)  BP: 116/63 (14 Feb 2024 05:01) (116/63 - 161/81)  BP(mean): --  RR: 18 (14 Feb 2024 05:01) (15 - 22)  SpO2: 96% (14 Feb 2024 05:01) (96% - 98%)    Parameters below as of 14 Feb 2024 05:01  Patient On (Oxygen Delivery Method): room air          Physical Exam:  General:  A&O X3, NAD.   Chest:  Clear to auscultation bilaterally, Equal expansion bilaterally  CV:  S1 & S2, RRR  Abdomen:  Soft, nondistended, non-tender  RUE w/dressing C/D/I, RUE cap refill <2 seconds, sensation intact         LABS: BMP 2/14 pending                         10.3   1.18  )-----------( 214      ( 14 Feb 2024 08:17 )             29.6     02-13    136  |  99  |  14  ----------------------------<  106<H>  4.4   |  27  |  0.7    Ca    8.0<L>      13 Feb 2024 08:20        Urinalysis Basic - ( 13 Feb 2024 08:20 )    Color: x / Appearance: x / SG: x / pH: x  Gluc: 106 mg/dL / Ketone: x  / Bili: x / Urobili: x   Blood: x / Protein: x / Nitrite: x   Leuk Esterase: x / RBC: x / WBC x   Sq Epi: x / Non Sq Epi: x / Bacteria: x        Culture - Other (collected 11 Feb 2024 16:00)  Source: Drainage Drainage  Preliminary Report (14 Feb 2024 08:20):    Numerous Methicillin Resistant Staphylococcus aureus  Organism: Methicillin resistant Staphylococcus aureus (14 Feb 2024 08:18)  Organism: Methicillin resistant Staphylococcus aureus (14 Feb 2024 08:18)      .

## 2024-02-15 ENCOUNTER — APPOINTMENT (OUTPATIENT)
Dept: OTOLARYNGOLOGY | Facility: CLINIC | Age: 69
End: 2024-02-15

## 2024-02-16 LAB
-  AMPICILLIN/SULBACTAM: SIGNIFICANT CHANGE UP
-  CEFAZOLIN: SIGNIFICANT CHANGE UP
-  CLINDAMYCIN: SIGNIFICANT CHANGE UP
-  DAPTOMYCIN: SIGNIFICANT CHANGE UP
-  ERYTHROMYCIN: SIGNIFICANT CHANGE UP
-  GENTAMICIN: SIGNIFICANT CHANGE UP
-  LINEZOLID: SIGNIFICANT CHANGE UP
-  OXACILLIN: SIGNIFICANT CHANGE UP
-  PENICILLIN: SIGNIFICANT CHANGE UP
-  RIFAMPIN: SIGNIFICANT CHANGE UP
-  TETRACYCLINE: SIGNIFICANT CHANGE UP
-  TRIMETHOPRIM/SULFAMETHOXAZOLE: SIGNIFICANT CHANGE UP
-  VANCOMYCIN: SIGNIFICANT CHANGE UP
CULTURE RESULTS: ABNORMAL
CULTURE RESULTS: SIGNIFICANT CHANGE UP
CULTURE RESULTS: SIGNIFICANT CHANGE UP
METHOD TYPE: SIGNIFICANT CHANGE UP
ORGANISM # SPEC MICROSCOPIC CNT: ABNORMAL
ORGANISM # SPEC MICROSCOPIC CNT: SIGNIFICANT CHANGE UP
SPECIMEN SOURCE: SIGNIFICANT CHANGE UP

## 2024-02-17 LAB
CULTURE RESULTS: ABNORMAL
ORGANISM # SPEC MICROSCOPIC CNT: ABNORMAL
ORGANISM # SPEC MICROSCOPIC CNT: SIGNIFICANT CHANGE UP
SPECIMEN SOURCE: SIGNIFICANT CHANGE UP

## 2024-02-21 ENCOUNTER — APPOINTMENT (OUTPATIENT)
Dept: HEMATOLOGY ONCOLOGY | Facility: CLINIC | Age: 69
End: 2024-02-21

## 2024-02-21 ENCOUNTER — OUTPATIENT (OUTPATIENT)
Dept: OUTPATIENT SERVICES | Facility: HOSPITAL | Age: 69
LOS: 1 days | End: 2024-02-21
Payer: MEDICARE

## 2024-02-21 DIAGNOSIS — C43.61 MALIGNANT MELANOMA OF RIGHT UPPER LIMB, INCLUDING SHOULDER: ICD-10-CM

## 2024-02-21 DIAGNOSIS — Z87.891 PERSONAL HISTORY OF NICOTINE DEPENDENCE: ICD-10-CM

## 2024-02-21 DIAGNOSIS — Z85.820 PERSONAL HISTORY OF MALIGNANT MELANOMA OF SKIN: ICD-10-CM

## 2024-02-21 DIAGNOSIS — B95.62 METHICILLIN RESISTANT STAPHYLOCOCCUS AUREUS INFECTION AS THE CAUSE OF DISEASES CLASSIFIED ELSEWHERE: ICD-10-CM

## 2024-02-21 DIAGNOSIS — Z90.49 ACQUIRED ABSENCE OF OTHER SPECIFIED PARTS OF DIGESTIVE TRACT: Chronic | ICD-10-CM

## 2024-02-21 DIAGNOSIS — Z88.0 ALLERGY STATUS TO PENICILLIN: ICD-10-CM

## 2024-02-21 DIAGNOSIS — E66.9 OBESITY, UNSPECIFIED: ICD-10-CM

## 2024-02-21 DIAGNOSIS — E03.9 HYPOTHYROIDISM, UNSPECIFIED: ICD-10-CM

## 2024-02-21 DIAGNOSIS — Z90.01 ACQUIRED ABSENCE OF EYE: ICD-10-CM

## 2024-02-21 DIAGNOSIS — Z79.890 HORMONE REPLACEMENT THERAPY: ICD-10-CM

## 2024-02-21 DIAGNOSIS — M19.241 SECONDARY OSTEOARTHRITIS, RIGHT HAND: ICD-10-CM

## 2024-02-21 DIAGNOSIS — A41.9 SEPSIS, UNSPECIFIED ORGANISM: ICD-10-CM

## 2024-02-21 DIAGNOSIS — L02.413 CUTANEOUS ABSCESS OF RIGHT UPPER LIMB: ICD-10-CM

## 2024-02-21 DIAGNOSIS — I10 ESSENTIAL (PRIMARY) HYPERTENSION: ICD-10-CM

## 2024-02-21 DIAGNOSIS — L03.113 CELLULITIS OF RIGHT UPPER LIMB: ICD-10-CM

## 2024-02-21 DIAGNOSIS — C62.90 MALIGNANT NEOPLASM OF UNSPECIFIED TESTIS, UNSPECIFIED WHETHER DESCENDED OR UNDESCENDED: ICD-10-CM
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
## 2024-02-21 DIAGNOSIS — S90.822A BLISTER (NONTHERMAL), LEFT FOOT, INITIAL ENCOUNTER: ICD-10-CM
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
## 2024-02-21 DIAGNOSIS — D84.821 IMMUNODEFICIENCY DUE TO DRUGS: ICD-10-CM
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
## 2024-02-21 DIAGNOSIS — M25.531 PAIN IN RIGHT WRIST: ICD-10-CM

## 2024-02-21 DIAGNOSIS — Z98.890 OTHER SPECIFIED POSTPROCEDURAL STATES: Chronic | ICD-10-CM

## 2024-02-21 LAB
ALBUMIN SERPL ELPH-MCNC: 3.3 G/DL
ALP BLD-CCNC: 68 U/L
ALT SERPL-CCNC: 12 U/L
ANION GAP SERPL CALC-SCNC: 13 MMOL/L
AST SERPL-CCNC: 14 U/L
BASOPHILS # BLD AUTO: 0.04 K/UL
BASOPHILS NFR BLD AUTO: 2 %
BILIRUB DIRECT SERPL-MCNC: <0.2 MG/DL
BILIRUB INDIRECT SERPL-MCNC: >0 MG/DL
BILIRUB SERPL-MCNC: 0.2 MG/DL
BUN SERPL-MCNC: 18 MG/DL
CALCIUM SERPL-MCNC: 8.3 MG/DL
CHLORIDE SERPL-SCNC: 98 MMOL/L
CO2 SERPL-SCNC: 25 MMOL/L
CREAT SERPL-MCNC: 1.3 MG/DL
EGFR: 59 ML/MIN/1.73M2
EOSINOPHIL # BLD AUTO: 0 K/UL
EOSINOPHIL NFR BLD AUTO: 0 %
GLUCOSE SERPL-MCNC: 131 MG/DL
HCG SERPL-MCNC: <1 MIU/ML
HCT VFR BLD CALC: 27.1 %
HGB BLD-MCNC: 9.2 G/DL
IMM GRANULOCYTES NFR BLD AUTO: 9.6 %
LYMPHOCYTES # BLD AUTO: 0.81 K/UL
LYMPHOCYTES NFR BLD AUTO: 40.9 %
MAGNESIUM SERPL-MCNC: 1.3 MG/DL
MAN DIFF?: NORMAL
MCHC RBC-ENTMCNC: 29.8 PG
MCHC RBC-ENTMCNC: 33.9 G/DL
MCV RBC AUTO: 87.7 FL
MONOCYTES # BLD AUTO: 0.52 K/UL
MONOCYTES NFR BLD AUTO: 26.3 %
NEUTROPHILS # BLD AUTO: 0.42 K/UL
NEUTROPHILS NFR BLD AUTO: 21.2 %
PLATELET # BLD AUTO: 79 K/UL
PMV BLD AUTO: 0 /100 WBCS
POTASSIUM SERPL-SCNC: 4.1 MMOL/L
PROT SERPL-MCNC: 5.7 G/DL
RBC # BLD: 3.09 M/UL
RBC # FLD: 11.8 %
SODIUM SERPL-SCNC: 136 MMOL/L
WBC # FLD AUTO: 1.98 K/UL

## 2024-02-21 PROCEDURE — 83735 ASSAY OF MAGNESIUM: CPT

## 2024-02-21 PROCEDURE — 80076 HEPATIC FUNCTION PANEL: CPT

## 2024-02-21 PROCEDURE — 85027 COMPLETE CBC AUTOMATED: CPT

## 2024-02-21 PROCEDURE — 82105 ALPHA-FETOPROTEIN SERUM: CPT

## 2024-02-21 PROCEDURE — 80048 BASIC METABOLIC PNL TOTAL CA: CPT

## 2024-02-21 PROCEDURE — 84702 CHORIONIC GONADOTROPIN TEST: CPT

## 2024-02-21 PROCEDURE — 36415 COLL VENOUS BLD VENIPUNCTURE: CPT

## 2024-02-22 DIAGNOSIS — C62.90 MALIGNANT NEOPLASM OF UNSPECIFIED TESTIS, UNSPECIFIED WHETHER DESCENDED OR UNDESCENDED: ICD-10-CM

## 2024-02-22 LAB — AFP-TM SERPL-MCNC: 7.1 NG/ML

## 2024-02-23 ENCOUNTER — APPOINTMENT (OUTPATIENT)
Dept: INFUSION THERAPY | Facility: CLINIC | Age: 69
End: 2024-02-23
Payer: MEDICARE

## 2024-02-23 ENCOUNTER — OUTPATIENT (OUTPATIENT)
Dept: OUTPATIENT SERVICES | Facility: HOSPITAL | Age: 69
LOS: 1 days | End: 2024-02-23
Payer: MEDICARE

## 2024-02-23 ENCOUNTER — APPOINTMENT (OUTPATIENT)
Dept: HEMATOLOGY ONCOLOGY | Facility: CLINIC | Age: 69
End: 2024-02-23
Payer: MEDICARE

## 2024-02-23 VITALS
HEART RATE: 108 BPM | DIASTOLIC BLOOD PRESSURE: 78 MMHG | BODY MASS INDEX: 33.86 KG/M2 | RESPIRATION RATE: 16 BRPM | TEMPERATURE: 98.3 F | SYSTOLIC BLOOD PRESSURE: 159 MMHG | WEIGHT: 250 LBS | HEIGHT: 72 IN

## 2024-02-23 DIAGNOSIS — C43.61 MALIGNANT MELANOMA OF RIGHT UPPER LIMB, INCLUDING SHOULDER: ICD-10-CM

## 2024-02-23 DIAGNOSIS — Z90.01 ACQUIRED ABSENCE OF EYE: Chronic | ICD-10-CM

## 2024-02-23 LAB
BASOPHILS # BLD AUTO: 0.01 K/UL
BASOPHILS NFR BLD AUTO: 0.2 %
EOSINOPHIL # BLD AUTO: 0.01 K/UL
EOSINOPHIL NFR BLD AUTO: 0.2 %
HCT VFR BLD CALC: 29.8 %
HGB BLD-MCNC: 10 G/DL
IMM GRANULOCYTES NFR BLD AUTO: 22.4 %
LYMPHOCYTES # BLD AUTO: 1.09 K/UL
LYMPHOCYTES NFR BLD AUTO: 23 %
MAN DIFF?: NORMAL
MCHC RBC-ENTMCNC: 29.8 PG
MCHC RBC-ENTMCNC: 33.6 G/DL
MCV RBC AUTO: 88.7 FL
MONOCYTES # BLD AUTO: 0.75 K/UL
MONOCYTES NFR BLD AUTO: 15.9 %
NEUTROPHILS # BLD AUTO: 1.81 K/UL
NEUTROPHILS NFR BLD AUTO: 38.3 %
PLATELET # BLD AUTO: 199 K/UL
PMV BLD AUTO: 0 /100 WBCS
RBC # BLD: 3.36 M/UL
RBC # FLD: 11.9 %
WBC # FLD AUTO: 4.73 K/UL

## 2024-02-23 PROCEDURE — 99215 OFFICE O/P EST HI 40 MIN: CPT

## 2024-02-23 PROCEDURE — 86850 RBC ANTIBODY SCREEN: CPT

## 2024-02-23 PROCEDURE — 86900 BLOOD TYPING SEROLOGIC ABO: CPT

## 2024-02-23 PROCEDURE — 80048 BASIC METABOLIC PNL TOTAL CA: CPT

## 2024-02-23 PROCEDURE — 85027 COMPLETE CBC AUTOMATED: CPT

## 2024-02-23 PROCEDURE — 80076 HEPATIC FUNCTION PANEL: CPT

## 2024-02-23 RX ORDER — FILGRASTIM 480MCG/1.6
480 VIAL (ML) INJECTION ONCE
Refills: 0 | Status: COMPLETED | OUTPATIENT
Start: 2024-02-23 | End: 2024-02-23

## 2024-02-23 RX ORDER — MAGNESIUM OXIDE 500 MG
500 TABLET ORAL DAILY
Qty: 30 | Refills: 0 | Status: COMPLETED | COMMUNITY
Start: 2024-02-23 | End: 2024-03-24

## 2024-02-23 RX ADMIN — Medication 480 MICROGRAM(S): at 13:33

## 2024-02-23 NOTE — REVIEW OF SYSTEMS
[Recent Change In Weight] : ~T recent weight change [Diarrhea: Grade 0] : Diarrhea: Grade 0 [Negative] : Allergic/Immunologic [Vomiting] : no vomiting [FreeTextEntry2] : lost about 6lbs since last visit

## 2024-02-23 NOTE — HISTORY OF PRESENT ILLNESS
[Therapy: ___] : Therapy: [unfilled] [Cycle: ___] : Cycle: [unfilled] [de-identified] : Mr. MALIA GAUTHIER is a 68 year old male here today for evaluation and management of Testicular Seminoma and history of melanoma.     MALIA is a 68 year old M with PMHx including malignant melanoma of skin, HTN, hypothyroid, OA who presents to clinic to establish care, accompanied by sister at initial visit.  Patient states he went to PCP regarding testicular swelling a few months ago and was subsequently sent for additional workup including MR imaging which noted large right testicular mass.  CT imaging raised suspicion for enlarged lymphadenopathy.  He is presently feeling well with no new complaints.  Patient denies fever, chills, nausea, vomiting, dyspnea, unintentional weight loss or bleeding.  Patient reports family history of malignancy in his father (stomach, skin cancer), paternal uncle (skin cancer), maternal aunt + maternal grandmother (breast cancer).  Smokes cigars occasionally for social use.     RADIOLOGIC WORKUP  CT C/A/P (12.2.2023) IMPRESSION:3 mm left lower lobe pulmonary nodule of indeterminate clinical significance. Otherwise, no CT evidence of thoracic metastatic disease.Intra-abdominal lymphadenopathy concerning for metastatic disease. Consider complete evaluation with a PET/CT.Large right hydrocele noted. MR Pelvis (10.3.2023 - R) IMPRESSION: 1. Large heterogeneous mass, likely centered in the right testicle and extending into the right epididymis and right spermatic cord, highly suspicious for malignancy.  The tumor appears to extend beyond the testicle into the scrotal sac.  Surgical resection is advised.  2.  Large right hydrocele.  3 Limited views of the abdomen without definite evidence for metastatic disease, however, incomplete on the scrotal examination recommend complete characterization with CT chest abdomen and pelvis with contrast. US Scrotal (9.15.2023 - [facility]) IMPRESSION: Enlarged right testes with multiple masses highly suspicious for neoplasm.  Associated large right hydrocele.  On several images the mass in the upper pole of the right testes appears to be extended superiorly beyond the testicle.  Further evaluation with an MRI of the scrotum with and without contrast may be of diagnostic benefit. NM Lymphoscintigraphy (10.19.2018) Impression: 1. Definite demonstration of one sentinel lymph node uptake in  right axilla, by 5 minutes after injection.2. The overlying skin was marked in the  anterior position.3. The patient left the radiology department in good condition without any incident. 4. This is a preoperative marking for sentinel lymph node right axilla for wide excision of melanoma, right shoulder.  PET/CT WB FDG (9.19.2018) IMPRESSION: No evidence of pathologic FDG uptake.  LAB WORKUP (11.24.2023) WBC 5.25, Hgb 14.3, , Cr 0.8, eGFR 95, PSA 0.96, normal LFTs, , AFP 2.9, hCG TM 1   PATHOLOGY (see results section)   HCM Colonoscopy overdue   Upper Endoscopy never done  [FreeTextEntry1] : Started EP (Etoposide, Cisplatin) on 1.15.2023  [de-identified] : 1/12/24 Patient is here for a follow-up visit for Testicular Seminoma and history of melanoma.  He is feeling well with no new complaints.  Reviewed most recent CBC, which is stable with mild anemia, hgb 13.5g/dL.  Patient denies fever, chills, nausea, vomiting, dyspnea or bleeding.  He completed PFTs on Wednesday.  He also completed audiogram recently.  Patient went for MR imaging this morning; not yet resulted.  Patient is s/p Right chest port placed via right IJ access on 1.3.2024.  Reviewed most recent CT imaging which shows increase in size of abdominal pelvic lymph nodes, stable left lung nodule and indeterminate right adrenal gland lesion.  CT C/A/P (1.9.2024) Impression:Stable left lung nodule. No new nodules or lymphadenopathy.Increase in size of abdominal pelvic lymph nodes as described.Stable indeterminate right adrenal gland lesion.  2/2/24 Patient is here for a follow-up visit for Testicular Seminoma and history of melanoma.  He is due for cycle 2 of Cisplatin + Etoposide on 2/5, initiation on 1.15.2024.  He lost about 10lbs since last visit since he has been adjusting portion size.  He reports some hair thinning/loss.  Patient denies fever, chills, nausea, vomiting, dyspnea, worsening of tinnitus (present prior to initiation of chemo) or bleeding.  Reviewed most recent MR imaging which shows no evidence of intracranial metastasis or acute intracranial pathology, paranasal sinus mucosal disease.   MR Head (1.12.2024) IMPRESSION:No evidence of intracranial metastasis or acute intracranial pathology.Mild chronic microvascular ischemic changes.Paranasal sinus mucosal disease.  2/23/24 Patient is here for a follow-up visit for Testicular Seminoma and history of melanoma.  He is due for cycle 3 of Cisplatin + Etoposide on 2/26, initiation on 1.15.2024.  Reviewed most recent CBC which shows neutropenia and worsening anemia with hgb down to 9.2g/dL.  Patient denies fever, chills, nausea, vomiting, dyspnea, worsening of tinnitus (present prior to initiation of chemo) or bleeding.  Patient also has low magnesium.  He is still losing weight.

## 2024-02-23 NOTE — PHYSICAL EXAM
[Restricted in physically strenuous activity but ambulatory and able to carry out work of a light or sedentary nature] : Status 1- Restricted in physically strenuous activity but ambulatory and able to carry out work of a light or sedentary nature, e.g., light house work, office work [Obese] : obese [Normal] : affect appropriate [de-identified] : wearing glasses ; right eye prosthesis but wearing R eye patch  [de-identified] : tattoos ; s/p Right chest port placed via right IJ access on 1.3.2024

## 2024-02-23 NOTE — ASSESSMENT
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  - followed by ENT; baseline audiogram completed 12.28.2023 prior to chemotherapy using Cisplatin - followed by PULM; baseline PFTs completed since we were considering using Bleomycin but opted to avoid  - MR Brain 1.12.2024 no evidence of intracranial metastasis or acute intracranial pathology, paranasal sinus mucosal disease  - s/p Right chest port placed via right IJ  - c/w cycle 3 of Cisplatin + Etoposide on 2/26 followed by Neulasta onbody injection D5, initiated on 1.15.2024  - c/w Emend Tri-Bradley to be taken as follows: Emend 125mg by mouth prior to chemotherapy on Day 1 and 80mg on Days 2 and 3 of each cycle of chemotherapy.  - Labwork today: CBC, BMP STAT, LFTs, Mg, HCG TM level   # Neutropenia, likely due to chemotherapy  - START Zarxio 480mcg SQ daily x 2  - will add Neulasta onbody 6mg SQ following each cycle of chemo   # Anemia, likely due to chemotherapy  - Labwork today: T+C for 1 unit PRBC on 2/26  - On 2/26, please T+C again for 1 unit PRBC on 2/27   # Hypomagnesemia  - will give Magnesium Sulfate 2g twice per week with chemotherapy  - START Magnesium 500mg PO once daily , possible side effects discussed + Rx sent   # History of Melanoma of skin, right shoulder, dx 10/2018  - requested to obtain records including surgical pathology for our review  - followup with DERM for surveillance skin exams at least every 6-12 months   We will hydrate 1L NS IV over 2 hours on 2/24 due to decrease in eGFR.    Labwork daily next week with chemo: CBC, BMP, Mg level  RTC in 1 week with CBC, BMP, Mg level   seen/ examined w/ NP CCecelia; note reviewed; case discussed; agree w/ plan   testicular seminoma: pT3, cN2m, likely Stage IIC. An evaluation of M status is not completed and MRI brain w/wo IVC is done 1/12/24 and not read: contacted neuro radiology to expedite the read; so far, pt has GOOD risk disease and  the goal of treatment is to cure  1. proceed w/ EP c 3 out of 4 2. neutropenia due to chemo; neupogen 480mcg daily x2 beginnng 2/23/24 and on body neulasta on the last day of chemo 3. anemia due to chemo: decrease in Hg 12.7g to 9.2g in 3 weeks and c/o FATIGUE; today hg 10g: transfuse 1 unit PRBC daily x2 : 2/26/24 and 2/27/24 (will reevaluate 2/27/24 4. renal failure, despite IVF with chemo; will administer 1L NS 2 hours 2/24/24; daily BMP /MG/' CBC 5. lasix on the day of PRBC and IVF given together rtc 3/1/24

## 2024-02-23 NOTE — CONSULT LETTER
[Please see my note below.] : Please see my note below. [Dear  ___] : Dear  [unfilled], [Sincerely,] : Sincerely, [FreeTextEntry3] : Demetrio Stokes NP

## 2024-02-24 ENCOUNTER — APPOINTMENT (OUTPATIENT)
Dept: INFUSION THERAPY | Facility: CLINIC | Age: 69
End: 2024-02-24

## 2024-02-24 ENCOUNTER — OUTPATIENT (OUTPATIENT)
Dept: OUTPATIENT SERVICES | Facility: HOSPITAL | Age: 69
LOS: 1 days | End: 2024-02-24
Payer: MEDICARE

## 2024-02-24 VITALS — SYSTOLIC BLOOD PRESSURE: 143 MMHG | DIASTOLIC BLOOD PRESSURE: 67 MMHG | HEART RATE: 86 BPM | TEMPERATURE: 100 F

## 2024-02-24 DIAGNOSIS — Z90.01 ACQUIRED ABSENCE OF EYE: Chronic | ICD-10-CM

## 2024-02-24 DIAGNOSIS — C43.61 MALIGNANT MELANOMA OF RIGHT UPPER LIMB, INCLUDING SHOULDER: ICD-10-CM

## 2024-02-24 DIAGNOSIS — Z90.49 ACQUIRED ABSENCE OF OTHER SPECIFIED PARTS OF DIGESTIVE TRACT: Chronic | ICD-10-CM

## 2024-02-24 DIAGNOSIS — Z98.890 OTHER SPECIFIED POSTPROCEDURAL STATES: Chronic | ICD-10-CM

## 2024-02-24 PROCEDURE — 96360 HYDRATION IV INFUSION INIT: CPT

## 2024-02-24 PROCEDURE — 96372 THER/PROPH/DIAG INJ SC/IM: CPT

## 2024-02-24 PROCEDURE — 96361 HYDRATE IV INFUSION ADD-ON: CPT

## 2024-02-24 RX ORDER — FILGRASTIM 480MCG/1.6
480 VIAL (ML) INJECTION ONCE
Refills: 0 | Status: COMPLETED | OUTPATIENT
Start: 2024-02-24 | End: 2024-02-24

## 2024-02-24 RX ORDER — SODIUM CHLORIDE 9 MG/ML
1000 INJECTION INTRAMUSCULAR; INTRAVENOUS; SUBCUTANEOUS
Refills: 0 | Status: DISCONTINUED | OUTPATIENT
Start: 2024-02-24 | End: 2024-05-25

## 2024-02-24 RX ADMIN — SODIUM CHLORIDE 500 MILLILITER(S): 9 INJECTION INTRAMUSCULAR; INTRAVENOUS; SUBCUTANEOUS at 09:11

## 2024-02-24 RX ADMIN — SODIUM CHLORIDE 1000 MILLILITER(S): 9 INJECTION INTRAMUSCULAR; INTRAVENOUS; SUBCUTANEOUS at 11:10

## 2024-02-24 RX ADMIN — Medication 480 MICROGRAM(S): at 09:12

## 2024-02-26 ENCOUNTER — APPOINTMENT (OUTPATIENT)
Dept: INFUSION THERAPY | Facility: CLINIC | Age: 69
End: 2024-02-26

## 2024-02-26 ENCOUNTER — OUTPATIENT (OUTPATIENT)
Dept: OUTPATIENT SERVICES | Facility: HOSPITAL | Age: 69
LOS: 1 days | End: 2024-02-26
Payer: MEDICARE

## 2024-02-26 VITALS — SYSTOLIC BLOOD PRESSURE: 169 MMHG | HEART RATE: 86 BPM | DIASTOLIC BLOOD PRESSURE: 79 MMHG | TEMPERATURE: 98 F

## 2024-02-26 DIAGNOSIS — Z90.01 ACQUIRED ABSENCE OF EYE: Chronic | ICD-10-CM

## 2024-02-26 DIAGNOSIS — Z90.49 ACQUIRED ABSENCE OF OTHER SPECIFIED PARTS OF DIGESTIVE TRACT: Chronic | ICD-10-CM

## 2024-02-26 DIAGNOSIS — C43.61 MALIGNANT MELANOMA OF RIGHT UPPER LIMB, INCLUDING SHOULDER: ICD-10-CM

## 2024-02-26 DIAGNOSIS — Z98.890 OTHER SPECIFIED POSTPROCEDURAL STATES: Chronic | ICD-10-CM

## 2024-02-26 LAB — ABO RH CONFIRMATION: SIGNIFICANT CHANGE UP

## 2024-02-26 PROCEDURE — 86850 RBC ANTIBODY SCREEN: CPT

## 2024-02-26 PROCEDURE — 80048 BASIC METABOLIC PNL TOTAL CA: CPT

## 2024-02-26 PROCEDURE — 36415 COLL VENOUS BLD VENIPUNCTURE: CPT

## 2024-02-26 PROCEDURE — 96417 CHEMO IV INFUS EACH ADDL SEQ: CPT

## 2024-02-26 PROCEDURE — 85027 COMPLETE CBC AUTOMATED: CPT

## 2024-02-26 PROCEDURE — 36430 TRANSFUSION BLD/BLD COMPNT: CPT

## 2024-02-26 PROCEDURE — 96413 CHEMO IV INFUSION 1 HR: CPT

## 2024-02-26 PROCEDURE — 83735 ASSAY OF MAGNESIUM: CPT

## 2024-02-26 PROCEDURE — 96375 TX/PRO/DX INJ NEW DRUG ADDON: CPT

## 2024-02-26 PROCEDURE — 86923 COMPATIBILITY TEST ELECTRIC: CPT

## 2024-02-26 PROCEDURE — 96367 TX/PROPH/DG ADDL SEQ IV INF: CPT

## 2024-02-26 PROCEDURE — 86900 BLOOD TYPING SEROLOGIC ABO: CPT

## 2024-02-26 PROCEDURE — P9040: CPT

## 2024-02-26 RX ORDER — CISPLATIN 1 MG/ML
47 INJECTION, SOLUTION INTRAVENOUS ONCE
Refills: 0 | Status: COMPLETED | OUTPATIENT
Start: 2024-02-26 | End: 2024-02-26

## 2024-02-26 RX ORDER — FUROSEMIDE 40 MG
20 TABLET ORAL ONCE
Refills: 0 | Status: COMPLETED | OUTPATIENT
Start: 2024-02-26 | End: 2024-02-26

## 2024-02-26 RX ORDER — DIPHENHYDRAMINE HCL 50 MG
50 CAPSULE ORAL ONCE
Refills: 0 | Status: COMPLETED | OUTPATIENT
Start: 2024-02-26 | End: 2024-02-26

## 2024-02-26 RX ORDER — ETOPOSIDE 20 MG/ML
235 VIAL (ML) INTRAVENOUS ONCE
Refills: 0 | Status: COMPLETED | OUTPATIENT
Start: 2024-02-26 | End: 2024-02-26

## 2024-02-26 RX ORDER — DEXAMETHASONE 0.5 MG/5ML
12 ELIXIR ORAL ONCE
Refills: 0 | Status: COMPLETED | OUTPATIENT
Start: 2024-02-26 | End: 2024-02-26

## 2024-02-26 RX ORDER — SODIUM CHLORIDE 9 MG/ML
1000 INJECTION, SOLUTION INTRAVENOUS
Refills: 0 | Status: COMPLETED | OUTPATIENT
Start: 2024-02-26 | End: 2024-02-26

## 2024-02-26 RX ORDER — FAMOTIDINE 10 MG/ML
20 INJECTION INTRAVENOUS ONCE
Refills: 0 | Status: COMPLETED | OUTPATIENT
Start: 2024-02-26 | End: 2024-02-26

## 2024-02-26 RX ORDER — SODIUM CHLORIDE 9 MG/ML
1000 INJECTION, SOLUTION INTRAVENOUS
Refills: 0 | Status: DISCONTINUED | OUTPATIENT
Start: 2024-02-26 | End: 2024-05-27

## 2024-02-26 RX ADMIN — Medication 102 MILLIGRAM(S): at 13:25

## 2024-02-26 RX ADMIN — SODIUM CHLORIDE 500 MILLILITER(S): 9 INJECTION, SOLUTION INTRAVENOUS at 14:40

## 2024-02-26 RX ADMIN — SODIUM CHLORIDE 1000 MILLILITER(S): 9 INJECTION, SOLUTION INTRAVENOUS at 13:35

## 2024-02-26 RX ADMIN — CISPLATIN 47 MILLIGRAM(S): 1 INJECTION, SOLUTION INTRAVENOUS at 14:40

## 2024-02-26 RX ADMIN — CISPLATIN 47 MILLIGRAM(S): 1 INJECTION, SOLUTION INTRAVENOUS at 15:40

## 2024-02-26 RX ADMIN — Medication 12 MILLIGRAM(S): at 12:55

## 2024-02-26 RX ADMIN — Medication 235 MILLIGRAM(S): at 14:40

## 2024-02-26 RX ADMIN — Medication 20 MILLIGRAM(S): at 16:59

## 2024-02-26 RX ADMIN — FAMOTIDINE 20 MILLIGRAM(S): 10 INJECTION INTRAVENOUS at 13:25

## 2024-02-26 RX ADMIN — Medication 235 MILLIGRAM(S): at 13:40

## 2024-02-26 RX ADMIN — Medication 50 MILLIGRAM(S): at 13:40

## 2024-02-26 RX ADMIN — SODIUM CHLORIDE 500 MILLILITER(S): 9 INJECTION, SOLUTION INTRAVENOUS at 11:37

## 2024-02-26 RX ADMIN — Medication 122 MILLIGRAM(S): at 12:25

## 2024-02-26 RX ADMIN — FAMOTIDINE 104 MILLIGRAM(S): 10 INJECTION INTRAVENOUS at 12:55

## 2024-02-27 ENCOUNTER — APPOINTMENT (OUTPATIENT)
Dept: INFUSION THERAPY | Facility: CLINIC | Age: 69
End: 2024-02-27

## 2024-02-27 ENCOUNTER — OUTPATIENT (OUTPATIENT)
Dept: OUTPATIENT SERVICES | Facility: HOSPITAL | Age: 69
LOS: 1 days | End: 2024-02-27
Payer: MEDICARE

## 2024-02-27 VITALS — DIASTOLIC BLOOD PRESSURE: 66 MMHG | HEART RATE: 90 BPM | TEMPERATURE: 98 F | SYSTOLIC BLOOD PRESSURE: 137 MMHG

## 2024-02-27 DIAGNOSIS — Z90.01 ACQUIRED ABSENCE OF EYE: Chronic | ICD-10-CM

## 2024-02-27 DIAGNOSIS — Z90.49 ACQUIRED ABSENCE OF OTHER SPECIFIED PARTS OF DIGESTIVE TRACT: Chronic | ICD-10-CM

## 2024-02-27 DIAGNOSIS — C43.61 MALIGNANT MELANOMA OF RIGHT UPPER LIMB, INCLUDING SHOULDER: ICD-10-CM

## 2024-02-27 DIAGNOSIS — Z98.890 OTHER SPECIFIED POSTPROCEDURAL STATES: Chronic | ICD-10-CM

## 2024-02-27 LAB
ABO + RH PNL BLD: NORMAL
ABO + RH PNL BLD: NORMAL
ALBUMIN SERPL ELPH-MCNC: 3.6 G/DL
ALP BLD-CCNC: 77 U/L
ALT SERPL-CCNC: 14 U/L
ANION GAP SERPL CALC-SCNC: 12 MMOL/L
ANION GAP SERPL CALC-SCNC: 12 MMOL/L
ANION GAP SERPL CALC-SCNC: 14 MMOL/L
AST SERPL-CCNC: 19 U/L
BASOPHILS # BLD AUTO: 0.06 K/UL
BASOPHILS # BLD AUTO: 0.18 K/UL
BASOPHILS NFR BLD AUTO: 0.1 %
BASOPHILS NFR BLD AUTO: 0.6 %
BILIRUB DIRECT SERPL-MCNC: <0.2 MG/DL
BILIRUB INDIRECT SERPL-MCNC: NORMAL MG/DL
BILIRUB SERPL-MCNC: <0.2 MG/DL
BLD GP AB SCN SERPL QL: NORMAL
BLD GP AB SCN SERPL QL: NORMAL
BUN SERPL-MCNC: 14 MG/DL
BUN SERPL-MCNC: 18 MG/DL
BUN SERPL-MCNC: 9 MG/DL
CALCIUM SERPL-MCNC: 8.1 MG/DL
CALCIUM SERPL-MCNC: 8.7 MG/DL
CALCIUM SERPL-MCNC: 8.9 MG/DL
CHLORIDE SERPL-SCNC: 100 MMOL/L
CHLORIDE SERPL-SCNC: 104 MMOL/L
CHLORIDE SERPL-SCNC: 99 MMOL/L
CO2 SERPL-SCNC: 24 MMOL/L
CO2 SERPL-SCNC: 27 MMOL/L
CO2 SERPL-SCNC: 28 MMOL/L
CREAT SERPL-MCNC: 0.8 MG/DL
CREAT SERPL-MCNC: 1 MG/DL
CREAT SERPL-MCNC: 1 MG/DL
EGFR: 81 ML/MIN/1.73M2
EGFR: 81 ML/MIN/1.73M2
EGFR: 95 ML/MIN/1.73M2
EOSINOPHIL # BLD AUTO: 0 K/UL
EOSINOPHIL # BLD AUTO: 0.01 K/UL
EOSINOPHIL NFR BLD AUTO: 0 %
EOSINOPHIL NFR BLD AUTO: 0 %
GLUCOSE SERPL-MCNC: 108 MG/DL
GLUCOSE SERPL-MCNC: 139 MG/DL
GLUCOSE SERPL-MCNC: 157 MG/DL
HCT VFR BLD CALC: 27.2 %
HCT VFR BLD CALC: 27.5 %
HGB BLD-MCNC: 9.3 G/DL
HGB BLD-MCNC: 9.4 G/DL
IMM GRANULOCYTES NFR BLD AUTO: 15.3 %
IMM GRANULOCYTES NFR BLD AUTO: 7.6 %
LYMPHOCYTES # BLD AUTO: 0.75 K/UL
LYMPHOCYTES # BLD AUTO: 3.76 K/UL
LYMPHOCYTES NFR BLD AUTO: 2.4 %
LYMPHOCYTES NFR BLD AUTO: 7.6 %
MAGNESIUM SERPL-MCNC: 1.4 MG/DL
MAGNESIUM SERPL-MCNC: 1.7 MG/DL
MAN DIFF?: NORMAL
MAN DIFF?: NORMAL
MCHC RBC-ENTMCNC: 29.5 PG
MCHC RBC-ENTMCNC: 30.4 PG
MCHC RBC-ENTMCNC: 33.8 G/DL
MCHC RBC-ENTMCNC: 34.6 G/DL
MCV RBC AUTO: 87.3 FL
MCV RBC AUTO: 88 FL
MONOCYTES # BLD AUTO: 1.64 K/UL
MONOCYTES # BLD AUTO: 1.98 K/UL
MONOCYTES NFR BLD AUTO: 4 %
MONOCYTES NFR BLD AUTO: 5.3 %
NEUTROPHILS # BLD AUTO: 25.88 K/UL
NEUTROPHILS # BLD AUTO: 35.86 K/UL
NEUTROPHILS NFR BLD AUTO: 73 %
NEUTROPHILS NFR BLD AUTO: 84.1 %
PLATELET # BLD AUTO: 269 K/UL
PLATELET # BLD AUTO: 273 K/UL
PMV BLD AUTO: 0 /100 WBCS
PMV BLD AUTO: 0 /100 WBCS
POTASSIUM SERPL-SCNC: 3.3 MMOL/L
POTASSIUM SERPL-SCNC: 3.7 MMOL/L
POTASSIUM SERPL-SCNC: 4.3 MMOL/L
PROT SERPL-MCNC: 6 G/DL
RBC # BLD: 3.09 M/UL
RBC # BLD: 3.15 M/UL
RBC # FLD: 12.9 %
RBC # FLD: 14.2 %
SODIUM SERPL-SCNC: 137 MMOL/L
SODIUM SERPL-SCNC: 140 MMOL/L
SODIUM SERPL-SCNC: 143 MMOL/L
WBC # FLD AUTO: 30.8 K/UL
WBC # FLD AUTO: 49.21 K/UL

## 2024-02-27 PROCEDURE — 96367 TX/PROPH/DG ADDL SEQ IV INF: CPT

## 2024-02-27 PROCEDURE — 36430 TRANSFUSION BLD/BLD COMPNT: CPT

## 2024-02-27 PROCEDURE — 85027 COMPLETE CBC AUTOMATED: CPT

## 2024-02-27 PROCEDURE — P9040: CPT

## 2024-02-27 PROCEDURE — 96417 CHEMO IV INFUS EACH ADDL SEQ: CPT

## 2024-02-27 PROCEDURE — 80048 BASIC METABOLIC PNL TOTAL CA: CPT

## 2024-02-27 PROCEDURE — 96413 CHEMO IV INFUSION 1 HR: CPT

## 2024-02-27 PROCEDURE — 83735 ASSAY OF MAGNESIUM: CPT

## 2024-02-27 PROCEDURE — 96375 TX/PRO/DX INJ NEW DRUG ADDON: CPT

## 2024-02-27 PROCEDURE — 96361 HYDRATE IV INFUSION ADD-ON: CPT

## 2024-02-27 PROCEDURE — 36415 COLL VENOUS BLD VENIPUNCTURE: CPT

## 2024-02-27 RX ORDER — FAMOTIDINE 10 MG/ML
20 INJECTION INTRAVENOUS ONCE
Refills: 0 | Status: COMPLETED | OUTPATIENT
Start: 2024-02-27 | End: 2024-02-27

## 2024-02-27 RX ORDER — DEXAMETHASONE 0.5 MG/5ML
12 ELIXIR ORAL ONCE
Refills: 0 | Status: COMPLETED | OUTPATIENT
Start: 2024-02-27 | End: 2024-02-27

## 2024-02-27 RX ORDER — DIPHENHYDRAMINE HCL 50 MG
50 CAPSULE ORAL ONCE
Refills: 0 | Status: COMPLETED | OUTPATIENT
Start: 2024-02-27 | End: 2024-02-27

## 2024-02-27 RX ORDER — ETOPOSIDE 20 MG/ML
235 VIAL (ML) INTRAVENOUS ONCE
Refills: 0 | Status: COMPLETED | OUTPATIENT
Start: 2024-02-27 | End: 2024-02-27

## 2024-02-27 RX ORDER — FUROSEMIDE 40 MG
20 TABLET ORAL ONCE
Refills: 0 | Status: COMPLETED | OUTPATIENT
Start: 2024-02-27 | End: 2024-02-27

## 2024-02-27 RX ORDER — CISPLATIN 1 MG/ML
47 INJECTION, SOLUTION INTRAVENOUS ONCE
Refills: 0 | Status: COMPLETED | OUTPATIENT
Start: 2024-02-27 | End: 2024-02-27

## 2024-02-27 RX ORDER — SODIUM CHLORIDE 9 MG/ML
1000 INJECTION, SOLUTION INTRAVENOUS
Refills: 0 | Status: COMPLETED | OUTPATIENT
Start: 2024-02-27 | End: 2024-02-27

## 2024-02-27 RX ADMIN — FAMOTIDINE 20 MILLIGRAM(S): 10 INJECTION INTRAVENOUS at 12:10

## 2024-02-27 RX ADMIN — CISPLATIN 47 MILLIGRAM(S): 1 INJECTION, SOLUTION INTRAVENOUS at 14:45

## 2024-02-27 RX ADMIN — Medication 12 MILLIGRAM(S): at 11:50

## 2024-02-27 RX ADMIN — FAMOTIDINE 104 MILLIGRAM(S): 10 INJECTION INTRAVENOUS at 11:50

## 2024-02-27 RX ADMIN — SODIUM CHLORIDE 500 MILLILITER(S): 9 INJECTION, SOLUTION INTRAVENOUS at 10:30

## 2024-02-27 RX ADMIN — Medication 20 MILLIGRAM(S): at 10:30

## 2024-02-27 RX ADMIN — SODIUM CHLORIDE 500 MILLILITER(S): 9 INJECTION, SOLUTION INTRAVENOUS at 14:45

## 2024-02-27 RX ADMIN — SODIUM CHLORIDE 1000 MILLILITER(S): 9 INJECTION, SOLUTION INTRAVENOUS at 16:43

## 2024-02-27 RX ADMIN — Medication 50 MILLIGRAM(S): at 12:40

## 2024-02-27 RX ADMIN — SODIUM CHLORIDE 1000 MILLILITER(S): 9 INJECTION, SOLUTION INTRAVENOUS at 12:30

## 2024-02-27 RX ADMIN — Medication 235 MILLIGRAM(S): at 12:40

## 2024-02-27 RX ADMIN — CISPLATIN 47 MILLIGRAM(S): 1 INJECTION, SOLUTION INTRAVENOUS at 13:45

## 2024-02-27 RX ADMIN — Medication 235 MILLIGRAM(S): at 13:40

## 2024-02-27 RX ADMIN — Medication 102 MILLIGRAM(S): at 12:10

## 2024-02-27 RX ADMIN — Medication 122 MILLIGRAM(S): at 11:30

## 2024-02-28 ENCOUNTER — APPOINTMENT (OUTPATIENT)
Dept: INFUSION THERAPY | Facility: CLINIC | Age: 69
End: 2024-02-28

## 2024-02-28 ENCOUNTER — OUTPATIENT (OUTPATIENT)
Dept: OUTPATIENT SERVICES | Facility: HOSPITAL | Age: 69
LOS: 1 days | End: 2024-02-28
Payer: MEDICARE

## 2024-02-28 DIAGNOSIS — C43.61 MALIGNANT MELANOMA OF RIGHT UPPER LIMB, INCLUDING SHOULDER: ICD-10-CM

## 2024-02-28 DIAGNOSIS — Z98.890 OTHER SPECIFIED POSTPROCEDURAL STATES: Chronic | ICD-10-CM

## 2024-02-28 DIAGNOSIS — Z90.01 ACQUIRED ABSENCE OF EYE: Chronic | ICD-10-CM

## 2024-02-28 DIAGNOSIS — Z90.49 ACQUIRED ABSENCE OF OTHER SPECIFIED PARTS OF DIGESTIVE TRACT: Chronic | ICD-10-CM

## 2024-02-28 LAB
ANION GAP SERPL CALC-SCNC: 10 MMOL/L
BASOPHILS # BLD AUTO: 0.01 K/UL
BASOPHILS NFR BLD AUTO: 0.1 %
BUN SERPL-MCNC: 26 MG/DL
CALCIUM SERPL-MCNC: 7.7 MG/DL
CHLORIDE SERPL-SCNC: 102 MMOL/L
CO2 SERPL-SCNC: 24 MMOL/L
CREAT SERPL-MCNC: 0.9 MG/DL
EGFR: 92 ML/MIN/1.73M2
EOSINOPHIL # BLD AUTO: 0 K/UL
EOSINOPHIL NFR BLD AUTO: 0 %
GLUCOSE SERPL-MCNC: 146 MG/DL
HCT VFR BLD CALC: 27 %
HGB BLD-MCNC: 9.4 G/DL
IMM GRANULOCYTES NFR BLD AUTO: 2.5 %
LYMPHOCYTES # BLD AUTO: 0.36 K/UL
LYMPHOCYTES NFR BLD AUTO: 2.5 %
MAN DIFF?: NORMAL
MCHC RBC-ENTMCNC: 29.9 PG
MCHC RBC-ENTMCNC: 34.8 G/DL
MCV RBC AUTO: 86 FL
MONOCYTES # BLD AUTO: 0.69 K/UL
MONOCYTES NFR BLD AUTO: 4.9 %
NEUTROPHILS # BLD AUTO: 12.7 K/UL
NEUTROPHILS NFR BLD AUTO: 90 %
PLATELET # BLD AUTO: 276 K/UL
PMV BLD AUTO: 0 /100 WBCS
POTASSIUM SERPL-SCNC: 4.1 MMOL/L
RBC # BLD: 3.14 M/UL
RBC # FLD: 13.9 %
SODIUM SERPL-SCNC: 136 MMOL/L
WBC # FLD AUTO: 14.12 K/UL

## 2024-02-28 PROCEDURE — 36415 COLL VENOUS BLD VENIPUNCTURE: CPT

## 2024-02-28 PROCEDURE — 80048 BASIC METABOLIC PNL TOTAL CA: CPT

## 2024-02-28 PROCEDURE — 96367 TX/PROPH/DG ADDL SEQ IV INF: CPT

## 2024-02-28 PROCEDURE — 96413 CHEMO IV INFUSION 1 HR: CPT

## 2024-02-28 PROCEDURE — 96361 HYDRATE IV INFUSION ADD-ON: CPT

## 2024-02-28 PROCEDURE — 85027 COMPLETE CBC AUTOMATED: CPT

## 2024-02-28 PROCEDURE — 96417 CHEMO IV INFUS EACH ADDL SEQ: CPT

## 2024-02-28 RX ORDER — DIPHENHYDRAMINE HCL 50 MG
50 CAPSULE ORAL ONCE
Refills: 0 | Status: COMPLETED | OUTPATIENT
Start: 2024-02-28 | End: 2024-04-05

## 2024-02-28 RX ORDER — ETOPOSIDE 20 MG/ML
235 VIAL (ML) INTRAVENOUS ONCE
Refills: 0 | Status: COMPLETED | OUTPATIENT
Start: 2024-02-28 | End: 2024-02-28

## 2024-02-28 RX ORDER — DEXAMETHASONE 0.5 MG/5ML
12 ELIXIR ORAL ONCE
Refills: 0 | Status: DISCONTINUED | OUTPATIENT
Start: 2024-02-28 | End: 2024-05-29

## 2024-02-28 RX ORDER — SODIUM CHLORIDE 9 MG/ML
1000 INJECTION, SOLUTION INTRAVENOUS
Refills: 0 | Status: DISCONTINUED | OUTPATIENT
Start: 2024-02-28 | End: 2024-05-29

## 2024-02-28 RX ORDER — FAMOTIDINE 10 MG/ML
20 INJECTION INTRAVENOUS ONCE
Refills: 0 | Status: DISCONTINUED | OUTPATIENT
Start: 2024-02-28 | End: 2024-05-29

## 2024-02-28 RX ORDER — CISPLATIN 1 MG/ML
47 INJECTION, SOLUTION INTRAVENOUS ONCE
Refills: 0 | Status: COMPLETED | OUTPATIENT
Start: 2024-02-28 | End: 2024-02-28

## 2024-02-28 RX ADMIN — Medication 235 MILLIGRAM(S): at 11:23

## 2024-02-28 RX ADMIN — CISPLATIN 47 MILLIGRAM(S): 1 INJECTION, SOLUTION INTRAVENOUS at 11:24

## 2024-02-29 ENCOUNTER — OUTPATIENT (OUTPATIENT)
Dept: OUTPATIENT SERVICES | Facility: HOSPITAL | Age: 69
LOS: 1 days | End: 2024-02-29
Payer: MEDICARE

## 2024-02-29 ENCOUNTER — APPOINTMENT (OUTPATIENT)
Dept: INFUSION THERAPY | Facility: CLINIC | Age: 69
End: 2024-02-29

## 2024-02-29 VITALS — SYSTOLIC BLOOD PRESSURE: 157 MMHG | TEMPERATURE: 99 F | DIASTOLIC BLOOD PRESSURE: 70 MMHG | HEART RATE: 61 BPM

## 2024-02-29 DIAGNOSIS — C43.61 MALIGNANT MELANOMA OF RIGHT UPPER LIMB, INCLUDING SHOULDER: ICD-10-CM

## 2024-02-29 DIAGNOSIS — Z90.01 ACQUIRED ABSENCE OF EYE: Chronic | ICD-10-CM

## 2024-02-29 DIAGNOSIS — Z90.49 ACQUIRED ABSENCE OF OTHER SPECIFIED PARTS OF DIGESTIVE TRACT: Chronic | ICD-10-CM

## 2024-02-29 DIAGNOSIS — Z98.890 OTHER SPECIFIED POSTPROCEDURAL STATES: Chronic | ICD-10-CM

## 2024-02-29 LAB
ALBUMIN SERPL ELPH-MCNC: 3.2 G/DL
ALP BLD-CCNC: 91 U/L
ALT SERPL-CCNC: 12 U/L
ANION GAP SERPL CALC-SCNC: 13 MMOL/L
AST SERPL-CCNC: 17 U/L
BASOPHILS # BLD AUTO: 0.01 K/UL
BASOPHILS NFR BLD AUTO: 0.1 %
BILIRUB SERPL-MCNC: 0.2 MG/DL
BUN SERPL-MCNC: 27 MG/DL
CALCIUM SERPL-MCNC: 7.8 MG/DL
CHLORIDE SERPL-SCNC: 105 MMOL/L
CO2 SERPL-SCNC: 21 MMOL/L
CREAT SERPL-MCNC: 0.9 MG/DL
EGFR: 92 ML/MIN/1.73M2
EOSINOPHIL # BLD AUTO: 0 K/UL
EOSINOPHIL NFR BLD AUTO: 0 %
GLUCOSE SERPL-MCNC: 134 MG/DL
HCT VFR BLD CALC: 27.1 %
HGB BLD-MCNC: 9.3 G/DL
IMM GRANULOCYTES NFR BLD AUTO: 1.8 %
LYMPHOCYTES # BLD AUTO: 0.25 K/UL
LYMPHOCYTES NFR BLD AUTO: 2 %
MAGNESIUM SERPL-MCNC: 1.4 MG/DL
MAN DIFF?: NORMAL
MCHC RBC-ENTMCNC: 30 PG
MCHC RBC-ENTMCNC: 34.3 G/DL
MCV RBC AUTO: 87.4 FL
MONOCYTES # BLD AUTO: 0.19 K/UL
MONOCYTES NFR BLD AUTO: 1.5 %
NEUTROPHILS # BLD AUTO: 11.94 K/UL
NEUTROPHILS NFR BLD AUTO: 94.6 %
PLATELET # BLD AUTO: 256 K/UL
PMV BLD AUTO: 0 /100 WBCS
POTASSIUM SERPL-SCNC: 4.1 MMOL/L
PROT SERPL-MCNC: 5.1 G/DL
RBC # BLD: 3.1 M/UL
RBC # FLD: 13.7 %
SODIUM SERPL-SCNC: 139 MMOL/L
WBC # FLD AUTO: 12.62 K/UL

## 2024-02-29 PROCEDURE — 96413 CHEMO IV INFUSION 1 HR: CPT

## 2024-02-29 PROCEDURE — 96367 TX/PROPH/DG ADDL SEQ IV INF: CPT

## 2024-02-29 PROCEDURE — 96417 CHEMO IV INFUS EACH ADDL SEQ: CPT

## 2024-02-29 PROCEDURE — 85027 COMPLETE CBC AUTOMATED: CPT

## 2024-02-29 PROCEDURE — 80053 COMPREHEN METABOLIC PANEL: CPT

## 2024-02-29 PROCEDURE — 83735 ASSAY OF MAGNESIUM: CPT

## 2024-02-29 PROCEDURE — 36415 COLL VENOUS BLD VENIPUNCTURE: CPT

## 2024-02-29 RX ORDER — SODIUM CHLORIDE 9 MG/ML
1000 INJECTION, SOLUTION INTRAVENOUS
Refills: 0 | Status: COMPLETED | OUTPATIENT
Start: 2024-02-29 | End: 2024-02-29

## 2024-02-29 RX ORDER — SODIUM CHLORIDE 9 MG/ML
1000 INJECTION, SOLUTION INTRAVENOUS
Refills: 0 | Status: DISCONTINUED | OUTPATIENT
Start: 2024-02-29 | End: 2024-05-30

## 2024-02-29 RX ORDER — DIPHENHYDRAMINE HCL 50 MG
50 CAPSULE ORAL ONCE
Refills: 0 | Status: COMPLETED | OUTPATIENT
Start: 2024-02-29 | End: 2024-02-29

## 2024-02-29 RX ORDER — CISPLATIN 1 MG/ML
47 INJECTION, SOLUTION INTRAVENOUS ONCE
Refills: 0 | Status: COMPLETED | OUTPATIENT
Start: 2024-02-29 | End: 2024-02-29

## 2024-02-29 RX ORDER — DEXAMETHASONE 0.5 MG/5ML
12 ELIXIR ORAL ONCE
Refills: 0 | Status: COMPLETED | OUTPATIENT
Start: 2024-02-29 | End: 2024-02-29

## 2024-02-29 RX ORDER — ETOPOSIDE 20 MG/ML
235 VIAL (ML) INTRAVENOUS ONCE
Refills: 0 | Status: COMPLETED | OUTPATIENT
Start: 2024-02-29 | End: 2024-02-29

## 2024-02-29 RX ORDER — FAMOTIDINE 10 MG/ML
20 INJECTION INTRAVENOUS ONCE
Refills: 0 | Status: COMPLETED | OUTPATIENT
Start: 2024-02-29 | End: 2024-02-29

## 2024-02-29 RX ADMIN — Medication 12 MILLIGRAM(S): at 11:30

## 2024-02-29 RX ADMIN — FAMOTIDINE 104 MILLIGRAM(S): 10 INJECTION INTRAVENOUS at 11:30

## 2024-02-29 RX ADMIN — SODIUM CHLORIDE 500 MILLILITER(S): 9 INJECTION, SOLUTION INTRAVENOUS at 14:25

## 2024-02-29 RX ADMIN — FAMOTIDINE 20 MILLIGRAM(S): 10 INJECTION INTRAVENOUS at 11:50

## 2024-02-29 RX ADMIN — SODIUM CHLORIDE 1000 MILLILITER(S): 9 INJECTION, SOLUTION INTRAVENOUS at 11:08

## 2024-02-29 RX ADMIN — Medication 102 MILLIGRAM(S): at 11:50

## 2024-02-29 RX ADMIN — Medication 122 MILLIGRAM(S): at 11:10

## 2024-02-29 RX ADMIN — CISPLATIN 47 MILLIGRAM(S): 1 INJECTION, SOLUTION INTRAVENOUS at 14:25

## 2024-02-29 RX ADMIN — Medication 50 MILLIGRAM(S): at 12:15

## 2024-02-29 RX ADMIN — SODIUM CHLORIDE 1000 MILLILITER(S): 9 INJECTION, SOLUTION INTRAVENOUS at 16:25

## 2024-02-29 RX ADMIN — Medication 235 MILLIGRAM(S): at 13:20

## 2024-02-29 RX ADMIN — CISPLATIN 47 MILLIGRAM(S): 1 INJECTION, SOLUTION INTRAVENOUS at 13:25

## 2024-02-29 RX ADMIN — Medication 235 MILLIGRAM(S): at 12:20

## 2024-02-29 RX ADMIN — SODIUM CHLORIDE 500 MILLILITER(S): 9 INJECTION, SOLUTION INTRAVENOUS at 09:08

## 2024-03-01 ENCOUNTER — APPOINTMENT (OUTPATIENT)
Dept: INFUSION THERAPY | Facility: CLINIC | Age: 69
End: 2024-03-01

## 2024-03-01 ENCOUNTER — OUTPATIENT (OUTPATIENT)
Dept: OUTPATIENT SERVICES | Facility: HOSPITAL | Age: 69
LOS: 1 days | End: 2024-03-01
Payer: MEDICARE

## 2024-03-01 VITALS — TEMPERATURE: 99 F | SYSTOLIC BLOOD PRESSURE: 164 MMHG | DIASTOLIC BLOOD PRESSURE: 69 MMHG | HEART RATE: 65 BPM

## 2024-03-01 DIAGNOSIS — Z98.890 OTHER SPECIFIED POSTPROCEDURAL STATES: Chronic | ICD-10-CM

## 2024-03-01 DIAGNOSIS — Z90.01 ACQUIRED ABSENCE OF EYE: Chronic | ICD-10-CM

## 2024-03-01 DIAGNOSIS — Z90.49 ACQUIRED ABSENCE OF OTHER SPECIFIED PARTS OF DIGESTIVE TRACT: Chronic | ICD-10-CM

## 2024-03-01 DIAGNOSIS — C62.90 MALIGNANT NEOPLASM OF UNSPECIFIED TESTIS, UNSPECIFIED WHETHER DESCENDED OR UNDESCENDED: ICD-10-CM

## 2024-03-01 LAB
ANION GAP SERPL CALC-SCNC: 11 MMOL/L
BASOPHILS # BLD AUTO: 0.01 K/UL
BASOPHILS NFR BLD AUTO: 0.1 %
BUN SERPL-MCNC: 21 MG/DL
CALCIUM SERPL-MCNC: 7.6 MG/DL
CHLORIDE SERPL-SCNC: 101 MMOL/L
CO2 SERPL-SCNC: 24 MMOL/L
CREAT SERPL-MCNC: 0.7 MG/DL
EGFR: 99 ML/MIN/1.73M2
EOSINOPHIL # BLD AUTO: 0 K/UL
EOSINOPHIL NFR BLD AUTO: 0 %
GLUCOSE SERPL-MCNC: 91 MG/DL
HCT VFR BLD CALC: 28.5 %
HGB BLD-MCNC: 9.7 G/DL
IMM GRANULOCYTES NFR BLD AUTO: 1.1 %
LYMPHOCYTES # BLD AUTO: 0.5 K/UL
LYMPHOCYTES NFR BLD AUTO: 7.1 %
MAGNESIUM SERPL-MCNC: 1.4 MG/DL
MAN DIFF?: NORMAL
MCHC RBC-ENTMCNC: 29.5 PG
MCHC RBC-ENTMCNC: 34 G/DL
MCV RBC AUTO: 86.6 FL
MONOCYTES # BLD AUTO: 0.03 K/UL
MONOCYTES NFR BLD AUTO: 0.4 %
NEUTROPHILS # BLD AUTO: 6.38 K/UL
NEUTROPHILS NFR BLD AUTO: 91.3 %
PLATELET # BLD AUTO: 269 K/UL
PMV BLD AUTO: 0 /100 WBCS
POTASSIUM SERPL-SCNC: 3.9 MMOL/L
RBC # BLD: 3.29 M/UL
RBC # FLD: 13.7 %
SODIUM SERPL-SCNC: 136 MMOL/L
WBC # FLD AUTO: 7 K/UL

## 2024-03-01 PROCEDURE — 96417 CHEMO IV INFUS EACH ADDL SEQ: CPT

## 2024-03-01 PROCEDURE — 85027 COMPLETE CBC AUTOMATED: CPT

## 2024-03-01 PROCEDURE — 80048 BASIC METABOLIC PNL TOTAL CA: CPT

## 2024-03-01 PROCEDURE — 96413 CHEMO IV INFUSION 1 HR: CPT

## 2024-03-01 PROCEDURE — 36415 COLL VENOUS BLD VENIPUNCTURE: CPT

## 2024-03-01 PROCEDURE — 96377 APPLICATON ON-BODY INJECTOR: CPT

## 2024-03-01 PROCEDURE — 96367 TX/PROPH/DG ADDL SEQ IV INF: CPT

## 2024-03-01 PROCEDURE — 83735 ASSAY OF MAGNESIUM: CPT

## 2024-03-01 PROCEDURE — 96361 HYDRATE IV INFUSION ADD-ON: CPT

## 2024-03-01 RX ORDER — DIPHENHYDRAMINE HCL 50 MG
50 CAPSULE ORAL ONCE
Refills: 0 | Status: COMPLETED | OUTPATIENT
Start: 2024-03-01 | End: 2024-03-01

## 2024-03-01 RX ORDER — PEGFILGRASTIM-CBQV 6 MG/.6ML
6 INJECTION, SOLUTION SUBCUTANEOUS ONCE
Refills: 0 | Status: COMPLETED | OUTPATIENT
Start: 2024-03-01 | End: 2024-03-01

## 2024-03-01 RX ORDER — FAMOTIDINE 10 MG/ML
20 INJECTION INTRAVENOUS ONCE
Refills: 0 | Status: COMPLETED | OUTPATIENT
Start: 2024-03-01 | End: 2024-03-01

## 2024-03-01 RX ORDER — ETOPOSIDE 20 MG/ML
235 VIAL (ML) INTRAVENOUS ONCE
Refills: 0 | Status: COMPLETED | OUTPATIENT
Start: 2024-03-01 | End: 2024-03-01

## 2024-03-01 RX ORDER — SODIUM CHLORIDE 9 MG/ML
1000 INJECTION, SOLUTION INTRAVENOUS
Refills: 0 | Status: COMPLETED | OUTPATIENT
Start: 2024-03-01 | End: 2024-03-01

## 2024-03-01 RX ORDER — DEXAMETHASONE 0.5 MG/5ML
12 ELIXIR ORAL ONCE
Refills: 0 | Status: COMPLETED | OUTPATIENT
Start: 2024-03-01 | End: 2024-03-01

## 2024-03-01 RX ORDER — CISPLATIN 1 MG/ML
47 INJECTION, SOLUTION INTRAVENOUS ONCE
Refills: 0 | Status: COMPLETED | OUTPATIENT
Start: 2024-03-01 | End: 2024-03-01

## 2024-03-01 RX ADMIN — SODIUM CHLORIDE 1000 MILLILITER(S): 9 INJECTION, SOLUTION INTRAVENOUS at 14:10

## 2024-03-01 RX ADMIN — Medication 102 MILLIGRAM(S): at 10:00

## 2024-03-01 RX ADMIN — CISPLATIN 47 MILLIGRAM(S): 1 INJECTION, SOLUTION INTRAVENOUS at 13:10

## 2024-03-01 RX ADMIN — SODIUM CHLORIDE 1000 MILLILITER(S): 9 INJECTION, SOLUTION INTRAVENOUS at 11:00

## 2024-03-01 RX ADMIN — SODIUM CHLORIDE 500 MILLILITER(S): 9 INJECTION, SOLUTION INTRAVENOUS at 09:00

## 2024-03-01 RX ADMIN — Medication 235 MILLIGRAM(S): at 11:05

## 2024-03-01 RX ADMIN — PEGFILGRASTIM-CBQV 6 MILLIGRAM(S): 6 INJECTION, SOLUTION SUBCUTANEOUS at 13:15

## 2024-03-01 RX ADMIN — FAMOTIDINE 104 MILLIGRAM(S): 10 INJECTION INTRAVENOUS at 09:30

## 2024-03-01 RX ADMIN — CISPLATIN 47 MILLIGRAM(S): 1 INJECTION, SOLUTION INTRAVENOUS at 12:10

## 2024-03-01 RX ADMIN — Medication 50 MILLIGRAM(S): at 10:30

## 2024-03-01 RX ADMIN — Medication 235 MILLIGRAM(S): at 12:05

## 2024-03-01 RX ADMIN — Medication 122 MILLIGRAM(S): at 09:01

## 2024-03-01 RX ADMIN — FAMOTIDINE 20 MILLIGRAM(S): 10 INJECTION INTRAVENOUS at 10:00

## 2024-03-01 RX ADMIN — Medication 12 MILLIGRAM(S): at 09:30

## 2024-03-02 DIAGNOSIS — C62.90 MALIGNANT NEOPLASM OF UNSPECIFIED TESTIS, UNSPECIFIED WHETHER DESCENDED OR UNDESCENDED: ICD-10-CM

## 2024-03-05 ENCOUNTER — INPATIENT (INPATIENT)
Facility: HOSPITAL | Age: 69
LOS: 14 days | Discharge: HOME | DRG: 871 | End: 2024-03-20
Attending: INTERNAL MEDICINE | Admitting: FAMILY MEDICINE
Payer: MEDICARE

## 2024-03-05 VITALS
SYSTOLIC BLOOD PRESSURE: 130 MMHG | DIASTOLIC BLOOD PRESSURE: 82 MMHG | TEMPERATURE: 100 F | WEIGHT: 250 LBS | OXYGEN SATURATION: 95 % | HEART RATE: 134 BPM | RESPIRATION RATE: 20 BRPM

## 2024-03-05 DIAGNOSIS — R10.9 UNSPECIFIED ABDOMINAL PAIN: ICD-10-CM

## 2024-03-05 DIAGNOSIS — Z98.890 OTHER SPECIFIED POSTPROCEDURAL STATES: Chronic | ICD-10-CM

## 2024-03-05 DIAGNOSIS — Z90.49 ACQUIRED ABSENCE OF OTHER SPECIFIED PARTS OF DIGESTIVE TRACT: Chronic | ICD-10-CM

## 2024-03-05 DIAGNOSIS — Z90.01 ACQUIRED ABSENCE OF EYE: Chronic | ICD-10-CM

## 2024-03-05 LAB
ALBUMIN SERPL ELPH-MCNC: 3.4 G/DL — LOW (ref 3.5–5.2)
ALP SERPL-CCNC: 85 U/L — SIGNIFICANT CHANGE UP (ref 30–115)
ALT FLD-CCNC: 16 U/L — SIGNIFICANT CHANGE UP (ref 0–41)
ANION GAP SERPL CALC-SCNC: 14 MMOL/L — SIGNIFICANT CHANGE UP (ref 7–14)
APTT BLD: 24.6 SEC — LOW (ref 27–39.2)
AST SERPL-CCNC: 18 U/L — SIGNIFICANT CHANGE UP (ref 0–41)
BASOPHILS # BLD AUTO: 0 K/UL — SIGNIFICANT CHANGE UP (ref 0–0.2)
BASOPHILS NFR BLD AUTO: 0 % — SIGNIFICANT CHANGE UP (ref 0–1)
BILIRUB SERPL-MCNC: 0.9 MG/DL — SIGNIFICANT CHANGE UP (ref 0.2–1.2)
BUN SERPL-MCNC: 24 MG/DL — HIGH (ref 10–20)
CALCIUM SERPL-MCNC: 7.1 MG/DL — LOW (ref 8.4–10.5)
CHLORIDE SERPL-SCNC: 93 MMOL/L — LOW (ref 98–110)
CO2 SERPL-SCNC: 27 MMOL/L — SIGNIFICANT CHANGE UP (ref 17–32)
CREAT SERPL-MCNC: 0.9 MG/DL — SIGNIFICANT CHANGE UP (ref 0.7–1.5)
EGFR: 92 ML/MIN/1.73M2 — SIGNIFICANT CHANGE UP
EOSINOPHIL NFR BLD AUTO: 0 % — SIGNIFICANT CHANGE UP (ref 0–8)
FLUAV AG NPH QL: SIGNIFICANT CHANGE UP
FLUBV AG NPH QL: SIGNIFICANT CHANGE UP
GLUCOSE SERPL-MCNC: 192 MG/DL — HIGH (ref 70–99)
HCT VFR BLD CALC: 32.2 % — LOW (ref 42–52)
HGB BLD-MCNC: 11.2 G/DL — LOW (ref 14–18)
INR BLD: 1.18 RATIO — SIGNIFICANT CHANGE UP (ref 0.65–1.3)
LACTATE SERPL-SCNC: 2 MMOL/L — SIGNIFICANT CHANGE UP (ref 0.7–2)
LYMPHOCYTES # BLD AUTO: 0.08 K/UL — LOW (ref 1.2–3.4)
LYMPHOCYTES # BLD AUTO: 70 % — HIGH (ref 20.5–51.1)
MANUAL SMEAR VERIFICATION: SIGNIFICANT CHANGE UP
MCHC RBC-ENTMCNC: 29.7 PG — SIGNIFICANT CHANGE UP (ref 27–31)
MCHC RBC-ENTMCNC: 34.8 G/DL — SIGNIFICANT CHANGE UP (ref 32–37)
MCV RBC AUTO: 85.4 FL — SIGNIFICANT CHANGE UP (ref 80–94)
MONOCYTES # BLD AUTO: 0.01 K/UL — LOW (ref 0.1–0.6)
MONOCYTES NFR BLD AUTO: 10 % — HIGH (ref 1.7–9.3)
NEUTROPHILS # BLD AUTO: 0.02 K/UL — LOW (ref 1.4–6.5)
NEUTROPHILS NFR BLD AUTO: 20 % — LOW (ref 42.2–75.2)
NRBC # BLD: 0 /100 WBCS — SIGNIFICANT CHANGE UP (ref 0–0)
NRBC # BLD: SIGNIFICANT CHANGE UP /100 WBCS (ref 0–0)
PLAT MORPH BLD: NORMAL — SIGNIFICANT CHANGE UP
PLATELET # BLD AUTO: 147 K/UL — SIGNIFICANT CHANGE UP (ref 130–400)
PMV BLD: 10.2 FL — SIGNIFICANT CHANGE UP (ref 7.4–10.4)
POTASSIUM SERPL-MCNC: 3.7 MMOL/L — SIGNIFICANT CHANGE UP (ref 3.5–5)
POTASSIUM SERPL-SCNC: 3.7 MMOL/L — SIGNIFICANT CHANGE UP (ref 3.5–5)
PROT SERPL-MCNC: 5.6 G/DL — LOW (ref 6–8)
PROTHROM AB SERPL-ACNC: 13.5 SEC — HIGH (ref 9.95–12.87)
RBC # BLD: 3.77 M/UL — LOW (ref 4.7–6.1)
RBC # FLD: 13.3 % — SIGNIFICANT CHANGE UP (ref 11.5–14.5)
RBC BLD AUTO: NORMAL — SIGNIFICANT CHANGE UP
RSV RNA NPH QL NAA+NON-PROBE: SIGNIFICANT CHANGE UP
SARS-COV-2 RNA SPEC QL NAA+PROBE: SIGNIFICANT CHANGE UP
SODIUM SERPL-SCNC: 134 MMOL/L — LOW (ref 135–146)
WBC # BLD: 0.11 K/UL — CRITICAL LOW (ref 4.8–10.8)
WBC # FLD AUTO: 0.11 K/UL — CRITICAL LOW (ref 4.8–10.8)

## 2024-03-05 PROCEDURE — 36415 COLL VENOUS BLD VENIPUNCTURE: CPT

## 2024-03-05 PROCEDURE — 74177 CT ABD & PELVIS W/CONTRAST: CPT | Mod: 26,MC

## 2024-03-05 PROCEDURE — P9100: CPT

## 2024-03-05 PROCEDURE — 99223 1ST HOSP IP/OBS HIGH 75: CPT

## 2024-03-05 PROCEDURE — 87449 NOS EACH ORGANISM AG IA: CPT

## 2024-03-05 PROCEDURE — 80202 ASSAY OF VANCOMYCIN: CPT

## 2024-03-05 PROCEDURE — 83735 ASSAY OF MAGNESIUM: CPT

## 2024-03-05 PROCEDURE — 82570 ASSAY OF URINE CREATININE: CPT

## 2024-03-05 PROCEDURE — 93005 ELECTROCARDIOGRAM TRACING: CPT

## 2024-03-05 PROCEDURE — 71045 X-RAY EXAM CHEST 1 VIEW: CPT | Mod: 26

## 2024-03-05 PROCEDURE — 83605 ASSAY OF LACTIC ACID: CPT

## 2024-03-05 PROCEDURE — 99285 EMERGENCY DEPT VISIT HI MDM: CPT

## 2024-03-05 PROCEDURE — 86850 RBC ANTIBODY SCREEN: CPT

## 2024-03-05 PROCEDURE — 80053 COMPREHEN METABOLIC PANEL: CPT

## 2024-03-05 PROCEDURE — 80048 BASIC METABOLIC PNL TOTAL CA: CPT

## 2024-03-05 PROCEDURE — 86900 BLOOD TYPING SEROLOGIC ABO: CPT

## 2024-03-05 PROCEDURE — 82330 ASSAY OF CALCIUM: CPT

## 2024-03-05 PROCEDURE — 82652 VIT D 1 25-DIHYDROXY: CPT

## 2024-03-05 PROCEDURE — 81001 URINALYSIS AUTO W/SCOPE: CPT

## 2024-03-05 PROCEDURE — 87493 C DIFF AMPLIFIED PROBE: CPT

## 2024-03-05 PROCEDURE — 85025 COMPLETE CBC W/AUTO DIFF WBC: CPT

## 2024-03-05 PROCEDURE — 71045 X-RAY EXAM CHEST 1 VIEW: CPT

## 2024-03-05 PROCEDURE — 87507 IADNA-DNA/RNA PROBE TQ 12-25: CPT

## 2024-03-05 PROCEDURE — 87040 BLOOD CULTURE FOR BACTERIA: CPT

## 2024-03-05 PROCEDURE — 85027 COMPLETE CBC AUTOMATED: CPT

## 2024-03-05 PROCEDURE — 87324 CLOSTRIDIUM AG IA: CPT

## 2024-03-05 PROCEDURE — 86923 COMPATIBILITY TEST ELECTRIC: CPT

## 2024-03-05 PROCEDURE — 86901 BLOOD TYPING SEROLOGIC RH(D): CPT

## 2024-03-05 PROCEDURE — P9037: CPT

## 2024-03-05 PROCEDURE — P9016: CPT

## 2024-03-05 PROCEDURE — 0241U: CPT

## 2024-03-05 PROCEDURE — 36430 TRANSFUSION BLD/BLD COMPNT: CPT

## 2024-03-05 RX ORDER — CIPROFLOXACIN LACTATE 400MG/40ML
400 VIAL (ML) INTRAVENOUS ONCE
Refills: 0 | Status: COMPLETED | OUTPATIENT
Start: 2024-03-05 | End: 2024-03-05

## 2024-03-05 RX ORDER — SODIUM CHLORIDE 9 MG/ML
500 INJECTION INTRAMUSCULAR; INTRAVENOUS; SUBCUTANEOUS ONCE
Refills: 0 | Status: COMPLETED | OUTPATIENT
Start: 2024-03-05 | End: 2024-03-05

## 2024-03-05 RX ORDER — ACETAMINOPHEN 500 MG
650 TABLET ORAL EVERY 6 HOURS
Refills: 0 | Status: DISCONTINUED | OUTPATIENT
Start: 2024-03-05 | End: 2024-03-20

## 2024-03-05 RX ORDER — METRONIDAZOLE 500 MG
500 TABLET ORAL EVERY 8 HOURS
Refills: 0 | Status: DISCONTINUED | OUTPATIENT
Start: 2024-03-06 | End: 2024-03-06

## 2024-03-05 RX ORDER — SODIUM CHLORIDE 9 MG/ML
1000 INJECTION INTRAMUSCULAR; INTRAVENOUS; SUBCUTANEOUS ONCE
Refills: 0 | Status: DISCONTINUED | OUTPATIENT
Start: 2024-03-05 | End: 2024-03-05

## 2024-03-05 RX ORDER — CALCIUM GLUCONATE 100 MG/ML
2 VIAL (ML) INTRAVENOUS ONCE
Refills: 0 | Status: DISCONTINUED | OUTPATIENT
Start: 2024-03-05 | End: 2024-03-07

## 2024-03-05 RX ORDER — ONDANSETRON 8 MG/1
4 TABLET, FILM COATED ORAL EVERY 8 HOURS
Refills: 0 | Status: DISCONTINUED | OUTPATIENT
Start: 2024-03-05 | End: 2024-03-20

## 2024-03-05 RX ORDER — VANCOMYCIN HCL 1 G
1700 VIAL (EA) INTRAVENOUS ONCE
Refills: 0 | Status: DISCONTINUED | OUTPATIENT
Start: 2024-03-05 | End: 2024-03-05

## 2024-03-05 RX ORDER — AZTREONAM 2 G
2000 VIAL (EA) INJECTION ONCE
Refills: 0 | Status: COMPLETED | OUTPATIENT
Start: 2024-03-05 | End: 2024-03-05

## 2024-03-05 RX ORDER — ACETAMINOPHEN 500 MG
975 TABLET ORAL ONCE
Refills: 0 | Status: COMPLETED | OUTPATIENT
Start: 2024-03-05 | End: 2024-03-05

## 2024-03-05 RX ORDER — KETOROLAC TROMETHAMINE 30 MG/ML
15 SYRINGE (ML) INJECTION ONCE
Refills: 0 | Status: DISCONTINUED | OUTPATIENT
Start: 2024-03-05 | End: 2024-03-05

## 2024-03-05 RX ORDER — SODIUM CHLORIDE 9 MG/ML
3500 INJECTION, SOLUTION INTRAVENOUS ONCE
Refills: 0 | Status: DISCONTINUED | OUTPATIENT
Start: 2024-03-05 | End: 2024-03-05

## 2024-03-05 RX ORDER — LOSARTAN POTASSIUM 100 MG/1
50 TABLET, FILM COATED ORAL AT BEDTIME
Refills: 0 | Status: DISCONTINUED | OUTPATIENT
Start: 2024-03-05 | End: 2024-03-05

## 2024-03-05 RX ORDER — CHOLECALCIFEROL (VITAMIN D3) 125 MCG
1000 CAPSULE ORAL DAILY
Refills: 0 | Status: DISCONTINUED | OUTPATIENT
Start: 2024-03-05 | End: 2024-03-20

## 2024-03-05 RX ORDER — VANCOMYCIN HCL 1 G
1250 VIAL (EA) INTRAVENOUS EVERY 12 HOURS
Refills: 0 | Status: DISCONTINUED | OUTPATIENT
Start: 2024-03-05 | End: 2024-03-06

## 2024-03-05 RX ORDER — VANCOMYCIN HCL 1 G
125 VIAL (EA) INTRAVENOUS ONCE
Refills: 0 | Status: DISCONTINUED | OUTPATIENT
Start: 2024-03-05 | End: 2024-03-06

## 2024-03-05 RX ORDER — MIDODRINE HYDROCHLORIDE 2.5 MG/1
10 TABLET ORAL EVERY 8 HOURS
Refills: 0 | Status: DISCONTINUED | OUTPATIENT
Start: 2024-03-05 | End: 2024-03-05

## 2024-03-05 RX ORDER — METRONIDAZOLE 500 MG
500 TABLET ORAL ONCE
Refills: 0 | Status: COMPLETED | OUTPATIENT
Start: 2024-03-05 | End: 2024-03-05

## 2024-03-05 RX ORDER — LACTOBACILLUS ACIDOPHILUS 100MM CELL
1 CAPSULE ORAL EVERY 8 HOURS
Refills: 0 | Status: DISCONTINUED | OUTPATIENT
Start: 2024-03-05 | End: 2024-03-08

## 2024-03-05 RX ORDER — VANCOMYCIN HCL 1 G
1750 VIAL (EA) INTRAVENOUS ONCE
Refills: 0 | Status: COMPLETED | OUTPATIENT
Start: 2024-03-05 | End: 2024-03-05

## 2024-03-05 RX ORDER — ONDANSETRON 8 MG/1
4 TABLET, FILM COATED ORAL ONCE
Refills: 0 | Status: COMPLETED | OUTPATIENT
Start: 2024-03-05 | End: 2024-03-05

## 2024-03-05 RX ORDER — AZTREONAM 2 G
VIAL (EA) INJECTION
Refills: 0 | Status: DISCONTINUED | OUTPATIENT
Start: 2024-03-05 | End: 2024-03-05

## 2024-03-05 RX ORDER — SODIUM CHLORIDE 9 MG/ML
1000 INJECTION INTRAMUSCULAR; INTRAVENOUS; SUBCUTANEOUS
Refills: 0 | Status: DISCONTINUED | OUTPATIENT
Start: 2024-03-05 | End: 2024-03-10

## 2024-03-05 RX ORDER — LANOLIN ALCOHOL/MO/W.PET/CERES
5 CREAM (GRAM) TOPICAL AT BEDTIME
Refills: 0 | Status: DISCONTINUED | OUTPATIENT
Start: 2024-03-05 | End: 2024-03-20

## 2024-03-05 RX ORDER — SODIUM CHLORIDE 9 MG/ML
2000 INJECTION, SOLUTION INTRAVENOUS ONCE
Refills: 0 | Status: COMPLETED | OUTPATIENT
Start: 2024-03-05 | End: 2024-03-05

## 2024-03-05 RX ORDER — LEVOTHYROXINE SODIUM 125 MCG
137 TABLET ORAL DAILY
Refills: 0 | Status: DISCONTINUED | OUTPATIENT
Start: 2024-03-05 | End: 2024-03-20

## 2024-03-05 RX ADMIN — Medication 975 MILLIGRAM(S): at 15:26

## 2024-03-05 RX ADMIN — Medication 250 MILLIGRAM(S): at 21:57

## 2024-03-05 RX ADMIN — Medication 200 MILLIGRAM(S): at 15:08

## 2024-03-05 RX ADMIN — SODIUM CHLORIDE 2000 MILLILITER(S): 9 INJECTION, SOLUTION INTRAVENOUS at 15:28

## 2024-03-05 RX ADMIN — Medication 15 MILLIGRAM(S): at 19:50

## 2024-03-05 RX ADMIN — SODIUM CHLORIDE 100 MILLILITER(S): 9 INJECTION INTRAMUSCULAR; INTRAVENOUS; SUBCUTANEOUS at 23:54

## 2024-03-05 RX ADMIN — Medication 100 MILLIGRAM(S): at 21:31

## 2024-03-05 RX ADMIN — SODIUM CHLORIDE 500 MILLILITER(S): 9 INJECTION INTRAMUSCULAR; INTRAVENOUS; SUBCUTANEOUS at 23:30

## 2024-03-05 RX ADMIN — Medication 100 MILLIGRAM(S): at 16:56

## 2024-03-05 RX ADMIN — ONDANSETRON 4 MILLIGRAM(S): 8 TABLET, FILM COATED ORAL at 19:50

## 2024-03-05 NOTE — ED ADULT NURSE NOTE - NSFALLUNIVINTERV_ED_ALL_ED
Bed/Stretcher in lowest position, wheels locked, appropriate side rails in place/Call bell, personal items and telephone in reach/Instruct patient to call for assistance before getting out of bed/chair/stretcher/Non-slip footwear applied when patient is off stretcher/[place] to call system/Physically safe environment - no spills, clutter or unnecessary equipment/Purposeful proactive rounding/Room/bathroom lighting operational, light cord in reach

## 2024-03-05 NOTE — H&P ADULT - RESPIRATORY
clear to auscultation bilaterally no wheezes/no rales/no respiratory distress/no use of accessory muscles/no subcutaneous emphysema/airway patent/breath sounds equal/good air movement/rhonchi

## 2024-03-05 NOTE — ED PROVIDER NOTE - CARE PLAN
1 Principal Discharge DX:	Neutropenic fever  Secondary Diagnosis:	Abdominal pain, vomiting, and diarrhea

## 2024-03-05 NOTE — ED PROVIDER NOTE - PHYSICAL EXAMINATION
GENERAL: Well-developed; well-nourished; in no acute distress.   SKIN: warm, dry  HEAD: Normocephalic; atraumatic.  EYES: PERRLA, EOMI, no conjunctival erythema  ENT: No nasal discharge; airway clear. Dry mucous membranes   NECK: Supple; non tender.  CARD: S1, S2 normal; no murmurs, gallops, or rubs. Tachycardia. Regular rhythm.   RESP: LCTAB; No wheezes, rales, rhonchi, or stridor.  ABD: obese, soft, nontender, nondistended  EXT: Normal ROM.  No LE TTP or edema bilaterally.  NEURO: A/ox3, grossly unremarkable  PSYCH: Cooperative, appropriate.

## 2024-03-05 NOTE — ED PROVIDER NOTE - ATTENDING CONTRIBUTION TO CARE
68-year-old male presenting today with vomiting, diarrhea and fevers.  Patient noted to be neutropenic with a fever and has a diarrheal illness on CT scan.  Patient started on antibiotics and will require admission for further evaluation and care.  Patient was treated as sepsis however given only 2 L as he has chemotherapy and unknown if has any cardiomyopathy associated with this.  Patient is pending ICU evaluation for admission and signed out 68-year-old male presenting today with vomiting, diarrhea and fevers.  Patient noted to be neutropenic with a fever and has a diarrheal illness on CT scan.  Patient started on antibiotics and will require admission for further evaluation and care.  Patient was treated as sepsis however given only 2 L as he has chemotherapy and unknown if has any cardiomyopathy associated with this.  Patient is pending ICU evaluation for admission and signed out.

## 2024-03-05 NOTE — ED PROVIDER NOTE - NSICDXPASTMEDICALHX_GEN_ALL_CORE_FT
PAST MEDICAL HISTORY:  HTN (hypertension)     Hypothyroidism, unspecified type     Obesity     Other secondary osteoarthritis of right hand     Testicular seminoma

## 2024-03-05 NOTE — ED PROVIDER NOTE - PROGRESS NOTE DETAILS
Dr. Renata Macario, DO: Awaiting CT results. Patient requesting more pain meds Dr. Renata Macario, DO: Neutropenic fever precautions started Dr. Renata Macario, DO: Patient agrees to admission Pt  nontoxic appearing IV abx and IVF running  -  spoke with  ICU PA for eval received call from  heme onc  Dr Dhulipalla or  Dr Cisse -   requesting vanco and cefepime,    blood urine stool  cultures PCR,  cdiff,   official ID consult tomorrow.  Pt  already received GF  after chemo received call from  heme onc  Dr Dhulipalla or  Dr Cisse -   requesting vanco and aztreonam (given  pcn allergy)  blood urine stool  cultures PCR,  official ID consult tomorrow.  Pt  already received GF  after chemo D/W Dr. Atwood and wants patient to be admitted to [facility]. admit to hospitalist and he will consult on the patient. D/W Dr. Atwood and wants patient to be admitted to [facility]. admit to hospitalist and he will consult on the patient.    Pt stable for transport

## 2024-03-05 NOTE — H&P ADULT - HISTORY OF PRESENT ILLNESS
68-year-old male, former smoker with past medical history of HTN, hypothyroidism, melanoma s/p resection, testicular seminoma currently undergoing chemotherapy, presents to the emergency department for diarrhea onset last night.  Patient also complains of vomiting onset this morning and abdominal cramping.  Patient denies fever, chills, cough, shortness of breath, chest pain.  Patient last had chemo on 3/1, which was found 3 of etoposide and cisplatin.  Patient sees Dr. Atwood, oncologist.    Home meds obtained from pt. Patient is 68-year-old male, former smoker with past medical history of HTN, hypothyroidism, melanoma s/p resection, testicular seminoma currently undergoing chemotherapy, presents to the emergency department for diarrhea onset last night.  Patient also complains of vomiting onset this morning and abdominal cramping.  Patient denies fever, chills, cough, shortness of breath, chest pain.  Patient last had chemo on 3/1, which was found 3 of etoposide and cisplatin.  Patient sees Dr. Atwood, oncologist.        Home meds obtained from pt.    declined by ICU, oncology recommended admission to [place]

## 2024-03-05 NOTE — H&P ADULT - ASSESSMENT
68-year-old male, former smoker with past medical history of HTN, hypothyroidism, melanoma s/p resection, testicular seminoma currently undergoing chemotherapy, presents to the emergency department for diarrhea onset last night.  Patient also complains of vomiting onset this morning and abdominal cramping.  Patient denies fever, chills, cough, shortness of breath, chest pain.  Patient last had chemo on 3/1, which was found 3 of etoposide and cisplatin.  Patient sees Dr. Atwood, oncologist.      # Neutropenic Fever  - ID consult  - c/w abx: LEvaquin, Flagyl, Vanco  - Oncology consult  (pt known to Dr Cisse)  - Repeat Labs      # Testicular Seminoma  - Oncology Consult  - pt receives daily chemo infusions.        # HTN  - c/w home med    # Hypothyroid  - c/w home med Patient is  68-year-old male, former smoker with past medical history of HTN, hypothyroidism, melanoma s/p resection, testicular seminoma currently undergoing chemotherapy, presents to the emergency department for diarrhea onset last night.  Patient also complains of vomiting onset this morning and abdominal cramping.  Patient denies fever, chills, cough, shortness of breath, chest pain.  Patient last had chemo on 3/1, which was found 3 of etoposide and cisplatin.  Patient sees Dr. Atwood, oncologist.      # Neutropenic Fever  -Diarrhea.  CT scan noticed.  CXR shows possible opacity, asymptomatic   - ID consult  - c/w abx: Levaquin Flagyl, Vanco  - Oncology consult  (pt known to Dr Cisse)  - Repeat Labs  -Unsure if patient received bone marrow stimulated   -F/U Blood culture, Urine culture, GI PCR, stool culture, and C-diff  -denies any recent antibiotic, or travel  -IVF for hydration  -Monitor BP       # Testicular cancer  - Oncology Consult  - pt receives daily chemo infusions.        # HTN  - Hold anti-hypertensive medications due to soft BP    # Hypothyroid  - c/w home med        Risks, benefits, and alternatives of transfer discussed with patient in details seems to understand, and in agreement  will sign out the case to [place], and [place]

## 2024-03-05 NOTE — ED ADULT NURSE NOTE - SUICIDE SCREENING QUESTION 3
Detail Level: Detailed Introduction Text (Please End With A Colon): The following was deferred for the following: Instructions (Optional): Orange slip submitted Procedure To Be Performed At Next Visit: Excision No

## 2024-03-05 NOTE — ED PROVIDER NOTE - CLINICAL SUMMARY MEDICAL DECISION MAKING FREE TEXT BOX
68yM  on active  chemo for  seminoma  ow neutropenic fever  ANC 22  with diarrhea vomiting abdominal pain .  cirpo flagyl given initially  dw heme onc - vanco aztreonam added pancultured .  CT AP 1.  Liquid stool within the colon and rectum, consistent with given   	history of diarrhea. No bowel wall thickening or fat stranding to suggest   	colitis.  	2.  Aortocaval and periaortic lymphadenopathy, markedly decreased in size   	comparison to January 9, 2024.    Pts hemeonc  consulted  and  [place]

## 2024-03-05 NOTE — H&P ADULT - NSHPPHYSICALEXAM_GEN_ALL_CORE
Vital Signs Last 24 Hrs  T(C): 37.1 (05 Mar 2024 22:02), Max: 37.9 (05 Mar 2024 13:35)  T(F): 98.8 (05 Mar 2024 22:02), Max: 100.3 (05 Mar 2024 13:35)  HR: 104 (05 Mar 2024 23:01) (104 - 134)  BP: 108/60 (05 Mar 2024 23:01) (86/64 - 138/60)  BP(mean): --  RR: 18 (05 Mar 2024 22:02) (18 - 20)  SpO2: 96% (05 Mar 2024 22:02) (95% - 98%)    Parameters below as of 05 Mar 2024 17:04  Patient On (Oxygen Delivery Method): room air

## 2024-03-05 NOTE — ED PROVIDER NOTE - OBJECTIVE STATEMENT
68-year-old male, former smoker with past medical history of HTN, hypothyroidism, melanoma s/p resection, testicular seminoma currently undergoing chemotherapy, presents to the emergency department for diarrhea onset last night.  Patient also complains of vomiting onset this morning and abdominal cramping.  Patient denies fever, chills, cough, shortness of breath, chest pain.  Patient last had chemo on 3/1, which was found 3 of etoposide and cisplatin.  Patient sees Dr. Atwood, oncologist.

## 2024-03-06 DIAGNOSIS — D70.9 NEUTROPENIA, UNSPECIFIED: ICD-10-CM

## 2024-03-06 DIAGNOSIS — A04.72 ENTEROCOLITIS DUE TO CLOSTRIDIUM DIFFICILE, NOT SPECIFIED AS RECURRENT: ICD-10-CM

## 2024-03-06 DIAGNOSIS — E03.9 HYPOTHYROIDISM, UNSPECIFIED: ICD-10-CM

## 2024-03-06 DIAGNOSIS — C62.90 MALIGNANT NEOPLASM OF UNSPECIFIED TESTIS, UNSPECIFIED WHETHER DESCENDED OR UNDESCENDED: ICD-10-CM

## 2024-03-06 DIAGNOSIS — L03.90 CELLULITIS, UNSPECIFIED: ICD-10-CM

## 2024-03-06 LAB
ALBUMIN SERPL ELPH-MCNC: 2.5 G/DL — LOW (ref 3.5–5.2)
ALP SERPL-CCNC: 56 U/L — SIGNIFICANT CHANGE UP (ref 30–115)
ALT FLD-CCNC: 18 U/L — SIGNIFICANT CHANGE UP (ref 0–41)
ANION GAP SERPL CALC-SCNC: 13 MMOL/L — SIGNIFICANT CHANGE UP (ref 7–14)
AST SERPL-CCNC: 16 U/L — SIGNIFICANT CHANGE UP (ref 0–41)
BASOPHILS # BLD AUTO: 0 K/UL — SIGNIFICANT CHANGE UP (ref 0–0.2)
BASOPHILS NFR BLD AUTO: 0 % — SIGNIFICANT CHANGE UP (ref 0–1)
BILIRUB SERPL-MCNC: 0.8 MG/DL — SIGNIFICANT CHANGE UP (ref 0.2–1.2)
BUN SERPL-MCNC: 30 MG/DL — HIGH (ref 10–20)
C DIFF GDH STL QL: SIGNIFICANT CHANGE UP
C DIFF GDH STL QL: SIGNIFICANT CHANGE UP
CALCIUM SERPL-MCNC: 5.7 MG/DL — CRITICAL LOW (ref 8.4–10.5)
CHLORIDE SERPL-SCNC: 95 MMOL/L — LOW (ref 98–110)
CO2 SERPL-SCNC: 23 MMOL/L — SIGNIFICANT CHANGE UP (ref 17–32)
CREAT SERPL-MCNC: 1.2 MG/DL — SIGNIFICANT CHANGE UP (ref 0.7–1.5)
EGFR: 65 ML/MIN/1.73M2 — SIGNIFICANT CHANGE UP
EOSINOPHIL # BLD AUTO: 0 K/UL — SIGNIFICANT CHANGE UP (ref 0–0.7)
EOSINOPHIL NFR BLD AUTO: 0 % — SIGNIFICANT CHANGE UP (ref 0–8)
GI PCR PANEL: DETECTED
GLUCOSE SERPL-MCNC: 106 MG/DL — HIGH (ref 70–99)
HCT VFR BLD CALC: 23.1 % — LOW (ref 42–52)
HGB BLD-MCNC: 8.2 G/DL — LOW (ref 14–18)
LACTATE SERPL-SCNC: 0.7 MMOL/L — SIGNIFICANT CHANGE UP (ref 0.7–2)
LYMPHOCYTES # BLD AUTO: 0.11 K/UL — LOW (ref 1.2–3.4)
LYMPHOCYTES # BLD AUTO: 88.9 % — HIGH (ref 20.5–51.1)
MAGNESIUM SERPL-MCNC: 0.7 MG/DL — LOW (ref 1.8–2.4)
MCHC RBC-ENTMCNC: 29.5 PG — SIGNIFICANT CHANGE UP (ref 27–31)
MCHC RBC-ENTMCNC: 35.5 G/DL — SIGNIFICANT CHANGE UP (ref 32–37)
MCV RBC AUTO: 83.1 FL — SIGNIFICANT CHANGE UP (ref 80–94)
MONOCYTES # BLD AUTO: 0 K/UL — LOW (ref 0.1–0.6)
MONOCYTES NFR BLD AUTO: 0 % — LOW (ref 1.7–9.3)
NEUTROPHILS # BLD AUTO: 0.01 K/UL — LOW (ref 1.4–6.5)
NEUTROPHILS NFR BLD AUTO: 11.1 % — LOW (ref 42.2–75.2)
NOROVIRUS GI+II RNA STL QL NAA+NON-PROBE: ABNORMAL
PLATELET # BLD AUTO: 46 K/UL — LOW (ref 130–400)
PMV BLD: 11.4 FL — HIGH (ref 7.4–10.4)
POTASSIUM SERPL-MCNC: 3.3 MMOL/L — LOW (ref 3.5–5)
POTASSIUM SERPL-SCNC: 3.3 MMOL/L — LOW (ref 3.5–5)
PROT SERPL-MCNC: 4.2 G/DL — LOW (ref 6–8)
RBC # BLD: 2.78 M/UL — LOW (ref 4.7–6.1)
RBC # FLD: 13.4 % — SIGNIFICANT CHANGE UP (ref 11.5–14.5)
SODIUM SERPL-SCNC: 131 MMOL/L — LOW (ref 135–146)
WBC # BLD: 0.12 K/UL — CRITICAL LOW (ref 4.8–10.8)
WBC # FLD AUTO: 0.12 K/UL — CRITICAL LOW (ref 4.8–10.8)

## 2024-03-06 PROCEDURE — 99233 SBSQ HOSP IP/OBS HIGH 50: CPT

## 2024-03-06 PROCEDURE — 99222 1ST HOSP IP/OBS MODERATE 55: CPT

## 2024-03-06 RX ORDER — ENOXAPARIN SODIUM 100 MG/ML
40 INJECTION SUBCUTANEOUS EVERY 24 HOURS
Refills: 0 | Status: DISCONTINUED | OUTPATIENT
Start: 2024-03-06 | End: 2024-03-07

## 2024-03-06 RX ORDER — VANCOMYCIN HCL 1 G
125 VIAL (EA) INTRAVENOUS EVERY 6 HOURS
Refills: 0 | Status: DISCONTINUED | OUTPATIENT
Start: 2024-03-06 | End: 2024-03-06

## 2024-03-06 RX ORDER — CEFEPIME 1 G/1
2000 INJECTION, POWDER, FOR SOLUTION INTRAMUSCULAR; INTRAVENOUS ONCE
Refills: 0 | Status: COMPLETED | OUTPATIENT
Start: 2024-03-06 | End: 2024-03-06

## 2024-03-06 RX ORDER — CEFEPIME 1 G/1
INJECTION, POWDER, FOR SOLUTION INTRAMUSCULAR; INTRAVENOUS
Refills: 0 | Status: DISCONTINUED | OUTPATIENT
Start: 2024-03-06 | End: 2024-03-07

## 2024-03-06 RX ORDER — CEFEPIME 1 G/1
2000 INJECTION, POWDER, FOR SOLUTION INTRAMUSCULAR; INTRAVENOUS EVERY 8 HOURS
Refills: 0 | Status: DISCONTINUED | OUTPATIENT
Start: 2024-03-06 | End: 2024-03-07

## 2024-03-06 RX ORDER — FIDAXOMICIN 200 MG/5ML
200 GRANULE, FOR SUSPENSION ORAL
Refills: 0 | Status: DISCONTINUED | OUTPATIENT
Start: 2024-03-06 | End: 2024-03-07

## 2024-03-06 RX ADMIN — SODIUM CHLORIDE 100 MILLILITER(S): 9 INJECTION INTRAMUSCULAR; INTRAVENOUS; SUBCUTANEOUS at 18:06

## 2024-03-06 RX ADMIN — FIDAXOMICIN 200 MILLIGRAM(S): 200 GRANULE, FOR SUSPENSION ORAL at 22:15

## 2024-03-06 RX ADMIN — Medication 1 TABLET(S): at 22:15

## 2024-03-06 RX ADMIN — Medication 1000 UNIT(S): at 13:38

## 2024-03-06 RX ADMIN — Medication 1 TABLET(S): at 13:38

## 2024-03-06 RX ADMIN — Medication 137 MICROGRAM(S): at 06:43

## 2024-03-06 RX ADMIN — Medication 125 MILLIGRAM(S): at 06:42

## 2024-03-06 RX ADMIN — ENOXAPARIN SODIUM 40 MILLIGRAM(S): 100 INJECTION SUBCUTANEOUS at 09:12

## 2024-03-06 RX ADMIN — Medication 125 MILLIGRAM(S): at 17:40

## 2024-03-06 RX ADMIN — ONDANSETRON 4 MILLIGRAM(S): 8 TABLET, FILM COATED ORAL at 23:59

## 2024-03-06 RX ADMIN — Medication 125 MILLIGRAM(S): at 13:38

## 2024-03-06 RX ADMIN — Medication 100 MILLIGRAM(S): at 06:43

## 2024-03-06 RX ADMIN — CEFEPIME 100 MILLIGRAM(S): 1 INJECTION, POWDER, FOR SOLUTION INTRAMUSCULAR; INTRAVENOUS at 22:10

## 2024-03-06 RX ADMIN — CEFEPIME 100 MILLIGRAM(S): 1 INJECTION, POWDER, FOR SOLUTION INTRAMUSCULAR; INTRAVENOUS at 13:38

## 2024-03-06 RX ADMIN — Medication 1 TABLET(S): at 06:42

## 2024-03-06 NOTE — PROGRESS NOTE ADULT - ASSESSMENT
Patient was evaluated at bedside with Housestaff on morning rounds.    - Onc consulted and a/w mangement  - ID consulted w/ recs pending. patient on PO vancomycin. s/p antibiotics last night during admission. Noted right forearm cellulitis without purulence. Patient reports Penicillin rash as teenager and agreeable to trial of cefepime after verbalizing understanding of risks. Given Neutropenic fever favor coverage for cellulitis w/ cefepime for now in addition to treatment of C.diff colitis w/ PO Vancomycin.      ***Incomplete, full notation to follow 68M w/ Testicular Cancer s/p Right Orchiectomy on Chemotherapy, Hx of Melanoma, Right Eye Removed, HTN, Hypothyroid, recent hospitalization for Right Forearm Abscess s/p I/D(2/13) admit for Neutropenic Fever and C.Diff Colitis    #Neutropenic Fever  #C. Diff Colitis  #Cellulitis  #Testicular Cancer  #Hypothyroid    - etiology of Neutropenic fever 2/2 C. diff colitis however Right forearm cellulitis maybe contributory  - Continue w/ PO Vancomycin for C.diff. Cefepime for cellulitis for now. Patient reports Penicillin Allergy w/ rash as teenager. We discussed benefit/risk/alternative to trialing cefepime given reported allergy (cross-reactivity less likely given reported hx) and patient verbalized understanding and agreement to trial cefepime.  - ID consult placed w/ recs pending  - Onc consulted & a/w management  - c/w levothyroxine    #Progress Note Handoff  Pending (specify):  f/u blood culture results (in lab), ID recs pending  Family discussion: house staff updated pt family  Disposition: from home

## 2024-03-06 NOTE — CONSULT NOTE ADULT - SUBJECTIVE AND OBJECTIVE BOX
Hematology Consult Note    HPI:  Patient is 68-year-old male, former smoker with past medical history of HTN, hypothyroidism, melanoma s/p resection, testicular seminoma currently undergoing chemotherapy, presents to the emergency department for diarrhea onset last night.  Patient also complains of vomiting onset this morning and abdominal cramping.  Patient denies fever, chills, cough, shortness of breath, chest pain.  Patient last had chemo on 3/1, which was found 3 of etoposide and cisplatin.  Patient sees Dr. Atwood, oncologist.        Home meds obtained from pt.    declined by ICU, oncology recommended admission to [place]  (05 Mar 2024 21:35)      Allergies    penicillin G benzathine (Rash)  penicillin (Rash)    Intolerances        MEDICATIONS  (STANDING):  calcium gluconate IVPB 2 Gram(s) IV Intermittent once  cholecalciferol 1000 Unit(s) Oral daily  enoxaparin Injectable 40 milliGRAM(s) SubCutaneous every 24 hours  lactobacillus acidophilus 1 Tablet(s) Oral every 8 hours  levoFLOXacin IVPB 750 milliGRAM(s) IV Intermittent every 24 hours  levothyroxine 137 MICROGram(s) Oral daily  metroNIDAZOLE  IVPB 500 milliGRAM(s) IV Intermittent every 8 hours  multivitamin 1 Tablet(s) Oral daily  sodium chloride 0.9%. 1000 milliLiter(s) (100 mL/Hr) IV Continuous <Continuous>  vancomycin    Solution 125 milliGRAM(s) Oral every 6 hours  vancomycin  IVPB 1250 milliGRAM(s) IV Intermittent every 12 hours    MEDICATIONS  (PRN):  acetaminophen     Tablet .. 650 milliGRAM(s) Oral every 6 hours PRN Temp greater or equal to 38C (100.4F), Mild Pain (1 - 3)  aluminum hydroxide/magnesium hydroxide/simethicone Suspension 30 milliLiter(s) Oral every 4 hours PRN Dyspepsia  melatonin 5 milliGRAM(s) Oral at bedtime PRN Insomnia  ondansetron Injectable 4 milliGRAM(s) IV Push every 8 hours PRN Nausea and/or Vomiting      PAST MEDICAL & SURGICAL HISTORY:  HTN (hypertension)      Hypothyroidism, unspecified type      Obesity      Other secondary osteoarthritis of right hand      Testicular seminoma      History of eye removal  Right eye      H/O melanoma excision      S/P appendectomy  14 yrs old          FAMILY HISTORY:  CAD (coronary artery disease) (Mother)    CAD (coronary artery disease)  Stent        SOCIAL HISTORY: No EtOH, no tobacco    REVIEW OF SYSTEMS:    CONSTITUTIONAL: No weakness, fevers or chills  EYES/ENT: No visual changes;  No vertigo or throat pain   NECK: No pain or stiffness  RESPIRATORY: No cough, wheezing, hemoptysis; No shortness of breath  CARDIOVASCULAR: No chest pain or palpitations  GASTROINTESTINAL: No abdominal or epigastric pain. No nausea, vomiting, or hematemesis; No diarrhea or constipation. No melena or hematochezia.  GENITOURINARY: No dysuria, frequency or hematuria  NEUROLOGICAL: No numbness or weakness  SKIN: No itching, burning, rashes, or lesions   All other review of systems is negative unless indicated above.      Weight (kg): 113.4 (03-05 @ 13:35)    T(F): 97.9 (03-06-24 @ 03:39), Max: 100.3 (03-05-24 @ 13:35)  HR: 102 (03-06-24 @ 06:30)  BP: 121/78 (03-06-24 @ 06:30)  RR: 19 (03-06-24 @ 06:30)  SpO2: 98% (03-06-24 @ 06:30)  Wt(kg): --    GENERAL: NAD, well-developed  HEAD:  Atraumatic, Normocephalic  EYES: EOMI, PERRLA, conjunctiva and sclera clear  NECK: Supple, No JVD  CHEST/LUNG: Clear to auscultation bilaterally; No wheeze  HEART: Regular rate and rhythm; No murmurs, rubs, or gallops  ABDOMEN: Soft, Nontender, Nondistended; Bowel sounds present  EXTREMITIES:  2+ Peripheral Pulses, No clubbing, cyanosis, or edema  NEUROLOGY: non-focal  SKIN: No rashes or lesions                          11.2   0.11  )-----------( 147      ( 05 Mar 2024 14:33 )             32.2       03-05    134<L>  |  93<L>  |  24<H>  ----------------------------<  192<H>  3.7   |  27  |  0.9    Ca    7.1<L>      05 Mar 2024 14:33    TPro  5.6<L>  /  Alb  3.4<L>  /  TBili  0.9  /  DBili  x   /  AST  18  /  ALT  16  /  AlkPhos  85  03-05           Hematology Consult Note    HPI:  Patient is 68-year-old male, former smoker with past medical history of HTN, hypothyroidism, melanoma s/p resection, testicular seminoma currently undergoing chemotherapy, presents to the emergency department for diarrhea onset last night.  Patient also complains of vomiting onset this morning and abdominal cramping.  Patient denies fever, chills, cough, shortness of breath, chest pain.  Patient last had chemo on 3/1, which was found 3 of etoposide and cisplatin.  Patient sees Dr. Atwood, oncologist.        Home meds obtained from pt.    declined by ICU, oncology recommended admission to [place]  (05 Mar 2024 21:35)      Allergies    penicillin G benzathine (Rash)  penicillin (Rash)    Intolerances        MEDICATIONS  (STANDING):  calcium gluconate IVPB 2 Gram(s) IV Intermittent once  cholecalciferol 1000 Unit(s) Oral daily  enoxaparin Injectable 40 milliGRAM(s) SubCutaneous every 24 hours  lactobacillus acidophilus 1 Tablet(s) Oral every 8 hours  levoFLOXacin IVPB 750 milliGRAM(s) IV Intermittent every 24 hours  levothyroxine 137 MICROGram(s) Oral daily  metroNIDAZOLE  IVPB 500 milliGRAM(s) IV Intermittent every 8 hours  multivitamin 1 Tablet(s) Oral daily  sodium chloride 0.9%. 1000 milliLiter(s) (100 mL/Hr) IV Continuous <Continuous>  vancomycin    Solution 125 milliGRAM(s) Oral every 6 hours  vancomycin  IVPB 1250 milliGRAM(s) IV Intermittent every 12 hours    MEDICATIONS  (PRN):  acetaminophen     Tablet .. 650 milliGRAM(s) Oral every 6 hours PRN Temp greater or equal to 38C (100.4F), Mild Pain (1 - 3)  aluminum hydroxide/magnesium hydroxide/simethicone Suspension 30 milliLiter(s) Oral every 4 hours PRN Dyspepsia  melatonin 5 milliGRAM(s) Oral at bedtime PRN Insomnia  ondansetron Injectable 4 milliGRAM(s) IV Push every 8 hours PRN Nausea and/or Vomiting      PAST MEDICAL & SURGICAL HISTORY:  HTN (hypertension)      Hypothyroidism, unspecified type      Obesity      Other secondary osteoarthritis of right hand      Testicular seminoma      History of eye removal  Right eye      H/O melanoma excision      S/P appendectomy  14 yrs old          FAMILY HISTORY:  CAD (coronary artery disease) (Mother)    CAD (coronary artery disease)  Stent        SOCIAL HISTORY: No EtOH, no tobacco    REVIEW OF SYSTEMS:    CONSTITUTIONAL: No weakness, fevers or chills  EYES/ENT: No visual changes;  No vertigo or throat pain   NECK: No pain or stiffness  RESPIRATORY: No cough, wheezing, hemoptysis; No shortness of breath  CARDIOVASCULAR: No chest pain or palpitations  GASTROINTESTINAL: No abdominal or epigastric pain. No nausea, vomiting, or hematemesis; No diarrhea or constipation. No melena or hematochezia.  GENITOURINARY: No dysuria, frequency or hematuria  NEUROLOGICAL: No numbness or weakness  SKIN: No itching, burning, rashes, or lesions   All other review of systems is negative unless indicated above.      Weight (kg): 113.4 (03-05 @ 13:35)    T(F): 97.9 (03-06-24 @ 03:39), Max: 100.3 (03-05-24 @ 13:35)  HR: 102 (03-06-24 @ 06:30)  BP: 121/78 (03-06-24 @ 06:30)  RR: 19 (03-06-24 @ 06:30)  SpO2: 98% (03-06-24 @ 06:30)  Wt(kg): --    GENERAL: NAD, well-developed  HEAD:  Atraumatic, Normocephalic  EYES: EOMI, PERRLA, conjunctiva and sclera clear  NECK: Supple, No JVD  CHEST/LUNG: Clear to auscultation bilaterally; No wheeze  HEART: Regular rate and rhythm; No murmurs, rubs, or gallops  ABDOMEN: Soft, Nontender, Nondistended; Bowel sounds present  EXTREMITIES:  2+ Peripheral Pulses, No clubbing, cyanosis, or edema  NEUROLOGY: non-focal  SKIN: No rashes or lesions                          11.2   0.11  )-----------( 147      ( 05 Mar 2024 14:33 )             32.2       03-05    134<L>  |  93<L>  |  24<H>  ----------------------------<  192<H>  3.7   |  27  |  0.9    Ca    7.1<L>      05 Mar 2024 14:33    TPro  5.6<L>  /  Alb  3.4<L>  /  TBili  0.9  /  DBili  x   /  AST  18  /  ALT  16  /  AlkPhos  85  03-05      Radiology    < from: CT Abdomen and Pelvis w/ IV Cont (03.05.24 @ 16:43) >  MPRESSION:    1.  Liquid stool within the colon and rectum, consistent with given   history of diarrhea. No bowel wall thickening or fat stranding to suggest   colitis.  2.  Aortocaval and periaortic lymphadenopathy, markedly decreased in size   comparison to January 9, 2024.  3.  Stable right adrenal nodule measuring 1.6 cm.    --- End of Report ---    < end of copied text >

## 2024-03-06 NOTE — CONSULT NOTE ADULT - ASSESSMENT
Patient is 68-year-old male, former smoker with past medical history of HTN, hypothyroidism, melanoma s/p resection, testicular seminoma currently undergoing chemotherapy, presents to the emergency department for diarrhea onset last night.  Patient also complains of vomiting onset this morning and abdominal cramping.  Patient denies fever, chills, cough, shortness of breath, chest pain.  Patient last had chemo on 3/1, that was cycle 3 of EP.  Patient sees Dr. Atwood, oncologist.    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  - MR Brain 1.12.2024 no evidence of intracranial metastasis or acute intracranial pathology, paranasal sinus mucosal disease  - s/p Right chest port placed via right IJ  - s/p cycle 3 of Cisplatin + Etoposide on 2/26 followed by Neulasta onbody injection D5 (3/1/24), initiated on 1.15.2024    Recommendations:    - ID consult. c/w PO vanco. Consider de-escalating Antibiotics.   - check serum Mag levels  - Pt received G-CSF on 3/1, no need for further support for neutropenia  - neutropenic precautions: NO RECTAL TEMP or MEDICATIONS  - we will follow the patient    for any questions call or text via MS teams   Patient is 68-year-old male, former smoker with past medical history of HTN, hypothyroidism, melanoma s/p resection, testicular seminoma currently undergoing chemotherapy, presents to the emergency department for diarrhea onset last night.  Patient also complains of vomiting onset this morning and abdominal cramping.  Patient denies fever, chills, cough, shortness of breath, chest pain.  Patient last had chemo on 3/1, that was cycle 3 of EP.  Patient sees Dr. Atwood, oncologist.    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  - MR Brain 1.12.2024 no evidence of intracranial metastasis or acute intracranial pathology, paranasal sinus mucosal disease  - s/p Right chest port placed via right IJ  - s/p cycle 3 of Cisplatin + Etoposide on 2/26 followed by Neulasta onbody injection D5 (3/1/24), initiated on 1.15.2024  - CT abdomen 3.5.24: Aortocaval and periaortic lymphadenopathy, markedly decreased in size comparison to January 9, 2024.    # History of Melanoma of skin, right shoulder, dx 10/2018    - followup with [facility] for surveillance skin exams at least every 6-12 months      Recommendations:    - ID consult. c/w PO vanco. Consider de-escalating Antibiotics.   - check serum Mag levels  - Pt received G-CSF on 3/1, no need for further support for neutropenia  - neutropenic precautions: NO RECTAL TEMP or MEDICATIONS  - we will follow the patient    for any questions call or text via MS teams

## 2024-03-06 NOTE — CONSULT NOTE ADULT - ASSESSMENT
ASSESSMENT  Patient is 68-year-old male, former smoker with past medical history of HTN, hypothyroidism, melanoma s/p resection, testicular seminoma currently undergoing chemotherapy, presents to the emergency department for diarrhea onset last night.  Patient also complains of vomiting onset this morning and abdominal cramping.  Patient denies fever, chills, cough, shortness of breath, chest pain.  Patient last had chemo on 3/1, which was found 3 of etoposide and cisplatin.  Patient sees Dr. Atwood, oncologist.    IMPRESSION  #Neutropenic Fever  #C diff Colitis   #Testicular seminoma on active chemo - last on 3/1     #Abx allergy: penicillin G benzathine (Rash)  penicillin (Rash)    RECOMMENDATIONS  This is a preliminary incomplete pended note, all final recommendations to follow after interview and examination of the patient.    Please call or message on [employer] Teams if with any questions.  Spectra [phone]   ASSESSMENT  Patient is 68-year-old male, former smoker with past medical history of HTN, hypothyroidism, melanoma s/p resection, testicular seminoma currently undergoing chemotherapy, presents to the emergency department for diarrhea onset last night.    IMPRESSION  #Neutropenic Fever  #C diff Colitis   #Testicular seminoma on active chemo - last on 3/1     #Abx allergy: penicillin G benzathine (Rash)  penicillin (Rash)    RECOMMENDATIONS  - Patient with risk factors for recurrence including recent chemo therapy -- change PO vancomycin to fidaxomicin 200 mg BID for 10 days   - continue cefepime 2g q 8 hours until blood cx return -- once negative for 24 hours, stop systemic antibiotics  - trend stool count     Please call or message on [employer] Teams if with any questions.  Spectra [phone]

## 2024-03-06 NOTE — PROGRESS NOTE ADULT - SUBJECTIVE AND OBJECTIVE BOX
CC: 68M admit for Neutropenic Fever; C. diff colitis    SUBJECTIVE / OVERNIGHT EVENTS:  No acute events overnight. Patient seen and evaluated at bedside. No fever/chills.  Patient reports that he continues to have watery diarrhea. Additionally reports that on right forearm he developed cellulitis without purulence 5d ago (has not been treated for antibiotics for it)    ROS:  no chest pain    MEDICATIONS  (STANDING):  calcium gluconate IVPB 2 Gram(s) IV Intermittent once  cefepime   IVPB      cholecalciferol 1000 Unit(s) Oral daily  enoxaparin Injectable 40 milliGRAM(s) SubCutaneous every 24 hours  lactobacillus acidophilus 1 Tablet(s) Oral every 8 hours  levothyroxine 137 MICROGram(s) Oral daily  multivitamin 1 Tablet(s) Oral daily  sodium chloride 0.9%. 1000 milliLiter(s) (100 mL/Hr) IV Continuous <Continuous>  vancomycin    Solution 125 milliGRAM(s) Oral every 6 hours    MEDICATIONS  (PRN):  acetaminophen     Tablet .. 650 milliGRAM(s) Oral every 6 hours PRN Temp greater or equal to 38C (100.4F), Mild Pain (1 - 3)  aluminum hydroxide/magnesium hydroxide/simethicone Suspension 30 milliLiter(s) Oral every 4 hours PRN Dyspepsia  melatonin 5 milliGRAM(s) Oral at bedtime PRN Insomnia  ondansetron Injectable 4 milliGRAM(s) IV Push every 8 hours PRN Nausea and/or Vomiting    Physical Exam  Vital Signs Last 24 Hrs  T(C): 36.6 (06 Mar 2024 03:39), Max: 37.9 (05 Mar 2024 13:35)  T(F): 97.9 (06 Mar 2024 03:39), Max: 100.3 (05 Mar 2024 13:35)  HR: 102 (06 Mar 2024 06:30) (102 - 134)  BP: 121/78 (06 Mar 2024 06:30) (86/64 - 138/60)  RR: 19 (06 Mar 2024 06:30) (16 - 20)  SpO2: 98% (06 Mar 2024 06:30) (95% - 99%)    Parameters below as of 06 Mar 2024 06:30  Patient On (Oxygen Delivery Method): room air    GENERAL: NAD  HEAD:  Atraumatic, Normocephalic  EYES: Right eye patch, left eye conjunctiva and sclera clear  Lung: normal work of breathing, cta b/l  Cardiovascular: S1&S2+, rrr, no m/r/g appreciated  ABDOMEN: soft, nt  : No duncan catheter, no suprapubic pain to palpation  Neuro: Alert & follows commands, no flaccid paralysis in extremities appreciated  SKIN: warm and dry, no visible purulence in exposed areas, Right forearm w/ erythematous rash (no drainage, crepitus, severe pain to palpation), Right chest port does not appear to have purulence or erythema    LABS:                        11.2   0.11  )-----------( 147      ( 05 Mar 2024 14:33 )             32.2     03-05    134<L>  |  93<L>  |  24<H>  ----------------------------<  192<H>  3.7   |  27  |  0.9    Ca    7.1<L>      05 Mar 2024 14:33    TPro  5.6<L>  /  Alb  3.4<L>  /  TBili  0.9  /  DBili  x   /  AST  18  /  ALT  16  /  AlkPhos  85  03-05    PT/INR - ( 05 Mar 2024 14:33 )   PT: 13.50 sec;   INR: 1.18 ratio         PTT - ( 05 Mar 2024 14:33 )  PTT:24.6 sec      Urinalysis Basic - ( 05 Mar 2024 14:33 )    Color: x / Appearance: x / SG: x / pH: x  Gluc: 192 mg/dL / Ketone: x  / Bili: x / Urobili: x   Blood: x / Protein: x / Nitrite: x   Leuk Esterase: x / RBC: x / WBC x   Sq Epi: x / Non Sq Epi: x / Bacteria: x          RADIOLOGY & ADDITIONAL TESTS:  Results Reviewed:   Imaging Personally Reviewed:  Electrocardiogram Personally Reviewed:    COORDINATION OF CARE:  Care Discussed with Consultants/Other Providers [Y/N]:  Prior or Outpatient Records Reviewed [Y/N]:

## 2024-03-06 NOTE — CONSULT NOTE ADULT - TIME BILLING
I have personally seen and examined this patient.    I have reviewed all pertinent clinical information and reviewed all relevant imaging and diagnostic studies personally.   I counseled the patient about diagnostic testing and treatment plan. All questions were answered.   I discussed recommendations with the primary team.
21.1

## 2024-03-06 NOTE — ED ADULT NURSE REASSESSMENT NOTE - NS ED NURSE REASSESS COMMENT FT1
report given to charge sue gabriel ed
Pt. transferred from [facility]. Pt. alert and responsive to tactile and verbal stimuli oriented to person time place and situation. Pt. placed in ED3 B9

## 2024-03-06 NOTE — CONSULT NOTE ADULT - SUBJECTIVE AND OBJECTIVE BOX
MALIA GAUTHIER  68y, Male  Allergy: penicillin G benzathine (Rash)  penicillin (Rash)      CHIEF COMPLAINT: 68M admit for Neutropenic Fever; C. diff colitis (06 Mar 2024 10:09)      LOS  1d    HPI:  Patient is 68-year-old male, former smoker with past medical history of HTN, hypothyroidism, melanoma s/p resection, testicular seminoma currently undergoing chemotherapy, presents to the emergency department for diarrhea onset last night.  Patient also complains of vomiting onset this morning and abdominal cramping.  Patient denies fever, chills, cough, shortness of breath, chest pain.  Patient last had chemo on 3/1, which was found 3 of etoposide and cisplatin.  Patient sees Dr. Atwood, oncologist.        Home meds obtained from pt.    declined by ICU, oncology recommended admission to [place]  (05 Mar 2024 21:35)      INFECTIOUS DISEASE HISTORY:  History as above.  Presents with fevers/diarrhea/vomiting.   Found to be C diff positive     PAST MEDICAL & SURGICAL HISTORY:  HTN (hypertension)      Hypothyroidism, unspecified type      Obesity      Other secondary osteoarthritis of right hand      Testicular seminoma      History of eye removal  Right eye      H/O melanoma excision      S/P appendectomy  14 yrs old          FAMILY HISTORY  CAD (coronary artery disease) (Mother)    CAD (coronary artery disease)        SOCIAL HISTORY  Social History:  former smoker (11 Feb 2024 09:25)        ROS  General: Denies rigors, nightsweats  HEENT: Denies headache, rhinorrhea, sore throat, eye pain  CV: Denies CP, palpitations  PULM: Denies wheezing, hemoptysis  GI: Denies hematemesis, hematochezia, melena  : Denies discharge, hematuria  MSK: Denies arthralgias, myalgias  SKIN: Denies rash, lesions  NEURO: Denies paresthesias, weakness  PSYCH: Denies depression, anxiety    VITALS:  T(F): 100.3, Max: 100.3 (03-06-24 @ 16:10)  HR: 100  BP: 108/57  RR: 18Vital Signs Last 24 Hrs  T(C): 37.9 (06 Mar 2024 16:10), Max: 37.9 (06 Mar 2024 16:10)  T(F): 100.3 (06 Mar 2024 16:10), Max: 100.3 (06 Mar 2024 16:10)  HR: 100 (06 Mar 2024 16:10) (100 - 122)  BP: 108/57 (06 Mar 2024 16:10) (86/64 - 135/63)  BP(mean): --  RR: 18 (06 Mar 2024 16:10) (16 - 19)  SpO2: 98% (06 Mar 2024 06:30) (95% - 99%)    Parameters below as of 06 Mar 2024 06:30  Patient On (Oxygen Delivery Method): room air        PHYSICAL EXAM:  Gen: NAD, resting in bed  HEENT: Normocephalic, atraumatic  Neck: supple, no lymphadenopathy  CV: Regular rate & regular rhythm  Lungs: decreased BS at bases, no fremitus  Abdomen: Soft, BS present  Ext: Warm, well perfused  Neuro: non focal, awake  Skin: no rash, no erythema  Lines: no phlebitis    TESTS & MEASUREMENTS:                        11.2   0.11  )-----------( 147      ( 05 Mar 2024 14:33 )             32.2     03-05    134<L>  |  93<L>  |  24<H>  ----------------------------<  192<H>  3.7   |  27  |  0.9    Ca    7.1<L>      05 Mar 2024 14:33    TPro  5.6<L>  /  Alb  3.4<L>  /  TBili  0.9  /  DBili  x   /  AST  18  /  ALT  16  /  AlkPhos  85  03-05      LIVER FUNCTIONS - ( 05 Mar 2024 14:33 )  Alb: 3.4 g/dL / Pro: 5.6 g/dL / ALK PHOS: 85 U/L / ALT: 16 U/L / AST: 18 U/L / GGT: x           Urinalysis Basic - ( 05 Mar 2024 14:33 )    Color: x / Appearance: x / SG: x / pH: x  Gluc: 192 mg/dL / Ketone: x  / Bili: x / Urobili: x   Blood: x / Protein: x / Nitrite: x   Leuk Esterase: x / RBC: x / WBC x   Sq Epi: x / Non Sq Epi: x / Bacteria: x        Culture - Other (collected 02-13-24 @ 12:03)  Source: Wound None  Final Report (02-16-24 @ 15:43):    Numerous Methicillin Resistant Staphylococcus aureus  Organism: Methicillin resistant Staphylococcus aureus (02-16-24 @ 15:43)  Organism: Methicillin resistant Staphylococcus aureus (02-16-24 @ 15:43)      -  Clindamycin: S <=0.25      -  Oxacillin: R >2      -  Gentamicin: S <=1 Should not be used as monotherapy      -  Daptomycin: S 0.5      -  Linezolid: S 2      -  Cefazolin: R <=4      -  Vancomycin: S 2      -  Tetracycline: S <=1      Method Type: HANH      -  Ampicillin/Sulbactam: R <=8/4      -  Penicillin: R 2      -  Rifampin: S <=1 Should not be used as monotherapy      -  Erythromycin: S <=0.25      -  Trimethoprim/Sulfamethoxazole: S <=0.5/9.5    Culture - Other (collected 02-11-24 @ 16:00)  Source: Drainage Drainage  Final Report (02-17-24 @ 10:12):    Numerous Methicillin Resistant Staphylococcus aureus    Moderate Staphylococcus epidermidis "Susceptibilities not performed"  Organism: Methicillin resistant Staphylococcus aureus (02-17-24 @ 10:12)  Organism: Methicillin resistant Staphylococcus aureus (02-17-24 @ 10:12)      -  Clindamycin: S <=0.25      -  Oxacillin: R >2      -  Gentamicin: S <=1 Should not be used as monotherapy      -  Daptomycin: S 0.5      -  Linezolid: S 2      -  Cefazolin: R <=4      -  Vancomycin: S 2      -  Tetracycline: S <=1      Method Type: HANH      -  Ampicillin/Sulbactam: R <=8/4      -  Penicillin: R 1      -  Rifampin: S <=1 Should not be used as monotherapy      -  Erythromycin: S <=0.25      -  Trimethoprim/Sulfamethoxazole: S <=0.5/9.5    Culture - Blood (collected 02-11-24 @ 07:30)  Source: .Blood Blood  Final Report (02-16-24 @ 20:00):    No growth at 5 days    Culture - Blood (collected 02-11-24 @ 07:30)  Source: .Blood Blood  Final Report (02-16-24 @ 20:00):    No growth at 5 days    Culture - Urine (collected 11-24-23 @ 12:24)  Source: Clean Catch Clean Catch (Midstream)  Final Report (11-25-23 @ 19:27):    No growth        Lactate, Blood: 2.0 mmol/L (03-05-24 @ 14:33)      INFECTIOUS DISEASES TESTING  MRSA PCR Result.: Positive (02-11-24 @ 16:00)      RADIOLOGY & ADDITIONAL TESTS:  I have personally reviewed the last Chest xray  CXR  Xray Chest 1 View- PORTABLE-Urgent:   ACC: [id number] EXAM:  XR CHEST PORTABLE URGENT 1V   ORDERED BY: GHAZAL RIVERA     PROCEDURE DATE:  03/05/2024          INTERPRETATION:  Clinical History / Reason for exam: Sepsis.    Comparison : Chest radiograph 8/18/2022.    Technique/Positioning: Frontal chest radiograph.    Findings:    Support devices: Right-sided chest port terminating in atriocaval   junction.    Cardiac/mediastinum/hilum: Prominent cardiac silhouette, may be due to   magnification. Aortic calcification.    Lung parenchyma/Pleura: Left basilar opacity/effusion. No pneumothorax.    Skeleton/soft tissues: Degenerative changes.    Impression:    Left basilar opacity/effusion.        --- End of Report ---          FRAN GREENE MD; Resident Radiologist  This document has been electronically signed.  SETH CLAYTON MD; Attending Radiologist  This document has been electronically signed. Mar  5 2024  4:58PM (03-05-24 @ 14:19)      CT  CT Abdomen and Pelvis w/ IV Cont:   ACC: [id number] EXAM:  CT ABDOMEN AND PELVIS IC   ORDERED BY: NATASHA VERDE     PROCEDURE DATE:  03/05/2024          INTERPRETATION:  CLINICAL STATEMENT: Diarrhea, fever in an   immunocompromised patient on chemotherapy. History of testicular seminoma   status post orchiectomy, melanoma.    TECHNIQUE: Contiguous axial CT images were obtained from the lower chest   to the pubic symphysis following administration of intravenous contrast.   95 cc administered of Omnipaque 350 (5 cc discarded). Oral contrast was   administered. Reformatted images in the coronal and sagittal planes were   acquired.    COMPARISON CT: CT abdomen and pelvis 1/9/2024.    OTHER STUDIES USED FOR CORRELATION: None.      FINDINGS:    LOWER CHEST: Left lower lobe subpleural calcified granuloma.    HEPATOBILIARY: Patent hepatic and portal veins. No intra or extrahepatic   biliary ductal dilation. Unremarkable gallbladder.    SPLEEN: Unremarkable.    PANCREAS: Unremarkable.    ADRENAL GLANDS: Right 1.6 cm adrenal nodule,stable. Unremarkable left   adrenal gland.    KIDNEYS: Bilateral symmetric cortical enhancement. No hydronephrosis.   Bilateral stable cortical and left parapelvic cysts. Right renal   hypodensity too small to further characterize.    ABDOMINOPELVIC NODES: Lobulated 2.8 x 2.0 cm aortocaval lymph node   markedly decreased in size (series 302, image 175). Left 1.7 x 1.0 cm   periaortic lymph node, previously measuring 3.7 x 2.7 cm (series 302,   image 174). Additional periaortic lymph nodes decreased in size.    PELVIC ORGANS: Unremarkable urinary bladder.    PERITONEUM/MESENTERY/BOWEL: no evidence of obstruction. No ascites. No   pneumoperitoneum. Liquid stool diffusely within the colon and rectum. No   pericecal inflammatory changes.    BONES/SOFT TISSUES: Bilateral fat-containing inguinal hernias.   Degenerative changes of the lumbosacral spine and glenohumeral joints.    OTHER: Atherosclerotic aortoiliac calcifications.      IMPRESSION:    1.  Liquid stool within the colon and rectum, consistent with given   history of diarrhea. No bowel wall thickening or fat stranding to suggest   colitis.  2.  Aortocaval and periaortic lymphadenopathy, markedly decreased in size   comparison to January 9, 2024.  3.  Stable right adrenal nodule measuring 1.6 cm.    --- End of Report ---          AIRAM SONG MD; Resident Radiologist  This document has been electronically signed.  MAYA LESTER MD; Attending Radiologist  This document has been electronically signed. Mar  5 2024  6:30PM (03-05-24 @ 16:43)      CARDIOLOGY TESTING  12 Lead ECG:   Ventricular Rate 130 BPM    Atrial Rate 130 BPM    P-R Interval 96 ms    QRS Duration 94 ms    Q-T Interval 396 ms    QTC Calculation(Bazett) 582 ms    R Axis 0 degrees    T Axis 36 degrees    Diagnosis Line Sinus tachycardia with short AK  RSR' orQR pattern in V1 suggests right ventricular conduction delay  ST & T wave abnormality, consider lateral ischemia  Abnormal ECG    Confirmed by Kyler Mcwilliams ([id number]) on 3/5/2024 2:59:30 PM (03-05-24 @ 14:24)      MEDICATIONS  calcium gluconate IVPB 2 IV Intermittent once  cefepime   IVPB     cefepime   IVPB 2000 IV Intermittent every 8 hours  cholecalciferol 1000 Oral daily  enoxaparin Injectable 40 SubCutaneous every 24 hours  lactobacillus acidophilus 1 Oral every 8 hours  levothyroxine 137 Oral daily  multivitamin 1 Oral daily  sodium chloride 0.9%. 1000 IV Continuous <Continuous>  vancomycin    Solution 125 Oral every 6 hours      Weight  Weight (kg): 113.4 (03-05-24 @ 13:35)    ANTIBIOTICS:  cefepime   IVPB 2000 milliGRAM(s) IV Intermittent every 8 hours  cefepime   IVPB      vancomycin    Solution 125 milliGRAM(s) Oral every 6 hours      ALLERGIES:  penicillin G benzathine (Rash)  penicillin (Rash)         MALIA GAUTHIER  68y, Male  Allergy: penicillin G benzathine (Rash)  penicillin (Rash)      CHIEF COMPLAINT: 68M admit for Neutropenic Fever; C. diff colitis (06 Mar 2024 10:09)      LOS  1d    HPI:  Patient is 68-year-old male, former smoker with past medical history of HTN, hypothyroidism, melanoma s/p resection, testicular seminoma currently undergoing chemotherapy, presents to the emergency department for diarrhea onset last night.  Patient also complains of vomiting onset this morning and abdominal cramping.  Patient denies fever, chills, cough, shortness of breath, chest pain.  Patient last had chemo on 3/1, which was found 3 of etoposide and cisplatin.  Patient sees Dr. Atwood, oncologist.        Home meds obtained from pt.    declined by ICU, oncology recommended admission to [place]  (05 Mar 2024 21:35)      INFECTIOUS DISEASE HISTORY:  History as above.  Presents with fevers/diarrhea/vomiting.   Found to be C diff positive   No previous history of C diff.   Denies any dysuria, flank pain.   Denies coughing or shortnes of breath.   No new rashes or joint pains.     PAST MEDICAL & SURGICAL HISTORY:  HTN (hypertension)      Hypothyroidism, unspecified type      Obesity      Other secondary osteoarthritis of right hand      Testicular seminoma      History of eye removal  Right eye      H/O melanoma excision      S/P appendectomy  14 yrs old          FAMILY HISTORY  CAD (coronary artery disease) (Mother)    CAD (coronary artery disease)        SOCIAL HISTORY  Social History:  former smoker (11 Feb 2024 09:25)        ROS  General: Denies rigors, nightsweats  HEENT: Denies headache, rhinorrhea, sore throat, eye pain  CV: Denies CP, palpitations  PULM: Denies wheezing, hemoptysis  GI: Denies hematemesis, hematochezia, melena  : Denies discharge, hematuria  MSK: Denies arthralgias, myalgias  SKIN: Denies rash, lesions  NEURO: Denies paresthesias, weakness  PSYCH: Denies depression, anxiety    VITALS:  T(F): 100.3, Max: 100.3 (03-06-24 @ 16:10)  HR: 100  BP: 108/57  RR: 18Vital Signs Last 24 Hrs  T(C): 37.9 (06 Mar 2024 16:10), Max: 37.9 (06 Mar 2024 16:10)  T(F): 100.3 (06 Mar 2024 16:10), Max: 100.3 (06 Mar 2024 16:10)  HR: 100 (06 Mar 2024 16:10) (100 - 122)  BP: 108/57 (06 Mar 2024 16:10) (86/64 - 135/63)  BP(mean): --  RR: 18 (06 Mar 2024 16:10) (16 - 19)  SpO2: 98% (06 Mar 2024 06:30) (95% - 99%)    Parameters below as of 06 Mar 2024 06:30  Patient On (Oxygen Delivery Method): room air        PHYSICAL EXAM:  Gen: NAD, resting in bed  HEENT: Normocephalic, atraumatic  Neck: supple, no lymphadenopathy  CV: Regular rate & regular rhythm  Lungs: decreased BS at bases, no fremitus  Abdomen: Soft, BS present  Ext: Warm, well perfused  Neuro: non focal, awake  Skin: no rash, no erythema  Lines: no phlebitis    TESTS & MEASUREMENTS:                        11.2   0.11  )-----------( 147      ( 05 Mar 2024 14:33 )             32.2     03-05    134<L>  |  93<L>  |  24<H>  ----------------------------<  192<H>  3.7   |  27  |  0.9    Ca    7.1<L>      05 Mar 2024 14:33    TPro  5.6<L>  /  Alb  3.4<L>  /  TBili  0.9  /  DBili  x   /  AST  18  /  ALT  16  /  AlkPhos  85  03-05      LIVER FUNCTIONS - ( 05 Mar 2024 14:33 )  Alb: 3.4 g/dL / Pro: 5.6 g/dL / ALK PHOS: 85 U/L / ALT: 16 U/L / AST: 18 U/L / GGT: x           Urinalysis Basic - ( 05 Mar 2024 14:33 )    Color: x / Appearance: x / SG: x / pH: x  Gluc: 192 mg/dL / Ketone: x  / Bili: x / Urobili: x   Blood: x / Protein: x / Nitrite: x   Leuk Esterase: x / RBC: x / WBC x   Sq Epi: x / Non Sq Epi: x / Bacteria: x        Culture - Other (collected 02-13-24 @ 12:03)  Source: Wound None  Final Report (02-16-24 @ 15:43):    Numerous Methicillin Resistant Staphylococcus aureus  Organism: Methicillin resistant Staphylococcus aureus (02-16-24 @ 15:43)  Organism: Methicillin resistant Staphylococcus aureus (02-16-24 @ 15:43)      -  Clindamycin: S <=0.25      -  Oxacillin: R >2      -  Gentamicin: S <=1 Should not be used as monotherapy      -  Daptomycin: S 0.5      -  Linezolid: S 2      -  Cefazolin: R <=4      -  Vancomycin: S 2      -  Tetracycline: S <=1      Method Type: HANH      -  Ampicillin/Sulbactam: R <=8/4      -  Penicillin: R 2      -  Rifampin: S <=1 Should not be used as monotherapy      -  Erythromycin: S <=0.25      -  Trimethoprim/Sulfamethoxazole: S <=0.5/9.5    Culture - Other (collected 02-11-24 @ 16:00)  Source: Drainage Drainage  Final Report (02-17-24 @ 10:12):    Numerous Methicillin Resistant Staphylococcus aureus    Moderate Staphylococcus epidermidis "Susceptibilities not performed"  Organism: Methicillin resistant Staphylococcus aureus (02-17-24 @ 10:12)  Organism: Methicillin resistant Staphylococcus aureus (02-17-24 @ 10:12)      -  Clindamycin: S <=0.25      -  Oxacillin: R >2      -  Gentamicin: S <=1 Should not be used as monotherapy      -  Daptomycin: S 0.5      -  Linezolid: S 2      -  Cefazolin: R <=4      -  Vancomycin: S 2      -  Tetracycline: S <=1      Method Type: HANH      -  Ampicillin/Sulbactam: R <=8/4      -  Penicillin: R 1      -  Rifampin: S <=1 Should not be used as monotherapy      -  Erythromycin: S <=0.25      -  Trimethoprim/Sulfamethoxazole: S <=0.5/9.5    Culture - Blood (collected 02-11-24 @ 07:30)  Source: .Blood Blood  Final Report (02-16-24 @ 20:00):    No growth at 5 days    Culture - Blood (collected 02-11-24 @ 07:30)  Source: .Blood Blood  Final Report (02-16-24 @ 20:00):    No growth at 5 days    Culture - Urine (collected 11-24-23 @ 12:24)  Source: Clean Catch Clean Catch (Midstream)  Final Report (11-25-23 @ 19:27):    No growth        Lactate, Blood: 2.0 mmol/L (03-05-24 @ 14:33)      INFECTIOUS DISEASES TESTING  MRSA PCR Result.: Positive (02-11-24 @ 16:00)      RADIOLOGY & ADDITIONAL TESTS:  I have personally reviewed the last Chest xray  CXR  Xray Chest 1 View- PORTABLE-Urgent:   ACC: [id number] EXAM:  XR CHEST PORTABLE URGENT 1V   ORDERED BY: GHAZAL RIVERA     PROCEDURE DATE:  03/05/2024          INTERPRETATION:  Clinical History / Reason for exam: Sepsis.    Comparison : Chest radiograph 8/18/2022.    Technique/Positioning: Frontal chest radiograph.    Findings:    Support devices: Right-sided chest port terminating in atriocaval   junction.    Cardiac/mediastinum/hilum: Prominent cardiac silhouette, may be due to   magnification. Aortic calcification.    Lung parenchyma/Pleura: Left basilar opacity/effusion. No pneumothorax.    Skeleton/soft tissues: Degenerative changes.    Impression:    Left basilar opacity/effusion.        --- End of Report ---          FRAN GREENE MD; Resident Radiologist  This document has been electronically signed.  SETH CLAYTON MD; Attending Radiologist  This document has been electronically signed. Mar  5 2024  4:58PM (03-05-24 @ 14:19)      CT  CT Abdomen and Pelvis w/ IV Cont:   ACC: [id number] EXAM:  CT ABDOMEN AND PELVIS IC   ORDERED BY: NATASHA VERDE     PROCEDURE DATE:  03/05/2024          INTERPRETATION:  CLINICAL STATEMENT: Diarrhea, fever in an   immunocompromised patient on chemotherapy. History of testicular seminoma   status post orchiectomy, melanoma.    TECHNIQUE: Contiguous axial CT images were obtained from the lower chest   to the pubic symphysis following administration of intravenous contrast.   95 cc administered of Omnipaque 350 (5 cc discarded). Oral contrast was   administered. Reformatted images in the coronal and sagittal planes were   acquired.    COMPARISON CT: CT abdomen and pelvis 1/9/2024.    OTHER STUDIES USED FOR CORRELATION: None.      FINDINGS:    LOWER CHEST: Left lower lobe subpleural calcified granuloma.    HEPATOBILIARY: Patent hepatic and portal veins. No intra or extrahepatic   biliary ductal dilation. Unremarkable gallbladder.    SPLEEN: Unremarkable.    PANCREAS: Unremarkable.    ADRENAL GLANDS: Right 1.6 cm adrenal nodule,stable. Unremarkable left   adrenal gland.    KIDNEYS: Bilateral symmetric cortical enhancement. No hydronephrosis.   Bilateral stable cortical and left parapelvic cysts. Right renal   hypodensity too small to further characterize.    ABDOMINOPELVIC NODES: Lobulated 2.8 x 2.0 cm aortocaval lymph node   markedly decreased in size (series 302, image 175). Left 1.7 x 1.0 cm   periaortic lymph node, previously measuring 3.7 x 2.7 cm (series 302,   image 174). Additional periaortic lymph nodes decreased in size.    PELVIC ORGANS: Unremarkable urinary bladder.    PERITONEUM/MESENTERY/BOWEL: no evidence of obstruction. No ascites. No   pneumoperitoneum. Liquid stool diffusely within the colon and rectum. No   pericecal inflammatory changes.    BONES/SOFT TISSUES: Bilateral fat-containing inguinal hernias.   Degenerative changes of the lumbosacral spine and glenohumeral joints.    OTHER: Atherosclerotic aortoiliac calcifications.      IMPRESSION:    1.  Liquid stool within the colon and rectum, consistent with given   history of diarrhea. No bowel wall thickening or fat stranding to suggest   colitis.  2.  Aortocaval and periaortic lymphadenopathy, markedly decreased in size   comparison to January 9, 2024.  3.  Stable right adrenal nodule measuring 1.6 cm.    --- End of Report ---          AIRAM SONG MD; Resident Radiologist  This document has been electronically signed.  MAYA LESTER MD; Attending Radiologist  This document has been electronically signed. Mar  5 2024  6:30PM (03-05-24 @ 16:43)      CARDIOLOGY TESTING  12 Lead ECG:   Ventricular Rate 130 BPM    Atrial Rate 130 BPM    P-R Interval 96 ms    QRS Duration 94 ms    Q-T Interval 396 ms    QTC Calculation(Bazett) 582 ms    R Axis 0 degrees    T Axis 36 degrees    Diagnosis Line Sinus tachycardia with short OK  RSR' orQR pattern in V1 suggests right ventricular conduction delay  ST & T wave abnormality, consider lateral ischemia  Abnormal ECG    Confirmed by Kyler Mcwilliams ([id number]) on 3/5/2024 2:59:30 PM (03-05-24 @ 14:24)      MEDICATIONS  calcium gluconate IVPB 2 IV Intermittent once  cefepime   IVPB     cefepime   IVPB 2000 IV Intermittent every 8 hours  cholecalciferol 1000 Oral daily  enoxaparin Injectable 40 SubCutaneous every 24 hours  lactobacillus acidophilus 1 Oral every 8 hours  levothyroxine 137 Oral daily  multivitamin 1 Oral daily  sodium chloride 0.9%. 1000 IV Continuous <Continuous>  vancomycin    Solution 125 Oral every 6 hours      Weight  Weight (kg): 113.4 (03-05-24 @ 13:35)    ANTIBIOTICS:  cefepime   IVPB 2000 milliGRAM(s) IV Intermittent every 8 hours  cefepime   IVPB      vancomycin    Solution 125 milliGRAM(s) Oral every 6 hours      ALLERGIES:  penicillin G benzathine (Rash)  penicillin (Rash)

## 2024-03-06 NOTE — PATIENT PROFILE ADULT - NSPROIMPLANTSMEDDEV_GEN_A_NUR
patient has right upper chest wall port for chemotherapy/Vascular access device patient has right upper chest wall port for chemotherapy, right prosthetic eye/Prosthetic device(s)/Vascular access device

## 2024-03-06 NOTE — PATIENT PROFILE ADULT - FUNCTIONAL ASSESSMENT - BASIC MOBILITY 6.
4-calculated by average/Not able to assess (calculate score using [facility] averaging method)

## 2024-03-07 LAB
ALBUMIN SERPL ELPH-MCNC: 2.5 G/DL — LOW (ref 3.5–5.2)
ALBUMIN SERPL ELPH-MCNC: 2.6 G/DL — LOW (ref 3.5–5.2)
ALP SERPL-CCNC: 53 U/L — SIGNIFICANT CHANGE UP (ref 30–115)
ALP SERPL-CCNC: 53 U/L — SIGNIFICANT CHANGE UP (ref 30–115)
ALT FLD-CCNC: 13 U/L — SIGNIFICANT CHANGE UP (ref 0–41)
ALT FLD-CCNC: 14 U/L — SIGNIFICANT CHANGE UP (ref 0–41)
ANION GAP SERPL CALC-SCNC: 12 MMOL/L — SIGNIFICANT CHANGE UP (ref 7–14)
ANION GAP SERPL CALC-SCNC: 12 MMOL/L — SIGNIFICANT CHANGE UP (ref 7–14)
ANION GAP SERPL CALC-SCNC: 14 MMOL/L — SIGNIFICANT CHANGE UP (ref 7–14)
AST SERPL-CCNC: 8 U/L — SIGNIFICANT CHANGE UP (ref 0–41)
AST SERPL-CCNC: 8 U/L — SIGNIFICANT CHANGE UP (ref 0–41)
BASOPHILS # BLD AUTO: 0 K/UL — SIGNIFICANT CHANGE UP (ref 0–0.2)
BASOPHILS NFR BLD AUTO: 0 % — SIGNIFICANT CHANGE UP (ref 0–1)
BILIRUB SERPL-MCNC: 0.7 MG/DL — SIGNIFICANT CHANGE UP (ref 0.2–1.2)
BILIRUB SERPL-MCNC: 0.8 MG/DL — SIGNIFICANT CHANGE UP (ref 0.2–1.2)
BUN SERPL-MCNC: 28 MG/DL — HIGH (ref 10–20)
BUN SERPL-MCNC: 29 MG/DL — HIGH (ref 10–20)
BUN SERPL-MCNC: 31 MG/DL — HIGH (ref 10–20)
CALCIUM SERPL-MCNC: 6.2 MG/DL — LOW (ref 8.4–10.5)
CALCIUM SERPL-MCNC: 6.4 MG/DL — LOW (ref 8.4–10.4)
CALCIUM SERPL-MCNC: 6.4 MG/DL — LOW (ref 8.4–10.5)
CHLORIDE SERPL-SCNC: 98 MMOL/L — SIGNIFICANT CHANGE UP (ref 98–110)
CO2 SERPL-SCNC: 23 MMOL/L — SIGNIFICANT CHANGE UP (ref 17–32)
CO2 SERPL-SCNC: 24 MMOL/L — SIGNIFICANT CHANGE UP (ref 17–32)
CO2 SERPL-SCNC: 24 MMOL/L — SIGNIFICANT CHANGE UP (ref 17–32)
CREAT SERPL-MCNC: 1 MG/DL — SIGNIFICANT CHANGE UP (ref 0.7–1.5)
CREAT SERPL-MCNC: 1.1 MG/DL — SIGNIFICANT CHANGE UP (ref 0.7–1.5)
CREAT SERPL-MCNC: 1.1 MG/DL — SIGNIFICANT CHANGE UP (ref 0.7–1.5)
EGFR: 72 ML/MIN/1.73M2 — SIGNIFICANT CHANGE UP
EGFR: 72 ML/MIN/1.73M2 — SIGNIFICANT CHANGE UP
EGFR: 81 ML/MIN/1.73M2 — SIGNIFICANT CHANGE UP
EOSINOPHIL # BLD AUTO: 0 K/UL — SIGNIFICANT CHANGE UP (ref 0–0.7)
EOSINOPHIL NFR BLD AUTO: 0 % — SIGNIFICANT CHANGE UP (ref 0–8)
GLUCOSE SERPL-MCNC: 104 MG/DL — HIGH (ref 70–99)
GLUCOSE SERPL-MCNC: 105 MG/DL — HIGH (ref 70–99)
GLUCOSE SERPL-MCNC: 112 MG/DL — HIGH (ref 70–99)
HCT VFR BLD CALC: 23.9 % — LOW (ref 42–52)
HGB BLD-MCNC: 8.5 G/DL — LOW (ref 14–18)
IMM GRANULOCYTES NFR BLD AUTO: 0 % — LOW (ref 0.1–0.3)
LACTATE SERPL-SCNC: 1 MMOL/L — SIGNIFICANT CHANGE UP (ref 0.7–2)
LYMPHOCYTES # BLD AUTO: 0.11 K/UL — LOW (ref 1.2–3.4)
LYMPHOCYTES # BLD AUTO: 84.6 % — HIGH (ref 20.5–51.1)
MAGNESIUM SERPL-MCNC: 1.3 MG/DL — LOW (ref 1.8–2.4)
MAGNESIUM SERPL-MCNC: 2.1 MG/DL — SIGNIFICANT CHANGE UP (ref 1.8–2.4)
MAGNESIUM SERPL-MCNC: 2.2 MG/DL — SIGNIFICANT CHANGE UP (ref 1.8–2.4)
MCHC RBC-ENTMCNC: 29.8 PG — SIGNIFICANT CHANGE UP (ref 27–31)
MCHC RBC-ENTMCNC: 35.6 G/DL — SIGNIFICANT CHANGE UP (ref 32–37)
MCV RBC AUTO: 83.9 FL — SIGNIFICANT CHANGE UP (ref 80–94)
MONOCYTES # BLD AUTO: 0.01 K/UL — LOW (ref 0.1–0.6)
MONOCYTES NFR BLD AUTO: 7.7 % — SIGNIFICANT CHANGE UP (ref 1.7–9.3)
NEUTROPHILS # BLD AUTO: 0.01 K/UL — LOW (ref 1.4–6.5)
NEUTROPHILS NFR BLD AUTO: 7.7 % — LOW (ref 42.2–75.2)
NRBC # BLD: 0 /100 WBCS — SIGNIFICANT CHANGE UP (ref 0–0)
PLATELET # BLD AUTO: 20 K/UL — LOW (ref 130–400)
PMV BLD: 10.8 FL — HIGH (ref 7.4–10.4)
POTASSIUM SERPL-MCNC: 2.9 MMOL/L — LOW (ref 3.5–5)
POTASSIUM SERPL-MCNC: 3.3 MMOL/L — LOW (ref 3.5–5)
POTASSIUM SERPL-MCNC: 3.4 MMOL/L — LOW (ref 3.5–5)
POTASSIUM SERPL-SCNC: 2.9 MMOL/L — LOW (ref 3.5–5)
POTASSIUM SERPL-SCNC: 3.3 MMOL/L — LOW (ref 3.5–5)
POTASSIUM SERPL-SCNC: 3.4 MMOL/L — LOW (ref 3.5–5)
PROT SERPL-MCNC: 4.2 G/DL — LOW (ref 6–8)
PROT SERPL-MCNC: 4.2 G/DL — LOW (ref 6–8)
RBC # BLD: 2.85 M/UL — LOW (ref 4.7–6.1)
RBC # FLD: 13.5 % — SIGNIFICANT CHANGE UP (ref 11.5–14.5)
SODIUM SERPL-SCNC: 133 MMOL/L — LOW (ref 135–146)
SODIUM SERPL-SCNC: 134 MMOL/L — LOW (ref 135–146)
SODIUM SERPL-SCNC: 136 MMOL/L — SIGNIFICANT CHANGE UP (ref 135–146)
VIT D25+D1,25 OH+D1,25 PNL SERPL-MCNC: 82.6 PG/ML — HIGH (ref 19.9–79.3)
WBC # BLD: 0.13 K/UL — CRITICAL LOW (ref 4.8–10.8)
WBC # FLD AUTO: 0.13 K/UL — CRITICAL LOW (ref 4.8–10.8)

## 2024-03-07 PROCEDURE — 93010 ELECTROCARDIOGRAM REPORT: CPT

## 2024-03-07 PROCEDURE — 99233 SBSQ HOSP IP/OBS HIGH 50: CPT

## 2024-03-07 PROCEDURE — 99232 SBSQ HOSP IP/OBS MODERATE 35: CPT

## 2024-03-07 RX ORDER — MAGNESIUM SULFATE 500 MG/ML
2 VIAL (ML) INJECTION
Refills: 0 | Status: COMPLETED | OUTPATIENT
Start: 2024-03-07 | End: 2024-03-07

## 2024-03-07 RX ORDER — MAGNESIUM SULFATE 500 MG/ML
2 VIAL (ML) INJECTION ONCE
Refills: 0 | Status: COMPLETED | OUTPATIENT
Start: 2024-03-07 | End: 2024-03-07

## 2024-03-07 RX ORDER — POTASSIUM CHLORIDE 20 MEQ
20 PACKET (EA) ORAL ONCE
Refills: 0 | Status: DISCONTINUED | OUTPATIENT
Start: 2024-03-07 | End: 2024-03-07

## 2024-03-07 RX ORDER — FIDAXOMICIN 200 MG/5ML
200 GRANULE, FOR SUSPENSION ORAL
Refills: 0 | Status: COMPLETED | OUTPATIENT
Start: 2024-03-07 | End: 2024-03-16

## 2024-03-07 RX ORDER — POTASSIUM CHLORIDE 20 MEQ
40 PACKET (EA) ORAL ONCE
Refills: 0 | Status: COMPLETED | OUTPATIENT
Start: 2024-03-07 | End: 2024-03-07

## 2024-03-07 RX ORDER — CALCIUM GLUCONATE 100 MG/ML
2 VIAL (ML) INTRAVENOUS ONCE
Refills: 0 | Status: COMPLETED | OUTPATIENT
Start: 2024-03-07 | End: 2024-03-07

## 2024-03-07 RX ORDER — MAGNESIUM OXIDE 400 MG ORAL TABLET 241.3 MG
400 TABLET ORAL
Refills: 0 | Status: DISCONTINUED | OUTPATIENT
Start: 2024-03-07 | End: 2024-03-07

## 2024-03-07 RX ORDER — CALCIUM GLUCONATE 100 MG/ML
2 VIAL (ML) INTRAVENOUS ONCE
Refills: 0 | Status: COMPLETED | OUTPATIENT
Start: 2024-03-07 | End: 2024-03-08

## 2024-03-07 RX ADMIN — FIDAXOMICIN 200 MILLIGRAM(S): 200 GRANULE, FOR SUSPENSION ORAL at 17:23

## 2024-03-07 RX ADMIN — Medication 40 MILLIEQUIVALENT(S): at 01:42

## 2024-03-07 RX ADMIN — ENOXAPARIN SODIUM 40 MILLIGRAM(S): 100 INJECTION SUBCUTANEOUS at 06:38

## 2024-03-07 RX ADMIN — ONDANSETRON 4 MILLIGRAM(S): 8 TABLET, FILM COATED ORAL at 08:41

## 2024-03-07 RX ADMIN — Medication 40 MILLIEQUIVALENT(S): at 18:35

## 2024-03-07 RX ADMIN — FIDAXOMICIN 200 MILLIGRAM(S): 200 GRANULE, FOR SUSPENSION ORAL at 06:06

## 2024-03-07 RX ADMIN — Medication 25 GRAM(S): at 13:45

## 2024-03-07 RX ADMIN — Medication 25 GRAM(S): at 12:19

## 2024-03-07 RX ADMIN — Medication 1000 UNIT(S): at 12:19

## 2024-03-07 RX ADMIN — Medication 200 GRAM(S): at 06:45

## 2024-03-07 RX ADMIN — CEFEPIME 100 MILLIGRAM(S): 1 INJECTION, POWDER, FOR SOLUTION INTRAMUSCULAR; INTRAVENOUS at 06:05

## 2024-03-07 RX ADMIN — Medication 30 MILLILITER(S): at 19:04

## 2024-03-07 RX ADMIN — ONDANSETRON 4 MILLIGRAM(S): 8 TABLET, FILM COATED ORAL at 19:04

## 2024-03-07 RX ADMIN — Medication 25 GRAM(S): at 01:42

## 2024-03-07 RX ADMIN — Medication 1 TABLET(S): at 13:43

## 2024-03-07 RX ADMIN — SODIUM CHLORIDE 100 MILLILITER(S): 9 INJECTION INTRAMUSCULAR; INTRAVENOUS; SUBCUTANEOUS at 12:19

## 2024-03-07 RX ADMIN — Medication 1 TABLET(S): at 12:19

## 2024-03-07 RX ADMIN — Medication 1 TABLET(S): at 21:01

## 2024-03-07 RX ADMIN — Medication 137 MICROGRAM(S): at 06:05

## 2024-03-07 RX ADMIN — Medication 1 TABLET(S): at 06:04

## 2024-03-07 NOTE — PROGRESS NOTE ADULT - ASSESSMENT
68-year-old male, former smoker with past medical history of HTN, hypothyroidism, melanoma s/p resection, testicular seminoma currently undergoing chemotherapy, presents to the emergency department for diarrhea.       # Neutropenic fever 2/2 C diff colitis   # Pancytopenia 2/2 chemo   # Magnesium deficiency   # Hypocalcemia 2/2 magnesium deficiency   # Prolonged QTc   - advance diet, c/w IVF   - c/w fidaxomicin   - isolation precautions   - discontinue cefepime - blood cultures NGTD   - Oncology f/u   - ID f/u   - repeat EKG daily      # Testicular cancer on chemo   - Oncology following     # HTN  - Hold anti-hypertensive medications      # Hypothyroid  - c/w home med    dvt ppx: SCD  68-year-old male, former smoker with past medical history of HTN, hypothyroidism, melanoma s/p resection, testicular seminoma currently undergoing chemotherapy, presents to the emergency department for diarrhea.       # Neutropenic fever 2/2 C diff colitis   # Pancytopenia 2/2 chemo   # Magnesium deficiency   # Hypocalcemia 2/2 magnesium deficiency   # Prolonged QTc   - advance diet, c/w IVF   - c/w fidaxomicin   - isolation precautions   - discontinue cefepime - blood cultures NGTD   - Oncology f/u -> patient was given 1 pRBC, threshold for platelets transfusion is 10 000 or active bleeding  - ID f/u -> DC Cefepime  - repeat EKG daily      # Testicular cancer on chemo   - Oncology following     # HTN  - Hold anti-hypertensive medications      # Hypothyroid  - c/w home med    dvt ppx: SCD

## 2024-03-07 NOTE — PROGRESS NOTE ADULT - SUBJECTIVE AND OBJECTIVE BOX
Pt seen and examined at bedside.  Continues to have diarrhea.       VITAL SIGNS (Last 24 hrs):  T(C): 37.1 (03-07-24 @ 05:09), Max: 37.9 (03-06-24 @ 16:10)  HR: 97 (03-07-24 @ 05:09) (97 - 100)  BP: 108/67 (03-07-24 @ 05:09) (108/57 - 115/59)  RR: 18 (03-07-24 @ 05:09) (18 - 18)  SpO2: --  Wt(kg): --  Daily Height in cm: 182.88 (06 Mar 2024 17:28)    Daily     I&O's Summary      PHYSICAL EXAM:  GENERAL: NAD   HEAD:  Atraumatic, Normocephalic  EYES:   conjunctiva and sclera clear  NECK: Supple, No JVD  CHEST/LUNG: Clear to auscultation bilaterally; No wheeze  HEART: Regular rate and rhythm; No murmurs, rubs, or gallops  ABDOMEN: Soft, Nontender, Nondistended; Bowel sounds present  EXTREMITIES:  2+ Peripheral Pulses, No clubbing, cyanosis, or edema  PSYCH: AAOx3  NEUROLOGY: non-focal  SKIN: No rashes or lesions    Labs Reviewed       CBC Full  -  ( 07 Mar 2024 08:47 )  WBC Count : 0.13 K/uL  Hemoglobin : 8.5 g/dL  Hematocrit : 23.9 %  Platelet Count - Automated : 20 K/uL  Mean Cell Volume : 83.9 fL  Mean Cell Hemoglobin : 29.8 pg  Mean Cell Hemoglobin Concentration : 35.6 g/dL  Auto Neutrophil # : 0.01 K/uL  Auto Lymphocyte # : 0.11 K/uL  Auto Monocyte # : 0.01 K/uL  Auto Eosinophil # : 0.00 K/uL  Auto Basophil # : 0.00 K/uL  Auto Neutrophil % : 7.7 %  Auto Lymphocyte % : 84.6 %  Auto Monocyte % : 7.7 %  Auto Eosinophil % : 0.0 %  Auto Basophil % : 0.0 %    BMP:    03-07 @ 08:47    Blood Urea Nitrogen - 31  Calcium - 6.2  Carbond Dioxide - 23  Chloride - 98  Creatinine - 1.1  Glucose - 112  Potassium - 3.3  Sodium - 133      Hemoglobin A1c -   PT/INR - ( 05 Mar 2024 14:33 )   PT: 13.50 sec;   INR: 1.18 ratio         PTT - ( 05 Mar 2024 14:33 )  PTT:24.6 sec  Urine Culture:  03-05 @ 14:05 Urine culture: --    Culture Results:   No growth at 24 hours  Method Type: --  Organism: --  Organism Identification: --  Specimen Source: .Blood Blood-Peripheral            MEDICATIONS  (STANDING):  calcium gluconate IVPB 2 Gram(s) IV Intermittent once  cholecalciferol 1000 Unit(s) Oral daily  fidaxomicin 200 milliGRAM(s) Oral two times a day  lactobacillus acidophilus 1 Tablet(s) Oral every 8 hours  levothyroxine 137 MICROGram(s) Oral daily  multivitamin 1 Tablet(s) Oral daily  sodium chloride 0.9%. 1000 milliLiter(s) (100 mL/Hr) IV Continuous <Continuous>    MEDICATIONS  (PRN):  acetaminophen     Tablet .. 650 milliGRAM(s) Oral every 6 hours PRN Temp greater or equal to 38C (100.4F), Mild Pain (1 - 3)  aluminum hydroxide/magnesium hydroxide/simethicone Suspension 30 milliLiter(s) Oral every 4 hours PRN Dyspepsia  melatonin 5 milliGRAM(s) Oral at bedtime PRN Insomnia  ondansetron Injectable 4 milliGRAM(s) IV Push every 8 hours PRN Nausea and/or Vomiting

## 2024-03-07 NOTE — PROGRESS NOTE ADULT - SUBJECTIVE AND OBJECTIVE BOX
MALIA GAUTHIER  68y, Male  Allergy: penicillin G benzathine (Rash)  penicillin (Rash)      LOS  2d    CHIEF COMPLAINT: diarrhea (07 Mar 2024 15:14)      INTERVAL EVENTS/HPI  - No acute events overnight  - T(F): , Max: 99 (03-06-24 @ 17:28)  - 3 loose stools from this morning to afternoon   - WBC Count: 0.13 (03-07-24 @ 08:47)  WBC Count: 0.12 (03-06-24 @ 21:43)     - Creatinine: 1.1 (03-07-24 @ 08:47)  Creatinine: 1.2 (03-06-24 @ 21:43)       ROS  General: Denies rigors, nightsweats  HEENT: Denies headache, rhinorrhea, sore throat, eye pain  CV: Denies CP, palpitations  PULM: Denies wheezing, hemoptysis  GI: Denies hematemesis, hematochezia, melena  : Denies discharge, hematuria  MSK: Denies arthralgias, myalgias  SKIN: Denies rash, lesions  NEURO: Denies paresthesias, weakness  PSYCH: Denies depression, anxiety    VITALS:  T(F): 98.8, Max: 99 (03-06-24 @ 17:28)  HR: 97  BP: 108/67  RR: 18Vital Signs Last 24 Hrs  T(C): 37.1 (07 Mar 2024 05:09), Max: 37.2 (06 Mar 2024 17:28)  T(F): 98.8 (07 Mar 2024 05:09), Max: 99 (06 Mar 2024 17:28)  HR: 97 (07 Mar 2024 05:09) (97 - 98)  BP: 108/67 (07 Mar 2024 05:09) (108/67 - 115/59)  BP(mean): --  RR: 18 (07 Mar 2024 05:09) (18 - 18)  SpO2: --        PHYSICAL EXAM:  Gen: NAD, resting in bed  HEENT: Normocephalic, atraumatic  Neck: supple, no lymphadenopathy  CV: Regular rate & regular rhythm  Lungs: decreased BS at bases, no fremitus  Abdomen: Soft, BS present  Ext: Warm, well perfused  Neuro: non focal, awake  Skin: no rash, no erythema  Lines: no phlebitis    FH: Non-contributory  Social Hx: Non-contributory    TESTS & MEASUREMENTS:                        8.5    0.13  )-----------( 20       ( 07 Mar 2024 08:47 )             23.9     03-07    133<L>  |  98  |  31<H>  ----------------------------<  112<H>  3.3<L>   |  23  |  1.1    Ca    6.2<L>      07 Mar 2024 08:47  Mg     1.3     03-07    TPro  4.2<L>  /  Alb  2.5<L>  /  TBili  0.8  /  DBili  x   /  AST  8   /  ALT  14  /  AlkPhos  53  03-07      LIVER FUNCTIONS - ( 07 Mar 2024 08:47 )  Alb: 2.5 g/dL / Pro: 4.2 g/dL / ALK PHOS: 53 U/L / ALT: 14 U/L / AST: 8 U/L / GGT: x           Urinalysis Basic - ( 07 Mar 2024 08:47 )    Color: x / Appearance: x / SG: x / pH: x  Gluc: 112 mg/dL / Ketone: x  / Bili: x / Urobili: x   Blood: x / Protein: x / Nitrite: x   Leuk Esterase: x / RBC: x / WBC x   Sq Epi: x / Non Sq Epi: x / Bacteria: x        Culture - Blood (collected 03-05-24 @ 14:05)  Source: .Blood Blood-Peripheral  Preliminary Report (03-06-24 @ 23:02):    No growth at 24 hours    Culture - Blood (collected 03-05-24 @ 14:05)  Source: .Blood Blood-Peripheral  Preliminary Report (03-06-24 @ 23:02):    No growth at 24 hours    Culture - Other (collected 02-13-24 @ 12:03)  Source: Wound None  Final Report (02-16-24 @ 15:43):    Numerous Methicillin Resistant Staphylococcus aureus  Organism: Methicillin resistant Staphylococcus aureus (02-16-24 @ 15:43)  Organism: Methicillin resistant Staphylococcus aureus (02-16-24 @ 15:43)      Method Type: HANH      -  Ampicillin/Sulbactam: R <=8/4      -  Cefazolin: R <=4      -  Clindamycin: S <=0.25      -  Daptomycin: S 0.5      -  Erythromycin: S <=0.25      -  Gentamicin: S <=1 Should not be used as monotherapy      -  Linezolid: S 2      -  Oxacillin: R >2      -  Penicillin: R 2      -  Rifampin: S <=1 Should not be used as monotherapy      -  Tetracycline: S <=1      -  Trimethoprim/Sulfamethoxazole: S <=0.5/9.5      -  Vancomycin: S 2    Culture - Other (collected 02-11-24 @ 16:00)  Source: Drainage Drainage  Final Report (02-17-24 @ 10:12):    Numerous Methicillin Resistant Staphylococcus aureus    Moderate Staphylococcus epidermidis "Susceptibilities not performed"  Organism: Methicillin resistant Staphylococcus aureus (02-17-24 @ 10:12)  Organism: Methicillin resistant Staphylococcus aureus (02-17-24 @ 10:12)      Method Type: HANH      -  Ampicillin/Sulbactam: R <=8/4      -  Cefazolin: R <=4      -  Clindamycin: S <=0.25      -  Daptomycin: S 0.5      -  Erythromycin: S <=0.25      -  Gentamicin: S <=1 Should not be used as monotherapy      -  Linezolid: S 2      -  Oxacillin: R >2      -  Penicillin: R 1      -  Rifampin: S <=1 Should not be used as monotherapy      -  Tetracycline: S <=1      -  Trimethoprim/Sulfamethoxazole: S <=0.5/9.5      -  Vancomycin: S 2    Culture - Blood (collected 02-11-24 @ 07:30)  Source: .Blood Blood  Final Report (02-16-24 @ 20:00):    No growth at 5 days    Culture - Blood (collected 02-11-24 @ 07:30)  Source: .Blood Blood  Final Report (02-16-24 @ 20:00):    No growth at 5 days        Lactate, Blood: 1.0 mmol/L (03-07-24 @ 12:13)  Lactate, Blood: 0.7 mmol/L (03-06-24 @ 21:43)  Lactate, Blood: 2.0 mmol/L (03-05-24 @ 14:33)      INFECTIOUS DISEASES TESTING  MRSA PCR Result.: Positive (02-11-24 @ 16:00)      INFLAMMATORY MARKERS  C-Reactive Protein, Serum: 92.6 mg/L (02-12-24 @ 06:44)      RADIOLOGY & ADDITIONAL TESTS:  I have personally reviewed the last available Chest xray  CXR  Xray Chest 1 View- PORTABLE-Urgent:   ACC: [id number] EXAM:  XR CHEST PORTABLE URGENT 1V   ORDERED BY: GHAZAL RIVERA     PROCEDURE DATE:  03/05/2024          INTERPRETATION:  Clinical History / Reason for exam: Sepsis.    Comparison : Chest radiograph 8/18/2022.    Technique/Positioning: Frontal chest radiograph.    Findings:    Support devices: Right-sided chest port terminating in atriocaval   junction.    Cardiac/mediastinum/hilum: Prominent cardiac silhouette, may be due to   magnification. Aortic calcification.    Lung parenchyma/Pleura: Left basilar opacity/effusion. No pneumothorax.    Skeleton/soft tissues: Degenerative changes.    Impression:    Left basilar opacity/effusion.        --- End of Report ---          FRAN GREENE MD; Resident Radiologist  This document has been electronically signed.  SETH CLAYTON MD; Attending Radiologist  This document has been electronically signed. Mar  5 2024  4:58PM (03-05-24 @ 14:19)      CT  CT Abdomen and Pelvis w/ IV Cont:   ACC: [id number] EXAM:  CT ABDOMEN AND PELVIS IC   ORDERED BY: NATASHA VERDE     PROCEDURE DATE:  03/05/2024          INTERPRETATION:  CLINICAL STATEMENT: Diarrhea, fever in an   immunocompromised patient on chemotherapy. History of testicular seminoma   status post orchiectomy, melanoma.    TECHNIQUE: Contiguous axial CT images were obtained from the lower chest   to the pubic symphysis following administration of intravenous contrast.   95 cc administered of Omnipaque 350 (5 cc discarded). Oral contrast was   administered. Reformatted images in the coronal and sagittal planes were   acquired.    COMPARISON CT: CT abdomen and pelvis 1/9/2024.    OTHER STUDIES USED FOR CORRELATION: None.      FINDINGS:    LOWER CHEST: Left lower lobe subpleural calcified granuloma.    HEPATOBILIARY: Patent hepatic and portal veins. No intra or extrahepatic   biliary ductal dilation. Unremarkable gallbladder.    SPLEEN: Unremarkable.    PANCREAS: Unremarkable.    ADRENAL GLANDS: Right 1.6 cm adrenal nodule,stable. Unremarkable left   adrenal gland.    KIDNEYS: Bilateral symmetric cortical enhancement. No hydronephrosis.   Bilateral stable cortical and left parapelvic cysts. Right renal   hypodensity too small to further characterize.    ABDOMINOPELVIC NODES: Lobulated 2.8 x 2.0 cm aortocaval lymph node   markedly decreased in size (series 302, image 175). Left 1.7 x 1.0 cm   periaortic lymph node, previously measuring 3.7 x 2.7 cm (series 302,   image 174). Additional periaortic lymph nodes decreased in size.    PELVIC ORGANS: Unremarkable urinary bladder.    PERITONEUM/MESENTERY/BOWEL: no evidence of obstruction. No ascites. No   pneumoperitoneum. Liquid stool diffusely within the colon and rectum. No   pericecal inflammatory changes.    BONES/SOFT TISSUES: Bilateral fat-containing inguinal hernias.   Degenerative changes of the lumbosacral spine and glenohumeral joints.    OTHER: Atherosclerotic aortoiliac calcifications.      IMPRESSION:    1.  Liquid stool within the colon and rectum, consistent with given   history of diarrhea. No bowel wall thickening or fat stranding to suggest   colitis.  2.  Aortocaval and periaortic lymphadenopathy, markedly decreased in size   comparison to January 9, 2024.  3.  Stable right adrenal nodule measuring 1.6 cm.    --- End of Report ---          AIRAM SONG MD; Resident Radiologist  This document has been electronically signed.  MAYA LESTER MD; Attending Radiologist  This document has been electronically signed. Mar  5 2024  6:30PM (03-05-24 @ 16:43)      CARDIOLOGY TESTING  12 Lead ECG:   Ventricular Rate 130 BPM    Atrial Rate 130 BPM    P-R Interval 96 ms    QRS Duration 94 ms    Q-T Interval 396 ms    QTC Calculation(Bazett) 582 ms    R Axis 0 degrees    T Axis 36 degrees    Diagnosis Line Sinus tachycardia with short ID  RSR' orQR pattern in V1 suggests right ventricular conduction delay  ST & T wave abnormality, consider lateral ischemia  Abnormal ECG    Confirmed by Kyler Mcwilliams ([id number]) on 3/5/2024 2:59:30 PM (03-05-24 @ 14:24)      MEDICATIONS  calcium gluconate IVPB 2 IV Intermittent once  cholecalciferol 1000 Oral daily  fidaxomicin 200 Oral two times a day  lactobacillus acidophilus 1 Oral every 8 hours  levothyroxine 137 Oral daily  multivitamin 1 Oral daily  sodium chloride 0.9%. 1000 IV Continuous <Continuous>      WEIGHT  Weight (kg): 115.5 (03-06-24 @ 17:28)  Creatinine: 1.1 mg/dL (03-07-24 @ 08:47)  Creatinine: 1.2 mg/dL (03-06-24 @ 21:43)      ANTIBIOTICS:  fidaxomicin 200 milliGRAM(s) Oral two times a day      All available historical records have been reviewed

## 2024-03-07 NOTE — PROGRESS NOTE ADULT - SUBJECTIVE AND OBJECTIVE BOX
24H events:    Patient is a 68y old Male who presents with a chief complaint of diarrhea (07 Mar 2024 16:14)    Primary diagnosis of Neutropenic fever      Day 1:  Day 2:  Day 3:     Today is hospital day 2d. This morning patient was seen and examined at bedside, resting comfortably in bed.    No acute or major events overnight. Hemodynamically stable, tolerating oral diet, voiding appropriately with appropriate bowel movements.     Code Status: Full code    PAST MEDICAL & SURGICAL HISTORY  HTN (hypertension)    Hypothyroidism, unspecified type    Obesity    Other secondary osteoarthritis of right hand    Testicular seminoma    History of eye removal Right eye    H/O melanoma excision    S/P appendectomy 14 yrs old    SOCIAL HISTORY:  Social History:      ALLERGIES:  penicillin G benzathine (Rash)  penicillin (Rash)    MEDICATIONS:  STANDING MEDICATIONS  calcium gluconate IVPB 2 Gram(s) IV Intermittent once  cholecalciferol 1000 Unit(s) Oral daily  fidaxomicin 200 milliGRAM(s) Oral two times a day  lactobacillus acidophilus 1 Tablet(s) Oral every 8 hours  levothyroxine 137 MICROGram(s) Oral daily  multivitamin 1 Tablet(s) Oral daily  potassium chloride    Tablet ER 20 milliEquivalent(s) Oral once  potassium chloride   Powder 40 milliEquivalent(s) Oral once  sodium chloride 0.9%. 1000 milliLiter(s) IV Continuous <Continuous>    PRN MEDICATIONS  acetaminophen     Tablet .. 650 milliGRAM(s) Oral every 6 hours PRN  aluminum hydroxide/magnesium hydroxide/simethicone Suspension 30 milliLiter(s) Oral every 4 hours PRN  melatonin 5 milliGRAM(s) Oral at bedtime PRN  ondansetron Injectable 4 milliGRAM(s) IV Push every 8 hours PRN    VITALS:   T(F): 98.8  HR: 97  BP: 108/67  RR: 18  SpO2: --    PHYSICAL EXAM:  GENERAL: NAD, lying in bed comfortably, obtunded, lethargic,  somnolent, cooperative, elderly  HEAD: NCAD, no hematoma or laceration   NECK: Supple, no neck stiffness/nuchal rigidity, no JVD    HEART: Regular rate and rhythm, normal S1/S2, no murmurs, heaves, thrills  LUNGS: No acute respiratory distress, clear b/l breath sounds, no wheezing, rales, rhonchi  ABDOMEN:  soft, non-tender, non-distented, + BS, no hepatosplenomegaly   EXTREMITIES: no rashes, extremities warm/dry, no cyanosis, no edema, ulcerations or ecchymosis  NERVOUS SYSTEM:  A&Ox3, CNII-XII intact, follows commands, answers questions appropriately   SKIN: No rashes or lesions       [facility] score:    ( X ) Central Line:   Date insered:  Location: (  ) Right IJ     (  ) Left IJ     (  ) Right Fem     (  ) Left Fem    LABS:                        8.5    0.13  )-----------( 20       ( 07 Mar 2024 08:47 )             23.9     03-07    134<L>  |  98  |  29<H>  ----------------------------<  104<H>  2.9<L>   |  24  |  1.1    Ca    6.4<L>      07 Mar 2024 16:15  Mg     2.1     03-07    TPro  4.2<L>  /  Alb  2.5<L>  /  TBili  0.8  /  DBili  x   /  AST  8   /  ALT  14  /  AlkPhos  53  03-07      Urinalysis Basic - ( 07 Mar 2024 16:15 )    Color: x / Appearance: x / SG: x / pH: x  Gluc: 104 mg/dL / Ketone: x  / Bili: x / Urobili: x   Blood: x / Protein: x / Nitrite: x   Leuk Esterase: x / RBC: x / WBC x   Sq Epi: x / Non Sq Epi: x / Bacteria: x    Lactate, Blood: 1.0 mmol/L (03-07-24 @ 12:13)  Lactate, Blood: 0.7 mmol/L (03-06-24 @ 21:43)    Culture - Blood (collected 05 Mar 2024 14:05)  Source: .Blood Blood-Peripheral  Preliminary Report (06 Mar 2024 23:02):    No growth at 24 hours    Culture - Blood (collected 05 Mar 2024 14:05)  Source: .Blood Blood-Peripheral  Preliminary Report (06 Mar 2024 23:02):    No growth at 24 hours    RADIOLOGY:     24H events:    Patient is a 68y old Male who presents with a chief complaint of diarrhea (07 Mar 2024 16:14)    Primary diagnosis of Neutropenic fever    Today is hospital day 2d. This morning patient was seen and examined at bedside.  Patient was given 1pRBC based on Hem onc recommendations.  Cefepime was discontinued, no additional coverage for cellulitis indicated at the time  Hemodynamically stable, tolerating oral diet, voiding appropriately   Still having diarrhea with abdominal pain    Code Status: Full code    PAST MEDICAL & SURGICAL HISTORY  HTN (hypertension)    Hypothyroidism, unspecified type    Obesity    Other secondary osteoarthritis of right hand    Testicular seminoma    History of eye removal Right eye    H/O melanoma excision    S/P appendectomy 14 yrs old    SOCIAL HISTORY:  Social History:      ALLERGIES:  penicillin G benzathine (Rash)  penicillin (Rash)    MEDICATIONS:  STANDING MEDICATIONS  calcium gluconate IVPB 2 Gram(s) IV Intermittent once  cholecalciferol 1000 Unit(s) Oral daily  fidaxomicin 200 milliGRAM(s) Oral two times a day  lactobacillus acidophilus 1 Tablet(s) Oral every 8 hours  levothyroxine 137 MICROGram(s) Oral daily  multivitamin 1 Tablet(s) Oral daily  potassium chloride    Tablet ER 20 milliEquivalent(s) Oral once  potassium chloride   Powder 40 milliEquivalent(s) Oral once  sodium chloride 0.9%. 1000 milliLiter(s) IV Continuous <Continuous>    PRN MEDICATIONS  acetaminophen     Tablet .. 650 milliGRAM(s) Oral every 6 hours PRN  aluminum hydroxide/magnesium hydroxide/simethicone Suspension 30 milliLiter(s) Oral every 4 hours PRN  melatonin 5 milliGRAM(s) Oral at bedtime PRN  ondansetron Injectable 4 milliGRAM(s) IV Push every 8 hours PRN    VITALS:   T(F): 98.8  HR: 97  BP: 108/67  RR: 18  SpO2: --    PHYSICAL EXAM:  GENERAL: NAD, lying in bed, cooperative, elderly  HEAD: Right eye covering patch  NECK: Supple, no JVD    HEART: Regular rate and rhythm, normal S1/S2   LUNGS: No acute respiratory distress, clear b/l breath sounds, no wheezing, rales, rhonchi  ABDOMEN:  soft, diffuse tenderness, slightly distended  EXTREMITIES: no rashes, no edema, ulcerations or ecchymosis  NERVOUS SYSTEM:  A&Ox3, CNII-XII intact, follows commands, answers questions appropriately   SKIN: No rashes or lesions       AMPAC score:    ( X ) Central Line:   Date insered:  Location: (  ) Right IJ     (  ) Left IJ     (  ) Right Fem     (  ) Left Fem    LABS:                        8.5    0.13  )-----------( 20       ( 07 Mar 2024 08:47 )             23.9     03-07    134<L>  |  98  |  29<H>  ----------------------------<  104<H>  2.9<L>   |  24  |  1.1    Ca    6.4<L>      07 Mar 2024 16:15  Mg     2.1     03-07    TPro  4.2<L>  /  Alb  2.5<L>  /  TBili  0.8  /  DBili  x   /  AST  8   /  ALT  14  /  AlkPhos  53  03-07      Urinalysis Basic - ( 07 Mar 2024 16:15 )    Color: x / Appearance: x / SG: x / pH: x  Gluc: 104 mg/dL / Ketone: x  / Bili: x / Urobili: x   Blood: x / Protein: x / Nitrite: x   Leuk Esterase: x / RBC: x / WBC x   Sq Epi: x / Non Sq Epi: x / Bacteria: x    Lactate, Blood: 1.0 mmol/L (03-07-24 @ 12:13)  Lactate, Blood: 0.7 mmol/L (03-06-24 @ 21:43)    Culture - Blood (collected 05 Mar 2024 14:05)  Source: .Blood Blood-Peripheral  Preliminary Report (06 Mar 2024 23:02):    No growth at 24 hours    Culture - Blood (collected 05 Mar 2024 14:05)  Source: .Blood Blood-Peripheral  Preliminary Report (06 Mar 2024 23:02):    No growth at 24 hours

## 2024-03-07 NOTE — PROGRESS NOTE ADULT - ASSESSMENT
Patient is 68-year-old male, former smoker with past medical history of HTN, hypothyroidism, melanoma s/p resection, testicular seminoma currently undergoing chemotherapy, presents to the emergency department for diarrhea onset last night.  Patient also complains of vomiting onset this morning and abdominal cramping.  Patient denies fever, chills, cough, shortness of breath, chest pain.  Patient last had chemo on 3/1, that was cycle 3 of EP.  Patient sees Dr. Atwood, oncologist.    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  - MR Brain 1.12.2024 no evidence of intracranial metastasis or acute intracranial pathology, paranasal sinus mucosal disease  - s/p Right chest port placed via right IJ  - s/p cycle 3 of Cisplatin + Etoposide on 2/26 followed by Neulasta onbody injection D5 (3/1/24), initiated on 1.15.2024  - CT abdomen 3.5.24: Aortocaval and periaortic lymphadenopathy, markedly decreased in size comparison to January 9, 2024.    # History of Melanoma of skin, right shoulder, dx 10/2018    - followup with DERM for surveillance skin exams at least every 6-12 months      Recommendations:    - Fidoxamycin for C.diff diarrhea. C/w cefepime as per ID recs  - Replete serum Mag levels. C/w IVF.   - Pt received G-CSF on 3/1, no need for further support for neutropenia  - neutropenic precautions: NO RECTAL TEMP or MEDICATIONS  - we will follow the patient    for any questions call or text via MS teams     Patient is 68-year-old male, former smoker with past medical history of HTN, hypothyroidism, melanoma s/p resection, testicular seminoma currently undergoing chemotherapy, presents to the emergency department for diarrhea onset last night.  Patient also complains of vomiting onset this morning and abdominal cramping.  Patient denies fever, chills, cough, shortness of breath, chest pain.  Patient last had chemo on 3/1, that was cycle 3 of EP.  Patient sees Dr. Atwood, oncologist.    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  - MR Brain 1.12.2024 no evidence of intracranial metastasis or acute intracranial pathology, paranasal sinus mucosal disease  - s/p Right chest port placed via right IJ  - s/p cycle 3 of Cisplatin + Etoposide on 2/26 followed by Neulasta onbody injection D5 (3/1/24), initiated on 1.15.2024  - CT abdomen 3.5.24: Aortocaval and periaortic lymphadenopathy, markedly decreased in size comparison to January 9, 2024.    # History of Melanoma of skin, right shoulder, dx 10/2018    - followup with DERM for surveillance skin exams at least every 6-12 months      Recommendations:    - Fidoxamycin for C.diff diarrhea. C/w cefepime as per ID recs  - Replete serum Mag levels. C/w IVF.   - Transfuse Plt if < 10 K or if patient is bleeding.   - Pt received G-CSF on 3/1, no need for further support for neutropenia  - neutropenic precautions: NO RECTAL TEMP or MEDICATIONS  - we will follow the patient    for any questions call or text via MS teams     Patient is 68-year-old male, former smoker with past medical history of HTN, hypothyroidism, melanoma s/p resection, testicular seminoma currently undergoing chemotherapy, presents to the emergency department for diarrhea onset last night.  Patient also complains of vomiting onset this morning and abdominal cramping.  Patient denies fever, chills, cough, shortness of breath, chest pain.  Patient last had chemo on 3/1, that was cycle 3 of EP.  Patient sees Dr. Atwood, oncologist.    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  - MR Brain 1.12.2024 no evidence of intracranial metastasis or acute intracranial pathology, paranasal sinus mucosal disease  - s/p Right chest port placed via right IJ  - s/p cycle 3 of Cisplatin + Etoposide on 2/26 followed by Neulasta onbody injection D5 (3/1/24), initiated on 1.15.2024  - CT abdomen 3.5.24: Aortocaval and periaortic lymphadenopathy, markedly decreased in size comparison to January 9, 2024.    # History of Melanoma of skin, right shoulder, dx 10/2018    - followup with DERM for surveillance skin exams at least every 6-12 months      Recommendations:    - Fidoxamycin for C.diff diarrhea. C/w cefepime as per ID recs. Gram + coverage for cellulitis  - Replete serum Mag levels. C/w IVF.   - Transfuse 1U of PRBC today. Transfuse Plt if < 10 K or if patient is bleeding.   - Pt received G-CSF on 3/1, no need for further support for neutropenia  - neutropenic precautions: NO RECTAL TEMP or MEDICATIONS  - we will follow the patient    for any questions call or text via MS teams

## 2024-03-07 NOTE — PROGRESS NOTE ADULT - SUBJECTIVE AND OBJECTIVE BOX
LENGTH OF HOSPITAL STAY: 2d    CHIEF COMPLAINT:   Patient is a 68y old  Male who presents with a chief complaint of diarrhea (06 Mar 2024 16:19)      HISTORY OF PRESENTING ILLNESS:    HPI:  Patient is 68-year-old male, former smoker with past medical history of HTN, hypothyroidism, melanoma s/p resection, testicular seminoma currently undergoing chemotherapy, presents to the emergency department for diarrhea onset last night.  Patient also complains of vomiting onset this morning and abdominal cramping.  Patient denies fever, chills, cough, shortness of breath, chest pain.  Patient last had chemo on 3/1, which was found 3 of etoposide and cisplatin.  Patient sees Dr. Atwood, oncologist.        Home meds obtained from pt.    declined by ICU, oncology recommended admission to [place]  (05 Mar 2024 21:35)    PAST MEDICAL & SURGICAL HISTORY  PAST MEDICAL & SURGICAL HISTORY:  HTN (hypertension)      Hypothyroidism, unspecified type      Obesity      Other secondary osteoarthritis of right hand      Testicular seminoma      History of eye removal  Right eye      H/O melanoma excision      S/P appendectomy  14 yrs old        SOCIAL HISTORY:    ALLERGIES:  penicillin G benzathine (Rash)  penicillin (Rash)    MEDICATIONS:  STANDING MEDICATIONS  calcium gluconate IVPB 2 Gram(s) IV Intermittent once  cholecalciferol 1000 Unit(s) Oral daily  fidaxomicin 200 milliGRAM(s) Oral two times a day  lactobacillus acidophilus 1 Tablet(s) Oral every 8 hours  levothyroxine 137 MICROGram(s) Oral daily  multivitamin 1 Tablet(s) Oral daily  sodium chloride 0.9%. 1000 milliLiter(s) IV Continuous <Continuous>    PRN MEDICATIONS  acetaminophen     Tablet .. 650 milliGRAM(s) Oral every 6 hours PRN  aluminum hydroxide/magnesium hydroxide/simethicone Suspension 30 milliLiter(s) Oral every 4 hours PRN  melatonin 5 milliGRAM(s) Oral at bedtime PRN  ondansetron Injectable 4 milliGRAM(s) IV Push every 8 hours PRN    VITALS:   T(F): 98.8  HR: 97  BP: 108/67  RR: 18  SpO2: --    LABS:                        8.5    0.13  )-----------( 20       ( 07 Mar 2024 08:47 )             23.9     03-06    131<L>  |  95<L>  |  30<H>  ----------------------------<  106<H>  3.3<L>   |  23  |  1.2    Ca    5.7<LL>      06 Mar 2024 21:43  Mg     0.7     03-06    TPro  4.2<L>  /  Alb  2.5<L>  /  TBili  0.8  /  DBili  x   /  AST  16  /  ALT  18  /  AlkPhos  56  03-06    PT/INR - ( 05 Mar 2024 14:33 )   PT: 13.50 sec;   INR: 1.18 ratio         PTT - ( 05 Mar 2024 14:33 )  PTT:24.6 sec  Urinalysis Basic - ( 06 Mar 2024 21:43 )    Color: x / Appearance: x / SG: x / pH: x  Gluc: 106 mg/dL / Ketone: x  / Bili: x / Urobili: x   Blood: x / Protein: x / Nitrite: x   Leuk Esterase: x / RBC: x / WBC x   Sq Epi: x / Non Sq Epi: x / Bacteria: x        Lactate, Blood: 0.7 mmol/L (03-06-24 @ 21:43)      Culture - Blood (collected 05 Mar 2024 14:05)  Source: .Blood Blood-Peripheral  Preliminary Report (06 Mar 2024 23:02):    No growth at 24 hours    Culture - Blood (collected 05 Mar 2024 14:05)  Source: .Blood Blood-Peripheral  Preliminary Report (06 Mar 2024 23:02):    No growth at 24 hours          RADIOLOGY:    PHYSICAL EXAM:  GEN: No acute distress  HEENT: AT, NC, [place], Rt eye enucleated  LUNGS: Clear to auscultation bilaterally   HEART: S1/S2 present. RRR.   ABD: Soft, non-tender, non-distended. Bowel sounds present  EXT: edema, cyanosis, clubbing absent  NEURO: AAOX3

## 2024-03-07 NOTE — PROGRESS NOTE ADULT - ASSESSMENT
68-year-old male, former smoker with past medical history of HTN, hypothyroidism, melanoma s/p resection, testicular seminoma currently undergoing chemotherapy, presents to the emergency department for diarrhea.       # Neutropenic fever 2/2 C diff colitis   # Pancytopenia 2/2 chemo   # Magnesium deficiency   # Hypocalcemia 2/2 magnesium deficiency   # Prolonged QTc   - advance diet, c/w IVF   - c/w fidaxomicin   - isolation precautions   - discontinue cefepime - blood cultures NGTD   - Oncology f/u   - ID f/u   - repeat EKG daily      # Testicular cancer on chemo   - Oncology following     # HTN  - Hold anti-hypertensive medications      # Hypothyroid  - c/w home med    dvt ppx: SCD

## 2024-03-07 NOTE — PHARMACOTHERAPY INTERVENTION NOTE - COMMENTS
Modified fidaxomicin order so total treatment duration is 10 days per Dr. Davenport's recommendation. 
Modified penicillin allergy history to state that patient tolerated cefepime during this admission. 
Recommended discontinuing cefepime per Dr. Davenport's recommendation since blood cultures have been negative x 24 hours. 
Recommended continuing cefepime 2g IV q8h, dosed based on an eGFR of ~90mL/min/1.73m2, as per ID consult recommendations.    Man Littlejohn, PharmD, [facility]  Clinical Pharmacy Specialist, Infectious Diseases  Tele-Antimicrobial Stewardship Program (Tele-ASP)  Tele-ASP Phone: [phone] 
Recommended escalating from vancomycin to fidaxomicin 200mg PO BID as per ID consult recommendaitons.

## 2024-03-07 NOTE — PROGRESS NOTE ADULT - ASSESSMENT
ASSESSMENT  Patient is 68-year-old male, former smoker with past medical history of HTN, hypothyroidism, melanoma s/p resection, testicular seminoma currently undergoing chemotherapy, presents to the emergency department for diarrhea onset last night.    IMPRESSION  #Neutropenic Fever  #C diff Colitis   #Testicular seminoma on active chemo - last on 3/1   # Recent right fore arm abscess s/p I and D 3/14 - MRSA -- completed course Linezolid    #Abx allergy: penicillin G benzathine (Rash)  penicillin (Rash)    RECOMMENDATIONS  - continue fidaxomicin 200 mg BID for 10 days (end date 3/16)  - reviewed previous notes -- recently treated for right arm abscess requiring I and D -- completed course of antibiotics and wound appears to be healing well  - trend stool count     Please call or message on Microsoft Teams if with any questions.  Spectra [phone]

## 2024-03-07 NOTE — PHARMACOTHERAPY INTERVENTION NOTE - NSPHARMCOMMASP
ASP - Therapy recommended/ Alternative therapy
ASP - Duration of therapy
ASP - De-escalation
ASP - Therapy recommended/ Alternative therapy

## 2024-03-08 ENCOUNTER — APPOINTMENT (OUTPATIENT)
Dept: SURGERY | Facility: CLINIC | Age: 69
End: 2024-03-08

## 2024-03-08 ENCOUNTER — APPOINTMENT (OUTPATIENT)
Dept: HEMATOLOGY ONCOLOGY | Facility: CLINIC | Age: 69
End: 2024-03-08

## 2024-03-08 LAB
ALBUMIN SERPL ELPH-MCNC: 2.6 G/DL — LOW (ref 3.5–5.2)
ALBUMIN SERPL ELPH-MCNC: 2.6 G/DL — LOW (ref 3.5–5.2)
ALP SERPL-CCNC: 50 U/L — SIGNIFICANT CHANGE UP (ref 30–115)
ALP SERPL-CCNC: 53 U/L — SIGNIFICANT CHANGE UP (ref 30–115)
ALT FLD-CCNC: 11 U/L — SIGNIFICANT CHANGE UP (ref 0–41)
ALT FLD-CCNC: 12 U/L — SIGNIFICANT CHANGE UP (ref 0–41)
ANION GAP SERPL CALC-SCNC: 10 MMOL/L — SIGNIFICANT CHANGE UP (ref 7–14)
ANION GAP SERPL CALC-SCNC: 12 MMOL/L — SIGNIFICANT CHANGE UP (ref 7–14)
ANION GAP SERPL CALC-SCNC: 12 MMOL/L — SIGNIFICANT CHANGE UP (ref 7–14)
AST SERPL-CCNC: 6 U/L — SIGNIFICANT CHANGE UP (ref 0–41)
AST SERPL-CCNC: 6 U/L — SIGNIFICANT CHANGE UP (ref 0–41)
BASOPHILS # BLD AUTO: 0 K/UL — SIGNIFICANT CHANGE UP (ref 0–0.2)
BASOPHILS # BLD AUTO: 0 K/UL — SIGNIFICANT CHANGE UP (ref 0–0.2)
BASOPHILS NFR BLD AUTO: 0 % — SIGNIFICANT CHANGE UP (ref 0–1)
BASOPHILS NFR BLD AUTO: 0 % — SIGNIFICANT CHANGE UP (ref 0–1)
BILIRUB SERPL-MCNC: 0.8 MG/DL — SIGNIFICANT CHANGE UP (ref 0.2–1.2)
BILIRUB SERPL-MCNC: 0.8 MG/DL — SIGNIFICANT CHANGE UP (ref 0.2–1.2)
BUN SERPL-MCNC: 31 MG/DL — HIGH (ref 10–20)
BUN SERPL-MCNC: 33 MG/DL — HIGH (ref 10–20)
BUN SERPL-MCNC: 33 MG/DL — HIGH (ref 10–20)
CALCIUM SERPL-MCNC: 6.7 MG/DL — LOW (ref 8.4–10.4)
CALCIUM SERPL-MCNC: 7.4 MG/DL — LOW (ref 8.4–10.5)
CALCIUM SERPL-MCNC: 7.5 MG/DL — LOW (ref 8.4–10.4)
CHLORIDE SERPL-SCNC: 102 MMOL/L — SIGNIFICANT CHANGE UP (ref 98–110)
CHLORIDE SERPL-SCNC: 103 MMOL/L — SIGNIFICANT CHANGE UP (ref 98–110)
CHLORIDE SERPL-SCNC: 106 MMOL/L — SIGNIFICANT CHANGE UP (ref 98–110)
CO2 SERPL-SCNC: 22 MMOL/L — SIGNIFICANT CHANGE UP (ref 17–32)
CO2 SERPL-SCNC: 23 MMOL/L — SIGNIFICANT CHANGE UP (ref 17–32)
CO2 SERPL-SCNC: 23 MMOL/L — SIGNIFICANT CHANGE UP (ref 17–32)
CREAT SERPL-MCNC: 1 MG/DL — SIGNIFICANT CHANGE UP (ref 0.7–1.5)
CREAT SERPL-MCNC: 1 MG/DL — SIGNIFICANT CHANGE UP (ref 0.7–1.5)
CREAT SERPL-MCNC: 1.1 MG/DL — SIGNIFICANT CHANGE UP (ref 0.7–1.5)
EGFR: 72 ML/MIN/1.73M2 — SIGNIFICANT CHANGE UP
EGFR: 81 ML/MIN/1.73M2 — SIGNIFICANT CHANGE UP
EGFR: 81 ML/MIN/1.73M2 — SIGNIFICANT CHANGE UP
EOSINOPHIL # BLD AUTO: 0 K/UL — SIGNIFICANT CHANGE UP (ref 0–0.7)
EOSINOPHIL # BLD AUTO: 0 K/UL — SIGNIFICANT CHANGE UP (ref 0–0.7)
EOSINOPHIL NFR BLD AUTO: 0 % — SIGNIFICANT CHANGE UP (ref 0–8)
EOSINOPHIL NFR BLD AUTO: 0 % — SIGNIFICANT CHANGE UP (ref 0–8)
GLUCOSE SERPL-MCNC: 123 MG/DL — HIGH (ref 70–99)
GLUCOSE SERPL-MCNC: 132 MG/DL — HIGH (ref 70–99)
GLUCOSE SERPL-MCNC: 137 MG/DL — HIGH (ref 70–99)
HCT VFR BLD CALC: 25.6 % — LOW (ref 42–52)
HCT VFR BLD CALC: 26.9 % — LOW (ref 42–52)
HCT VFR BLD CALC: 28.2 % — LOW (ref 42–52)
HGB BLD-MCNC: 8.9 G/DL — LOW (ref 14–18)
HGB BLD-MCNC: 9.3 G/DL — LOW (ref 14–18)
HGB BLD-MCNC: 9.7 G/DL — LOW (ref 14–18)
IMM GRANULOCYTES NFR BLD AUTO: 0 % — LOW (ref 0.1–0.3)
IMM GRANULOCYTES NFR BLD AUTO: 0 % — LOW (ref 0.1–0.3)
LYMPHOCYTES # BLD AUTO: 0.12 K/UL — LOW (ref 1.2–3.4)
LYMPHOCYTES # BLD AUTO: 0.14 K/UL — LOW (ref 1.2–3.4)
LYMPHOCYTES # BLD AUTO: 82.4 % — HIGH (ref 20.5–51.1)
LYMPHOCYTES # BLD AUTO: 85.7 % — HIGH (ref 20.5–51.1)
MAGNESIUM SERPL-MCNC: 1.5 MG/DL — LOW (ref 1.8–2.4)
MAGNESIUM SERPL-MCNC: 1.9 MG/DL — SIGNIFICANT CHANGE UP (ref 1.8–2.4)
MAGNESIUM SERPL-MCNC: 1.9 MG/DL — SIGNIFICANT CHANGE UP (ref 1.8–2.4)
MCHC RBC-ENTMCNC: 29.2 PG — SIGNIFICANT CHANGE UP (ref 27–31)
MCHC RBC-ENTMCNC: 29.5 PG — SIGNIFICANT CHANGE UP (ref 27–31)
MCHC RBC-ENTMCNC: 29.6 PG — SIGNIFICANT CHANGE UP (ref 27–31)
MCHC RBC-ENTMCNC: 34.4 G/DL — SIGNIFICANT CHANGE UP (ref 32–37)
MCHC RBC-ENTMCNC: 34.6 G/DL — SIGNIFICANT CHANGE UP (ref 32–37)
MCHC RBC-ENTMCNC: 34.8 G/DL — SIGNIFICANT CHANGE UP (ref 32–37)
MCV RBC AUTO: 84.9 FL — SIGNIFICANT CHANGE UP (ref 80–94)
MCV RBC AUTO: 85 FL — SIGNIFICANT CHANGE UP (ref 80–94)
MCV RBC AUTO: 85.4 FL — SIGNIFICANT CHANGE UP (ref 80–94)
MONOCYTES # BLD AUTO: 0.01 K/UL — LOW (ref 0.1–0.6)
MONOCYTES # BLD AUTO: 0.02 K/UL — LOW (ref 0.1–0.6)
MONOCYTES NFR BLD AUTO: 11.8 % — HIGH (ref 1.7–9.3)
MONOCYTES NFR BLD AUTO: 7.1 % — SIGNIFICANT CHANGE UP (ref 1.7–9.3)
NEUTROPHILS # BLD AUTO: 0.01 K/UL — LOW (ref 1.4–6.5)
NEUTROPHILS # BLD AUTO: 0.01 K/UL — LOW (ref 1.4–6.5)
NEUTROPHILS NFR BLD AUTO: 5.8 % — LOW (ref 42.2–75.2)
NEUTROPHILS NFR BLD AUTO: 7.2 % — LOW (ref 42.2–75.2)
NRBC # BLD: 0 /100 WBCS — SIGNIFICANT CHANGE UP (ref 0–0)
PLATELET # BLD AUTO: 11 K/UL — CRITICAL LOW (ref 130–400)
PLATELET # BLD AUTO: 16 K/UL — CRITICAL LOW (ref 130–400)
PLATELET # BLD AUTO: 8 K/UL — CRITICAL LOW (ref 130–400)
PMV BLD: 10.3 FL — SIGNIFICANT CHANGE UP (ref 7.4–10.4)
PMV BLD: 11.7 FL — HIGH (ref 7.4–10.4)
PMV BLD: 13.3 FL — HIGH (ref 7.4–10.4)
POTASSIUM SERPL-MCNC: 3 MMOL/L — LOW (ref 3.5–5)
POTASSIUM SERPL-MCNC: 3.4 MMOL/L — LOW (ref 3.5–5)
POTASSIUM SERPL-MCNC: 3.6 MMOL/L — SIGNIFICANT CHANGE UP (ref 3.5–5)
POTASSIUM SERPL-SCNC: 3 MMOL/L — LOW (ref 3.5–5)
POTASSIUM SERPL-SCNC: 3.4 MMOL/L — LOW (ref 3.5–5)
POTASSIUM SERPL-SCNC: 3.6 MMOL/L — SIGNIFICANT CHANGE UP (ref 3.5–5)
PROT SERPL-MCNC: 4.3 G/DL — LOW (ref 6–8)
PROT SERPL-MCNC: 4.4 G/DL — LOW (ref 6–8)
RBC # BLD: 3.01 M/UL — LOW (ref 4.7–6.1)
RBC # BLD: 3.15 M/UL — LOW (ref 4.7–6.1)
RBC # BLD: 3.32 M/UL — LOW (ref 4.7–6.1)
RBC # FLD: 13.5 % — SIGNIFICANT CHANGE UP (ref 11.5–14.5)
RBC # FLD: 13.6 % — SIGNIFICANT CHANGE UP (ref 11.5–14.5)
RBC # FLD: 13.7 % — SIGNIFICANT CHANGE UP (ref 11.5–14.5)
SODIUM SERPL-SCNC: 136 MMOL/L — SIGNIFICANT CHANGE UP (ref 135–146)
SODIUM SERPL-SCNC: 138 MMOL/L — SIGNIFICANT CHANGE UP (ref 135–146)
SODIUM SERPL-SCNC: 139 MMOL/L — SIGNIFICANT CHANGE UP (ref 135–146)
WBC # BLD: 0.14 K/UL — CRITICAL LOW (ref 4.8–10.8)
WBC # BLD: 0.17 K/UL — CRITICAL LOW (ref 4.8–10.8)
WBC # BLD: 0.2 K/UL — CRITICAL LOW (ref 4.8–10.8)
WBC # FLD AUTO: 0.14 K/UL — CRITICAL LOW (ref 4.8–10.8)
WBC # FLD AUTO: 0.17 K/UL — CRITICAL LOW (ref 4.8–10.8)
WBC # FLD AUTO: 0.2 K/UL — CRITICAL LOW (ref 4.8–10.8)

## 2024-03-08 PROCEDURE — 99233 SBSQ HOSP IP/OBS HIGH 50: CPT

## 2024-03-08 PROCEDURE — 99232 SBSQ HOSP IP/OBS MODERATE 35: CPT

## 2024-03-08 RX ORDER — POTASSIUM CHLORIDE 20 MEQ
20 PACKET (EA) ORAL
Refills: 0 | Status: COMPLETED | OUTPATIENT
Start: 2024-03-08 | End: 2024-03-08

## 2024-03-08 RX ORDER — PANTOPRAZOLE SODIUM 20 MG/1
40 TABLET, DELAYED RELEASE ORAL
Refills: 0 | Status: DISCONTINUED | OUTPATIENT
Start: 2024-03-08 | End: 2024-03-08

## 2024-03-08 RX ORDER — MAGNESIUM SULFATE 500 MG/ML
2 VIAL (ML) INJECTION ONCE
Refills: 0 | Status: COMPLETED | OUTPATIENT
Start: 2024-03-08 | End: 2024-03-08

## 2024-03-08 RX ORDER — FAMOTIDINE 10 MG/ML
40 INJECTION INTRAVENOUS DAILY
Refills: 0 | Status: DISCONTINUED | OUTPATIENT
Start: 2024-03-08 | End: 2024-03-20

## 2024-03-08 RX ORDER — POTASSIUM CHLORIDE 20 MEQ
40 PACKET (EA) ORAL ONCE
Refills: 0 | Status: DISCONTINUED | OUTPATIENT
Start: 2024-03-08 | End: 2024-03-08

## 2024-03-08 RX ORDER — CALCIUM CARBONATE 500(1250)
1 TABLET ORAL THREE TIMES A DAY
Refills: 0 | Status: DISCONTINUED | OUTPATIENT
Start: 2024-03-08 | End: 2024-03-08

## 2024-03-08 RX ADMIN — Medication 1000 UNIT(S): at 12:02

## 2024-03-08 RX ADMIN — Medication 200 GRAM(S): at 06:01

## 2024-03-08 RX ADMIN — Medication 50 MILLIEQUIVALENT(S): at 06:02

## 2024-03-08 RX ADMIN — Medication 50 MILLIEQUIVALENT(S): at 08:28

## 2024-03-08 RX ADMIN — FIDAXOMICIN 200 MILLIGRAM(S): 200 GRANULE, FOR SUSPENSION ORAL at 17:09

## 2024-03-08 RX ADMIN — Medication 50 MILLIEQUIVALENT(S): at 17:10

## 2024-03-08 RX ADMIN — FIDAXOMICIN 200 MILLIGRAM(S): 200 GRANULE, FOR SUSPENSION ORAL at 06:02

## 2024-03-08 RX ADMIN — Medication 1 TABLET(S): at 12:02

## 2024-03-08 RX ADMIN — PANTOPRAZOLE SODIUM 40 MILLIGRAM(S): 20 TABLET, DELAYED RELEASE ORAL at 06:10

## 2024-03-08 RX ADMIN — Medication 25 GRAM(S): at 23:43

## 2024-03-08 RX ADMIN — Medication 50 MILLIEQUIVALENT(S): at 19:04

## 2024-03-08 RX ADMIN — FAMOTIDINE 40 MILLIGRAM(S): 10 INJECTION INTRAVENOUS at 17:09

## 2024-03-08 RX ADMIN — ONDANSETRON 4 MILLIGRAM(S): 8 TABLET, FILM COATED ORAL at 13:22

## 2024-03-08 RX ADMIN — Medication 137 MICROGRAM(S): at 06:02

## 2024-03-08 RX ADMIN — Medication 1 TABLET(S): at 13:22

## 2024-03-08 RX ADMIN — Medication 1 TABLET(S): at 06:02

## 2024-03-08 NOTE — PROGRESS NOTE ADULT - SUBJECTIVE AND OBJECTIVE BOX
LENGTH OF HOSPITAL STAY: 3d    CHIEF COMPLAINT:   Patient is a 68y old  Male who presents with a chief complaint of Diarrhea (07 Mar 2024 17:55)      HISTORY OF PRESENTING ILLNESS:    HPI:  Patient is 68-year-old male, former smoker with past medical history of HTN, hypothyroidism, melanoma s/p resection, testicular seminoma currently undergoing chemotherapy, presents to the emergency department for diarrhea onset last night.  Patient also complains of vomiting onset this morning and abdominal cramping.  Patient denies fever, chills, cough, shortness of breath, chest pain.  Patient last had chemo on 3/1, which was found 3 of etoposide and cisplatin.  Patient sees Dr. Atwood, oncologist.        Home meds obtained from pt.    declined by ICU, oncology recommended admission to [place]  (05 Mar 2024 21:35)    PAST MEDICAL & SURGICAL HISTORY  PAST MEDICAL & SURGICAL HISTORY:  HTN (hypertension)      Hypothyroidism, unspecified type      Obesity      Other secondary osteoarthritis of right hand      Testicular seminoma      History of eye removal  Right eye      H/O melanoma excision      S/P appendectomy  14 yrs old        SOCIAL HISTORY:    ALLERGIES:  penicillin G benzathine (Rash)  penicillin (Rash)    MEDICATIONS:  STANDING MEDICATIONS  cholecalciferol 1000 Unit(s) Oral daily  fidaxomicin 200 milliGRAM(s) Oral two times a day  lactobacillus acidophilus 1 Tablet(s) Oral every 8 hours  levothyroxine 137 MICROGram(s) Oral daily  multivitamin 1 Tablet(s) Oral daily  pantoprazole    Tablet 40 milliGRAM(s) Oral before breakfast  sodium chloride 0.9%. 1000 milliLiter(s) IV Continuous <Continuous>    PRN MEDICATIONS  acetaminophen     Tablet .. 650 milliGRAM(s) Oral every 6 hours PRN  aluminum hydroxide/magnesium hydroxide/simethicone Suspension 30 milliLiter(s) Oral every 4 hours PRN  melatonin 5 milliGRAM(s) Oral at bedtime PRN  ondansetron Injectable 4 milliGRAM(s) IV Push every 8 hours PRN    VITALS:   T(F): 97.1  HR: 87  BP: 121/68  RR: 18  SpO2: --    LABS:                        9.3    0.14  )-----------( 11       ( 08 Mar 2024 01:44 )             26.9     03-08    138  |  103  |  31<H>  ----------------------------<  123<H>  3.4<L>   |  23  |  1.1    Ca    6.7<L>      08 Mar 2024 01:44  Mg     1.9     03-08    TPro  4.3<L>  /  Alb  2.6<L>  /  TBili  0.8  /  DBili  x   /  AST  6   /  ALT  12  /  AlkPhos  53  03-08      Urinalysis Basic - ( 08 Mar 2024 01:44 )    Color: x / Appearance: x / SG: x / pH: x  Gluc: 123 mg/dL / Ketone: x  / Bili: x / Urobili: x   Blood: x / Protein: x / Nitrite: x   Leuk Esterase: x / RBC: x / WBC x   Sq Epi: x / Non Sq Epi: x / Bacteria: x        Lactate, Blood: 1.0 mmol/L (03-07-24 @ 12:13)      Culture - Blood (collected 05 Mar 2024 14:05)  Source: .Blood Blood-Peripheral  Preliminary Report (07 Mar 2024 23:02):    No growth at 48 Hours    Culture - Blood (collected 05 Mar 2024 14:05)  Source: .Blood Blood-Peripheral  Preliminary Report (07 Mar 2024 23:02):    No growth at 48 Hours          RADIOLOGY:    PHYSICAL EXAM:  GEN: No acute distress  HEENT: AT, NC, [place], Rt eye enucleated  LUNGS: Clear to auscultation bilaterally   HEART: S1/S2 present. RRR.   ABD: Soft, non-tender, non-distended. Bowel sounds present  EXT: edema, cyanosis, clubbing absent  NEURO: AAOX3

## 2024-03-08 NOTE — PROGRESS NOTE ADULT - ASSESSMENT
Patient is 68-year-old male, former smoker with past medical history of HTN, hypothyroidism, melanoma s/p resection, testicular seminoma currently undergoing chemotherapy, presents to the emergency department for diarrhea onset last night.  Patient also complains of vomiting onset this morning and abdominal cramping.  Patient denies fever, chills, cough, shortness of breath, chest pain.  Patient last had chemo on 3/1, that was cycle 3 of EP.  Patient sees Dr. Atwood, oncologist.    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  - MR Brain 1.12.2024 no evidence of intracranial metastasis or acute intracranial pathology, paranasal sinus mucosal disease  - s/p Right chest port placed via right IJ  - s/p cycle 3 of Cisplatin + Etoposide on 2/26 followed by Neulasta onbody injection D5 (3/1/24), initiated on 1.15.2024  - CT abdomen 3.5.24: Aortocaval and periaortic lymphadenopathy, markedly decreased in size comparison to January 9, 2024.    # History of Melanoma of skin, right shoulder, dx 10/2018    - followup with DERM for surveillance skin exams at least every 6-12 months      Recommendations:    - Fidoxamycin for C.diff diarrhea.   -  C/w IVF.   -  Transfuse Plt if < 10 K or if patient is bleeding.   - Pt received G-CSF on 3/1, no need for further support for neutropenia  - neutropenic precautions: NO RECTAL TEMP or MEDICATIONS  - we will follow the patient  - Mechanical DVT PPx    for any questions call or text via MS teams     Patient is 68-year-old male, former smoker with past medical history of HTN, hypothyroidism, melanoma s/p resection, testicular seminoma currently undergoing chemotherapy, presents to the emergency department for diarrhea onset last night.  Patient also complains of vomiting onset this morning and abdominal cramping.  Patient denies fever, chills, cough, shortness of breath, chest pain.  Patient last had chemo on 3/1, that was cycle 3 of EP.  Patient sees Dr. Atwood, oncologist.    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  - MR Brain 1.12.2024 no evidence of intracranial metastasis or acute intracranial pathology, paranasal sinus mucosal disease  - s/p Right chest port placed via right IJ  - s/p cycle 3 of Cisplatin + Etoposide on 2/26 followed by Neulasta onbody injection D5 (3/1/24), initiated on 1.15.2024  - CT abdomen 3.5.24: Aortocaval and periaortic lymphadenopathy, markedly decreased in size comparison to January 9, 2024.    # History of Melanoma of skin, right shoulder, dx 10/2018    - followup with DERM for surveillance skin exams at least every 6-12 months      Recommendations:    - Fidoxamycin for C.diff diarrhea.   -  C/w IVF.   -  Transfuse Plt if < 10 K or if patient is bleeding. Repeat CBC in PM.   - Pt received G-CSF on 3/1, no need for further support for neutropenia  - neutropenic precautions: NO RECTAL TEMP or MEDICATIONS  - we will follow the patient  - Mechanical DVT PPx    for any questions call or text via MS teams

## 2024-03-08 NOTE — PROGRESS NOTE ADULT - SUBJECTIVE AND OBJECTIVE BOX
24H events:    Patient is a 68y old Male who presents with a chief complaint of diarrhea (08 Mar 2024 15:46)    Primary diagnosis of Neutropenic fever    Day 2:  Patient was given was pRBC  Cefepime was discontinued as recommended  Electrolytes replaced as indicated    Today is hospital day 3d. This morning patient was seen and examined at bedside  Patient had 2 episodes of vomiting overnight with chunks of food. Taking Zofran PRN  His platelets today were 8000 -> will be transfused 1 unit of platelets  No acute or major events overnight. Hemodynamically stable.    Code Status: Full code    PAST MEDICAL & SURGICAL HISTORY  HTN (hypertension)    Hypothyroidism, unspecified type    Obesity    Other secondary osteoarthritis of right hand    Testicular seminoma    History of eye removal  Right eye    H/O melanoma excision    S/P appendectomy 14 yrs old    ALLERGIES:  penicillin G benzathine (Rash)  penicillin (Rash)    MEDICATIONS:  STANDING MEDICATIONS  cholecalciferol 1000 Unit(s) Oral daily  famotidine    Tablet 40 milliGRAM(s) Oral daily  fidaxomicin 200 milliGRAM(s) Oral two times a day  levothyroxine 137 MICROGram(s) Oral daily  multivitamin 1 Tablet(s) Oral daily  potassium chloride  20 mEq/100 mL IVPB 20 milliEquivalent(s) IV Intermittent every 2 hours  sodium chloride 0.9%. 1000 milliLiter(s) IV Continuous <Continuous>    PRN MEDICATIONS  acetaminophen     Tablet .. 650 milliGRAM(s) Oral every 6 hours PRN  aluminum hydroxide/magnesium hydroxide/simethicone Suspension 30 milliLiter(s) Oral every 4 hours PRN  melatonin 5 milliGRAM(s) Oral at bedtime PRN  ondansetron Injectable 4 milliGRAM(s) IV Push every 8 hours PRN    VITALS:   T(F): 98  HR: 101  BP: 154/71  RR: 18  SpO2: --    PHYSICAL EXAM:  GENERAL: NAD, lying in bed comfortably, cooperative   HEART: Regular rate and rhythm, normal S1/S2, no murmurs, heaves, thrills  LUNGS: No acute respiratory distress, clear b/l breath sounds   ABDOMEN:  soft, slightly tender, non-distented   EXTREMITIES: no rashes, no edema, ulcerations or ecchymosis  NERVOUS SYSTEM:  A&Ox3, CNII-XII intact, follows commands, answers questions appropriately   SKIN: Healing right hand cellulitis    LABS:                        9.7    0.17  )-----------( 8        ( 08 Mar 2024 11:07 )             28.2     03-08    136  |  102  |  33<H>  ----------------------------<  132<H>  3.0<L>   |  22  |  1.0    Ca    7.4<L>      08 Mar 2024 11:07  Mg     1.9     03-08    TPro  4.4<L>  /  Alb  2.6<L>  /  TBili  0.8  /  DBili  x   /  AST  6   /  ALT  11  /  AlkPhos  50  03-08    Urinalysis Basic - ( 08 Mar 2024 11:07 )    Color: x / Appearance: x / SG: x / pH: x  Gluc: 132 mg/dL / Ketone: x  / Bili: x / Urobili: x   Blood: x / Protein: x / Nitrite: x   Leuk Esterase: x / RBC: x / WBC x   Sq Epi: x / Non Sq Epi: x / Bacteria: x

## 2024-03-08 NOTE — PROGRESS NOTE ADULT - SUBJECTIVE AND OBJECTIVE BOX
Pt seen and examined at bedside. Had an episode of vomiting overnight. Continues to have diarrhea.     VITAL SIGNS (Last 24 hrs):  T(C): 36.7 (03-08-24 @ 13:33), Max: 36.7 (03-08-24 @ 13:33)  HR: 101 (03-08-24 @ 13:33) (87 - 113)  BP: 154/71 (03-08-24 @ 13:33) (121/68 - 154/71)  RR: 18 (03-08-24 @ 13:33) (18 - 19)  SpO2: --  Wt(kg): --  Daily     Daily     I&O's Summary      PHYSICAL EXAM:  GENERAL: NAD, well-developed  HEAD:  Atraumatic, Normocephalic  EYES: EOMI, PERRLA, conjunctiva and sclera clear  NECK: Supple, No JVD  CHEST/LUNG: Clear to auscultation bilaterally; No wheeze  HEART: Regular rate and rhythm; No murmurs, rubs, or gallops  ABDOMEN: Soft, Nontender, Nondistended; Bowel sounds present  EXTREMITIES:  2+ Peripheral Pulses, No clubbing, cyanosis, or edema  PSYCH: AAOx3  NEUROLOGY: non-focal  SKIN: No rashes or lesions    Labs Reviewed  Spoke to patient in regards to abnormal labs.    CBC Full  -  ( 08 Mar 2024 11:07 )  WBC Count : 0.17 K/uL  Hemoglobin : 9.7 g/dL  Hematocrit : 28.2 %  Platelet Count - Automated : 8 K/uL  Mean Cell Volume : 84.9 fL  Mean Cell Hemoglobin : 29.2 pg  Mean Cell Hemoglobin Concentration : 34.4 g/dL  Auto Neutrophil # : 0.01 K/uL  Auto Lymphocyte # : 0.14 K/uL  Auto Monocyte # : 0.02 K/uL  Auto Eosinophil # : 0.00 K/uL  Auto Basophil # : 0.00 K/uL  Auto Neutrophil % : 5.8 %  Auto Lymphocyte % : 82.4 %  Auto Monocyte % : 11.8 %  Auto Eosinophil % : 0.0 %  Auto Basophil % : 0.0 %    BMP:    03-08 @ 11:07    Blood Urea Nitrogen - 33  Calcium - 7.4  Carbond Dioxide - 22  Chloride - 102  Creatinine - 1.0  Glucose - 132  Potassium - 3.0  Sodium - 136      Hemoglobin A1c -   PT/INR - ( 05 Mar 2024 14:33 )   PT: 13.50 sec;   INR: 1.18 ratio         PTT - ( 05 Mar 2024 14:33 )  PTT:24.6 sec  Urine Culture:  03-05 @ 14:05 Urine culture: --    Culture Results:   No growth at 48 Hours  Method Type: --  Organism: --  Organism Identification: --  Specimen Source: .Blood Blood-Peripheral            MEDICATIONS  (STANDING):  cholecalciferol 1000 Unit(s) Oral daily  fidaxomicin 200 milliGRAM(s) Oral two times a day  lactobacillus acidophilus 1 Tablet(s) Oral every 8 hours  levothyroxine 137 MICROGram(s) Oral daily  multivitamin 1 Tablet(s) Oral daily  pantoprazole    Tablet 40 milliGRAM(s) Oral before breakfast  potassium chloride  20 mEq/100 mL IVPB 20 milliEquivalent(s) IV Intermittent every 2 hours  sodium chloride 0.9%. 1000 milliLiter(s) (100 mL/Hr) IV Continuous <Continuous>    MEDICATIONS  (PRN):  acetaminophen     Tablet .. 650 milliGRAM(s) Oral every 6 hours PRN Temp greater or equal to 38C (100.4F), Mild Pain (1 - 3)  aluminum hydroxide/magnesium hydroxide/simethicone Suspension 30 milliLiter(s) Oral every 4 hours PRN Dyspepsia  melatonin 5 milliGRAM(s) Oral at bedtime PRN Insomnia  ondansetron Injectable 4 milliGRAM(s) IV Push every 8 hours PRN Nausea and/or Vomiting

## 2024-03-08 NOTE — PROGRESS NOTE ADULT - ASSESSMENT
68-year-old male, former smoker with past medical history of HTN, hypothyroidism, melanoma s/p resection, testicular seminoma currently undergoing chemotherapy, presents to the emergency department for diarrhea.       # Neutropenic fever 2/2 C diff colitis   # Pancytopenia 2/2 chemo   # Magnesium deficiency 2/2 diarrhea   # Hypocalcemia 2/2 magnesium deficiency   # Hypokalemia   # Prolonged QTc - resolved   - c/w IVF   - c/w fidaxomicin   - Isolation precautions   - Oncology following   - transfuse platelets to keep above 10k unless bleeding and hgb above 7.5   - s/p Neulasta as outpatient     # Testicular cancer on chemo   - Oncology following     # HTN  - Hold anti-hypertensive medications      # Hypothyroid  - c/w home med    dvt ppx: SCD due to thrombocytopenia     Pending: neutropenia improvement, diarrhea resolution   Plan of care d/w patient   Dispo: Home

## 2024-03-09 LAB
ALBUMIN SERPL ELPH-MCNC: 2.7 G/DL — LOW (ref 3.5–5.2)
ALP SERPL-CCNC: 50 U/L — SIGNIFICANT CHANGE UP (ref 30–115)
ALT FLD-CCNC: 9 U/L — SIGNIFICANT CHANGE UP (ref 0–41)
ANION GAP SERPL CALC-SCNC: 10 MMOL/L — SIGNIFICANT CHANGE UP (ref 7–14)
ANION GAP SERPL CALC-SCNC: 15 MMOL/L — HIGH (ref 7–14)
APPEARANCE UR: CLEAR — SIGNIFICANT CHANGE UP
AST SERPL-CCNC: 5 U/L — SIGNIFICANT CHANGE UP (ref 0–41)
BASOPHILS # BLD AUTO: 0 K/UL — SIGNIFICANT CHANGE UP (ref 0–0.2)
BASOPHILS # BLD AUTO: 0 K/UL — SIGNIFICANT CHANGE UP (ref 0–0.2)
BASOPHILS NFR BLD AUTO: 0 % — SIGNIFICANT CHANGE UP (ref 0–1)
BASOPHILS NFR BLD AUTO: 0 % — SIGNIFICANT CHANGE UP (ref 0–1)
BILIRUB SERPL-MCNC: 0.9 MG/DL — SIGNIFICANT CHANGE UP (ref 0.2–1.2)
BILIRUB UR-MCNC: ABNORMAL
BUN SERPL-MCNC: 29 MG/DL — HIGH (ref 10–20)
BUN SERPL-MCNC: 30 MG/DL — HIGH (ref 10–20)
CA-I BLD-SCNC: 4.2 MG/DL — LOW (ref 4.5–5.6)
CALCIUM SERPL-MCNC: 7.1 MG/DL — LOW (ref 8.4–10.5)
CALCIUM SERPL-MCNC: 7.4 MG/DL — LOW (ref 8.4–10.5)
CHLORIDE SERPL-SCNC: 103 MMOL/L — SIGNIFICANT CHANGE UP (ref 98–110)
CHLORIDE SERPL-SCNC: 105 MMOL/L — SIGNIFICANT CHANGE UP (ref 98–110)
CO2 SERPL-SCNC: 20 MMOL/L — SIGNIFICANT CHANGE UP (ref 17–32)
CO2 SERPL-SCNC: 22 MMOL/L — SIGNIFICANT CHANGE UP (ref 17–32)
COLOR SPEC: ABNORMAL
CREAT SERPL-MCNC: 0.9 MG/DL — SIGNIFICANT CHANGE UP (ref 0.7–1.5)
CREAT SERPL-MCNC: 1 MG/DL — SIGNIFICANT CHANGE UP (ref 0.7–1.5)
DIFF PNL FLD: ABNORMAL
EGFR: 81 ML/MIN/1.73M2 — SIGNIFICANT CHANGE UP
EGFR: 92 ML/MIN/1.73M2 — SIGNIFICANT CHANGE UP
EOSINOPHIL # BLD AUTO: 0 K/UL — SIGNIFICANT CHANGE UP (ref 0–0.7)
EOSINOPHIL # BLD AUTO: 0 K/UL — SIGNIFICANT CHANGE UP (ref 0–0.7)
EOSINOPHIL NFR BLD AUTO: 0 % — SIGNIFICANT CHANGE UP (ref 0–8)
EOSINOPHIL NFR BLD AUTO: 0 % — SIGNIFICANT CHANGE UP (ref 0–8)
GLUCOSE SERPL-MCNC: 121 MG/DL — HIGH (ref 70–99)
GLUCOSE SERPL-MCNC: 135 MG/DL — HIGH (ref 70–99)
GLUCOSE UR QL: NEGATIVE MG/DL — SIGNIFICANT CHANGE UP
HCT VFR BLD CALC: 22.1 % — LOW (ref 42–52)
HCT VFR BLD CALC: 25.8 % — LOW (ref 42–52)
HGB BLD-MCNC: 7.7 G/DL — LOW (ref 14–18)
HGB BLD-MCNC: 8.9 G/DL — LOW (ref 14–18)
IMM GRANULOCYTES NFR BLD AUTO: 0 % — LOW (ref 0.1–0.3)
IMM GRANULOCYTES NFR BLD AUTO: 0 % — LOW (ref 0.1–0.3)
KETONES UR-MCNC: 40 MG/DL
LACTATE SERPL-SCNC: 1.8 MMOL/L — SIGNIFICANT CHANGE UP (ref 0.7–2)
LEUKOCYTE ESTERASE UR-ACNC: ABNORMAL
LYMPHOCYTES # BLD AUTO: 0.17 K/UL — LOW (ref 1.2–3.4)
LYMPHOCYTES # BLD AUTO: 0.26 K/UL — LOW (ref 1.2–3.4)
LYMPHOCYTES # BLD AUTO: 43.3 % — SIGNIFICANT CHANGE UP (ref 20.5–51.1)
LYMPHOCYTES # BLD AUTO: 60.7 % — HIGH (ref 20.5–51.1)
MAGNESIUM SERPL-MCNC: 1.6 MG/DL — LOW (ref 1.8–2.4)
MAGNESIUM SERPL-MCNC: 2.3 MG/DL — SIGNIFICANT CHANGE UP (ref 1.8–2.4)
MCHC RBC-ENTMCNC: 29.4 PG — SIGNIFICANT CHANGE UP (ref 27–31)
MCHC RBC-ENTMCNC: 29.6 PG — SIGNIFICANT CHANGE UP (ref 27–31)
MCHC RBC-ENTMCNC: 34.5 G/DL — SIGNIFICANT CHANGE UP (ref 32–37)
MCHC RBC-ENTMCNC: 34.8 G/DL — SIGNIFICANT CHANGE UP (ref 32–37)
MCV RBC AUTO: 84.4 FL — SIGNIFICANT CHANGE UP (ref 80–94)
MCV RBC AUTO: 85.7 FL — SIGNIFICANT CHANGE UP (ref 80–94)
MONOCYTES # BLD AUTO: 0.07 K/UL — LOW (ref 0.1–0.6)
MONOCYTES # BLD AUTO: 0.15 K/UL — SIGNIFICANT CHANGE UP (ref 0.1–0.6)
MONOCYTES NFR BLD AUTO: 25 % — HIGH (ref 1.7–9.3)
MONOCYTES NFR BLD AUTO: 25 % — HIGH (ref 1.7–9.3)
NEUTROPHILS # BLD AUTO: 0.04 K/UL — LOW (ref 1.4–6.5)
NEUTROPHILS # BLD AUTO: 0.19 K/UL — LOW (ref 1.4–6.5)
NEUTROPHILS NFR BLD AUTO: 14.3 % — LOW (ref 42.2–75.2)
NEUTROPHILS NFR BLD AUTO: 31.7 % — LOW (ref 42.2–75.2)
NITRITE UR-MCNC: NEGATIVE — SIGNIFICANT CHANGE UP
NRBC # BLD: 0 /100 WBCS — SIGNIFICANT CHANGE UP (ref 0–0)
NRBC # BLD: 0 /100 WBCS — SIGNIFICANT CHANGE UP (ref 0–0)
PH UR: 6 — SIGNIFICANT CHANGE UP (ref 5–8)
PLATELET # BLD AUTO: 21 K/UL — LOW (ref 130–400)
PLATELET # BLD AUTO: 9 K/UL — CRITICAL LOW (ref 130–400)
PMV BLD: 10.1 FL — SIGNIFICANT CHANGE UP (ref 7.4–10.4)
PMV BLD: 11.8 FL — HIGH (ref 7.4–10.4)
POTASSIUM SERPL-MCNC: 2.5 MMOL/L — CRITICAL LOW (ref 3.5–5)
POTASSIUM SERPL-MCNC: 2.7 MMOL/L — CRITICAL LOW (ref 3.5–5)
POTASSIUM SERPL-SCNC: 2.5 MMOL/L — CRITICAL LOW (ref 3.5–5)
POTASSIUM SERPL-SCNC: 2.7 MMOL/L — CRITICAL LOW (ref 3.5–5)
PROT SERPL-MCNC: 4.4 G/DL — LOW (ref 6–8)
PROT UR-MCNC: 100 MG/DL
RBC # BLD: 2.62 M/UL — LOW (ref 4.7–6.1)
RBC # BLD: 3.01 M/UL — LOW (ref 4.7–6.1)
RBC # FLD: 13.5 % — SIGNIFICANT CHANGE UP (ref 11.5–14.5)
RBC # FLD: 13.5 % — SIGNIFICANT CHANGE UP (ref 11.5–14.5)
SODIUM SERPL-SCNC: 135 MMOL/L — SIGNIFICANT CHANGE UP (ref 135–146)
SODIUM SERPL-SCNC: 140 MMOL/L — SIGNIFICANT CHANGE UP (ref 135–146)
SP GR SPEC: 1.03 — SIGNIFICANT CHANGE UP (ref 1–1.03)
UROBILINOGEN FLD QL: 0.2 MG/DL — SIGNIFICANT CHANGE UP (ref 0.2–1)
WBC # BLD: 0.28 K/UL — CRITICAL LOW (ref 4.8–10.8)
WBC # BLD: 0.6 K/UL — CRITICAL LOW (ref 4.8–10.8)
WBC # FLD AUTO: 0.28 K/UL — CRITICAL LOW (ref 4.8–10.8)
WBC # FLD AUTO: 0.6 K/UL — CRITICAL LOW (ref 4.8–10.8)

## 2024-03-09 PROCEDURE — 99233 SBSQ HOSP IP/OBS HIGH 50: CPT

## 2024-03-09 PROCEDURE — 71045 X-RAY EXAM CHEST 1 VIEW: CPT | Mod: 26

## 2024-03-09 PROCEDURE — 99232 SBSQ HOSP IP/OBS MODERATE 35: CPT

## 2024-03-09 RX ORDER — MAGNESIUM SULFATE 500 MG/ML
2 VIAL (ML) INJECTION
Refills: 0 | Status: COMPLETED | OUTPATIENT
Start: 2024-03-09 | End: 2024-03-09

## 2024-03-09 RX ORDER — VANCOMYCIN HCL 1 G
1000 VIAL (EA) INTRAVENOUS EVERY 12 HOURS
Refills: 0 | Status: DISCONTINUED | OUTPATIENT
Start: 2024-03-09 | End: 2024-03-11

## 2024-03-09 RX ORDER — POTASSIUM CHLORIDE 20 MEQ
20 PACKET (EA) ORAL
Refills: 0 | Status: DISCONTINUED | OUTPATIENT
Start: 2024-03-09 | End: 2024-03-09

## 2024-03-09 RX ORDER — CEFEPIME 1 G/1
2000 INJECTION, POWDER, FOR SOLUTION INTRAMUSCULAR; INTRAVENOUS EVERY 8 HOURS
Refills: 0 | Status: DISCONTINUED | OUTPATIENT
Start: 2024-03-09 | End: 2024-03-11

## 2024-03-09 RX ORDER — CEFEPIME 1 G/1
2000 INJECTION, POWDER, FOR SOLUTION INTRAMUSCULAR; INTRAVENOUS ONCE
Refills: 0 | Status: COMPLETED | OUTPATIENT
Start: 2024-03-09 | End: 2024-03-09

## 2024-03-09 RX ORDER — CEFEPIME 1 G/1
INJECTION, POWDER, FOR SOLUTION INTRAMUSCULAR; INTRAVENOUS
Refills: 0 | Status: DISCONTINUED | OUTPATIENT
Start: 2024-03-09 | End: 2024-03-11

## 2024-03-09 RX ORDER — FILGRASTIM 480MCG/1.6
480 VIAL (ML) INJECTION DAILY
Refills: 0 | Status: DISCONTINUED | OUTPATIENT
Start: 2024-03-09 | End: 2024-03-11

## 2024-03-09 RX ORDER — POTASSIUM CHLORIDE 20 MEQ
40 PACKET (EA) ORAL
Refills: 0 | Status: COMPLETED | OUTPATIENT
Start: 2024-03-09 | End: 2024-03-10

## 2024-03-09 RX ORDER — POTASSIUM CHLORIDE 20 MEQ
40 PACKET (EA) ORAL ONCE
Refills: 0 | Status: COMPLETED | OUTPATIENT
Start: 2024-03-09 | End: 2024-03-09

## 2024-03-09 RX ORDER — POTASSIUM CHLORIDE 20 MEQ
20 PACKET (EA) ORAL ONCE
Refills: 0 | Status: COMPLETED | OUTPATIENT
Start: 2024-03-09 | End: 2024-03-09

## 2024-03-09 RX ADMIN — Medication 650 MILLIGRAM(S): at 13:59

## 2024-03-09 RX ADMIN — FIDAXOMICIN 200 MILLIGRAM(S): 200 GRANULE, FOR SUSPENSION ORAL at 17:58

## 2024-03-09 RX ADMIN — Medication 480 MICROGRAM(S): at 17:34

## 2024-03-09 RX ADMIN — Medication 25 GRAM(S): at 13:59

## 2024-03-09 RX ADMIN — Medication 137 MICROGRAM(S): at 05:16

## 2024-03-09 RX ADMIN — CEFEPIME 100 MILLIGRAM(S): 1 INJECTION, POWDER, FOR SOLUTION INTRAMUSCULAR; INTRAVENOUS at 13:59

## 2024-03-09 RX ADMIN — Medication 1000 UNIT(S): at 12:40

## 2024-03-09 RX ADMIN — SODIUM CHLORIDE 100 MILLILITER(S): 9 INJECTION INTRAMUSCULAR; INTRAVENOUS; SUBCUTANEOUS at 12:41

## 2024-03-09 RX ADMIN — Medication 40 MILLIEQUIVALENT(S): at 23:13

## 2024-03-09 RX ADMIN — Medication 25 GRAM(S): at 17:34

## 2024-03-09 RX ADMIN — Medication 250 MILLIGRAM(S): at 17:36

## 2024-03-09 RX ADMIN — Medication 40 MILLIEQUIVALENT(S): at 12:40

## 2024-03-09 RX ADMIN — Medication 1 TABLET(S): at 12:40

## 2024-03-09 RX ADMIN — Medication 50 MILLIEQUIVALENT(S): at 23:14

## 2024-03-09 RX ADMIN — FAMOTIDINE 40 MILLIGRAM(S): 10 INJECTION INTRAVENOUS at 12:40

## 2024-03-09 RX ADMIN — CEFEPIME 100 MILLIGRAM(S): 1 INJECTION, POWDER, FOR SOLUTION INTRAMUSCULAR; INTRAVENOUS at 21:07

## 2024-03-09 RX ADMIN — FIDAXOMICIN 200 MILLIGRAM(S): 200 GRANULE, FOR SUSPENSION ORAL at 05:18

## 2024-03-09 NOTE — PROGRESS NOTE ADULT - ASSESSMENT
68-year-old male, former smoker with past medical history of HTN, hypothyroidism, melanoma s/p resection, testicular seminoma currently undergoing chemotherapy, presents to the emergency department for diarrhea.     # Pancytopenia 2/2 chemo   # Neutropenic fever 2/2 C diff colitis    - c/w IVF   - c/w fidaxomicin   - Isolation precautions   - transfuse platelets to keep above 10k unless bleeding and hgb above 7.5 -> s/p 1 pRBC and 1 platelet transfusion  - Per hem onc team has not answered adequately to the platelet transfusion, will request approval for cross matched platelets  - s/p Neulasta as outpatient     # Testicular cancer on chemo   - Oncology following : - s/p Right chest port placed via right IJ  - s/p cycle 3 of Cisplatin + Etoposide on 2/26 followed by Neulasta onbody injection D5 (3/1/24), initiated on 1.15.2024    # Hypomagnesemia 2/2 diarrhea   # Hypocalcemia 2/2 magnesium deficiency   # Hypokalemia   - Prolonged QTc - resolved (QTc 582 on 03/05 --> 433 on 03/07)  - Electrolytes are being monitored BID and replaced as needed    # HTN  - Hold anti-hypertensive medications      # Hypothyroid  - c/w home med    dvt ppx: SCD due to thrombocytopenia     Pending: neutropenia improvement, diarrhea resolution   Plan of care d/w patient   Dispo: Home   68-year-old male, former smoker with past medical history of HTN, hypothyroidism, melanoma s/p resection, testicular seminoma currently undergoing chemotherapy, presents to the emergency department for diarrhea.     # Pancytopenia 2/2 chemo   # Neutropenic fever 2/2 C diff colitis    - c/w IVF   - c/w fidaxomicin   - Isolation precautions   - transfuse platelets to keep above 10k unless bleeding and hgb above 7.5 -> s/p 1 pRBC and 1 platelet transfusion  - Per hem onc team has not answered adequately to the platelet transfusion, approval requested and granted for cross matched platelets from DR. Shipman on 03/09/2024 at 10:55  - In the meantime, will transfuse 1 regular unit of platelets  - s/p Neulasta as outpatient     # Testicular cancer on chemo   - Oncology following : - s/p Right chest port placed via right IJ  - s/p cycle 3 of Cisplatin + Etoposide on 2/26 followed by Neulasta onbody injection D5 (3/1/24), initiated on 1.15.2024    # Hypomagnesemia 2/2 diarrhea   # Hypocalcemia 2/2 magnesium deficiency   # Hypokalemia   - Prolonged QTc - resolved (QTc 582 on 03/05 --> 433 on 03/07)  - Electrolytes are being monitored BID and replaced as needed    # HTN  - Hold anti-hypertensive medications      # Hypothyroid  - c/w home med    dvt ppx: SCD due to thrombocytopenia     Pending: neutropenia improvement, diarrhea resolution   Plan of care d/w patient   Dispo: Home

## 2024-03-09 NOTE — CHART NOTE - NSCHARTNOTEFT_GEN_A_CORE
Patient spiked a fever around noon.  STAT blood cultures and lactate were drawn, UA was sent, chest x-ray performed.  Case discussed with hem-onc fellow and ID attending on call.  Empiric antibiotic therapy with Cefepime and Vancomycin was started Patient spiked a fever around noon.  STAT blood cultures and lactate were drawn, UA was sent, chest x-ray performed.  Case discussed with hem-onc fellow and ID attending on call.  Empiric antibiotic therapy with Cefepime and Vancomycin was started      Edit (00:17, 3/10): Spoke to blood bank regarding giving pt crossmatched plts, per blood bank, sample is sent out for crossmatching and crossmatched unit of plts will be received earliest on Monday 3/11. Pt received 1 unit of plts today and 8pm CBC showed plt count of 21. Will monitor for signs of bleeding.

## 2024-03-09 NOTE — PROGRESS NOTE ADULT - SUBJECTIVE AND OBJECTIVE BOX
LENGTH OF HOSPITAL STAY: 4d    CHIEF COMPLAINT:   Patient is a 68y old  Male who presents with a chief complaint of diarrhea (09 Mar 2024 10:28)      HISTORY OF PRESENTING ILLNESS:    HPI:  Patient is 68-year-old male, former smoker with past medical history of HTN, hypothyroidism, melanoma s/p resection, testicular seminoma currently undergoing chemotherapy, presents to the emergency department for diarrhea onset last night.  Patient also complains of vomiting onset this morning and abdominal cramping.  Patient denies fever, chills, cough, shortness of breath, chest pain.  Patient last had chemo on 3/1, which was found 3 of etoposide and cisplatin.  Patient sees Dr. Atwood, oncologist.        Home meds obtained from pt.    declined by ICU, oncology recommended admission to [facility]  (05 Mar 2024 21:35)    PAST MEDICAL & SURGICAL HISTORY  PAST MEDICAL & SURGICAL HISTORY:  HTN (hypertension)      Hypothyroidism, unspecified type      Obesity      Other secondary osteoarthritis of right hand      Testicular seminoma      History of eye removal  Right eye      H/O melanoma excision      S/P appendectomy  14 yrs old        SOCIAL HISTORY:    ALLERGIES:  penicillin G benzathine (Rash)  penicillin (Rash)    MEDICATIONS:  STANDING MEDICATIONS  cholecalciferol 1000 Unit(s) Oral daily  famotidine    Tablet 40 milliGRAM(s) Oral daily  fidaxomicin 200 milliGRAM(s) Oral two times a day  levothyroxine 137 MICROGram(s) Oral daily  magnesium sulfate  IVPB 2 Gram(s) IV Intermittent every 2 hours  multivitamin 1 Tablet(s) Oral daily  potassium chloride  20 mEq/100 mL IVPB 20 milliEquivalent(s) IV Intermittent every 2 hours  sodium chloride 0.9%. 1000 milliLiter(s) IV Continuous <Continuous>    PRN MEDICATIONS  acetaminophen     Tablet .. 650 milliGRAM(s) Oral every 6 hours PRN  aluminum hydroxide/magnesium hydroxide/simethicone Suspension 30 milliLiter(s) Oral every 4 hours PRN  melatonin 5 milliGRAM(s) Oral at bedtime PRN  ondansetron Injectable 4 milliGRAM(s) IV Push every 8 hours PRN    VITALS:   T(F): 97.1  HR: 92  BP: 130/71  RR: 18  SpO2: --    LABS:                        8.9    0.28  )-----------( 9        ( 09 Mar 2024 09:12 )             25.8     03-09    140  |  105  |  30<H>  ----------------------------<  121<H>  2.7<LL>   |  20  |  0.9    Ca    7.4<L>      09 Mar 2024 09:12  Mg     1.6     03-09    TPro  4.4<L>  /  Alb  2.7<L>  /  TBili  0.9  /  DBili  x   /  AST  5   /  ALT  9   /  AlkPhos  50  03-09      Urinalysis Basic - ( 09 Mar 2024 09:12 )    Color: x / Appearance: x / SG: x / pH: x  Gluc: 121 mg/dL / Ketone: x  / Bili: x / Urobili: x   Blood: x / Protein: x / Nitrite: x   Leuk Esterase: x / RBC: x / WBC x   Sq Epi: x / Non Sq Epi: x / Bacteria: x                RADIOLOGY:    PHYSICAL EXAM:  GEN: No acute distress  HEENT: AT, NC, [place]  LUNGS: Clear to auscultation bilaterally   HEART: S1/S2 present. RRR.   ABD: Soft, non-tender, non-distended. Bowel sounds present  EXT: edema , cyanosis, clubbing absnet  NEURO: AAOX3

## 2024-03-09 NOTE — PROGRESS NOTE ADULT - ASSESSMENT
Patient is 68-year-old male, former smoker with past medical history of HTN, hypothyroidism, melanoma s/p resection, testicular seminoma currently undergoing chemotherapy, presents to the emergency department for diarrhea onset last night.  Patient also complains of vomiting onset this morning and abdominal cramping.  Patient denies fever, chills, cough, shortness of breath, chest pain.  Patient last had chemo on 3/1, that was cycle 3 of EP.  Patient sees Dr. Atwood, oncologist.    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  - MR Brain 1.12.2024 no evidence of intracranial metastasis or acute intracranial pathology, paranasal sinus mucosal disease  - s/p Right chest port placed via right IJ  - s/p cycle 3 of Cisplatin + Etoposide on 2/26 followed by Neulasta onbody injection D5 (3/1/24), initiated on 1.15.2024  - CT abdomen 3.5.24: Aortocaval and periaortic lymphadenopathy, markedly decreased in size comparison to January 9, 2024.    # History of Melanoma of skin, right shoulder, dx 10/2018    - followup with [facility] for surveillance skin exams at least every 6-12 months      Recommendations:    - Fidoxamycin for C.diff diarrhea.   -  C/w IVF. Replete Mag and K  -  Transfuse Plt if < 10 K or if patient is bleeding. Transfuse Cross-matched Platelets as pt has sub-optimal response to transfusion. Okay to transfuse Regular platelets in interim (Transfuse slowly over 3 hrs)  - Will start G-CSF (3/9/24)  - neutropenic precautions: NO RECTAL TEMP or MEDICATIONS  - we will follow the patient  - Mechanical DVT PPx    for any questions call or text via MS teams

## 2024-03-09 NOTE — PROGRESS NOTE ADULT - SUBJECTIVE AND OBJECTIVE BOX
24H events:    Patient is a 68y old Male who presents with a chief complaint of diarrhea (08 Mar 2024 16:25)    Primary diagnosis of Neutropenic fever      Day 1:  Day 2:  Day 3:     Today is hospital day 4d. This morning patient was seen and examined at bedside, resting comfortably in bed.    No acute or major events overnight. Hemodynamically stable, tolerating oral diet, voiding appropriately with appropriate bowel movements.     Code Status:    Family communication:  Contact date:  Name of person contacted:  Relationship to patient:  Communication details:  What matters most:    PAST MEDICAL & SURGICAL HISTORY  HTN (hypertension)    Hypothyroidism, unspecified type    Obesity    Other secondary osteoarthritis of right hand    Testicular seminoma    History of eye removal  Right eye    H/O melanoma excision    S/P appendectomy  14 yrs old      SOCIAL HISTORY:  Social History:      ALLERGIES:  penicillin G benzathine (Rash)  penicillin (Rash)    MEDICATIONS:  STANDING MEDICATIONS  cholecalciferol 1000 Unit(s) Oral daily  famotidine    Tablet 40 milliGRAM(s) Oral daily  fidaxomicin 200 milliGRAM(s) Oral two times a day  levothyroxine 137 MICROGram(s) Oral daily  multivitamin 1 Tablet(s) Oral daily  sodium chloride 0.9%. 1000 milliLiter(s) IV Continuous <Continuous>    PRN MEDICATIONS  acetaminophen     Tablet .. 650 milliGRAM(s) Oral every 6 hours PRN  aluminum hydroxide/magnesium hydroxide/simethicone Suspension 30 milliLiter(s) Oral every 4 hours PRN  melatonin 5 milliGRAM(s) Oral at bedtime PRN  ondansetron Injectable 4 milliGRAM(s) IV Push every 8 hours PRN    VITALS:   T(F): 97.1  HR: 92  BP: 130/71  RR: 18  SpO2: --    PHYSICAL EXAM:  GENERAL: NAD, lying in bed comfortably, obtunded, lethargic,  somnolent, cooperative, elderly  HEAD: NCAD, no hematoma or laceration   NECK: Supple, no neck stiffness/nuchal rigidity, no JVD    HEART: Regular rate and rhythm, normal S1/S2, no murmurs, heaves, thrills  LUNGS: No acute respiratory distress, clear b/l breath sounds, no wheezing, rales, rhonchi  ABDOMEN:  soft, non-tender, non-distented, + BS, no hepatosplenomegaly   EXTREMITIES: no rashes, extremities warm/dry, no cyanosis, no edema, ulcerations or ecchymosis  NERVOUS SYSTEM:  A&Ox3, CNII-XII intact, follows commands, answers questions appropriately   SKIN: No rashes or lesions       [facility] score:    LABS:                        8.9    0.28  )-----------( 9        ( 09 Mar 2024 09:12 )             25.8     03-08    139  |  106  |  33<H>  ----------------------------<  137<H>  3.6   |  23  |  1.0    Ca    7.5<L>      08 Mar 2024 22:03  Mg     1.5     03-08    TPro  4.4<L>  /  Alb  2.6<L>  /  TBili  0.8  /  DBili  x   /  AST  6   /  ALT  11  /  AlkPhos  50  03-08    Urinalysis Basic - ( 08 Mar 2024 22:03 )    Color: x / Appearance: x / SG: x / pH: x  Gluc: 137 mg/dL / Ketone: x  / Bili: x / Urobili: x   Blood: x / Protein: x / Nitrite: x   Leuk Esterase: x / RBC: x / WBC x   Sq Epi: x / Non Sq Epi: x / Bacteria: x    RADIOLOGY:      RADIOLOGY   24H events:    Patient is a 68y old Male who presents with a chief complaint of diarrhea (08 Mar 2024 16:25)    Primary diagnosis of Neutropenic fever    Today is hospital day 4d. This morning patient was seen and examined at bedside.  Still having profuse watery diarrhea, which turned from transparent to greenish today. Abdominal distention and tenderness.  Hypomagnesemia replaced overnight, s/p platelet transfusion yesterday  Hemodynamically stable, tolerating oral diet, voiding appropriately with appropriate bowel movements.     Code Status: Full code    PAST MEDICAL & SURGICAL HISTORY  HTN (hypertension)    Hypothyroidism, unspecified type    Obesity    Other secondary osteoarthritis of right hand    Testicular seminoma    History of eye removal  Right eye    H/O melanoma excision    S/P appendectomy  14 yrs old      SOCIAL HISTORY:  Social History:      ALLERGIES:  penicillin G benzathine (Rash)  penicillin (Rash)    MEDICATIONS:  STANDING MEDICATIONS  cholecalciferol 1000 Unit(s) Oral daily  famotidine    Tablet 40 milliGRAM(s) Oral daily  fidaxomicin 200 milliGRAM(s) Oral two times a day  levothyroxine 137 MICROGram(s) Oral daily  multivitamin 1 Tablet(s) Oral daily  sodium chloride 0.9%. 1000 milliLiter(s) IV Continuous <Continuous>    PRN MEDICATIONS  acetaminophen     Tablet .. 650 milliGRAM(s) Oral every 6 hours PRN  aluminum hydroxide/magnesium hydroxide/simethicone Suspension 30 milliLiter(s) Oral every 4 hours PRN  melatonin 5 milliGRAM(s) Oral at bedtime PRN  ondansetron Injectable 4 milliGRAM(s) IV Push every 8 hours PRN    VITALS:   T(F): 97.1  HR: 92  BP: 130/71  RR: 18  SpO2: --    PHYSICAL EXAM:  GENERAL: NAD, lying in bed comfortably, obtunded, lethargic,  somnolent, cooperative, elderly  HEAD: NCAD, no hematoma or laceration   NECK: Supple, no neck stiffness/nuchal rigidity, no JVD    HEART: Regular rate and rhythm, normal S1/S2, no murmurs, heaves, thrills  LUNGS: No acute respiratory distress, clear b/l breath sounds, no wheezing, rales, rhonchi  ABDOMEN:  soft, non-tender, non-distented, + BS, no hepatosplenomegaly   EXTREMITIES: no rashes, extremities warm/dry, no cyanosis, no edema, ulcerations or ecchymosis  NERVOUS SYSTEM:  A&Ox3, CNII-XII intact, follows commands, answers questions appropriately   SKIN: No rashes or lesions       [facility] score:    LABS:                        8.9    0.28  )-----------( 9        ( 09 Mar 2024 09:12 )             25.8     03-08    139  |  106  |  33<H>  ----------------------------<  137<H>  3.6   |  23  |  1.0    Ca    7.5<L>      08 Mar 2024 22:03  Mg     1.5     03-08    TPro  4.4<L>  /  Alb  2.6<L>  /  TBili  0.8  /  DBili  x   /  AST  6   /  ALT  11  /  AlkPhos  50  03-08    Urinalysis Basic - ( 08 Mar 2024 22:03 )    Color: x / Appearance: x / SG: x / pH: x  Gluc: 137 mg/dL / Ketone: x  / Bili: x / Urobili: x   Blood: x / Protein: x / Nitrite: x   Leuk Esterase: x / RBC: x / WBC x   Sq Epi: x / Non Sq Epi: x / Bacteria: x    RADIOLOGY:      RADIOLOGY   24H events:    Patient is a 68y old Male who presents with a chief complaint of diarrhea (08 Mar 2024 16:25)    Primary diagnosis of Neutropenic fever    Today is hospital day 4d. This morning patient was seen and examined at bedside.  Still having profuse watery diarrhea, which turned from transparent to greenish today. Abdominal distention and tenderness.  Hypomagnesemia replaced overnight, s/p platelet transfusion yesterday  Hemodynamically stable, tolerating oral diet, voiding appropriately with appropriate bowel movements.     Code Status: Full code    PAST MEDICAL & SURGICAL HISTORY  HTN (hypertension)    Hypothyroidism, unspecified type    Obesity    Other secondary osteoarthritis of right hand    Testicular seminoma    History of eye removal  Right eye    H/O melanoma excision    S/P appendectomy  14 yrs old      SOCIAL HISTORY:  Social History:      ALLERGIES:  penicillin G benzathine (Rash)  penicillin (Rash)    MEDICATIONS:  STANDING MEDICATIONS  cholecalciferol 1000 Unit(s) Oral daily  famotidine    Tablet 40 milliGRAM(s) Oral daily  fidaxomicin 200 milliGRAM(s) Oral two times a day  levothyroxine 137 MICROGram(s) Oral daily  multivitamin 1 Tablet(s) Oral daily  sodium chloride 0.9%. 1000 milliLiter(s) IV Continuous <Continuous>    PRN MEDICATIONS  acetaminophen     Tablet .. 650 milliGRAM(s) Oral every 6 hours PRN  aluminum hydroxide/magnesium hydroxide/simethicone Suspension 30 milliLiter(s) Oral every 4 hours PRN  melatonin 5 milliGRAM(s) Oral at bedtime PRN  ondansetron Injectable 4 milliGRAM(s) IV Push every 8 hours PRN    VITALS:   T(F): 97.1  HR: 92  BP: 130/71  RR: 18  SpO2: --    PHYSICAL EXAM:  GENERAL: NAD, lying in bed comfortably, cooperative  HEAD: NCAD, no hematoma or laceration   NECK: Supple, no neck stiffness/nuchal rigidity, no JVD    HEART: Regular rate and rhythm, normal S1/S2  LUNGS: No acute respiratory distress, clear b/l breath sounds  ABDOMEN:  soft, tender, distended  EXTREMITIES: no cyanosis, no edema, ulcerations or ecchymosis  NERVOUS SYSTEM:  A&Ox3, CNII-XII intact, follows commands, answers questions appropriately   SKIN: No rashes or lesions         LABS:                        8.9    0.28  )-----------( 9        ( 09 Mar 2024 09:12 )             25.8     03-08    139  |  106  |  33<H>  ----------------------------<  137<H>  3.6   |  23  |  1.0    Ca    7.5<L>      08 Mar 2024 22:03  Mg     1.5     03-08    TPro  4.4<L>  /  Alb  2.6<L>  /  TBili  0.8  /  DBili  x   /  AST  6   /  ALT  11  /  AlkPhos  50  03-08    Urinalysis Basic - ( 08 Mar 2024 22:03 )    Color: x / Appearance: x / SG: x / pH: x  Gluc: 137 mg/dL / Ketone: x  / Bili: x / Urobili: x   Blood: x / Protein: x / Nitrite: x   Leuk Esterase: x / RBC: x / WBC x   Sq Epi: x / Non Sq Epi: x / Bacteria: x

## 2024-03-10 LAB
ANION GAP SERPL CALC-SCNC: 11 MMOL/L — SIGNIFICANT CHANGE UP (ref 7–14)
ANION GAP SERPL CALC-SCNC: 9 MMOL/L — SIGNIFICANT CHANGE UP (ref 7–14)
BASOPHILS # BLD AUTO: 0 K/UL — SIGNIFICANT CHANGE UP (ref 0–0.2)
BASOPHILS # BLD AUTO: 0 K/UL — SIGNIFICANT CHANGE UP (ref 0–0.2)
BASOPHILS NFR BLD AUTO: 0 % — SIGNIFICANT CHANGE UP (ref 0–1)
BASOPHILS NFR BLD AUTO: 0 % — SIGNIFICANT CHANGE UP (ref 0–1)
BUN SERPL-MCNC: 24 MG/DL — HIGH (ref 10–20)
BUN SERPL-MCNC: 26 MG/DL — HIGH (ref 10–20)
CALCIUM SERPL-MCNC: 7.3 MG/DL — LOW (ref 8.4–10.4)
CALCIUM SERPL-MCNC: 7.4 MG/DL — LOW (ref 8.4–10.5)
CHLORIDE SERPL-SCNC: 105 MMOL/L — SIGNIFICANT CHANGE UP (ref 98–110)
CHLORIDE SERPL-SCNC: 107 MMOL/L — SIGNIFICANT CHANGE UP (ref 98–110)
CO2 SERPL-SCNC: 20 MMOL/L — SIGNIFICANT CHANGE UP (ref 17–32)
CO2 SERPL-SCNC: 20 MMOL/L — SIGNIFICANT CHANGE UP (ref 17–32)
CREAT SERPL-MCNC: 0.8 MG/DL — SIGNIFICANT CHANGE UP (ref 0.7–1.5)
CREAT SERPL-MCNC: 1 MG/DL — SIGNIFICANT CHANGE UP (ref 0.7–1.5)
CULTURE RESULTS: SIGNIFICANT CHANGE UP
CULTURE RESULTS: SIGNIFICANT CHANGE UP
EGFR: 81 ML/MIN/1.73M2 — SIGNIFICANT CHANGE UP
EGFR: 95 ML/MIN/1.73M2 — SIGNIFICANT CHANGE UP
EOSINOPHIL # BLD AUTO: 0 K/UL — SIGNIFICANT CHANGE UP (ref 0–0.7)
EOSINOPHIL # BLD AUTO: 0 K/UL — SIGNIFICANT CHANGE UP (ref 0–0.7)
EOSINOPHIL NFR BLD AUTO: 0 % — SIGNIFICANT CHANGE UP (ref 0–8)
EOSINOPHIL NFR BLD AUTO: 0 % — SIGNIFICANT CHANGE UP (ref 0–8)
GLUCOSE SERPL-MCNC: 123 MG/DL — HIGH (ref 70–99)
GLUCOSE SERPL-MCNC: 125 MG/DL — HIGH (ref 70–99)
HCT VFR BLD CALC: 23.3 % — LOW (ref 42–52)
HCT VFR BLD CALC: 25.9 % — LOW (ref 42–52)
HGB BLD-MCNC: 8.1 G/DL — LOW (ref 14–18)
HGB BLD-MCNC: 9 G/DL — LOW (ref 14–18)
IMM GRANULOCYTES NFR BLD AUTO: 2.1 % — HIGH (ref 0.1–0.3)
LYMPHOCYTES # BLD AUTO: 0.34 K/UL — LOW (ref 1.2–3.4)
LYMPHOCYTES # BLD AUTO: 0.54 K/UL — LOW (ref 1.2–3.4)
LYMPHOCYTES # BLD AUTO: 20 % — LOW (ref 20.5–51.1)
LYMPHOCYTES # BLD AUTO: 35.4 % — SIGNIFICANT CHANGE UP (ref 20.5–51.1)
MAGNESIUM SERPL-MCNC: 1.7 MG/DL — LOW (ref 1.8–2.4)
MAGNESIUM SERPL-MCNC: 1.8 MG/DL — SIGNIFICANT CHANGE UP (ref 1.8–2.4)
MCHC RBC-ENTMCNC: 28.9 PG — SIGNIFICANT CHANGE UP (ref 27–31)
MCHC RBC-ENTMCNC: 29.6 PG — SIGNIFICANT CHANGE UP (ref 27–31)
MCHC RBC-ENTMCNC: 34.7 G/DL — SIGNIFICANT CHANGE UP (ref 32–37)
MCHC RBC-ENTMCNC: 34.8 G/DL — SIGNIFICANT CHANGE UP (ref 32–37)
MCV RBC AUTO: 83.3 FL — SIGNIFICANT CHANGE UP (ref 80–94)
MCV RBC AUTO: 85 FL — SIGNIFICANT CHANGE UP (ref 80–94)
MONOCYTES # BLD AUTO: 0.25 K/UL — SIGNIFICANT CHANGE UP (ref 0.1–0.6)
MONOCYTES # BLD AUTO: 0.86 K/UL — HIGH (ref 0.1–0.6)
MONOCYTES NFR BLD AUTO: 26 % — HIGH (ref 1.7–9.3)
MONOCYTES NFR BLD AUTO: 32 % — HIGH (ref 1.7–9.3)
NEUTROPHILS # BLD AUTO: 0.35 K/UL — LOW (ref 1.4–6.5)
NEUTROPHILS # BLD AUTO: 1.23 K/UL — LOW (ref 1.4–6.5)
NEUTROPHILS NFR BLD AUTO: 36.5 % — LOW (ref 42.2–75.2)
NEUTROPHILS NFR BLD AUTO: 46 % — SIGNIFICANT CHANGE UP (ref 42.2–75.2)
NRBC # BLD: 0 /100 WBCS — SIGNIFICANT CHANGE UP (ref 0–0)
NRBC # BLD: SIGNIFICANT CHANGE UP /100 WBCS (ref 0–0)
PLATELET # BLD AUTO: 13 K/UL — CRITICAL LOW (ref 130–400)
PLATELET # BLD AUTO: 9 K/UL — CRITICAL LOW (ref 130–400)
PMV BLD: 11.6 FL — HIGH (ref 7.4–10.4)
PMV BLD: 9.3 FL — SIGNIFICANT CHANGE UP (ref 7.4–10.4)
POTASSIUM SERPL-MCNC: 2.7 MMOL/L — CRITICAL LOW (ref 3.5–5)
POTASSIUM SERPL-MCNC: 3.1 MMOL/L — LOW (ref 3.5–5)
POTASSIUM SERPL-SCNC: 2.7 MMOL/L — CRITICAL LOW (ref 3.5–5)
POTASSIUM SERPL-SCNC: 3.1 MMOL/L — LOW (ref 3.5–5)
RBC # BLD: 2.74 M/UL — LOW (ref 4.7–6.1)
RBC # BLD: 3.11 M/UL — LOW (ref 4.7–6.1)
RBC # FLD: 13.3 % — SIGNIFICANT CHANGE UP (ref 11.5–14.5)
RBC # FLD: 13.7 % — SIGNIFICANT CHANGE UP (ref 11.5–14.5)
SODIUM SERPL-SCNC: 136 MMOL/L — SIGNIFICANT CHANGE UP (ref 135–146)
SODIUM SERPL-SCNC: 136 MMOL/L — SIGNIFICANT CHANGE UP (ref 135–146)
SPECIMEN SOURCE: SIGNIFICANT CHANGE UP
SPECIMEN SOURCE: SIGNIFICANT CHANGE UP
WBC # BLD: 0.96 K/UL — CRITICAL LOW (ref 4.8–10.8)
WBC # BLD: 2.68 K/UL — LOW (ref 4.8–10.8)
WBC # FLD AUTO: 0.96 K/UL — CRITICAL LOW (ref 4.8–10.8)
WBC # FLD AUTO: 2.68 K/UL — LOW (ref 4.8–10.8)

## 2024-03-10 PROCEDURE — 99233 SBSQ HOSP IP/OBS HIGH 50: CPT

## 2024-03-10 PROCEDURE — 93010 ELECTROCARDIOGRAM REPORT: CPT

## 2024-03-10 PROCEDURE — 99232 SBSQ HOSP IP/OBS MODERATE 35: CPT

## 2024-03-10 RX ORDER — POTASSIUM CHLORIDE 20 MEQ
40 PACKET (EA) ORAL EVERY 4 HOURS
Refills: 0 | Status: COMPLETED | OUTPATIENT
Start: 2024-03-10 | End: 2024-03-10

## 2024-03-10 RX ORDER — POTASSIUM CHLORIDE 20 MEQ
20 PACKET (EA) ORAL ONCE
Refills: 0 | Status: COMPLETED | OUTPATIENT
Start: 2024-03-10 | End: 2024-03-10

## 2024-03-10 RX ORDER — DEXTROSE MONOHYDRATE, SODIUM CHLORIDE, AND POTASSIUM CHLORIDE 50; .745; 4.5 G/1000ML; G/1000ML; G/1000ML
1000 INJECTION, SOLUTION INTRAVENOUS
Refills: 0 | Status: DISCONTINUED | OUTPATIENT
Start: 2024-03-10 | End: 2024-03-11

## 2024-03-10 RX ORDER — MAGNESIUM SULFATE 500 MG/ML
2 VIAL (ML) INJECTION ONCE
Refills: 0 | Status: COMPLETED | OUTPATIENT
Start: 2024-03-10 | End: 2024-03-10

## 2024-03-10 RX ORDER — POTASSIUM CHLORIDE 20 MEQ
20 PACKET (EA) ORAL
Refills: 0 | Status: COMPLETED | OUTPATIENT
Start: 2024-03-10 | End: 2024-03-10

## 2024-03-10 RX ORDER — MAGNESIUM SULFATE 500 MG/ML
2 VIAL (ML) INJECTION EVERY 6 HOURS
Refills: 0 | Status: COMPLETED | OUTPATIENT
Start: 2024-03-10 | End: 2024-03-10

## 2024-03-10 RX ORDER — DEXTROSE MONOHYDRATE, SODIUM CHLORIDE, AND POTASSIUM CHLORIDE 50; .745; 4.5 G/1000ML; G/1000ML; G/1000ML
1000 INJECTION, SOLUTION INTRAVENOUS
Refills: 0 | Status: DISCONTINUED | OUTPATIENT
Start: 2024-03-10 | End: 2024-03-10

## 2024-03-10 RX ADMIN — Medication 480 MICROGRAM(S): at 11:54

## 2024-03-10 RX ADMIN — Medication 250 MILLIGRAM(S): at 17:03

## 2024-03-10 RX ADMIN — Medication 40 MILLIEQUIVALENT(S): at 17:00

## 2024-03-10 RX ADMIN — Medication 1 TABLET(S): at 11:30

## 2024-03-10 RX ADMIN — Medication 1000 UNIT(S): at 11:29

## 2024-03-10 RX ADMIN — CEFEPIME 100 MILLIGRAM(S): 1 INJECTION, POWDER, FOR SOLUTION INTRAMUSCULAR; INTRAVENOUS at 17:04

## 2024-03-10 RX ADMIN — FIDAXOMICIN 200 MILLIGRAM(S): 200 GRANULE, FOR SUSPENSION ORAL at 17:08

## 2024-03-10 RX ADMIN — CEFEPIME 100 MILLIGRAM(S): 1 INJECTION, POWDER, FOR SOLUTION INTRAMUSCULAR; INTRAVENOUS at 05:10

## 2024-03-10 RX ADMIN — Medication 50 MILLIEQUIVALENT(S): at 11:32

## 2024-03-10 RX ADMIN — DEXTROSE MONOHYDRATE, SODIUM CHLORIDE, AND POTASSIUM CHLORIDE 100 MILLILITER(S): 50; .745; 4.5 INJECTION, SOLUTION INTRAVENOUS at 11:23

## 2024-03-10 RX ADMIN — CEFEPIME 100 MILLIGRAM(S): 1 INJECTION, POWDER, FOR SOLUTION INTRAMUSCULAR; INTRAVENOUS at 21:35

## 2024-03-10 RX ADMIN — Medication 250 MILLIGRAM(S): at 05:51

## 2024-03-10 RX ADMIN — Medication 25 GRAM(S): at 11:33

## 2024-03-10 RX ADMIN — DEXTROSE MONOHYDRATE, SODIUM CHLORIDE, AND POTASSIUM CHLORIDE 50 MILLILITER(S): 50; .745; 4.5 INJECTION, SOLUTION INTRAVENOUS at 13:37

## 2024-03-10 RX ADMIN — Medication 40 MILLIEQUIVALENT(S): at 01:31

## 2024-03-10 RX ADMIN — Medication 40 MILLIEQUIVALENT(S): at 13:33

## 2024-03-10 RX ADMIN — Medication 25 GRAM(S): at 17:04

## 2024-03-10 RX ADMIN — FAMOTIDINE 40 MILLIGRAM(S): 10 INJECTION INTRAVENOUS at 11:29

## 2024-03-10 RX ADMIN — Medication 137 MICROGRAM(S): at 05:09

## 2024-03-10 RX ADMIN — FIDAXOMICIN 200 MILLIGRAM(S): 200 GRANULE, FOR SUSPENSION ORAL at 05:09

## 2024-03-10 NOTE — PROGRESS NOTE ADULT - SUBJECTIVE AND OBJECTIVE BOX
LENGTH OF HOSPITAL STAY: 5d    CHIEF COMPLAINT:   Patient is a 68y old  Male who presents with a chief complaint of diarrhea (09 Mar 2024 11:57)      HISTORY OF PRESENTING ILLNESS:    HPI:  Patient is 68-year-old male, former smoker with past medical history of HTN, hypothyroidism, melanoma s/p resection, testicular seminoma currently undergoing chemotherapy, presents to the emergency department for diarrhea onset last night.  Patient also complains of vomiting onset this morning and abdominal cramping.  Patient denies fever, chills, cough, shortness of breath, chest pain.  Patient last had chemo on 3/1, which was found 3 of etoposide and cisplatin.  Patient sees Dr. Atwood, oncologist.        Home meds obtained from pt.    declined by ICU, oncology recommended admission to [place]  (05 Mar 2024 21:35)    PAST MEDICAL & SURGICAL HISTORY  PAST MEDICAL & SURGICAL HISTORY:  HTN (hypertension)      Hypothyroidism, unspecified type      Obesity      Other secondary osteoarthritis of right hand      Testicular seminoma      History of eye removal  Right eye      H/O melanoma excision      S/P appendectomy  14 yrs old        SOCIAL HISTORY:    ALLERGIES:  penicillin G benzathine (Rash)  penicillin (Rash)    MEDICATIONS:  STANDING MEDICATIONS  cefepime   IVPB 2000 milliGRAM(s) IV Intermittent every 8 hours  cefepime   IVPB      cholecalciferol 1000 Unit(s) Oral daily  dextrose 5% + sodium chloride 0.9% with potassium chloride 40 mEq/L 1000 milliLiter(s) IV Continuous <Continuous>  famotidine    Tablet 40 milliGRAM(s) Oral daily  fidaxomicin 200 milliGRAM(s) Oral two times a day  filgrastim-sndz (ZARXIO) Injectable 480 MICROGram(s) SubCutaneous daily  levothyroxine 137 MICROGram(s) Oral daily  multivitamin 1 Tablet(s) Oral daily  vancomycin  IVPB 1000 milliGRAM(s) IV Intermittent every 12 hours    PRN MEDICATIONS  acetaminophen     Tablet .. 650 milliGRAM(s) Oral every 6 hours PRN  aluminum hydroxide/magnesium hydroxide/simethicone Suspension 30 milliLiter(s) Oral every 4 hours PRN  melatonin 5 milliGRAM(s) Oral at bedtime PRN  ondansetron Injectable 4 milliGRAM(s) IV Push every 8 hours PRN    VITALS:   T(F): 98  HR: 88  BP: 134/64  RR: 18  SpO2: 97%    LABS:                        7.7    0.60  )-----------( 21       ( 09 Mar 2024 21:14 )             22.1     03-09    135  |  103  |  29<H>  ----------------------------<  135<H>  2.5<LL>   |  22  |  1.0    Ca    7.1<L>      09 Mar 2024 21:14  Mg     2.3     03-09    TPro  4.4<L>  /  Alb  2.7<L>  /  TBili  0.9  /  DBili  x   /  AST  5   /  ALT  9   /  AlkPhos  50  03-09      Urinalysis Basic - ( 09 Mar 2024 21:14 )    Color: x / Appearance: x / SG: x / pH: x  Gluc: 135 mg/dL / Ketone: x  / Bili: x / Urobili: x   Blood: x / Protein: x / Nitrite: x   Leuk Esterase: x / RBC: x / WBC x   Sq Epi: x / Non Sq Epi: x / Bacteria: x        Lactate, Blood: 1.8 mmol/L (03-09-24 @ 14:48)          RADIOLOGY:    PHYSICAL EXAM:  GEN: No acute distress  HEENT:   LUNGS: Clear to auscultation bilaterally   HEART: S1/S2 present. RRR.   ABD: Soft, non-tender, non-distended. Bowel sounds present  EXT:  NEURO: AAOX3

## 2024-03-10 NOTE — PROGRESS NOTE ADULT - SUBJECTIVE AND OBJECTIVE BOX
Pt seen and examined at bedside. Continues to have episodes of diarrhea.     VITAL SIGNS (Last 24 hrs):  T(C): 36.7 (03-10-24 @ 05:02), Max: 38.2 (03-09-24 @ 12:57)  HR: 88 (03-10-24 @ 05:02) (84 - 103)  BP: 134/64 (03-10-24 @ 05:02) (125/70 - 137/69)  RR: 18 (03-10-24 @ 05:02) (18 - 18)  SpO2: 97% (03-09-24 @ 20:41) (96% - 97%)  Wt(kg): --  Daily     Daily     I&O's Summary      PHYSICAL EXAM:  GENERAL: NAD, well-developed  HEAD:  Atraumatic, Normocephalic  EYES:   conjunctiva and sclera clear  NECK: Supple, No JVD  CHEST/LUNG: Clear to auscultation bilaterally; No wheeze  HEART: Regular rate and rhythm; No murmurs, rubs, or gallops  ABDOMEN: Soft, Nontender, slightly distended   EXTREMITIES:  2+ Peripheral Pulses, No clubbing, cyanosis + edema  PSYCH: AAOx3  NEUROLOGY: non-focal  SKIN: No rashes or lesions    Labs Reviewed       CBC Full  -  ( 10 Mar 2024 08:36 )  WBC Count : 0.96 K/uL  Hemoglobin : 8.1 g/dL  Hematocrit : 23.3 %  Platelet Count - Automated : 9 K/uL  Mean Cell Volume : 85.0 fL  Mean Cell Hemoglobin : 29.6 pg  Mean Cell Hemoglobin Concentration : 34.8 g/dL  Auto Neutrophil # : 0.35 K/uL  Auto Lymphocyte # : 0.34 K/uL  Auto Monocyte # : 0.25 K/uL  Auto Eosinophil # : 0.00 K/uL  Auto Basophil # : 0.00 K/uL  Auto Neutrophil % : 36.5 %  Auto Lymphocyte % : 35.4 %  Auto Monocyte % : 26.0 %  Auto Eosinophil % : 0.0 %  Auto Basophil % : 0.0 %    BMP:    03-10 @ 08:36    Blood Urea Nitrogen - 26  Calcium - 7.3  Carbond Dioxide - 20  Chloride - 105  Creatinine - 1.0  Glucose - 125  Potassium - 2.7  Sodium - 136      Hemoglobin A1c -     Urine Culture:  03-05 @ 14:05 Urine culture: --    Culture Results:   No growth at 4 days  Method Type: --  Organism: --  Organism Identification: --  Specimen Source: .Blood Blood-Peripheral            MEDICATIONS  (STANDING):  cefepime   IVPB 2000 milliGRAM(s) IV Intermittent every 8 hours  cefepime   IVPB      cholecalciferol 1000 Unit(s) Oral daily  dextrose 5% + sodium chloride 0.9% with potassium chloride 40 mEq/L 1000 milliLiter(s) (100 mL/Hr) IV Continuous <Continuous>  famotidine    Tablet 40 milliGRAM(s) Oral daily  fidaxomicin 200 milliGRAM(s) Oral two times a day  filgrastim-sndz (ZARXIO) Injectable 480 MICROGram(s) SubCutaneous daily  levothyroxine 137 MICROGram(s) Oral daily  magnesium sulfate  IVPB 2 Gram(s) IV Intermittent every 6 hours  multivitamin 1 Tablet(s) Oral daily  vancomycin  IVPB 1000 milliGRAM(s) IV Intermittent every 12 hours    MEDICATIONS  (PRN):  acetaminophen     Tablet .. 650 milliGRAM(s) Oral every 6 hours PRN Temp greater or equal to 38C (100.4F), Mild Pain (1 - 3)  aluminum hydroxide/magnesium hydroxide/simethicone Suspension 30 milliLiter(s) Oral every 4 hours PRN Dyspepsia  melatonin 5 milliGRAM(s) Oral at bedtime PRN Insomnia  ondansetron Injectable 4 milliGRAM(s) IV Push every 8 hours PRN Nausea and/or Vomiting

## 2024-03-10 NOTE — CHART NOTE - NSCHARTNOTEFT_GEN_A_CORE
Plt this AM is 9K. Discussed with Heme onc, will transfuse plt slowly, over 3 hours and repeat CBC at 8pm.   Cross matched platelets won't be available until Monday per prior documentation.  No active bleeding noted. Plt this AM is 9K and Hgb 8.1. Discussed with Heme onc, will transfuse plt slowly, over 3 hours and give 1 unit pRBC and repeat CBC at 8pm.   Cross matched platelets won't be available until Monday per prior documentation.  No active bleeding or AMS noted.

## 2024-03-10 NOTE — PROGRESS NOTE ADULT - ASSESSMENT
Patient is 68-year-old male, former smoker with past medical history of HTN, hypothyroidism, melanoma s/p resection, testicular seminoma currently undergoing chemotherapy, presents to the emergency department for diarrhea onset last night.  Patient also complains of vomiting onset this morning and abdominal cramping.  Patient denies fever, chills, cough, shortness of breath, chest pain.  Patient last had chemo on 3/1, that was cycle 3 of EP.  Patient sees Dr. Atwood, oncologist.    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  - MR Brain 1.12.2024 no evidence of intracranial metastasis or acute intracranial pathology, paranasal sinus mucosal disease  - s/p Right chest port placed via right IJ  - s/p cycle 3 of Cisplatin + Etoposide on 2/26 followed by Neulasta onbody injection D5 (3/1/24), initiated on 1.15.2024  - CT abdomen 3.5.24: Aortocaval and periaortic lymphadenopathy, markedly decreased in size comparison to January 9, 2024.    # History of Melanoma of skin, right shoulder, dx 10/2018    - followup with DERM for surveillance skin exams at least every 6-12 months      Recommendations:    - Fidoxamycin for C.diff diarrhea. f/u BCx from 3/9  -  C/w IVF. Replete Mag and K  -  Transfuse Plt if < 10 K or if patient is bleeding. Transfuse Cross-matched Platelets as pt has sub-optimal response to transfusion. Okay to transfuse Regular platelets in interim (Transfuse slowly over 3 hrs)  - c/w G-CSF (started 3/9/24)  - If abdominal pain worsens, get repeat CT abdomen.   - neutropenic precautions: NO RECTAL TEMP or MEDICATIONS  - Mechanical DVT PPx    for any questions call or text via [facility] teams

## 2024-03-10 NOTE — PROGRESS NOTE ADULT - ASSESSMENT
68-year-old male, former smoker with past medical history of HTN, hypothyroidism, melanoma s/p resection, testicular seminoma currently undergoing chemotherapy, presents to the emergency department for diarrhea.       # Neutropenic fever 2/2 C diff colitis   # Pancytopenia 2/2 chemo   # Magnesium deficiency 2/2 diarrhea   # Hypocalcemia 2/2 magnesium deficiency   # Hypokalemia   # Prolonged QTc    - c/w IVF, gentle hydration (starting to develop edema)  - c/w fidaxomicin   - Isolation precautions   - Oncology following   - transfuse platelets to keep above 10k and hgb above 8  - started on Zarxio   - CBC BID and BMP with Mg TID    # Testicular cancer on chemo   - Oncology following     # HTN  - Hold anti-hypertensive medications      # Hypothyroid  - c/w home med    dvt ppx: SCD due to thrombocytopenia     Pending: neutropenia improvement, diarrhea resolution, hypokalemia   Plan of care d/w patient   Dispo: Home

## 2024-03-11 LAB
ALBUMIN SERPL ELPH-MCNC: 2.5 G/DL — LOW (ref 3.5–5.2)
ALP SERPL-CCNC: 54 U/L — SIGNIFICANT CHANGE UP (ref 30–115)
ALT FLD-CCNC: 9 U/L — SIGNIFICANT CHANGE UP (ref 0–41)
ANION GAP SERPL CALC-SCNC: 10 MMOL/L — SIGNIFICANT CHANGE UP (ref 7–14)
ANION GAP SERPL CALC-SCNC: 11 MMOL/L — SIGNIFICANT CHANGE UP (ref 7–14)
AST SERPL-CCNC: 12 U/L — SIGNIFICANT CHANGE UP (ref 0–41)
BASOPHILS # BLD AUTO: 0.05 K/UL — SIGNIFICANT CHANGE UP (ref 0–0.2)
BASOPHILS NFR BLD AUTO: 0.6 % — SIGNIFICANT CHANGE UP (ref 0–1)
BILIRUB SERPL-MCNC: 0.6 MG/DL — SIGNIFICANT CHANGE UP (ref 0.2–1.2)
BUN SERPL-MCNC: 20 MG/DL — SIGNIFICANT CHANGE UP (ref 10–20)
BUN SERPL-MCNC: 21 MG/DL — HIGH (ref 10–20)
CALCIUM SERPL-MCNC: 7.7 MG/DL — LOW (ref 8.4–10.4)
CALCIUM SERPL-MCNC: 7.8 MG/DL — LOW (ref 8.4–10.5)
CHLORIDE SERPL-SCNC: 107 MMOL/L — SIGNIFICANT CHANGE UP (ref 98–110)
CHLORIDE SERPL-SCNC: 107 MMOL/L — SIGNIFICANT CHANGE UP (ref 98–110)
CO2 SERPL-SCNC: 19 MMOL/L — SIGNIFICANT CHANGE UP (ref 17–32)
CO2 SERPL-SCNC: 20 MMOL/L — SIGNIFICANT CHANGE UP (ref 17–32)
CREAT SERPL-MCNC: 0.8 MG/DL — SIGNIFICANT CHANGE UP (ref 0.7–1.5)
CREAT SERPL-MCNC: 0.8 MG/DL — SIGNIFICANT CHANGE UP (ref 0.7–1.5)
EGFR: 95 ML/MIN/1.73M2 — SIGNIFICANT CHANGE UP
EGFR: 95 ML/MIN/1.73M2 — SIGNIFICANT CHANGE UP
EOSINOPHIL # BLD AUTO: 0 K/UL — SIGNIFICANT CHANGE UP (ref 0–0.7)
EOSINOPHIL NFR BLD AUTO: 0 % — SIGNIFICANT CHANGE UP (ref 0–8)
GLUCOSE SERPL-MCNC: 112 MG/DL — HIGH (ref 70–99)
GLUCOSE SERPL-MCNC: 87 MG/DL — SIGNIFICANT CHANGE UP (ref 70–99)
HCT VFR BLD CALC: 26.3 % — LOW (ref 42–52)
HGB BLD-MCNC: 9.4 G/DL — LOW (ref 14–18)
IMM GRANULOCYTES NFR BLD AUTO: 14.8 % — HIGH (ref 0.1–0.3)
LYMPHOCYTES # BLD AUTO: 0.96 K/UL — LOW (ref 1.2–3.4)
LYMPHOCYTES # BLD AUTO: 11.2 % — LOW (ref 20.5–51.1)
MAGNESIUM SERPL-MCNC: 1.5 MG/DL — LOW (ref 1.8–2.4)
MAGNESIUM SERPL-MCNC: 1.6 MG/DL — LOW (ref 1.8–2.4)
MAGNESIUM SERPL-MCNC: 2.2 MG/DL — SIGNIFICANT CHANGE UP (ref 1.8–2.4)
MCHC RBC-ENTMCNC: 28.9 PG — SIGNIFICANT CHANGE UP (ref 27–31)
MCHC RBC-ENTMCNC: 35.7 G/DL — SIGNIFICANT CHANGE UP (ref 32–37)
MCV RBC AUTO: 80.9 FL — SIGNIFICANT CHANGE UP (ref 80–94)
MONOCYTES # BLD AUTO: 1.05 K/UL — HIGH (ref 0.1–0.6)
MONOCYTES NFR BLD AUTO: 12.2 % — HIGH (ref 1.7–9.3)
NEUTROPHILS # BLD AUTO: 5.26 K/UL — SIGNIFICANT CHANGE UP (ref 1.4–6.5)
NEUTROPHILS NFR BLD AUTO: 61.2 % — SIGNIFICANT CHANGE UP (ref 42.2–75.2)
NRBC # BLD: 0 /100 WBCS — SIGNIFICANT CHANGE UP (ref 0–0)
PLATELET # BLD AUTO: 8 K/UL — CRITICAL LOW (ref 130–400)
PMV BLD: 11.5 FL — HIGH (ref 7.4–10.4)
POTASSIUM SERPL-MCNC: 2.9 MMOL/L — LOW (ref 3.5–5)
POTASSIUM SERPL-MCNC: 3.2 MMOL/L — LOW (ref 3.5–5)
POTASSIUM SERPL-SCNC: 2.9 MMOL/L — LOW (ref 3.5–5)
POTASSIUM SERPL-SCNC: 3.2 MMOL/L — LOW (ref 3.5–5)
PROT SERPL-MCNC: 4.1 G/DL — LOW (ref 6–8)
RBC # BLD: 3.25 M/UL — LOW (ref 4.7–6.1)
RBC # FLD: 14.1 % — SIGNIFICANT CHANGE UP (ref 11.5–14.5)
SODIUM SERPL-SCNC: 137 MMOL/L — SIGNIFICANT CHANGE UP (ref 135–146)
SODIUM SERPL-SCNC: 137 MMOL/L — SIGNIFICANT CHANGE UP (ref 135–146)
VANCOMYCIN TROUGH SERPL-MCNC: 7.8 UG/ML — SIGNIFICANT CHANGE UP (ref 5–10)
WBC # BLD: 8.59 K/UL — SIGNIFICANT CHANGE UP (ref 4.8–10.8)
WBC # FLD AUTO: 8.59 K/UL — SIGNIFICANT CHANGE UP (ref 4.8–10.8)

## 2024-03-11 PROCEDURE — 99233 SBSQ HOSP IP/OBS HIGH 50: CPT

## 2024-03-11 RX ORDER — MAGNESIUM SULFATE 500 MG/ML
2 VIAL (ML) INJECTION ONCE
Refills: 0 | Status: COMPLETED | OUTPATIENT
Start: 2024-03-11 | End: 2024-03-11

## 2024-03-11 RX ORDER — POTASSIUM CHLORIDE 20 MEQ
20 PACKET (EA) ORAL
Refills: 0 | Status: COMPLETED | OUTPATIENT
Start: 2024-03-11 | End: 2024-03-11

## 2024-03-11 RX ORDER — CHLORHEXIDINE GLUCONATE 213 G/1000ML
1 SOLUTION TOPICAL
Refills: 0 | Status: DISCONTINUED | OUTPATIENT
Start: 2024-03-11 | End: 2024-03-20

## 2024-03-11 RX ORDER — POTASSIUM CHLORIDE 20 MEQ
20 PACKET (EA) ORAL ONCE
Refills: 0 | Status: DISCONTINUED | OUTPATIENT
Start: 2024-03-11 | End: 2024-03-11

## 2024-03-11 RX ORDER — MAGNESIUM SULFATE 500 MG/ML
2 VIAL (ML) INJECTION
Refills: 0 | Status: COMPLETED | OUTPATIENT
Start: 2024-03-11 | End: 2024-03-11

## 2024-03-11 RX ADMIN — Medication 25 GRAM(S): at 02:00

## 2024-03-11 RX ADMIN — Medication 1000 UNIT(S): at 11:18

## 2024-03-11 RX ADMIN — CEFEPIME 100 MILLIGRAM(S): 1 INJECTION, POWDER, FOR SOLUTION INTRAMUSCULAR; INTRAVENOUS at 05:21

## 2024-03-11 RX ADMIN — FAMOTIDINE 40 MILLIGRAM(S): 10 INJECTION INTRAVENOUS at 11:18

## 2024-03-11 RX ADMIN — FIDAXOMICIN 200 MILLIGRAM(S): 200 GRANULE, FOR SUSPENSION ORAL at 05:21

## 2024-03-11 RX ADMIN — Medication 25 GRAM(S): at 13:50

## 2024-03-11 RX ADMIN — Medication 50 MILLIEQUIVALENT(S): at 13:52

## 2024-03-11 RX ADMIN — FIDAXOMICIN 200 MILLIGRAM(S): 200 GRANULE, FOR SUSPENSION ORAL at 18:00

## 2024-03-11 RX ADMIN — Medication 25 GRAM(S): at 15:21

## 2024-03-11 RX ADMIN — Medication 50 MILLIEQUIVALENT(S): at 15:21

## 2024-03-11 RX ADMIN — Medication 137 MICROGRAM(S): at 05:21

## 2024-03-11 RX ADMIN — Medication 50 MILLIEQUIVALENT(S): at 17:46

## 2024-03-11 RX ADMIN — Medication 1 TABLET(S): at 11:17

## 2024-03-11 RX ADMIN — Medication 250 MILLIGRAM(S): at 05:20

## 2024-03-11 NOTE — PROGRESS NOTE ADULT - ASSESSMENT
68-year-old male, former smoker with past medical history of HTN, hypothyroidism, melanoma s/p resection, testicular seminoma currently undergoing chemotherapy, presents to the emergency department for diarrhea.     # Pancytopenia 2/2 chemo   # Neutropenic fever 2/2 C diff colitis    - c/w IVF   - c/w fidaxomicin   - Isolation precautions   - transfuse platelets to keep above 10k unless bleeding and hgb above 7.5 -> s/p 1 pRBC and 1 platelet transfusion  - Per hem onc team has not answered adequately to the platelet transfusion, approval requested and granted for cross matched platelets from DR. Shipman on 03/09/2024 at 10:55  - In the meantime, will transfuse 1 regular unit of platelets  - s/p Neulasta as outpatient   - Patient got 2 doses of Zarxio SC on 03/09 and 03/10    #Fever on 03/09  - UA and blood cultures sent, f/u  - Chest x-ray did not identify any opacities  - Patient started on empiric atb    # Testicular cancer on chemo   - Oncology following : - s/p Right chest port placed via right IJ  - s/p cycle 3 of Cisplatin + Etoposide on 2/26 followed by Neulasta onbody injection D5 (3/1/24), initiated on 1.15.2024    # Hypomagnesemia 2/2 diarrhea   # Hypocalcemia 2/2 magnesium deficiency   # Hypokalemia   - Prolonged QTc - resolved (QTc 582 on 03/05 --> 433 on 03/07)  - Electrolytes are being monitored BID and replaced as needed    # HTN  - Hold anti-hypertensive medications      # Hypothyroid  - c/w home med    dvt ppx: SCD due to thrombocytopenia     Pending: thrombocytopenia improvement, diarrhea resolution   Plan of care d/w patient   Dispo: still acute, home when stable

## 2024-03-11 NOTE — PROGRESS NOTE ADULT - SUBJECTIVE AND OBJECTIVE BOX
24H events:    Patient is a 68y old Male who presents with a chief complaint of diarrhea (10 Mar 2024 12:14)    Primary diagnosis of Neutropenic fever      Day 1:  Day 2:  Day 3:     Today is hospital day 6d. This morning patient was seen and examined at bedside, resting comfortably in bed.    No acute or major events overnight. Hemodynamically stable, tolerating oral diet, voiding appropriately with appropriate bowel movements.     Code Status:    Family communication:  Contact date:  Name of person contacted:  Relationship to patient:  Communication details:  What matters most:    PAST MEDICAL & SURGICAL HISTORY  HTN (hypertension)    Hypothyroidism, unspecified type    Obesity    Other secondary osteoarthritis of right hand    Testicular seminoma    History of eye removal  Right eye    H/O melanoma excision    S/P appendectomy  14 yrs old      SOCIAL HISTORY:  Social History:      ALLERGIES:  penicillin G benzathine (Rash)  penicillin (Rash)    MEDICATIONS:  STANDING MEDICATIONS  cefepime   IVPB      cefepime   IVPB 2000 milliGRAM(s) IV Intermittent every 8 hours  chlorhexidine 2% Cloths 1 Application(s) Topical <User Schedule>  cholecalciferol 1000 Unit(s) Oral daily  dextrose 5% + sodium chloride 0.9% with potassium chloride 40 mEq/L 1000 milliLiter(s) IV Continuous <Continuous>  famotidine    Tablet 40 milliGRAM(s) Oral daily  fidaxomicin 200 milliGRAM(s) Oral two times a day  levothyroxine 137 MICROGram(s) Oral daily  multivitamin 1 Tablet(s) Oral daily  vancomycin  IVPB 1000 milliGRAM(s) IV Intermittent every 12 hours    PRN MEDICATIONS  acetaminophen     Tablet .. 650 milliGRAM(s) Oral every 6 hours PRN  aluminum hydroxide/magnesium hydroxide/simethicone Suspension 30 milliLiter(s) Oral every 4 hours PRN  melatonin 5 milliGRAM(s) Oral at bedtime PRN  ondansetron Injectable 4 milliGRAM(s) IV Push every 8 hours PRN    VITALS:   T(F): 96.9  HR: 80  BP: 137/69  RR: 18  SpO2: 95%    PHYSICAL EXAM:  GENERAL: NAD, lying in bed comfortably, obtunded, lethargic,  somnolent, cooperative, elderly  HEAD: NCAD, no hematoma or laceration   NECK: Supple, no neck stiffness/nuchal rigidity, no JVD    HEART: Regular rate and rhythm, normal S1/S2, no murmurs, heaves, thrills  LUNGS: No acute respiratory distress, clear b/l breath sounds, no wheezing, rales, rhonchi  ABDOMEN:  soft, non-tender, non-distented, + BS, no hepatosplenomegaly   EXTREMITIES: no rashes, extremities warm/dry, no cyanosis, no edema, ulcerations or ecchymosis  NERVOUS SYSTEM:  A&Ox3, CNII-XII intact, follows commands, answers questions appropriately   SKIN: No rashes or lesions       AMPA score:    (  ) Indwelling Garvin Catheter:   Date insterted:    Reason (  ) Critical illness     (  ) urinary retention    (  ) Accurate Ins/Outs Monitoring     (  ) CMO patient    (  ) Central Line:   Date inserted:  Location: (  ) Right IJ     (  ) Left IJ     (  ) Right Fem     (  ) Left Fem    (  ) SPC        (  ) pigtail       (  ) PEG tube       (  ) colostomy       (  ) jejunostomy  (  ) U-Dall    LABS:                        9.0    2.68  )-----------( 13       ( 10 Mar 2024 22:28 )             25.9     03-10    136  |  107  |  24<H>  ----------------------------<  123<H>  3.1<L>   |  20  |  0.8    Ca    7.4<L>      10 Mar 2024 22:28  Mg     1.5     03-11    Urinalysis Basic - ( 10 Mar 2024 22:28 )    Color: x / Appearance: x / SG: x / pH: x  Gluc: 123 mg/dL / Ketone: x  / Bili: x / Urobili: x   Blood: x / Protein: x / Nitrite: x   Leuk Esterase: x / RBC: x / WBC x   Sq Epi: x / Non Sq Epi: x / Bacteria: x    Culture - Blood (collected 09 Mar 2024 14:48)  Source: .Blood Blood  Preliminary Report (11 Mar 2024 01:02):    No growth at 24 hours    RADIOLOGY:  ACC: [id number] EXAM:  XR CHEST PORTABLE IMMED 1V     PROCEDURE DATE:  03/09/2024      INTERPRETATION:  Clinical History / Reason for exam: Sepsis    Comparison : Chest radiograph 3/5/2024.    Technique/Positioning: Frontal.    Findings:    Support devices: Port-A-Cath superior vena cava    Cardiac/mediastinum/hilum: Unremarkable.    Lung parenchyma/Pleura: Within normal limits.    Skeleton/soft tissues: Unremarkable.    Impression:    No radiographic evidence of acute cardiopulmonary disease.    --- End of Report ---    FLAQUITA FLANAGAN MD; Attending Radiologist  This document has been electronically signed. Mar  9 2024  2:37PM

## 2024-03-12 LAB
ALBUMIN SERPL ELPH-MCNC: 2.5 G/DL — LOW (ref 3.5–5.2)
ALP SERPL-CCNC: 69 U/L — SIGNIFICANT CHANGE UP (ref 30–115)
ALT FLD-CCNC: 10 U/L — SIGNIFICANT CHANGE UP (ref 0–41)
ANION GAP SERPL CALC-SCNC: 8 MMOL/L — SIGNIFICANT CHANGE UP (ref 7–14)
AST SERPL-CCNC: 16 U/L — SIGNIFICANT CHANGE UP (ref 0–41)
BASOPHILS # BLD AUTO: 0.19 K/UL — SIGNIFICANT CHANGE UP (ref 0–0.2)
BASOPHILS NFR BLD AUTO: 0.9 % — SIGNIFICANT CHANGE UP (ref 0–1)
BILIRUB SERPL-MCNC: 0.4 MG/DL — SIGNIFICANT CHANGE UP (ref 0.2–1.2)
BUN SERPL-MCNC: 19 MG/DL — SIGNIFICANT CHANGE UP (ref 10–20)
CALCIUM SERPL-MCNC: 7.7 MG/DL — LOW (ref 8.4–10.5)
CHLORIDE SERPL-SCNC: 109 MMOL/L — SIGNIFICANT CHANGE UP (ref 98–110)
CO2 SERPL-SCNC: 21 MMOL/L — SIGNIFICANT CHANGE UP (ref 17–32)
CREAT SERPL-MCNC: 0.8 MG/DL — SIGNIFICANT CHANGE UP (ref 0.7–1.5)
EGFR: 95 ML/MIN/1.73M2 — SIGNIFICANT CHANGE UP
EOSINOPHIL # BLD AUTO: 0 K/UL — SIGNIFICANT CHANGE UP (ref 0–0.7)
EOSINOPHIL NFR BLD AUTO: 0 % — SIGNIFICANT CHANGE UP (ref 0–8)
GLUCOSE SERPL-MCNC: 97 MG/DL — SIGNIFICANT CHANGE UP (ref 70–99)
HCT VFR BLD CALC: 25.9 % — LOW (ref 42–52)
HGB BLD-MCNC: 9.3 G/DL — LOW (ref 14–18)
IMM GRANULOCYTES NFR BLD AUTO: 22.4 % — HIGH (ref 0.1–0.3)
LYMPHOCYTES # BLD AUTO: 1.87 K/UL — SIGNIFICANT CHANGE UP (ref 1.2–3.4)
LYMPHOCYTES # BLD AUTO: 9.1 % — LOW (ref 20.5–51.1)
MAGNESIUM SERPL-MCNC: 1.7 MG/DL — LOW (ref 1.8–2.4)
MCHC RBC-ENTMCNC: 29.2 PG — SIGNIFICANT CHANGE UP (ref 27–31)
MCHC RBC-ENTMCNC: 35.9 G/DL — SIGNIFICANT CHANGE UP (ref 32–37)
MCV RBC AUTO: 81.2 FL — SIGNIFICANT CHANGE UP (ref 80–94)
MONOCYTES # BLD AUTO: 2.34 K/UL — HIGH (ref 0.1–0.6)
MONOCYTES NFR BLD AUTO: 11.4 % — HIGH (ref 1.7–9.3)
NEUTROPHILS # BLD AUTO: 11.47 K/UL — HIGH (ref 1.4–6.5)
NEUTROPHILS NFR BLD AUTO: 56.2 % — SIGNIFICANT CHANGE UP (ref 42.2–75.2)
NRBC # BLD: 0 /100 WBCS — SIGNIFICANT CHANGE UP (ref 0–0)
PLATELET # BLD AUTO: 31 K/UL — LOW (ref 130–400)
PMV BLD: 12 FL — HIGH (ref 7.4–10.4)
POTASSIUM SERPL-MCNC: 3.6 MMOL/L — SIGNIFICANT CHANGE UP (ref 3.5–5)
POTASSIUM SERPL-SCNC: 3.6 MMOL/L — SIGNIFICANT CHANGE UP (ref 3.5–5)
PROT SERPL-MCNC: 4.1 G/DL — LOW (ref 6–8)
RBC # BLD: 3.19 M/UL — LOW (ref 4.7–6.1)
RBC # FLD: 14.5 % — SIGNIFICANT CHANGE UP (ref 11.5–14.5)
SODIUM SERPL-SCNC: 138 MMOL/L — SIGNIFICANT CHANGE UP (ref 135–146)
WBC # BLD: 20.45 K/UL — HIGH (ref 4.8–10.8)
WBC # FLD AUTO: 20.45 K/UL — HIGH (ref 4.8–10.8)

## 2024-03-12 PROCEDURE — 99232 SBSQ HOSP IP/OBS MODERATE 35: CPT

## 2024-03-12 RX ORDER — POTASSIUM CHLORIDE 20 MEQ
20 PACKET (EA) ORAL
Refills: 0 | Status: COMPLETED | OUTPATIENT
Start: 2024-03-12 | End: 2024-03-12

## 2024-03-12 RX ORDER — MAGNESIUM SULFATE 500 MG/ML
2 VIAL (ML) INJECTION
Refills: 0 | Status: COMPLETED | OUTPATIENT
Start: 2024-03-12 | End: 2024-03-12

## 2024-03-12 RX ADMIN — Medication 1000 UNIT(S): at 11:12

## 2024-03-12 RX ADMIN — Medication 50 MILLIEQUIVALENT(S): at 03:47

## 2024-03-12 RX ADMIN — Medication 25 GRAM(S): at 10:03

## 2024-03-12 RX ADMIN — FAMOTIDINE 40 MILLIGRAM(S): 10 INJECTION INTRAVENOUS at 11:12

## 2024-03-12 RX ADMIN — Medication 50 MILLIEQUIVALENT(S): at 01:51

## 2024-03-12 RX ADMIN — Medication 25 GRAM(S): at 11:15

## 2024-03-12 RX ADMIN — Medication 50 MILLIEQUIVALENT(S): at 05:42

## 2024-03-12 RX ADMIN — Medication 1 TABLET(S): at 11:12

## 2024-03-12 RX ADMIN — Medication 137 MICROGRAM(S): at 05:42

## 2024-03-12 RX ADMIN — CHLORHEXIDINE GLUCONATE 1 APPLICATION(S): 213 SOLUTION TOPICAL at 06:07

## 2024-03-12 RX ADMIN — FIDAXOMICIN 200 MILLIGRAM(S): 200 GRANULE, FOR SUSPENSION ORAL at 05:42

## 2024-03-12 RX ADMIN — FIDAXOMICIN 200 MILLIGRAM(S): 200 GRANULE, FOR SUSPENSION ORAL at 17:41

## 2024-03-12 NOTE — PROGRESS NOTE ADULT - ASSESSMENT
68-year-old male, former smoker with past medical history of HTN, hypothyroidism, melanoma s/p resection, testicular seminoma currently undergoing chemotherapy, presents to the emergency department for diarrhea.     # Pancytopenia 2/2 chemo   # Neutropenic fever 2/2 C diff colitis    - c/w IVF   - c/w fidaxomicin   - Isolation precautions   - transfuse platelets to keep above 10k unless bleeding and hgb above 7.5 -> s/p 1 pRBC and 1 platelet transfusion  - Per hem onc team has not answered adequately to the platelet transfusion, approval requested and granted for cross matched platelets from DR. Shipman on 03/09/2024 at 10:55  - In the meantime, will transfuse 1 regular unit of platelets  - s/p Neulasta as outpatient   - Patient got 2 doses of Zarxio SC on 03/09 and 03/10  --> Neutropenia resolved     #Fever on 03/09  - UA and blood cultures sent  - Chest x-ray did not identify any opacities  - Patient started on empiric atb on 03/09 --> stopped on 03/11: blood cultures were negative, patient has been afebrile for 48h and WBC count normalized    # Testicular cancer on chemo   - Oncology following : - s/p Right chest port placed via right IJ  - s/p cycle 3 of Cisplatin + Etoposide on 2/26 followed by Neulasta onbody injection D5 (3/1/24), initiated on 1.15.2024    # Hypomagnesemia 2/2 diarrhea   # Hypocalcemia 2/2 magnesium deficiency   # Hypokalemia   - Prolonged QTc - resolved (QTc 582 on 03/05 --> 433 on 03/07)  - Electrolytes are being monitored BID and replaced as required    # HTN  - Hold anti-hypertensive medications      # Hypothyroid  - c/w home med    dvt ppx: SCD due to thrombocytopenia     Pending: thrombocytopenia improvement, diarrhea resolution   Plan of care d/w patient   Dispo: still acute, home when stable

## 2024-03-12 NOTE — PROGRESS NOTE ADULT - SUBJECTIVE AND OBJECTIVE BOX
24H events:    Patient is a 68y old Male who presents with a chief complaint of Diarrhea (11 Mar 2024 09:16)    Primary diagnosis of Neutropenic fever    Today is hospital day 7d. This morning patient was seen and examined at bedside.  Mentioned he had still 2 episodes of diarrhea overnight. No fever, no chills  Hemodynamically stable, tolerating oral diet, voiding appropriately with appropriate bowel movements.     Code Status: Full code    PAST MEDICAL & SURGICAL HISTORY  HTN (hypertension)    Hypothyroidism, unspecified type    Obesity    Other secondary osteoarthritis of right hand    Testicular seminoma    History of eye removal Right eye    H/O melanoma excision    S/P appendectomy 14 yrs old      SOCIAL HISTORY:  Social History:      ALLERGIES:  penicillin G benzathine (Rash)  penicillin (Rash)    MEDICATIONS:  STANDING MEDICATIONS  chlorhexidine 2% Cloths 1 Application(s) Topical <User Schedule>  cholecalciferol 1000 Unit(s) Oral daily  famotidine    Tablet 40 milliGRAM(s) Oral daily  fidaxomicin 200 milliGRAM(s) Oral two times a day  levothyroxine 137 MICROGram(s) Oral daily  magnesium sulfate  IVPB 2 Gram(s) IV Intermittent every 2 hours  multivitamin 1 Tablet(s) Oral daily    PRN MEDICATIONS  acetaminophen     Tablet .. 650 milliGRAM(s) Oral every 6 hours PRN  aluminum hydroxide/magnesium hydroxide/simethicone Suspension 30 milliLiter(s) Oral every 4 hours PRN  melatonin 5 milliGRAM(s) Oral at bedtime PRN  ondansetron Injectable 4 milliGRAM(s) IV Push every 8 hours PRN    VITALS:   T(F): 96.8  HR: 88  BP: 121/66  RR: 18  SpO2: 95%    PHYSICAL EXAM:  GENERAL: NAD, lying in bed comfortably, obtunded, lethargic,  somnolent, cooperative, elderly  HEAD: NCAD, no hematoma or laceration   NECK: Supple, no neck stiffness/nuchal rigidity, no JVD    HEART: Regular rate and rhythm, normal S1/S2, no murmurs, heaves, thrills  LUNGS: No acute respiratory distress, clear b/l breath sounds, no wheezing, rales, rhonchi  ABDOMEN:  soft, non-tender, non-distented, + BS, no hepatosplenomegaly   EXTREMITIES: no rashes, extremities warm/dry, no cyanosis, no edema, ulcerations or ecchymosis  NERVOUS SYSTEM:  A&Ox3, CNII-XII intact, follows commands, answers questions appropriately   SKIN: No rashes or lesions       [facility] score:    LABS:                        9.3    20.45 )-----------( 31       ( 12 Mar 2024 08:37 )             25.9     03-12    138  |  109  |  19  ----------------------------<  97  3.6   |  21  |  0.8    Ca    7.7<L>      12 Mar 2024 08:37  Mg     1.7     03-12    TPro  4.1<L>  /  Alb  2.5<L>  /  TBili  0.4  /  DBili  x   /  AST  16  /  ALT  10  /  AlkPhos  69  03-12    Urinalysis Basic - ( 12 Mar 2024 08:37 )    Color: x / Appearance: x / SG: x / pH: x  Gluc: 97 mg/dL / Ketone: x  / Bili: x / Urobili: x   Blood: x / Protein: x / Nitrite: x   Leuk Esterase: x / RBC: x / WBC x   Sq Epi: x / Non Sq Epi: x / Bacteria: x    Culture - Blood (collected 10 Mar 2024 08:36)  Source: .Blood None  Preliminary Report (11 Mar 2024 20:01):    No growth at 24 hours    Culture - Blood (collected 09 Mar 2024 14:48)  Source: .Blood Blood  Preliminary Report (12 Mar 2024 01:02):    No growth at 48 Hours    RADIOLOGY:

## 2024-03-13 LAB
ALBUMIN SERPL ELPH-MCNC: 2.5 G/DL — LOW (ref 3.5–5.2)
ALP SERPL-CCNC: 122 U/L — HIGH (ref 30–115)
ALT FLD-CCNC: 9 U/L — SIGNIFICANT CHANGE UP (ref 0–41)
ANION GAP SERPL CALC-SCNC: 12 MMOL/L — SIGNIFICANT CHANGE UP (ref 7–14)
AST SERPL-CCNC: 18 U/L — SIGNIFICANT CHANGE UP (ref 0–41)
BASOPHILS # BLD AUTO: 0.01 K/UL — SIGNIFICANT CHANGE UP (ref 0–0.2)
BASOPHILS NFR BLD AUTO: 0 % — SIGNIFICANT CHANGE UP (ref 0–1)
BILIRUB SERPL-MCNC: 0.3 MG/DL — SIGNIFICANT CHANGE UP (ref 0.2–1.2)
BUN SERPL-MCNC: 18 MG/DL — SIGNIFICANT CHANGE UP (ref 10–20)
CALCIUM SERPL-MCNC: 7.4 MG/DL — LOW (ref 8.4–10.5)
CHLORIDE SERPL-SCNC: 108 MMOL/L — SIGNIFICANT CHANGE UP (ref 98–110)
CO2 SERPL-SCNC: 20 MMOL/L — SIGNIFICANT CHANGE UP (ref 17–32)
CREAT SERPL-MCNC: 0.8 MG/DL — SIGNIFICANT CHANGE UP (ref 0.7–1.5)
EGFR: 95 ML/MIN/1.73M2 — SIGNIFICANT CHANGE UP
EOSINOPHIL # BLD AUTO: 0 K/UL — SIGNIFICANT CHANGE UP (ref 0–0.7)
EOSINOPHIL NFR BLD AUTO: 0 % — SIGNIFICANT CHANGE UP (ref 0–8)
GLUCOSE SERPL-MCNC: 76 MG/DL — SIGNIFICANT CHANGE UP (ref 70–99)
HCT VFR BLD CALC: 24.3 % — LOW (ref 42–52)
HGB BLD-MCNC: 8.8 G/DL — LOW (ref 14–18)
IMM GRANULOCYTES NFR BLD AUTO: 21 % — HIGH (ref 0.1–0.3)
LYMPHOCYTES # BLD AUTO: 13.1 % — LOW (ref 20.5–51.1)
LYMPHOCYTES # BLD AUTO: 4.79 K/UL — HIGH (ref 1.2–3.4)
MAGNESIUM SERPL-MCNC: 1.5 MG/DL — LOW (ref 1.8–2.4)
MCHC RBC-ENTMCNC: 29.2 PG — SIGNIFICANT CHANGE UP (ref 27–31)
MCHC RBC-ENTMCNC: 36.2 G/DL — SIGNIFICANT CHANGE UP (ref 32–37)
MCV RBC AUTO: 80.7 FL — SIGNIFICANT CHANGE UP (ref 80–94)
MONOCYTES # BLD AUTO: 1.54 K/UL — HIGH (ref 0.1–0.6)
MONOCYTES NFR BLD AUTO: 4.2 % — SIGNIFICANT CHANGE UP (ref 1.7–9.3)
NEUTROPHILS # BLD AUTO: 22.51 K/UL — HIGH (ref 1.4–6.5)
NEUTROPHILS NFR BLD AUTO: 61.7 % — SIGNIFICANT CHANGE UP (ref 42.2–75.2)
NRBC # BLD: 0 /100 WBCS — SIGNIFICANT CHANGE UP (ref 0–0)
PLATELET # BLD AUTO: 34 K/UL — LOW (ref 130–400)
PMV BLD: 11.7 FL — HIGH (ref 7.4–10.4)
POTASSIUM SERPL-MCNC: 2.7 MMOL/L — CRITICAL LOW (ref 3.5–5)
POTASSIUM SERPL-SCNC: 2.7 MMOL/L — CRITICAL LOW (ref 3.5–5)
PROT SERPL-MCNC: 4 G/DL — LOW (ref 6–8)
RBC # BLD: 3.01 M/UL — LOW (ref 4.7–6.1)
RBC # FLD: 14.6 % — HIGH (ref 11.5–14.5)
SODIUM SERPL-SCNC: 140 MMOL/L — SIGNIFICANT CHANGE UP (ref 135–146)
WBC # BLD: 36.54 K/UL — HIGH (ref 4.8–10.8)
WBC # FLD AUTO: 36.54 K/UL — HIGH (ref 4.8–10.8)

## 2024-03-13 PROCEDURE — 99232 SBSQ HOSP IP/OBS MODERATE 35: CPT

## 2024-03-13 RX ORDER — POTASSIUM CHLORIDE 20 MEQ
20 PACKET (EA) ORAL
Refills: 0 | Status: COMPLETED | OUTPATIENT
Start: 2024-03-13 | End: 2024-03-13

## 2024-03-13 RX ORDER — MAGNESIUM SULFATE 500 MG/ML
2 VIAL (ML) INJECTION
Refills: 0 | Status: COMPLETED | OUTPATIENT
Start: 2024-03-13 | End: 2024-03-13

## 2024-03-13 RX ORDER — POTASSIUM CHLORIDE 20 MEQ
40 PACKET (EA) ORAL ONCE
Refills: 0 | Status: COMPLETED | OUTPATIENT
Start: 2024-03-13 | End: 2024-03-13

## 2024-03-13 RX ORDER — POTASSIUM CHLORIDE 20 MEQ
20 PACKET (EA) ORAL
Refills: 0 | Status: DISCONTINUED | OUTPATIENT
Start: 2024-03-13 | End: 2024-03-13

## 2024-03-13 RX ADMIN — Medication 50 MILLIEQUIVALENT(S): at 11:52

## 2024-03-13 RX ADMIN — Medication 1000 UNIT(S): at 12:01

## 2024-03-13 RX ADMIN — Medication 25 GRAM(S): at 11:51

## 2024-03-13 RX ADMIN — Medication 137 MICROGRAM(S): at 05:15

## 2024-03-13 RX ADMIN — Medication 25 GRAM(S): at 10:39

## 2024-03-13 RX ADMIN — FIDAXOMICIN 200 MILLIGRAM(S): 200 GRANULE, FOR SUSPENSION ORAL at 18:03

## 2024-03-13 RX ADMIN — Medication 50 MILLIEQUIVALENT(S): at 18:03

## 2024-03-13 RX ADMIN — FIDAXOMICIN 200 MILLIGRAM(S): 200 GRANULE, FOR SUSPENSION ORAL at 05:14

## 2024-03-13 RX ADMIN — Medication 50 MILLIEQUIVALENT(S): at 15:21

## 2024-03-13 RX ADMIN — Medication 1 TABLET(S): at 12:01

## 2024-03-13 RX ADMIN — FAMOTIDINE 40 MILLIGRAM(S): 10 INJECTION INTRAVENOUS at 12:01

## 2024-03-13 RX ADMIN — Medication 50 MILLIEQUIVALENT(S): at 20:38

## 2024-03-13 NOTE — PROGRESS NOTE ADULT - ASSESSMENT
68-year-old male, former smoker with past medical history of HTN, hypothyroidism, melanoma s/p resection, testicular seminoma currently undergoing chemotherapy, presents to the emergency department for diarrhea.     # Pancytopenia 2/2 chemo   # Neutropenic fever 2/2 C diff colitis    - c/w IVF   - c/w fidaxomicin   - Isolation precautions   - transfuse platelets to keep above 10k unless bleeding and hgb above 7.5 -> s/p 1 pRBC and 1 platelet transfusion  - Per hem onc team has not answered adequately to the platelet transfusion, approval requested and granted for cross matched platelets from DR. Shipman on 03/09/2024 at 10:55  - Platelets 34k today, patient got 3 units of regular platelets and 1 unit of cross matched platelets on 03/11  - s/p Neulasta as outpatient   - Patient got 2 doses of Zarxio SC on 03/09 and 03/10  --> Neutropenia resolved     #Fever on 03/09  - UA and blood cultures sent  - Chest x-ray did not identify any opacities  - Patient started on empiric atb on 03/09 --> stopped on 03/11: blood cultures were negative, patient has been afebrile for 48h and WBC count initially came up to 8  - Afebrile since the 03/09, WBC 36k on 03/13    # Testicular cancer on chemo   - Oncology following : - s/p Right chest port placed via right IJ  - s/p cycle 3 of Cisplatin + Etoposide on 2/26 followed by Neulasta onbody injection D5 (3/1/24), initiated on 1.15.2024    # Hypomagnesemia 2/2 diarrhea   # Hypocalcemia 2/2 magnesium deficiency   # Hypokalemia   - Prolonged QTc - resolved (QTc 582 on 03/05 --> 433 on 03/07)  - Electrolytes were being monitored BID, currently daily monitoring --> replaced as required    # HTN  - Hold anti-hypertensive medications      # Hypothyroid  - c/w home med    dvt ppx: SCD due to thrombocytopenia     Pending: thrombocytopenia improvement, diarrhea resolution   Plan of care d/w patient   Dispo: home when ready   68-year-old male, former smoker with past medical history of HTN, hypothyroidism, melanoma s/p resection, testicular seminoma currently undergoing chemotherapy, presents to the emergency department for diarrhea.     # Pancytopenia 2/2 chemo   # Neutropenic fever 2/2 C diff colitis    - c/w IVF   - c/w fidaxomicin   - Isolation precautions   - transfuse platelets to keep above 10k unless bleeding and hgb above 7.5 -> s/p 1 pRBC and 1 platelet transfusion  - Per hem onc team has not answered adequately to the platelet transfusion, approval requested and granted for cross matched platelets from DR. Shipman on 03/09/2024 at 10:55  - Platelets 34k today, patient got 3 units of regular platelets and 1 unit of cross matched platelets on 03/11  - s/p Neulasta as outpatient   - Patient got 2 doses of Zarxio SC on 03/09 and 03/10  --> Neutropenia resolved     #Fever on 03/09  - UA and blood cultures sent  - Chest x-ray did not identify any opacities  - Patient started on empiric atb on 03/09 --> stopped on 03/11: blood cultures were negative, patient has been afebrile for 48h and WBC count initially came up to 8  - Afebrile since the 03/09, WBC 36k on 03/13    # Testicular cancer on chemo   - Oncology following : - s/p Right chest port placed via right IJ  - s/p cycle 3 of Cisplatin + Etoposide on 2/26 followed by Neulasta onbody injection D5 (3/1/24), initiated on 1.15.2024    # Hypomagnesemia 2/2 diarrhea   # Hypocalcemia 2/2 magnesium deficiency   # Hypokalemia   - Prolonged QTc - resolved (QTc 582 on 03/05 --> 433 on 03/07)  - Electrolytes were being monitored BID, currently daily monitoring --> replaced as required  - potassium and Mg supplement ordered.    # HTN  - Hold anti-hypertensive medications      # Hypothyroid  - c/w home med    dvt ppx: SCD due to thrombocytopenia     Pending: thrombocytopenia improvement, diarrhea resolution   Plan of care d/w patient   Dispo: anticipate for tomorrow.

## 2024-03-13 NOTE — PROGRESS NOTE ADULT - SUBJECTIVE AND OBJECTIVE BOX
24H events:    Patient is a 68y old Male who presents with a chief complaint of Diarrhea (12 Mar 2024 10:07)    Primary diagnosis of Neutropenic fever    Today is hospital day 8d. This morning patient was seen and examined at bedside, resting comfortably in bed. Mentioned he only had 1 loose bowel movement, no nausea or vomiting  Hemodynamically stable, tolerating oral diet, voiding appropriately with appropriate bowel movements. Ongoing electrolytes replacements    Code Status: Full code    PAST MEDICAL & SURGICAL HISTORY  HTN (hypertension)  Hypothyroidism, unspecified type  Obesity  Other secondary osteoarthritis of right hand  Testicular seminoma  History of eye removal (right)  H/O melanoma excision  S/P appendectomy 14 yrs old    ALLERGIES:  penicillin G benzathine (Rash)  penicillin (Rash)    MEDICATIONS:  STANDING MEDICATIONS  chlorhexidine 2% Cloths 1 Application(s) Topical <User Schedule>  cholecalciferol 1000 Unit(s) Oral daily  famotidine    Tablet 40 milliGRAM(s) Oral daily  fidaxomicin 200 milliGRAM(s) Oral two times a day  levothyroxine 137 MICROGram(s) Oral daily  magnesium sulfate  IVPB 2 Gram(s) IV Intermittent every 2 hours  multivitamin 1 Tablet(s) Oral daily  potassium chloride  20 mEq/100 mL IVPB 20 milliEquivalent(s) IV Intermittent every 2 hours  potassium chloride  20 mEq/100 mL IVPB 20 milliEquivalent(s) IV Intermittent every 2 hours    PRN MEDICATIONS  acetaminophen     Tablet .. 650 milliGRAM(s) Oral every 6 hours PRN  aluminum hydroxide/magnesium hydroxide/simethicone Suspension 30 milliLiter(s) Oral every 4 hours PRN  melatonin 5 milliGRAM(s) Oral at bedtime PRN  ondansetron Injectable 4 milliGRAM(s) IV Push every 8 hours PRN    VITALS:   T(F): 96.9  HR: 83  BP: 137/70  RR: 18  SpO2: 95%    PHYSICAL EXAM:  GENERAL: NAD, lying in bed comfortably, cooperative   NECK: Supple, no JVD    HEART: Regular rate and rhythm, normal S1/S2  LUNGS: No acute respiratory distress, clear b/l breath sounds  ABDOMEN:  soft, slight tenderness, non-distented  EXTREMITIES: no edema, ulcerations or ecchymosis  NERVOUS SYSTEM:  A&Ox3, CNII-XII intact, follows commands, answers questions appropriately   SKIN: No rashes or lesions       [facility] score:    LABS:                        8.8    36.54 )-----------( 34       ( 13 Mar 2024 06:24 )             24.3     03-13    140  |  108  |  18  ----------------------------<  76  2.7<LL>   |  20  |  0.8    Ca    7.4<L>      13 Mar 2024 06:24  Mg     1.5     03-13    TPro  4.0<L>  /  Alb  2.5<L>  /  TBili  0.3  /  DBili  x   /  AST  18  /  ALT  9   /  AlkPhos  122<H>  03-13    Urinalysis Basic - ( 13 Mar 2024 06:24 )    Color: x / Appearance: x / SG: x / pH: x  Gluc: 76 mg/dL / Ketone: x  / Bili: x / Urobili: x   Blood: x / Protein: x / Nitrite: x   Leuk Esterase: x / RBC: x / WBC x   Sq Epi: x / Non Sq Epi: x / Bacteria: x    RADIOLOGY:    ACC: [id number] EXAM:  XR CHEST PORTABLE IMMED 1V   ORDERED BY: CHACE PETER     PROCEDURE DATE:  03/09/2024      INTERPRETATION:  Clinical History / Reason for exam: Sepsis    Comparison : Chest radiograph 3/5/2024.    Technique/Positioning: Frontal.    Findings:    Support devices: Port-A-Cath superior vena cava    Cardiac/mediastinum/hilum: Unremarkable.    Lung parenchyma/Pleura: Within normal limits.    Skeleton/soft tissues: Unremarkable.    Impression:    No radiographic evidence of acute cardiopulmonary disease.    --- End of Report ---    FLAQUITA FLANAGAN MD; Attending Radiologist  This document has been electronically signed. Mar  9 2024  2:37PM

## 2024-03-14 ENCOUNTER — APPOINTMENT (OUTPATIENT)
Dept: HEMATOLOGY ONCOLOGY | Facility: CLINIC | Age: 69
End: 2024-03-14

## 2024-03-14 LAB
ALBUMIN SERPL ELPH-MCNC: 2.6 G/DL — LOW (ref 3.5–5.2)
ALP SERPL-CCNC: 114 U/L — SIGNIFICANT CHANGE UP (ref 30–115)
ALT FLD-CCNC: 11 U/L — SIGNIFICANT CHANGE UP (ref 0–41)
ANION GAP SERPL CALC-SCNC: 9 MMOL/L — SIGNIFICANT CHANGE UP (ref 7–14)
ANION GAP SERPL CALC-SCNC: 9 MMOL/L — SIGNIFICANT CHANGE UP (ref 7–14)
AST SERPL-CCNC: 20 U/L — SIGNIFICANT CHANGE UP (ref 0–41)
BASOPHILS # BLD AUTO: 0.01 K/UL — SIGNIFICANT CHANGE UP (ref 0–0.2)
BASOPHILS NFR BLD AUTO: 0 % — SIGNIFICANT CHANGE UP (ref 0–1)
BILIRUB SERPL-MCNC: 0.3 MG/DL — SIGNIFICANT CHANGE UP (ref 0.2–1.2)
BUN SERPL-MCNC: 13 MG/DL — SIGNIFICANT CHANGE UP (ref 10–20)
BUN SERPL-MCNC: 16 MG/DL — SIGNIFICANT CHANGE UP (ref 10–20)
CALCIUM SERPL-MCNC: 7.1 MG/DL — LOW (ref 8.4–10.5)
CALCIUM SERPL-MCNC: 7.2 MG/DL — LOW (ref 8.4–10.4)
CHLORIDE SERPL-SCNC: 107 MMOL/L — SIGNIFICANT CHANGE UP (ref 98–110)
CHLORIDE SERPL-SCNC: 109 MMOL/L — SIGNIFICANT CHANGE UP (ref 98–110)
CO2 SERPL-SCNC: 23 MMOL/L — SIGNIFICANT CHANGE UP (ref 17–32)
CO2 SERPL-SCNC: 25 MMOL/L — SIGNIFICANT CHANGE UP (ref 17–32)
CREAT SERPL-MCNC: 0.7 MG/DL — SIGNIFICANT CHANGE UP (ref 0.7–1.5)
CREAT SERPL-MCNC: 0.8 MG/DL — SIGNIFICANT CHANGE UP (ref 0.7–1.5)
EGFR: 95 ML/MIN/1.73M2 — SIGNIFICANT CHANGE UP
EGFR: 99 ML/MIN/1.73M2 — SIGNIFICANT CHANGE UP
EOSINOPHIL # BLD AUTO: 0 K/UL — SIGNIFICANT CHANGE UP (ref 0–0.7)
EOSINOPHIL NFR BLD AUTO: 0 % — SIGNIFICANT CHANGE UP (ref 0–8)
GLUCOSE SERPL-MCNC: 92 MG/DL — SIGNIFICANT CHANGE UP (ref 70–99)
GLUCOSE SERPL-MCNC: 96 MG/DL — SIGNIFICANT CHANGE UP (ref 70–99)
HCT VFR BLD CALC: 24.9 % — LOW (ref 42–52)
HGB BLD-MCNC: 8.6 G/DL — LOW (ref 14–18)
IMM GRANULOCYTES NFR BLD AUTO: 20.9 % — HIGH (ref 0.1–0.3)
LYMPHOCYTES # BLD AUTO: 1.99 K/UL — SIGNIFICANT CHANGE UP (ref 1.2–3.4)
LYMPHOCYTES # BLD AUTO: 4.9 % — LOW (ref 20.5–51.1)
MAGNESIUM SERPL-MCNC: 1.4 MG/DL — LOW (ref 1.8–2.4)
MAGNESIUM SERPL-MCNC: 1.7 MG/DL — LOW (ref 1.8–2.4)
MCHC RBC-ENTMCNC: 28.6 PG — SIGNIFICANT CHANGE UP (ref 27–31)
MCHC RBC-ENTMCNC: 34.5 G/DL — SIGNIFICANT CHANGE UP (ref 32–37)
MCV RBC AUTO: 82.7 FL — SIGNIFICANT CHANGE UP (ref 80–94)
MONOCYTES # BLD AUTO: 3.84 K/UL — HIGH (ref 0.1–0.6)
MONOCYTES NFR BLD AUTO: 9.5 % — HIGH (ref 1.7–9.3)
NEUTROPHILS # BLD AUTO: 26.2 K/UL — HIGH (ref 1.4–6.5)
NEUTROPHILS NFR BLD AUTO: 64.7 % — SIGNIFICANT CHANGE UP (ref 42.2–75.2)
NRBC # BLD: 0 /100 WBCS — SIGNIFICANT CHANGE UP (ref 0–0)
PLATELET # BLD AUTO: 37 K/UL — LOW (ref 130–400)
PMV BLD: 10.9 FL — HIGH (ref 7.4–10.4)
POTASSIUM SERPL-MCNC: 2.9 MMOL/L — LOW (ref 3.5–5)
POTASSIUM SERPL-MCNC: 3.4 MMOL/L — LOW (ref 3.5–5)
POTASSIUM SERPL-SCNC: 2.9 MMOL/L — LOW (ref 3.5–5)
POTASSIUM SERPL-SCNC: 3.4 MMOL/L — LOW (ref 3.5–5)
PROT SERPL-MCNC: 4.3 G/DL — LOW (ref 6–8)
RBC # BLD: 3.01 M/UL — LOW (ref 4.7–6.1)
RBC # FLD: 14.8 % — HIGH (ref 11.5–14.5)
SODIUM SERPL-SCNC: 141 MMOL/L — SIGNIFICANT CHANGE UP (ref 135–146)
SODIUM SERPL-SCNC: 141 MMOL/L — SIGNIFICANT CHANGE UP (ref 135–146)
WBC # BLD: 40.49 K/UL — CRITICAL HIGH (ref 4.8–10.8)
WBC # FLD AUTO: 40.49 K/UL — CRITICAL HIGH (ref 4.8–10.8)

## 2024-03-14 PROCEDURE — 99232 SBSQ HOSP IP/OBS MODERATE 35: CPT

## 2024-03-14 RX ORDER — MAGNESIUM SULFATE 500 MG/ML
2 VIAL (ML) INJECTION ONCE
Refills: 0 | Status: COMPLETED | OUTPATIENT
Start: 2024-03-14 | End: 2024-03-15

## 2024-03-14 RX ORDER — POTASSIUM CHLORIDE 20 MEQ
20 PACKET (EA) ORAL ONCE
Refills: 0 | Status: DISCONTINUED | OUTPATIENT
Start: 2024-03-14 | End: 2024-03-15

## 2024-03-14 RX ORDER — POTASSIUM CHLORIDE 20 MEQ
40 PACKET (EA) ORAL ONCE
Refills: 0 | Status: COMPLETED | OUTPATIENT
Start: 2024-03-14 | End: 2024-03-14

## 2024-03-14 RX ORDER — MAGNESIUM SULFATE 500 MG/ML
2 VIAL (ML) INJECTION
Refills: 0 | Status: COMPLETED | OUTPATIENT
Start: 2024-03-14 | End: 2024-03-14

## 2024-03-14 RX ORDER — POTASSIUM CHLORIDE 20 MEQ
20 PACKET (EA) ORAL
Refills: 0 | Status: COMPLETED | OUTPATIENT
Start: 2024-03-14 | End: 2024-03-14

## 2024-03-14 RX ORDER — POTASSIUM CHLORIDE 20 MEQ
20 PACKET (EA) ORAL ONCE
Refills: 0 | Status: COMPLETED | OUTPATIENT
Start: 2024-03-14 | End: 2024-03-14

## 2024-03-14 RX ORDER — MAGNESIUM OXIDE 400 MG ORAL TABLET 241.3 MG
400 TABLET ORAL
Refills: 0 | Status: COMPLETED | OUTPATIENT
Start: 2024-03-14 | End: 2024-03-17

## 2024-03-14 RX ADMIN — FIDAXOMICIN 200 MILLIGRAM(S): 200 GRANULE, FOR SUSPENSION ORAL at 17:34

## 2024-03-14 RX ADMIN — Medication 50 MILLIEQUIVALENT(S): at 10:15

## 2024-03-14 RX ADMIN — Medication 25 GRAM(S): at 10:15

## 2024-03-14 RX ADMIN — Medication 50 MILLIEQUIVALENT(S): at 14:11

## 2024-03-14 RX ADMIN — Medication 50 MILLIEQUIVALENT(S): at 11:48

## 2024-03-14 RX ADMIN — MAGNESIUM OXIDE 400 MG ORAL TABLET 400 MILLIGRAM(S): 241.3 TABLET ORAL at 15:41

## 2024-03-14 RX ADMIN — Medication 25 GRAM(S): at 11:48

## 2024-03-14 RX ADMIN — FIDAXOMICIN 200 MILLIGRAM(S): 200 GRANULE, FOR SUSPENSION ORAL at 05:12

## 2024-03-14 RX ADMIN — Medication 40 MILLIEQUIVALENT(S): at 17:34

## 2024-03-14 RX ADMIN — Medication 137 MICROGRAM(S): at 05:12

## 2024-03-14 RX ADMIN — MAGNESIUM OXIDE 400 MG ORAL TABLET 400 MILLIGRAM(S): 241.3 TABLET ORAL at 17:34

## 2024-03-14 NOTE — HISTORY OF PRESENT ILLNESS
[FreeTextEntry1] : Started EP (Etoposide, Cisplatin) on 1.15.2023  [de-identified] : Mr. MALIA GAUTHIER is a 68 year old male here today for evaluation and management of Testicular Seminoma and history of melanoma.     MALIA is a 68 year old M with PMHx including malignant melanoma of skin, HTN, hypothyroid, OA who presents to clinic to establish care, accompanied by sister at initial visit.  Patient states he went to PCP regarding testicular swelling a few months ago and was subsequently sent for additional workup including MR imaging which noted large right testicular mass.  CT imaging raised suspicion for enlarged lymphadenopathy.  He is presently feeling well with no new complaints.  Patient denies fever, chills, nausea, vomiting, dyspnea, unintentional weight loss or bleeding.  Patient reports family history of malignancy in his father (stomach, skin cancer), paternal uncle (skin cancer), maternal aunt + maternal grandmother (breast cancer).  Smokes cigars occasionally for social use.     RADIOLOGIC WORKUP  CT C/A/P (12.2.2023) IMPRESSION:3 mm left lower lobe pulmonary nodule of indeterminate clinical significance. Otherwise, no CT evidence of thoracic metastatic disease.Intra-abdominal lymphadenopathy concerning for metastatic disease. Consider complete evaluation with a PET/CT.Large right hydrocele noted. MR Pelvis (10.3.2023 - R) IMPRESSION: 1. Large heterogeneous mass, likely centered in the right testicle and extending into the right epididymis and right spermatic cord, highly suspicious for malignancy.  The tumor appears to extend beyond the testicle into the scrotal sac.  Surgical resection is advised.  2.  Large right hydrocele.  3 Limited views of the abdomen without definite evidence for metastatic disease, however, incomplete on the scrotal examination recommend complete characterization with CT chest abdomen and pelvis with contrast. US Scrotal (9.15.2023 - [facility]) IMPRESSION: Enlarged right testes with multiple masses highly suspicious for neoplasm.  Associated large right hydrocele.  On several images the mass in the upper pole of the right testes appears to be extended superiorly beyond the testicle.  Further evaluation with an MRI of the scrotum with and without contrast may be of diagnostic benefit. NM Lymphoscintigraphy (10.19.2018) Impression: 1. Definite demonstration of one sentinel lymph node uptake in  right axilla, by 5 minutes after injection.2. The overlying skin was marked in the  anterior position.3. The patient left the radiology department in good condition without any incident. 4. This is a preoperative marking for sentinel lymph node right axilla for wide excision of melanoma, right shoulder.  PET/CT WB FDG (9.19.2018) IMPRESSION: No evidence of pathologic FDG uptake.  LAB WORKUP (11.24.2023) WBC 5.25, Hgb 14.3, , Cr 0.8, eGFR 95, PSA 0.96, normal LFTs, , AFP 2.9, hCG TM 1   PATHOLOGY (see results section)   HCM Colonoscopy overdue   Upper Endoscopy never done  [de-identified] : 1/12/24 Patient is here for a follow-up visit for Testicular Seminoma and history of melanoma.  He is feeling well with no new complaints.  Reviewed most recent CBC, which is stable with mild anemia, hgb 13.5g/dL.  Patient denies fever, chills, nausea, vomiting, dyspnea or bleeding.  He completed PFTs on Wednesday.  He also completed audiogram recently.  Patient went for MR imaging this morning; not yet resulted.  Patient is s/p Right chest port placed via right IJ access on 1.3.2024.  Reviewed most recent CT imaging which shows increase in size of abdominal pelvic lymph nodes, stable left lung nodule and indeterminate right adrenal gland lesion.  CT C/A/P (1.9.2024) Impression:Stable left lung nodule. No new nodules or lymphadenopathy.Increase in size of abdominal pelvic lymph nodes as described.Stable indeterminate right adrenal gland lesion.  2/2/24 Patient is here for a follow-up visit for Testicular Seminoma and history of melanoma.  He is due for cycle 2 of Cisplatin + Etoposide on 2/5, initiation on 1.15.2024.  He lost about 10lbs since last visit since he has been adjusting portion size.  He reports some hair thinning/loss.  Patient denies fever, chills, nausea, vomiting, dyspnea, worsening of tinnitus (present prior to initiation of chemo) or bleeding.  Reviewed most recent MR imaging which shows no evidence of intracranial metastasis or acute intracranial pathology, paranasal sinus mucosal disease.   MR Head (1.12.2024) IMPRESSION:No evidence of intracranial metastasis or acute intracranial pathology.Mild chronic microvascular ischemic changes.Paranasal sinus mucosal disease.  2/23/24 Patient is here for a follow-up visit for Testicular Seminoma and history of melanoma.  He is due for cycle 3 of Cisplatin + Etoposide on 2/26, initiation on 1.15.2024.  Reviewed most recent CBC which shows neutropenia and worsening anemia with hgb down to 9.2g/dL.  Patient denies fever, chills, nausea, vomiting, dyspnea, worsening of tinnitus (present prior to initiation of chemo) or bleeding.  Patient also has low magnesium.  He is still losing weight.    3/14/24 Patient is here for a follow-up visit for Testicular Seminoma and history of melanoma.  He is due for cycle 4 of Cisplatin + Etoposide on 2/26, initiation on 1.15.2024.  Reviewed most recent CBC which shows neutropenia and worsening anemia with hgb down to 9.7g/dL.  Patient denies fever, chills, nausea, vomiting, dyspnea, worsening of tinnitus (present prior to initiation of chemo) or bleeding.  Patient also has low magnesium.  He is still losing weight.

## 2024-03-14 NOTE — PROGRESS NOTE ADULT - SUBJECTIVE AND OBJECTIVE BOX
24H events:    Patient is a 68y old Male who presents with a chief complaint of diarrhea (13 Mar 2024 11:28)    Primary diagnosis of Neutropenic fever    Today is hospital day 9d. This morning patient was seen and examined at bedside, resting comfortably in bed. Mentioned he only had 1 loose bowel movement, no nausea or vomiting but has been complaining of abdominal gurgling  Hemodynamically stable, tolerating oral diet, voiding appropriately with appropriate bowel movements. Ongoing electrolytes replacements    Code Status: Full code    PAST MEDICAL & SURGICAL HISTORY  HTN (hypertension)  Hypothyroidism, unspecified type  Obesity  Other secondary osteoarthritis of right hand  Testicular seminoma  History of eye removal Right eye  H/O melanoma excision  S/P appendectomy 14 yrs old    ALLERGIES:  penicillin G benzathine (Rash)  penicillin (Rash)    MEDICATIONS:  STANDING MEDICATIONS  chlorhexidine 2% Cloths 1 Application(s) Topical <User Schedule>  cholecalciferol 1000 Unit(s) Oral daily  famotidine    Tablet 40 milliGRAM(s) Oral daily  fidaxomicin 200 milliGRAM(s) Oral two times a day  levothyroxine 137 MICROGram(s) Oral daily  multivitamin 1 Tablet(s) Oral daily  potassium chloride  20 mEq/100 mL IVPB 20 milliEquivalent(s) IV Intermittent every 2 hours    PRN MEDICATIONS  acetaminophen     Tablet .. 650 milliGRAM(s) Oral every 6 hours PRN  aluminum hydroxide/magnesium hydroxide/simethicone Suspension 30 milliLiter(s) Oral every 4 hours PRN  melatonin 5 milliGRAM(s) Oral at bedtime PRN  ondansetron Injectable 4 milliGRAM(s) IV Push every 8 hours PRN    VITALS:   T(F): 98.1  HR: 90  BP: 132/81  RR: 18  SpO2: 96%    PHYSICAL EXAM:  GENERAL: NAD, lying in bed comfortably  NECK: Supple, no JVD    HEART: Regular rate and rhythm, normal S1/S2   LUNGS: No acute respiratory distress  ABDOMEN:  soft, non-tender, distented  EXTREMITIES: no rashes, extremities warm/dry, no cyanosis, no edema, ulcerations or ecchymosis  NERVOUS SYSTEM:  A&Ox3, CNII-XII intact, follows commands, answers questions appropriately   SKIN: No rashes or lesions       [facility] score: 24    LABS:                        8.6    40.49 )-----------( 37       ( 14 Mar 2024 07:48 )             24.9     03-14    141  |  107  |  16  ----------------------------<  92  2.9<L>   |  25  |  0.7    Ca    7.2<L>      14 Mar 2024 07:48  Mg     1.4     03-14    TPro  4.3<L>  /  Alb  2.6<L>  /  TBili  0.3  /  DBili  x   /  AST  20  /  ALT  11  /  AlkPhos  114  03-14

## 2024-03-14 NOTE — PHYSICAL EXAM
[de-identified] : wearing glasses ; right eye prosthesis but wearing R eye patch  [de-identified] : tattoos ; s/p Right chest port placed via right IJ access on 1.3.2024

## 2024-03-14 NOTE — PROGRESS NOTE ADULT - ASSESSMENT
68-year-old male, former smoker with past medical history of HTN, hypothyroidism, melanoma s/p resection, testicular seminoma currently undergoing chemotherapy, presents to the emergency department for diarrhea.     # Pancytopenia 2/2 chemo   # Neutropenic fever 2/2 C diff colitis    - c/w IVF   - c/w fidaxomicin   - Isolation precautions   - transfuse platelets to keep above 10k unless bleeding and hgb above 7.5 -> s/p 1 pRBC and 1 platelet transfusion  - Per hem onc team has not answered adequately to the platelet transfusion, approval requested and granted for cross matched platelets from DR. Shipman on 03/09/2024 at 10:55  - Platelets 34k today, patient got 3 units of regular platelets and 1 unit of cross matched platelets on 03/11  - s/p Neulasta as outpatient   - Patient got 2 doses of Zarxio SC on 03/09 and 03/10  --> Neutropenia resolved     #Fever on 03/09  - UA and blood cultures sent  - Chest x-ray did not identify any opacities  - Patient started on empiric atb on 03/09 --> stopped on 03/11: blood cultures were negative, patient has been afebrile for 48h and WBC count initially came up to 8  - Afebrile since the 03/09, WBC 36k on 03/13    # Testicular cancer on chemo   - Oncology following : - s/p Right chest port placed via right IJ  - s/p cycle 3 of Cisplatin + Etoposide on 2/26 followed by Neulasta onbody injection D5 (3/1/24), initiated on 1.15.2024    # Hypomagnesemia 2/2 diarrhea   # Hypocalcemia 2/2 magnesium deficiency   # Hypokalemia   - Prolonged QTc - resolved (QTc 582 on 03/05 --> 433 on 03/07)  - Electrolytes were being monitored BID, currently daily monitoring --> replaced as required  - K and Mg supplemented daily    # HTN  - Hold anti-hypertensive medications      # Hypothyroid  - c/w home med    dvt ppx: SCD due to thrombocytopenia     Pending: thrombocytopenia improvement, diarrhea resolution   Plan of care d/w patient   Dispo: anticipate for tomorrow. 68-year-old male, former smoker with past medical history of HTN, hypothyroidism, melanoma s/p resection, testicular seminoma currently undergoing chemotherapy, presents to the emergency department for diarrhea.     # Pancytopenia 2/2 chemo   # Neutropenic fever 2/2 C diff colitis    - c/w IVF   - c/w fidaxomicin   - Isolation precautions   - transfuse platelets to keep above 10k unless bleeding and hgb above 7.5 -> s/p 1 pRBC and 1 platelet transfusion  - Per hem onc team has not answered adequately to the platelet transfusion, approval requested and granted for cross matched platelets from DR. Shipman on 03/09/2024 at 10:55  - Platelets 34k today, patient got 3 units of regular platelets and 1 unit of cross matched platelets on 03/11  - s/p Neulasta as outpatient   - Patient got 2 doses of Zarxio SC on 03/09 and 03/10  --> Neutropenia resolved     #Fever on 03/09  - UA and blood cultures sent  - Chest x-ray did not identify any opacities  - Patient started on empiric atb on 03/09 --> stopped on 03/11: blood cultures were negative, patient has been afebrile for 48h and WBC count initially came up to 8  - Afebrile since the 03/09, WBC 36k on 03/13    # Testicular cancer on chemo   - Oncology following : - s/p Right chest port placed via right IJ  - s/p cycle 3 of Cisplatin + Etoposide on 2/26 followed by Neulasta onbody injection D5 (3/1/24), initiated on 1.15.2024    # Hypomagnesemia 2/2 diarrhea   # Hypocalcemia 2/2 magnesium deficiency   # Hypokalemia   - Prolonged QTc - resolved (QTc 582 on 03/05 --> 433 on 03/07)  - Electrolytes were being monitored BID, currently daily monitoring --> replaced as required  - K and Mg supplemented daily, repeat level at 8 pm    # HTN  - Hold anti-hypertensive medications      # Hypothyroid  - c/w home med    dvt ppx: SCD due to thrombocytopenia     Pending: thrombocytopenia improvement, diarrhea resolution,  electrolyte improvement.  Plan of care d/w patient   Dispo: anticipate for tomorrow.

## 2024-03-15 ENCOUNTER — TRANSCRIPTION ENCOUNTER (OUTPATIENT)
Age: 69
End: 2024-03-15

## 2024-03-15 LAB
ALBUMIN SERPL ELPH-MCNC: 2.7 G/DL — LOW (ref 3.5–5.2)
ALP SERPL-CCNC: 135 U/L — HIGH (ref 30–115)
ALT FLD-CCNC: 13 U/L — SIGNIFICANT CHANGE UP (ref 0–41)
ANION GAP SERPL CALC-SCNC: 7 MMOL/L — SIGNIFICANT CHANGE UP (ref 7–14)
AST SERPL-CCNC: 25 U/L — SIGNIFICANT CHANGE UP (ref 0–41)
BASOPHILS # BLD AUTO: 0.03 K/UL — SIGNIFICANT CHANGE UP (ref 0–0.2)
BASOPHILS NFR BLD AUTO: 0.1 % — SIGNIFICANT CHANGE UP (ref 0–1)
BILIRUB SERPL-MCNC: 0.3 MG/DL — SIGNIFICANT CHANGE UP (ref 0.2–1.2)
BUN SERPL-MCNC: 11 MG/DL — SIGNIFICANT CHANGE UP (ref 10–20)
CALCIUM SERPL-MCNC: 7.3 MG/DL — LOW (ref 8.4–10.5)
CHLORIDE SERPL-SCNC: 106 MMOL/L — SIGNIFICANT CHANGE UP (ref 98–110)
CO2 SERPL-SCNC: 26 MMOL/L — SIGNIFICANT CHANGE UP (ref 17–32)
CREAT SERPL-MCNC: 0.7 MG/DL — SIGNIFICANT CHANGE UP (ref 0.7–1.5)
CULTURE RESULTS: SIGNIFICANT CHANGE UP
CULTURE RESULTS: SIGNIFICANT CHANGE UP
EGFR: 99 ML/MIN/1.73M2 — SIGNIFICANT CHANGE UP
EOSINOPHIL # BLD AUTO: 0.01 K/UL — SIGNIFICANT CHANGE UP (ref 0–0.7)
EOSINOPHIL NFR BLD AUTO: 0 % — SIGNIFICANT CHANGE UP (ref 0–8)
GLUCOSE SERPL-MCNC: 103 MG/DL — HIGH (ref 70–99)
HCT VFR BLD CALC: 24.1 % — LOW (ref 42–52)
HGB BLD-MCNC: 8.8 G/DL — LOW (ref 14–18)
IMM GRANULOCYTES NFR BLD AUTO: 20.9 % — HIGH (ref 0.1–0.3)
LYMPHOCYTES # BLD AUTO: 1.73 K/UL — SIGNIFICANT CHANGE UP (ref 1.2–3.4)
LYMPHOCYTES # BLD AUTO: 4 % — LOW (ref 20.5–51.1)
MAGNESIUM SERPL-MCNC: 1.9 MG/DL — SIGNIFICANT CHANGE UP (ref 1.8–2.4)
MCHC RBC-ENTMCNC: 30.1 PG — SIGNIFICANT CHANGE UP (ref 27–31)
MCHC RBC-ENTMCNC: 36.5 G/DL — SIGNIFICANT CHANGE UP (ref 32–37)
MCV RBC AUTO: 82.5 FL — SIGNIFICANT CHANGE UP (ref 80–94)
MONOCYTES # BLD AUTO: 2.99 K/UL — HIGH (ref 0.1–0.6)
MONOCYTES NFR BLD AUTO: 7 % — SIGNIFICANT CHANGE UP (ref 1.7–9.3)
NEUTROPHILS # BLD AUTO: 29.21 K/UL — HIGH (ref 1.4–6.5)
NEUTROPHILS NFR BLD AUTO: 68 % — SIGNIFICANT CHANGE UP (ref 42.2–75.2)
NRBC # BLD: 0 /100 WBCS — SIGNIFICANT CHANGE UP (ref 0–0)
PLATELET # BLD AUTO: 67 K/UL — LOW (ref 130–400)
PMV BLD: 11.7 FL — HIGH (ref 7.4–10.4)
POTASSIUM SERPL-MCNC: 3.5 MMOL/L — SIGNIFICANT CHANGE UP (ref 3.5–5)
POTASSIUM SERPL-SCNC: 3.5 MMOL/L — SIGNIFICANT CHANGE UP (ref 3.5–5)
PROT SERPL-MCNC: 4.5 G/DL — LOW (ref 6–8)
RBC # BLD: 2.92 M/UL — LOW (ref 4.7–6.1)
RBC # FLD: 15.1 % — HIGH (ref 11.5–14.5)
SODIUM SERPL-SCNC: 139 MMOL/L — SIGNIFICANT CHANGE UP (ref 135–146)
SPECIMEN SOURCE: SIGNIFICANT CHANGE UP
SPECIMEN SOURCE: SIGNIFICANT CHANGE UP
WBC # BLD: 42.97 K/UL — CRITICAL HIGH (ref 4.8–10.8)
WBC # FLD AUTO: 42.97 K/UL — CRITICAL HIGH (ref 4.8–10.8)

## 2024-03-15 PROCEDURE — 99232 SBSQ HOSP IP/OBS MODERATE 35: CPT

## 2024-03-15 RX ORDER — MAGNESIUM OXIDE 400 MG ORAL TABLET 241.3 MG
1 TABLET ORAL
Qty: 0 | Refills: 0 | DISCHARGE
Start: 2024-03-15

## 2024-03-15 RX ORDER — POTASSIUM CHLORIDE 20 MEQ
40 PACKET (EA) ORAL ONCE
Refills: 0 | Status: COMPLETED | OUTPATIENT
Start: 2024-03-15 | End: 2024-03-15

## 2024-03-15 RX ORDER — LOPERAMIDE HCL 2 MG
2 TABLET ORAL DAILY
Refills: 0 | Status: COMPLETED | OUTPATIENT
Start: 2024-03-15 | End: 2024-03-16

## 2024-03-15 RX ORDER — FIDAXOMICIN 200 MG/5ML
1 GRANULE, FOR SUSPENSION ORAL
Qty: 0 | Refills: 0 | DISCHARGE
Start: 2024-03-15

## 2024-03-15 RX ORDER — ENOXAPARIN SODIUM 100 MG/ML
40 INJECTION SUBCUTANEOUS EVERY 24 HOURS
Refills: 0 | Status: DISCONTINUED | OUTPATIENT
Start: 2024-03-15 | End: 2024-03-17

## 2024-03-15 RX ADMIN — Medication 1000 UNIT(S): at 11:48

## 2024-03-15 RX ADMIN — Medication 40 MILLIEQUIVALENT(S): at 17:46

## 2024-03-15 RX ADMIN — Medication 137 MICROGRAM(S): at 05:23

## 2024-03-15 RX ADMIN — Medication 40 MILLIEQUIVALENT(S): at 08:01

## 2024-03-15 RX ADMIN — FIDAXOMICIN 200 MILLIGRAM(S): 200 GRANULE, FOR SUSPENSION ORAL at 17:45

## 2024-03-15 RX ADMIN — FAMOTIDINE 40 MILLIGRAM(S): 10 INJECTION INTRAVENOUS at 11:45

## 2024-03-15 RX ADMIN — MAGNESIUM OXIDE 400 MG ORAL TABLET 400 MILLIGRAM(S): 241.3 TABLET ORAL at 11:44

## 2024-03-15 RX ADMIN — ENOXAPARIN SODIUM 40 MILLIGRAM(S): 100 INJECTION SUBCUTANEOUS at 21:26

## 2024-03-15 RX ADMIN — Medication 2 MILLIGRAM(S): at 11:48

## 2024-03-15 RX ADMIN — MAGNESIUM OXIDE 400 MG ORAL TABLET 400 MILLIGRAM(S): 241.3 TABLET ORAL at 07:43

## 2024-03-15 RX ADMIN — Medication 1 TABLET(S): at 11:44

## 2024-03-15 RX ADMIN — MAGNESIUM OXIDE 400 MG ORAL TABLET 400 MILLIGRAM(S): 241.3 TABLET ORAL at 17:45

## 2024-03-15 RX ADMIN — CHLORHEXIDINE GLUCONATE 1 APPLICATION(S): 213 SOLUTION TOPICAL at 05:44

## 2024-03-15 RX ADMIN — Medication 25 GRAM(S): at 05:24

## 2024-03-15 RX ADMIN — FIDAXOMICIN 200 MILLIGRAM(S): 200 GRANULE, FOR SUSPENSION ORAL at 05:23

## 2024-03-15 NOTE — DISCHARGE NOTE PROVIDER - PROVIDER TOKENS
PROVIDER:[TOKEN:[[id number]],FOLLOWUP:[2 weeks]],PROVIDER:[TOKEN:[[id number]],FOLLOWUP:[1 week]]

## 2024-03-15 NOTE — PROGRESS NOTE ADULT - SUBJECTIVE AND OBJECTIVE BOX
MALIA GAUTHIER  68y, Male  Allergy: penicillin G benzathine (Rash)  penicillin (Rash)      LOS  10d    CHIEF COMPLAINT: Diarrhea  C. diff infection in the context of post chemo pancytopenia (15 Mar 2024 13:42)      INTERVAL EVENTS/HPI  - No acute events overnight  - T(F): , Max: 98.6 (03-14-24 @ 19:57)  - having diarrhea - 2 episodes yesterday - 1 this morning -- bloated, but stolerating appetite   - WBC Count: 42.97 (03-15-24 @ 08:45)  WBC Count: 40.49 (03-14-24 @ 07:48)     - Creatinine: 0.7 (03-15-24 @ 08:45)  Creatinine: 0.8 (03-14-24 @ 22:34)       ROS  General: Denies rigors, nightsweats  HEENT: Denies headache, rhinorrhea, sore throat, eye pain  CV: Denies CP, palpitations  PULM: Denies wheezing, hemoptysis  GI: Denies hematemesis, hematochezia, melena  : Denies discharge, hematuria  MSK: Denies arthralgias, myalgias  SKIN: Denies rash, lesions  NEURO: Denies paresthesias, weakness  PSYCH: Denies depression, anxiety    VITALS:  T(F): 96.4, Max: 98.6 (03-14-24 @ 19:57)  HR: 86  BP: 137/63  RR: 18Vital Signs Last 24 Hrs  T(C): 35.8 (15 Mar 2024 12:35), Max: 37 (14 Mar 2024 19:57)  T(F): 96.4 (15 Mar 2024 12:35), Max: 98.6 (14 Mar 2024 19:57)  HR: 86 (15 Mar 2024 12:35) (76 - 87)  BP: 137/63 (15 Mar 2024 12:35) (136/72 - 143/66)  BP(mean): --  RR: 18 (15 Mar 2024 12:35) (18 - 18)  SpO2: --        PHYSICAL EXAM:  Gen: NAD, resting in bed  HEENT: Normocephalic, atraumatic  Neck: supple, no lymphadenopathy  CV: Regular rate & regular rhythm  Lungs: decreased BS at bases, no fremitus  Abdomen: Soft, BS present  Ext: Warm, well perfused  Neuro: non focal, awake  Skin: no rash, no erythema  Lines: no phlebitis    FH: Non-contributory  Social Hx: Non-contributory    TESTS & MEASUREMENTS:                        8.8    42.97 )-----------( 67       ( 15 Mar 2024 08:45 )             24.1     03-15    139  |  106  |  11  ----------------------------<  103<H>  3.5   |  26  |  0.7    Ca    7.3<L>      15 Mar 2024 08:45  Mg     1.9     03-15    TPro  4.5<L>  /  Alb  2.7<L>  /  TBili  0.3  /  DBili  x   /  AST  25  /  ALT  13  /  AlkPhos  135<H>  03-15      LIVER FUNCTIONS - ( 15 Mar 2024 08:45 )  Alb: 2.7 g/dL / Pro: 4.5 g/dL / ALK PHOS: 135 U/L / ALT: 13 U/L / AST: 25 U/L / GGT: x           Urinalysis Basic - ( 15 Mar 2024 08:45 )    Color: x / Appearance: x / SG: x / pH: x  Gluc: 103 mg/dL / Ketone: x  / Bili: x / Urobili: x   Blood: x / Protein: x / Nitrite: x   Leuk Esterase: x / RBC: x / WBC x   Sq Epi: x / Non Sq Epi: x / Bacteria: x        Culture - Blood (collected 03-10-24 @ 08:36)  Source: .Blood None  Preliminary Report (03-14-24 @ 20:00):    No growth at 4 days    Culture - Blood (collected 03-09-24 @ 14:48)  Source: .Blood Blood  Final Report (03-15-24 @ 01:00):    No growth at 5 days    Culture - Blood (collected 03-05-24 @ 14:05)  Source: .Blood Blood-Peripheral  Final Report (03-10-24 @ 23:00):    No growth at 5 days    Culture - Blood (collected 03-05-24 @ 14:05)  Source: .Blood Blood-Peripheral  Final Report (03-10-24 @ 23:00):    No growth at 5 days            INFECTIOUS DISEASES TESTING  MRSA PCR Result.: Positive (02-11-24 @ 16:00)      INFLAMMATORY MARKERS  C-Reactive Protein, Serum: 92.6 mg/L (02-12-24 @ 06:44)      RADIOLOGY & ADDITIONAL TESTS:  I have personally reviewed the last available Chest xray  CXR      CT      CARDIOLOGY TESTING  12 Lead ECG:   Ventricular Rate 83 BPM    Atrial Rate 83 BPM    P-R Interval 148 ms    QRS Duration 94 ms    Q-T Interval 428 ms    QTC Calculation(Bazett) 502 ms    P Axis 28 degrees    R Axis 3 degrees    T Axis 62 degrees    Diagnosis Line Normal sinus rhythm  Nonspecific ST and T wave abnormality  Abnormal ECG    Confirmed by Ventura William ([id number]) on 3/10/2024 2:01:13 PM (03-10-24 @ 09:40)  12 Lead ECG:   Ventricular Rate 85 BPM    Atrial Rate 85 BPM    P-R Interval 194 ms    QRS Duration 104 ms    Q-T Interval 364 ms    QTC Calculation(Bazett) 433 ms    R Axis 18 degrees    T Axis 19 degrees    Diagnosis Line Normal sinus rhythm  ST & T wave abnormality, consider anterior ischemia  Abnormal ECG    Confirmed by Edenilson Caro ([id number]) on 3/7/2024 6:48:43 PM (03-07-24 @ 17:08)      MEDICATIONS  chlorhexidine 2% Cloths 1 Topical <User Schedule>  cholecalciferol 1000 Oral daily  famotidine    Tablet 40 Oral daily  fidaxomicin 200 Oral two times a day  levothyroxine 137 Oral daily  loperamide 2 Oral daily  magnesium oxide 400 Oral three times a day with meals  multivitamin 1 Oral daily      WEIGHT  Weight (kg): 115.5 (03-06-24 @ 17:28)  Creatinine: 0.7 mg/dL (03-15-24 @ 08:45)  Creatinine: 0.8 mg/dL (03-14-24 @ 22:34)      ANTIBIOTICS:  fidaxomicin 200 milliGRAM(s) Oral two times a day      All available historical records have been reviewed

## 2024-03-15 NOTE — DISCHARGE NOTE PROVIDER - NSDCFUSCHEDAPPT_GEN_ALL_CORE_FT
[facility]  AMBCHEMO SI [address]  Scheduled Appointment: 03/18/2024    Demetrio Atwood  [facility] PreAdmits  Scheduled Appointment: 03/18/2024    [facility]  AMBCHEMO  [place]  Scheduled Appointment: 03/19/2024    Demetrio Atwood  [facility] PreAdmits  Scheduled Appointment: 03/19/2024    Demetrio Atwood  [facility] PreAdmits  Scheduled Appointment: 03/20/2024    [facility]  AMBCHEMO  [place]  Scheduled Appointment: 03/20/2024    [facility]  AMBCHEMO  [place]  Scheduled Appointment: 03/21/2024    Demetrio Atwood  [facility] PreAdmits  Scheduled Appointment: 03/21/2024    Demetrio Atwood  [facility] PreAdmits  Scheduled Appointment: 03/22/2024    [facility]  AMBCHEMO  [place]  Scheduled Appointment: 03/22/2024     Demetrio Atwood  [facility] PreAdmits  Scheduled Appointment: 03/19/2024    [facility]ician [facility]  WILLARDCHEMO  [place]  Scheduled Appointment: 03/19/2024    [facility]  WILLARDCHEMO  [place]  Scheduled Appointment: 03/20/2024    Demetrio Atwodo  [facility] PreAdmits  Scheduled Appointment: 03/20/2024    Demetrio Atwood  [facility] PreAdmits  Scheduled Appointment: 03/21/2024    [facility] Physician [facility]  WILLARDCHEMO  [place]  Scheduled Appointment: 03/21/2024    [facility]  [facility]  [place]  Scheduled Appointment: 03/22/2024    Demetrio Atwood  [facility] PreAdmits  Scheduled Appointment: 03/22/2024

## 2024-03-15 NOTE — DISCHARGE NOTE PROVIDER - NSDCMRMEDTOKEN_GEN_ALL_CORE_FT
Levothroid 137 mcg (0.137 mg) oral tablet: 1 tab(s) orally once a day  losartan 50 mg oral tablet: 1 tab(s) orally once a day (at bedtime)  Multiple Vitamins oral tablet: 1 tab(s) orally once a day  mupirocin 2% topical ointment: Apply topically to affected area 2 times a day  Vitamin D3 1000 intl units oral tablet: 1 tab(s) orally once a day   fidaxomicin 200 mg oral tablet: 1 tab(s) orally 2 times a day  Levothroid 137 mcg (0.137 mg) oral tablet: 1 tab(s) orally once a day  losartan 50 mg oral tablet: 1 tab(s) orally once a day (at bedtime)  magnesium oxide 400 mg oral tablet: 1 tab(s) orally 3 times a day (with meals)  Multiple Vitamins oral tablet: 1 tab(s) orally once a day  mupirocin 2% topical ointment: Apply topically to affected area 2 times a day  Vitamin D3 1000 intl units oral tablet: 1 tab(s) orally once a day   famotidine 40 mg oral tablet: 1 tab(s) orally once a day  fidaxomicin 200 mg oral tablet: 1 tab(s) orally 2 times a day  Levothroid 137 mcg (0.137 mg) oral tablet: 1 tab(s) orally once a day  loperamide 2 mg oral capsule: 1 cap(s) orally once a day as needed for  diarrhea  losartan 50 mg oral tablet: 1 tab(s) orally once a day (at bedtime)  magnesium oxide 400 mg oral tablet: 1 tab(s) orally 3 times a day (with meals)  Multiple Vitamins oral tablet: 1 tab(s) orally once a day  mupirocin 2% topical ointment: Apply topically to affected area 2 times a day  Vitamin D3 1000 intl units oral tablet: 1 tab(s) orally once a day   famotidine 40 mg oral tablet: 1 tab(s) orally once a day  Levothroid 137 mcg (0.137 mg) oral tablet: 1 tab(s) orally once a day  loperamide 2 mg oral capsule: 1 cap(s) orally once a day as needed for  diarrhea  losartan 50 mg oral tablet: 1 tab(s) orally once a day (at bedtime)  magnesium oxide 400 mg oral tablet: 1 tab(s) orally 3 times a day (with meals)  Multiple Vitamins oral tablet: 1 tab(s) orally once a day  Vitamin D3 1000 intl units oral tablet: 1 tab(s) orally once a day

## 2024-03-15 NOTE — DISCHARGE NOTE PROVIDER - HOSPITAL COURSE
Patient is 68-year-old male, former smoker with past medical history of HTN, hypothyroidism, melanoma s/p resection, testicular seminoma currently undergoing chemotherapy, presents to the emergency department for diarrhea onset last night.  Patient also complains of vomiting onset this morning and abdominal cramping.  Patient denies fever, chills, cough, shortness of breath, chest pain.  Patient last had chemo on 3/1, that was cycle 3 of EP.  Patient sees Dr. Atwood, oncologist.    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  - MR Brain 1.12.2024 no evidence of intracranial metastasis or acute intracranial pathology, paranasal sinus mucosal disease  - s/p Right chest port placed via right IJ  - s/p cycle 3 of Cisplatin + Etoposide on 2/26 followed by Neulasta onbody injection D5 (3/1/24), initiated on 1.15.2024  - CT abdomen 3.5.24: Aortocaval and periaortic lymphadenopathy, markedly decreased in size comparison to January 9, 2024.    Hospital Course:  # Pancytopenia 2/2 chemo   # Neutropenic fever 2/2 C diff colitis    - c/w IVF   - c/w fidaxomicin   - Isolation precautions   - transfuse platelets to keep above 10k unless bleeding and hgb above 7.5 -> s/p 1 pRBC and 1 platelet transfusion  - Per hem onc team has not answered adequately to the platelet transfusion, approval requested and granted for cross matched platelets from DR. Shipman on 03/09/2024 at 10:55  - Platelets 34k today, patient got 3 units of regular platelets and 1 unit of cross matched platelets on 03/11  - s/p Neulasta as outpatient   - Patient got 2 doses of Zarxio SC on 03/09 and 03/10  --> Neutropenia resolved     #Fever on 03/09  - UA and blood cultures sent  - Chest x-ray did not identify any opacities  - Patient started on empiric atb on 03/09 --> stopped on 03/11: blood cultures were negative, patient has been afebrile for 48h and WBC count initially came up to 8  - Afebrile since the 03/09, WBC 36k on 03/13    # Testicular cancer on chemo   - Oncology following : - s/p Right chest port placed via right IJ  - s/p cycle 3 of Cisplatin + Etoposide on 2/26 followed by Neulasta onbody injection D5 (3/1/24), initiated on 1.15.2024    # Hypomagnesemia 2/2 diarrhea   # Hypocalcemia 2/2 magnesium deficiency   # Hypokalemia   - Prolonged QTc - resolved (QTc 582 on 03/05 --> 433 on 03/07)  - Electrolytes were being monitored BID, currently daily monitoring --> replaced as required  - K and Mg supplemented daily, repeat level at 8 pm    # HTN  - Hold anti-hypertensive medications      # Hypothyroid  - c/w home med    dvt ppx: SCD due to thrombocytopenia        Patient is 68-year-old male, former smoker with past medical history of HTN, hypothyroidism, melanoma s/p resection, testicular seminoma currently undergoing chemotherapy, presents to the emergency department for diarrhea onset last night.  Patient also complains of vomiting onset this morning and abdominal cramping.  Patient denies fever, chills, cough, shortness of breath, chest pain.  Patient last had chemo on 3/1, that was cycle 3 of EP.  Patient sees Dr. Atwood, oncologist.    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  - MR Brain 1.12.2024 no evidence of intracranial metastasis or acute intracranial pathology, paranasal sinus mucosal disease  - s/p Right chest port placed via right IJ  - s/p cycle 3 of Cisplatin + Etoposide on 2/26 followed by Neulasta onbody injection D5 (3/1/24), initiated on 1.15.2024  - CT abdomen 3.5.24: Aortocaval and periaortic lymphadenopathy, markedly decreased in size comparison to January 9, 2024.    Hospital Course:  # Pancytopenia 2/2 chemo   # Neutropenic fever 2/2 C diff colitis    - c/w IVF   - c/w fidaxomicin   - Isolation precautions   - transfuse platelets to keep above 10k unless bleeding and hgb above 7.5 -> s/p 1 pRBC and 1 platelet transfusion  - Per hem onc team has not answered adequately to the platelet transfusion, approval requested and granted for cross matched platelets from DR. Shipman on 03/09/2024 at 10:55  - Platelets 34k today, patient got 3 units of regular platelets and 1 unit of cross matched platelets on 03/11  - s/p Neulasta as outpatient   - Patient got 2 doses of Zarxio SC on 03/09 and 03/10  --> Neutropenia resolved     #Fever on 03/09  - UA and blood cultures sent  - Chest x-ray did not identify any opacities  - Patient started on empiric atb on 03/09 --> stopped on 03/11: blood cultures were negative, patient has been afebrile for 48h and WBC count initially came up to 8  - Afebrile since the 03/09, WBC 36k on 03/13    # Testicular cancer on chemo   - Oncology following : - s/p Right chest port placed via right IJ  - s/p cycle 3 of Cisplatin + Etoposide on 2/26 followed by Neulasta onbody injection D5 (3/1/24), initiated on 1.15.2024    # Hypomagnesemia 2/2 diarrhea   # Hypocalcemia 2/2 magnesium deficiency   # Hypokalemia   - Prolonged QTc - resolved (QTc 582 on 03/05 --> 433 on 03/07)  - Electrolytes were being monitored BID, currently daily monitoring --> replaced as required  - K and Mg supplemented daily, repeat level at 8 pm    # HTN  - Hold anti-hypertensive medications      # Hypothyroid  - c/w home med    Patient is medically stable and ready for discharge.       68-year-old male, former smoker with past medical history of HTN, hypothyroidism, melanoma s/p resection, testicular seminoma currently undergoing chemotherapy, admitted for C. diff colitis c/b neutropenia    Pancytopenia 2/2 chemo   Neutropenic fever 2/2 Severe C diff colitis    -c/w fidaxomicin extend for total 14 day course. imodium increased to tid to help with loose stool (ok with ID to start on imodium)  -Isolation precautions   -transfuse platelets to keep above 10k unless bleeding and hgb above 7.5 -> s/p 1 pRBC and 1 platelet transfusion  -Per hem onc team has not answered adequately to the platelet transfusion, approval requested and granted for cross matched platelets from DR. Shipman on 03/09/2024 at 10:55  -Platelets 67k today, patient got 3 units of regular platelets and 1 unit of cross matched platelets on 03/11  -s/p Neulasta as outpatient   -Patient got 2 doses of Zarxio SC on 03/09 and 03/10  --> Neutropenia resolved (WBC increased up to 42.97 on 03/15, starting to decrease)    Fever on 03/09  -UA and blood cultures negative  -CXR did not identify any opacities  -patient started on empiric atb on 03/09 --> stopped on 03/11: blood cultures were negative, patient has been afebrile for 48h and WBC count initially came up to 8  -afebrile since the 03/09, WBC 36k on 03/13    Testicular cancer on chemo   -oncology following : - s/p Right chest port placed via right IJ  -s/p cycle 3 of Cisplatin + Etoposide on 2/26 followed by Neulasta onbody injection D5 (3/1/24), initiated on 1.15.2024    Hypomagnesemia 2/2 diarrhea   Hypocalcemia 2/2 magnesium deficiency   Hypokalemia   -prolonged QTc - resolved (QTc 582 on 03/05 --> 433 on 03/07)  -electrolytes were being monitored BID, currently daily monitoring --> replaced as required  -K and Mg supplemented daily    HTN  -hold anti-hypertensive medications      Hypothyroid  -c/w home med    Patient is medically stable and ready for discharge.

## 2024-03-15 NOTE — DISCHARGE NOTE PROVIDER - NSDCCPCAREPLAN_GEN_ALL_CORE_FT
PRINCIPAL DISCHARGE DIAGNOSIS  Diagnosis: Neutropenic fever  Assessment and Plan of Treatment: You presented to the hospital with absoute neutropenia after chemo. This means your white blood cell count was etremely low. You had already received Neulasta and you received 2 injections of Zarxio while inpatient. Your white blood cell increased and normalized after a few days of the injections. It is very important to follow up with your oncologist and stay compliant with the regimen and the medications prescribed as indicated      SECONDARY DISCHARGE DIAGNOSES  Diagnosis: Abdominal pain, vomiting, and diarrhea  Assessment and Plan of Treatment:

## 2024-03-15 NOTE — PROGRESS NOTE ADULT - SUBJECTIVE AND OBJECTIVE BOX
24H events:    Patient is a 68y old Male who presents with a chief complaint of diarrhea (13 Mar 2024 11:28)    Primary diagnosis of Neutropenic fever    Today is hospital day 9d. This morning patient was seen and examined at bedside, resting comfortably in bed. Mentioned he only had 2 loose bowel movements since morning.    Code Status: Full code    PAST MEDICAL & SURGICAL HISTORY  HTN (hypertension)  Hypothyroidism, unspecified type  Obesity  Other secondary osteoarthritis of right hand  Testicular seminoma  History of eye removal Right eye  H/O melanoma excision  S/P appendectomy 14 yrs old    ALLERGIES:  penicillin G benzathine (Rash)  penicillin (Rash)    MEDICATIONS:  STANDING MEDICATIONS  chlorhexidine 2% Cloths 1 Application(s) Topical <User Schedule>, 03-11-24 @ 06:57  cholecalciferol 1000 Unit(s) Oral daily, 03-05-24 @ 21:45  famotidine    Tablet 40 milliGRAM(s) Oral daily, 03-08-24 @ 15:51  fidaxomicin 200 milliGRAM(s) Oral two times a day, 03-07-24 @ 08:33  levothyroxine 137 MICROGram(s) Oral daily, 03-05-24 @ 21:44  loperamide 2 milliGRAM(s) Oral daily, 03-15-24 @ 10:45  magnesium oxide 400 milliGRAM(s) Oral three times a day with meals, 03-14-24 @ 13:37  multivitamin 1 Tablet(s) Oral daily, 03-05-24 @ 21:45    PRN MEDICATIONS  acetaminophen     Tablet .. 650 milliGRAM(s) Oral every 6 hours, 03-05-24 @ 21:55 PRN  aluminum hydroxide/magnesium hydroxide/simethicone Suspension 30 milliLiter(s) Oral every 4 hours, 03-05-24 @ 21:55 PRN  melatonin 5 milliGRAM(s) Oral at bedtime, 03-05-24 @ 21:55 PRN  ondansetron Injectable 4 milliGRAM(s) IV Push every 8 hours, 03-05-24 @ 21:55 PRN    Diet, DASH/TLC:   Sodium & Cholesterol Restricted  Fiber/Residue Restricted  Lactose Restricted (Milk Sugar Intoler.) (03-07-24 @ 09:29) [Active]          Vital Signs Last 24 Hrs  T(C): 35.8 (15 Mar 2024 12:35), Max: 37 (14 Mar 2024 19:57)  T(F): 96.4 (15 Mar 2024 12:35), Max: 98.6 (14 Mar 2024 19:57)  HR: 86 (15 Mar 2024 12:35) (76 - 87)  BP: 137/63 (15 Mar 2024 12:35) (136/72 - 143/66)  BP(mean): --  RR: 18 (15 Mar 2024 12:35) (18 - 18)  SpO2: --      I&Os:    LABS:                        8.8    42.97 )-----------( 67       ( 15 Mar 2024 08:45 )             24.1     WBC trend: 42.97 <--, 40.49 <--, 36.54 <--, 20.45 <--  Hgb: 8.8 [03-15-24 @ 08:45]<--, 8.6 [03-14-24 @ 07:48]<--, 8.8 [03-13-24 @ 06:24]<--, 9.3 [03-12-24 @ 08:37]<--, 9.4 [03-11-24 @ 11:36]<--, 9.0 [03-10-24 @ 22:28]<--    03-15    139  |  106  |  11  ----------------------------<  103<H>  3.5   |  26  |  0.7    Ca    7.3<L>      15 Mar 2024 08:45  Mg     1.9     03-15    TPro  4.5<L>  /  Alb  2.7<L>  /  TBili  0.3  /  DBili  x   /  AST  25  /  ALT  13  /  AlkPhos  135<H>  03-15    Creatinine trend: 0.7<--, 0.8<--, 0.7<--, 0.8<--, 0.8<--, 0.8<--, 0.8<--  SODIUM TREND: Sodium 139 [03-15 @ 08:45]<--, Sodium 141 [03-14 @ 22:34]<--, Sodium 141 [03-14 @ 07:48]<--, Sodium 140 [03-13 @ 06:24]<--, Sodium 138 [03-12 @ 08:37]<--, Sodium 137 [03-11 @ 22:40]<--      POC Glucose:             Culture - Blood (collected 03-10-24 @ 08:36)  Source: .Blood None  Preliminary Report (03-14-24 @ 20:00):    No growth at 4 days    Culture - Blood (collected 03-09-24 @ 14:48)  Source: .Blood Blood  Final Report (03-15-24 @ 01:00):    No growth at 5 days      Urinalysis Basic - ( 15 Mar 2024 08:45 )    Color: x / Appearance: x / SG: x / pH: x  Gluc: 103 mg/dL / Ketone: x  / Bili: x / Urobili: x   Blood: x / Protein: x / Nitrite: x   Leuk Esterase: x / RBC: x / WBC x   Sq Epi: x / Non Sq Epi: x / Bacteria: x                                              -------------------------------------------------      PHYSICAL EXAM:  GENERAL: NAD, lying in bed comfortably  HEART: Regular rate and rhythm, normal S1/S2   LUNGS: No acute respiratory distress  ABDOMEN:  soft, non-tender, distented  EXTREMITIES: no rashes, extremities warm/dry, no cyanosis, no edema, ulcerations or ecchymosis  NERVOUS SYSTEM:  A&Ox3, CNII-XII intact, follows commands, answers questions appropriately   SKIN: No rashes or lesions

## 2024-03-15 NOTE — DISCHARGE NOTE PROVIDER - CARE PROVIDER_API CALL
Liam Aguilar  Family Medicine  [address] [place] [address]  Phone: [phone]  Fax: [phone]  Follow Up Time: 2 weeks    Demetrio Atwood  Medical Oncology  [address] [place] [id number]  Phone: [phone]  Fax: [phone]  Follow Up Time: 1 week

## 2024-03-15 NOTE — PROGRESS NOTE ADULT - ASSESSMENT
ASSESSMENT  Patient is 68-year-old male, former smoker with past medical history of HTN, hypothyroidism, melanoma s/p resection, testicular seminoma currently undergoing chemotherapy, presents to the emergency department for diarrhea onset last night.    IMPRESSION  #Neutropenic Fever  #C diff Colitis   #Testicular seminoma on active chemo - last on 3/1   # Recent right fore arm abscess s/p I and D 3/14 - MRSA -- completed course Linezolid    #Abx allergy: penicillin G benzathine (Rash)  penicillin (Rash)    RECOMMENDATIONS  - continue fidaxomicin 200 mg BID -- can extend for 14 days as slow response to treatment   - ok with giving immodium to help with electrolyte losses     Please call or message on [employer] Teams if with any questions.  Spectra [phone]

## 2024-03-15 NOTE — PROGRESS NOTE ADULT - ASSESSMENT
68-year-old male, former smoker with past medical history of HTN, hypothyroidism, melanoma s/p resection, testicular seminoma currently undergoing chemotherapy, presents to the emergency department for diarrhea.     # Pancytopenia 2/2 chemo   # Neutropenic fever 2/2 C diff colitis    - c/w IVF   - c/w fidaxomicin extend for total 14 day course. started on imodium to help with loose stool. ok with ID.  - Isolation precautions   - transfuse platelets to keep above 10k unless bleeding and hgb above 7.5 -> s/p 1 pRBC and 1 platelet transfusion  - Per hem onc team has not answered adequately to the platelet transfusion, approval requested and granted for cross matched platelets from DR. Shipman on 03/09/2024 at 10:55  - Platelets 67k today, patient got 3 units of regular platelets and 1 unit of cross matched platelets on 03/11  - s/p Neulasta as outpatient   - Patient got 2 doses of Zarxio SC on 03/09 and 03/10  --> Neutropenia resolved     #Fever on 03/09  - UA and blood cultures sent  - Chest x-ray did not identify any opacities  - Patient started on empiric atb on 03/09 --> stopped on 03/11: blood cultures were negative, patient has been afebrile for 48h and WBC count initially came up to 8  - Afebrile since the 03/09, WBC 36k on 03/13    # Testicular cancer on chemo   - Oncology following : - s/p Right chest port placed via right IJ  - s/p cycle 3 of Cisplatin + Etoposide on 2/26 followed by Neulasta onbody injection D5 (3/1/24), initiated on 1.15.2024    # Hypomagnesemia 2/2 diarrhea   # Hypocalcemia 2/2 magnesium deficiency   # Hypokalemia   - Prolonged QTc - resolved (QTc 582 on 03/05 --> 433 on 03/07)  - Electrolytes were being monitored BID, currently daily monitoring --> replaced as required  - K and Mg supplemented daily, repeat level at 8 pm    # HTN  - Hold anti-hypertensive medications      # Hypothyroid  - c/w home med    dvt ppx: SCD due to thrombocytopenia     Pending: diarrhea improvement and electrolyte correction.  Plan of care d/w patient   Dispo: anticipate for tomorrow.

## 2024-03-15 NOTE — DISCHARGE NOTE PROVIDER - DETAILS OF MALNUTRITION DIAGNOSIS/DIAGNOSES
This patient has been assessed with a concern for Malnutrition and was treated during this hospitalization for the following Nutrition diagnosis/diagnoses:     -  03/19/2024: Moderate protein-calorie malnutrition

## 2024-03-16 LAB
ALBUMIN SERPL ELPH-MCNC: 2.7 G/DL — LOW (ref 3.5–5.2)
ALP SERPL-CCNC: 129 U/L — HIGH (ref 30–115)
ALT FLD-CCNC: 15 U/L — SIGNIFICANT CHANGE UP (ref 0–41)
ANION GAP SERPL CALC-SCNC: 4 MMOL/L — LOW (ref 7–14)
ANION GAP SERPL CALC-SCNC: 5 MMOL/L — LOW (ref 7–14)
AST SERPL-CCNC: 28 U/L — SIGNIFICANT CHANGE UP (ref 0–41)
BASOPHILS # BLD AUTO: 0.18 K/UL — SIGNIFICANT CHANGE UP (ref 0–0.2)
BASOPHILS NFR BLD AUTO: 0.5 % — SIGNIFICANT CHANGE UP (ref 0–1)
BILIRUB SERPL-MCNC: 0.4 MG/DL — SIGNIFICANT CHANGE UP (ref 0.2–1.2)
BUN SERPL-MCNC: 8 MG/DL — LOW (ref 10–20)
BUN SERPL-MCNC: 9 MG/DL — LOW (ref 10–20)
CALCIUM SERPL-MCNC: 7.3 MG/DL — LOW (ref 8.4–10.5)
CALCIUM SERPL-MCNC: 7.4 MG/DL — LOW (ref 8.4–10.5)
CHLORIDE SERPL-SCNC: 102 MMOL/L — SIGNIFICANT CHANGE UP (ref 98–110)
CHLORIDE SERPL-SCNC: 105 MMOL/L — SIGNIFICANT CHANGE UP (ref 98–110)
CO2 SERPL-SCNC: 30 MMOL/L — SIGNIFICANT CHANGE UP (ref 17–32)
CO2 SERPL-SCNC: 31 MMOL/L — SIGNIFICANT CHANGE UP (ref 17–32)
CREAT SERPL-MCNC: 0.7 MG/DL — SIGNIFICANT CHANGE UP (ref 0.7–1.5)
CREAT SERPL-MCNC: 0.7 MG/DL — SIGNIFICANT CHANGE UP (ref 0.7–1.5)
EGFR: 99 ML/MIN/1.73M2 — SIGNIFICANT CHANGE UP
EGFR: 99 ML/MIN/1.73M2 — SIGNIFICANT CHANGE UP
EOSINOPHIL # BLD AUTO: 0 K/UL — SIGNIFICANT CHANGE UP (ref 0–0.7)
EOSINOPHIL NFR BLD AUTO: 0 % — SIGNIFICANT CHANGE UP (ref 0–8)
GLUCOSE SERPL-MCNC: 114 MG/DL — HIGH (ref 70–99)
GLUCOSE SERPL-MCNC: 99 MG/DL — SIGNIFICANT CHANGE UP (ref 70–99)
HCT VFR BLD CALC: 24.2 % — LOW (ref 42–52)
HGB BLD-MCNC: 8.5 G/DL — LOW (ref 14–18)
IMM GRANULOCYTES NFR BLD AUTO: 17.3 % — HIGH (ref 0.1–0.3)
LYMPHOCYTES # BLD AUTO: 1.42 K/UL — SIGNIFICANT CHANGE UP (ref 1.2–3.4)
LYMPHOCYTES # BLD AUTO: 4 % — LOW (ref 20.5–51.1)
MAGNESIUM SERPL-MCNC: 1.3 MG/DL — LOW (ref 1.8–2.4)
MAGNESIUM SERPL-MCNC: 1.8 MG/DL — SIGNIFICANT CHANGE UP (ref 1.8–2.4)
MCHC RBC-ENTMCNC: 29.4 PG — SIGNIFICANT CHANGE UP (ref 27–31)
MCHC RBC-ENTMCNC: 35.1 G/DL — SIGNIFICANT CHANGE UP (ref 32–37)
MCV RBC AUTO: 83.7 FL — SIGNIFICANT CHANGE UP (ref 80–94)
MONOCYTES # BLD AUTO: 2.74 K/UL — HIGH (ref 0.1–0.6)
MONOCYTES NFR BLD AUTO: 7.8 % — SIGNIFICANT CHANGE UP (ref 1.7–9.3)
NEUTROPHILS # BLD AUTO: 24.85 K/UL — HIGH (ref 1.4–6.5)
NEUTROPHILS NFR BLD AUTO: 70.4 % — SIGNIFICANT CHANGE UP (ref 42.2–75.2)
NRBC # BLD: 0 /100 WBCS — SIGNIFICANT CHANGE UP (ref 0–0)
PLATELET # BLD AUTO: 88 K/UL — LOW (ref 130–400)
PMV BLD: 11 FL — HIGH (ref 7.4–10.4)
POTASSIUM SERPL-MCNC: 3.4 MMOL/L — LOW (ref 3.5–5)
POTASSIUM SERPL-MCNC: 4 MMOL/L — SIGNIFICANT CHANGE UP (ref 3.5–5)
POTASSIUM SERPL-SCNC: 3.4 MMOL/L — LOW (ref 3.5–5)
POTASSIUM SERPL-SCNC: 4 MMOL/L — SIGNIFICANT CHANGE UP (ref 3.5–5)
PROT SERPL-MCNC: 4.7 G/DL — LOW (ref 6–8)
RBC # BLD: 2.89 M/UL — LOW (ref 4.7–6.1)
RBC # FLD: 15.2 % — HIGH (ref 11.5–14.5)
SODIUM SERPL-SCNC: 137 MMOL/L — SIGNIFICANT CHANGE UP (ref 135–146)
SODIUM SERPL-SCNC: 140 MMOL/L — SIGNIFICANT CHANGE UP (ref 135–146)
WBC # BLD: 35.3 K/UL — HIGH (ref 4.8–10.8)
WBC # FLD AUTO: 35.3 K/UL — HIGH (ref 4.8–10.8)

## 2024-03-16 PROCEDURE — 99232 SBSQ HOSP IP/OBS MODERATE 35: CPT

## 2024-03-16 RX ORDER — MAGNESIUM OXIDE 400 MG ORAL TABLET 241.3 MG
1 TABLET ORAL
Qty: 90 | Refills: 0
Start: 2024-03-16 | End: 2024-04-14

## 2024-03-16 RX ORDER — FIDAXOMICIN 200 MG/5ML
1 GRANULE, FOR SUSPENSION ORAL
Qty: 8 | Refills: 0
Start: 2024-03-16 | End: 2024-03-19

## 2024-03-16 RX ORDER — LOPERAMIDE HCL 2 MG
1 TABLET ORAL
Qty: 10 | Refills: 0
Start: 2024-03-16 | End: 2024-03-25

## 2024-03-16 RX ORDER — MAGNESIUM SULFATE 500 MG/ML
1 VIAL (ML) INJECTION ONCE
Refills: 0 | Status: COMPLETED | OUTPATIENT
Start: 2024-03-16 | End: 2024-03-16

## 2024-03-16 RX ORDER — FAMOTIDINE 10 MG/ML
1 INJECTION INTRAVENOUS
Qty: 0 | Refills: 0 | DISCHARGE
Start: 2024-03-16

## 2024-03-16 RX ORDER — FIDAXOMICIN 200 MG/5ML
GRANULE, FOR SUSPENSION ORAL
Refills: 0 | Status: DISCONTINUED | OUTPATIENT
Start: 2024-03-16 | End: 2024-03-16

## 2024-03-16 RX ORDER — MAGNESIUM SULFATE 500 MG/ML
2 VIAL (ML) INJECTION
Refills: 0 | Status: COMPLETED | OUTPATIENT
Start: 2024-03-16 | End: 2024-03-16

## 2024-03-16 RX ORDER — FAMOTIDINE 10 MG/ML
1 INJECTION INTRAVENOUS
Qty: 30 | Refills: 0
Start: 2024-03-16 | End: 2024-04-14

## 2024-03-16 RX ORDER — LOPERAMIDE HCL 2 MG
1 TABLET ORAL
Qty: 0 | Refills: 0 | DISCHARGE
Start: 2024-03-16

## 2024-03-16 RX ORDER — POTASSIUM CHLORIDE 20 MEQ
20 PACKET (EA) ORAL
Refills: 0 | Status: COMPLETED | OUTPATIENT
Start: 2024-03-16 | End: 2024-03-16

## 2024-03-16 RX ADMIN — Medication 2 MILLIGRAM(S): at 11:29

## 2024-03-16 RX ADMIN — Medication 50 MILLIEQUIVALENT(S): at 23:58

## 2024-03-16 RX ADMIN — Medication 25 GRAM(S): at 10:50

## 2024-03-16 RX ADMIN — Medication 50 MILLIEQUIVALENT(S): at 21:40

## 2024-03-16 RX ADMIN — FIDAXOMICIN 200 MILLIGRAM(S): 200 GRANULE, FOR SUSPENSION ORAL at 05:44

## 2024-03-16 RX ADMIN — CHLORHEXIDINE GLUCONATE 1 APPLICATION(S): 213 SOLUTION TOPICAL at 05:44

## 2024-03-16 RX ADMIN — Medication 1 TABLET(S): at 11:29

## 2024-03-16 RX ADMIN — Medication 137 MICROGRAM(S): at 05:44

## 2024-03-16 RX ADMIN — Medication 1000 UNIT(S): at 11:29

## 2024-03-16 RX ADMIN — MAGNESIUM OXIDE 400 MG ORAL TABLET 400 MILLIGRAM(S): 241.3 TABLET ORAL at 17:27

## 2024-03-16 RX ADMIN — MAGNESIUM OXIDE 400 MG ORAL TABLET 400 MILLIGRAM(S): 241.3 TABLET ORAL at 11:33

## 2024-03-16 RX ADMIN — FAMOTIDINE 40 MILLIGRAM(S): 10 INJECTION INTRAVENOUS at 11:30

## 2024-03-16 RX ADMIN — ENOXAPARIN SODIUM 40 MILLIGRAM(S): 100 INJECTION SUBCUTANEOUS at 21:40

## 2024-03-16 RX ADMIN — Medication 100 GRAM(S): at 21:40

## 2024-03-16 RX ADMIN — Medication 25 GRAM(S): at 11:29

## 2024-03-16 RX ADMIN — MAGNESIUM OXIDE 400 MG ORAL TABLET 400 MILLIGRAM(S): 241.3 TABLET ORAL at 07:49

## 2024-03-16 NOTE — PROGRESS NOTE ADULT - SUBJECTIVE AND OBJECTIVE BOX
24H events:    Patient is a 68y old Male who presents with a chief complaint of diarrhea (13 Mar 2024 11:28)    Primary diagnosis of Neutropenic fever    This morning patient was seen and examined at bedside, resting comfortably in bed, reports that bowel movements have improved.    Code Status: Full code    PAST MEDICAL & SURGICAL HISTORY  HTN (hypertension)  Hypothyroidism, unspecified type  Obesity  Other secondary osteoarthritis of right hand  Testicular seminoma  History of eye removal Right eye  H/O melanoma excision  S/P appendectomy 14 yrs old    ALLERGIES:  penicillin G benzathine (Rash)  penicillin (Rash)      MEDICATIONS:  STANDING MEDICATIONS  chlorhexidine 2% Cloths 1 Application(s) Topical <User Schedule>, 03-11-24 @ 06:57  cholecalciferol 1000 Unit(s) Oral daily, 03-05-24 @ 21:45  enoxaparin Injectable 40 milliGRAM(s) SubCutaneous every 24 hours, 03-15-24 @ 16:39  famotidine    Tablet 40 milliGRAM(s) Oral daily, 03-08-24 @ 15:51  levothyroxine 137 MICROGram(s) Oral daily, 03-05-24 @ 21:44  magnesium oxide 400 milliGRAM(s) Oral three times a day with meals, 03-14-24 @ 13:37  multivitamin 1 Tablet(s) Oral daily, 03-05-24 @ 21:45    PRN MEDICATIONS  acetaminophen     Tablet .. 650 milliGRAM(s) Oral every 6 hours, 03-05-24 @ 21:55 PRN  aluminum hydroxide/magnesium hydroxide/simethicone Suspension 30 milliLiter(s) Oral every 4 hours, 03-05-24 @ 21:55 PRN  melatonin 5 milliGRAM(s) Oral at bedtime, 03-05-24 @ 21:55 PRN  ondansetron Injectable 4 milliGRAM(s) IV Push every 8 hours, 03-05-24 @ 21:55 PRN    Diet, DASH/TLC:   Sodium & Cholesterol Restricted  Fiber/Residue Restricted  Lactose Restricted (Milk Sugar Intoler.) (03-07-24 @ 09:29) [Active]          Vital Signs Last 24 Hrs  T(C): 36.3 (16 Mar 2024 20:42), Max: 36.3 (16 Mar 2024 20:42)  T(F): 97.4 (16 Mar 2024 20:42), Max: 97.4 (16 Mar 2024 20:42)  HR: 87 (16 Mar 2024 20:42) (78 - 87)  BP: 134/67 (16 Mar 2024 20:42) (134/67 - 143/74)  BP(mean): 99 (16 Mar 2024 14:13) (99 - 99)  RR: 18 (16 Mar 2024 20:42) (18 - 18)  SpO2: 97% (16 Mar 2024 20:42) (97% - 98%)    Parameters below as of 16 Mar 2024 20:42  Patient On (Oxygen Delivery Method): room air      I&Os:    LABS:                        8.5    35.30 )-----------( 88       ( 16 Mar 2024 08:45 )             24.2     WBC trend: 35.30 <--, 42.97 <--, 40.49 <--, 36.54 <--  Hgb: 8.5 [03-16-24 @ 08:45]<--, 8.8 [03-15-24 @ 08:45]<--, 8.6 [03-14-24 @ 07:48]<--, 8.8 [03-13-24 @ 06:24]<--, 9.3 [03-12-24 @ 08:37]<--, 9.4 [03-11-24 @ 11:36]<--    03-16    137  |  102  |  8<L>  ----------------------------<  99  3.4<L>   |  31  |  0.7    Ca    7.4<L>      16 Mar 2024 18:05  Mg     1.8     03-16    TPro  4.7<L>  /  Alb  2.7<L>  /  TBili  0.4  /  DBili  x   /  AST  28  /  ALT  15  /  AlkPhos  129<H>  03-16    Creatinine trend: 0.7<--, 0.7<--, 0.7<--, 0.8<--, 0.7<--, 0.8<--, 0.8<--  SODIUM TREND: Sodium 137 [03-16 @ 18:05]<--, Sodium 140 [03-16 @ 08:45]<--, Sodium 139 [03-15 @ 08:45]<--, Sodium 141 [03-14 @ 22:34]<--, Sodium 141 [03-14 @ 07:48]<--, Sodium 140 [03-13 @ 06:24]<--      POC Glucose:     Culture - Blood (collected 03-10-24 @ 08:36)  Source: .Blood None  Final Report (03-15-24 @ 20:00):    No growth at 5 days      Urinalysis Basic - ( 16 Mar 2024 18:05 )    Color: x / Appearance: x / SG: x / pH: x  Gluc: 99 mg/dL / Ketone: x  / Bili: x / Urobili: x   Blood: x / Protein: x / Nitrite: x   Leuk Esterase: x / RBC: x / WBC x   Sq Epi: x / Non Sq Epi: x / Bacteria: x                                              -------------------------------------------------    PHYSICAL EXAM:  GENERAL: NAD, lying in bed comfortably  HEART: Regular rate and rhythm, normal S1/S2   LUNGS: No acute respiratory distress  ABDOMEN:  soft, non-tender, distented  EXTREMITIES: no rashes, extremities warm/dry, no cyanosis, no edema, ulcerations or ecchymosis  NERVOUS SYSTEM:  A&Ox3, CNII-XII intact, follows commands, answers questions appropriately   SKIN: No rashes or lesions

## 2024-03-17 LAB
ALBUMIN SERPL ELPH-MCNC: 2.8 G/DL — LOW (ref 3.5–5.2)
ALP SERPL-CCNC: 149 U/L — HIGH (ref 30–115)
ALT FLD-CCNC: 16 U/L — SIGNIFICANT CHANGE UP (ref 0–41)
ANION GAP SERPL CALC-SCNC: 6 MMOL/L — LOW (ref 7–14)
ANION GAP SERPL CALC-SCNC: 8 MMOL/L — SIGNIFICANT CHANGE UP (ref 7–14)
AST SERPL-CCNC: 27 U/L — SIGNIFICANT CHANGE UP (ref 0–41)
BASOPHILS # BLD AUTO: 0 K/UL — SIGNIFICANT CHANGE UP (ref 0–0.2)
BASOPHILS NFR BLD AUTO: 0 % — SIGNIFICANT CHANGE UP (ref 0–1)
BILIRUB SERPL-MCNC: 0.4 MG/DL — SIGNIFICANT CHANGE UP (ref 0.2–1.2)
BUN SERPL-MCNC: 8 MG/DL — LOW (ref 10–20)
BUN SERPL-MCNC: 9 MG/DL — LOW (ref 10–20)
CALCIUM SERPL-MCNC: 7.4 MG/DL — LOW (ref 8.4–10.4)
CALCIUM SERPL-MCNC: 7.6 MG/DL — LOW (ref 8.4–10.5)
CHLORIDE SERPL-SCNC: 102 MMOL/L — SIGNIFICANT CHANGE UP (ref 98–110)
CHLORIDE SERPL-SCNC: 102 MMOL/L — SIGNIFICANT CHANGE UP (ref 98–110)
CO2 SERPL-SCNC: 28 MMOL/L — SIGNIFICANT CHANGE UP (ref 17–32)
CO2 SERPL-SCNC: 30 MMOL/L — SIGNIFICANT CHANGE UP (ref 17–32)
CREAT SERPL-MCNC: 0.6 MG/DL — LOW (ref 0.7–1.5)
CREAT SERPL-MCNC: 0.7 MG/DL — SIGNIFICANT CHANGE UP (ref 0.7–1.5)
EGFR: 104 ML/MIN/1.73M2 — SIGNIFICANT CHANGE UP
EGFR: 99 ML/MIN/1.73M2 — SIGNIFICANT CHANGE UP
EOSINOPHIL # BLD AUTO: 0 K/UL — SIGNIFICANT CHANGE UP (ref 0–0.7)
EOSINOPHIL NFR BLD AUTO: 0 % — SIGNIFICANT CHANGE UP (ref 0–8)
GLUCOSE SERPL-MCNC: 75 MG/DL — SIGNIFICANT CHANGE UP (ref 70–99)
GLUCOSE SERPL-MCNC: 89 MG/DL — SIGNIFICANT CHANGE UP (ref 70–99)
HCT VFR BLD CALC: 26.4 % — LOW (ref 42–52)
HGB BLD-MCNC: 9 G/DL — LOW (ref 14–18)
LYMPHOCYTES # BLD AUTO: 0.2 K/UL — LOW (ref 1.2–3.4)
LYMPHOCYTES # BLD AUTO: 0.9 % — LOW (ref 20.5–51.1)
MAGNESIUM SERPL-MCNC: 1.4 MG/DL — LOW (ref 1.8–2.4)
MAGNESIUM SERPL-MCNC: 1.9 MG/DL — SIGNIFICANT CHANGE UP (ref 1.8–2.4)
MCHC RBC-ENTMCNC: 29.3 PG — SIGNIFICANT CHANGE UP (ref 27–31)
MCHC RBC-ENTMCNC: 34.1 G/DL — SIGNIFICANT CHANGE UP (ref 32–37)
MCV RBC AUTO: 86 FL — SIGNIFICANT CHANGE UP (ref 80–94)
MONOCYTES # BLD AUTO: 1.76 K/UL — HIGH (ref 0.1–0.6)
MONOCYTES NFR BLD AUTO: 8 % — SIGNIFICANT CHANGE UP (ref 1.7–9.3)
NEUTROPHILS # BLD AUTO: 19.44 K/UL — HIGH (ref 1.4–6.5)
NEUTROPHILS NFR BLD AUTO: 88.5 % — HIGH (ref 42.2–75.2)
NRBC # BLD: SIGNIFICANT CHANGE UP /100 WBCS (ref 0–0)
PLATELET # BLD AUTO: 119 K/UL — LOW (ref 130–400)
PMV BLD: 10.8 FL — HIGH (ref 7.4–10.4)
POTASSIUM SERPL-MCNC: 3.8 MMOL/L — SIGNIFICANT CHANGE UP (ref 3.5–5)
POTASSIUM SERPL-MCNC: 4.6 MMOL/L — SIGNIFICANT CHANGE UP (ref 3.5–5)
POTASSIUM SERPL-SCNC: 3.8 MMOL/L — SIGNIFICANT CHANGE UP (ref 3.5–5)
POTASSIUM SERPL-SCNC: 4.6 MMOL/L — SIGNIFICANT CHANGE UP (ref 3.5–5)
PROT SERPL-MCNC: 4.8 G/DL — LOW (ref 6–8)
RBC # BLD: 3.07 M/UL — LOW (ref 4.7–6.1)
RBC # FLD: 15.9 % — HIGH (ref 11.5–14.5)
SODIUM SERPL-SCNC: 138 MMOL/L — SIGNIFICANT CHANGE UP (ref 135–146)
SODIUM SERPL-SCNC: 138 MMOL/L — SIGNIFICANT CHANGE UP (ref 135–146)
WBC # BLD: 21.97 K/UL — HIGH (ref 4.8–10.8)
WBC # FLD AUTO: 21.97 K/UL — HIGH (ref 4.8–10.8)

## 2024-03-17 PROCEDURE — 99232 SBSQ HOSP IP/OBS MODERATE 35: CPT

## 2024-03-17 RX ORDER — LOPERAMIDE HCL 2 MG
2 TABLET ORAL
Refills: 0 | Status: DISCONTINUED | OUTPATIENT
Start: 2024-03-17 | End: 2024-03-18

## 2024-03-17 RX ORDER — FIDAXOMICIN 200 MG/5ML
200 GRANULE, FOR SUSPENSION ORAL
Refills: 0 | Status: DISCONTINUED | OUTPATIENT
Start: 2024-03-17 | End: 2024-03-20

## 2024-03-17 RX ORDER — POTASSIUM CHLORIDE 20 MEQ
40 PACKET (EA) ORAL ONCE
Refills: 0 | Status: COMPLETED | OUTPATIENT
Start: 2024-03-17 | End: 2024-03-17

## 2024-03-17 RX ORDER — MAGNESIUM SULFATE 500 MG/ML
2 VIAL (ML) INJECTION ONCE
Refills: 0 | Status: COMPLETED | OUTPATIENT
Start: 2024-03-17 | End: 2024-03-17

## 2024-03-17 RX ADMIN — Medication 25 GRAM(S): at 11:02

## 2024-03-17 RX ADMIN — Medication 137 MICROGRAM(S): at 05:43

## 2024-03-17 RX ADMIN — Medication 1 TABLET(S): at 11:00

## 2024-03-17 RX ADMIN — Medication 40 MILLIEQUIVALENT(S): at 11:02

## 2024-03-17 RX ADMIN — CHLORHEXIDINE GLUCONATE 1 APPLICATION(S): 213 SOLUTION TOPICAL at 05:43

## 2024-03-17 RX ADMIN — MAGNESIUM OXIDE 400 MG ORAL TABLET 400 MILLIGRAM(S): 241.3 TABLET ORAL at 11:00

## 2024-03-17 RX ADMIN — Medication 2 MILLIGRAM(S): at 21:37

## 2024-03-17 RX ADMIN — MAGNESIUM OXIDE 400 MG ORAL TABLET 400 MILLIGRAM(S): 241.3 TABLET ORAL at 08:27

## 2024-03-17 RX ADMIN — FAMOTIDINE 40 MILLIGRAM(S): 10 INJECTION INTRAVENOUS at 11:01

## 2024-03-17 RX ADMIN — Medication 25 GRAM(S): at 14:20

## 2024-03-17 RX ADMIN — FIDAXOMICIN 200 MILLIGRAM(S): 200 GRANULE, FOR SUSPENSION ORAL at 17:16

## 2024-03-17 RX ADMIN — Medication 2 MILLIGRAM(S): at 11:00

## 2024-03-17 RX ADMIN — Medication 1000 UNIT(S): at 11:01

## 2024-03-17 NOTE — PROGRESS NOTE ADULT - ASSESSMENT
68-year-old male, former smoker with past medical history of HTN, hypothyroidism, melanoma s/p resection, testicular seminoma currently undergoing chemotherapy, presents to the emergency department for diarrhea.     # Pancytopenia 2/2 chemo   # Neutropenic fever 2/2 C diff colitis    - c/w IVF   - c/w fidaxomicin extend for total 14 day course. started on imodium to help with loose stool. ok with ID.  - Isolation precautions   - transfuse platelets to keep above 10k unless bleeding and hgb above 7.5 -> s/p 1 pRBC and 1 platelet transfusion  - Per hem onc team has not answered adequately to the platelet transfusion, approval requested and granted for cross matched platelets from DR. Shipman on 03/09/2024 at 10:55  - Platelets 67k today, patient got 3 units of regular platelets and 1 unit of cross matched platelets on 03/11  - s/p Neulasta as outpatient   - Patient got 2 doses of Zarxio SC on 03/09 and 03/10  --> Neutropenia resolved (WBC increased up to 42.97 on 03/15, starting to decrease)    #Fever on 03/09  - UA and blood cultures sent  - Chest x-ray did not identify any opacities  - Patient started on empiric atb on 03/09 --> stopped on 03/11: blood cultures were negative, patient has been afebrile for 48h and WBC count initially came up to 8  - Afebrile since the 03/09, WBC 36k on 03/13    # Testicular cancer on chemo   - Oncology following : - s/p Right chest port placed via right IJ  - s/p cycle 3 of Cisplatin + Etoposide on 2/26 followed by Neulasta onbody injection D5 (3/1/24), initiated on 1.15.2024    # Hypomagnesemia 2/2 diarrhea   # Hypocalcemia 2/2 magnesium deficiency   # Hypokalemia   - Prolonged QTc - resolved (QTc 582 on 03/05 --> 433 on 03/07)  - Electrolytes were being monitored BID, currently daily monitoring --> replaced as required  - K and Mg supplemented daily, repeat level at 8 pm  - Initially supplemented IV, currently receiving PO K and Mg    # HTN  - Hold anti-hypertensive medications      # Hypothyroid  - c/w home med    dvt ppx: SCD due to thrombocytopenia     Pending: diarrhea improvement and electrolyte correction.  Plan of care d/w patient   Disposition: anticipate for tomorrow.

## 2024-03-17 NOTE — PROGRESS NOTE ADULT - SUBJECTIVE AND OBJECTIVE BOX
24H events:    Patient is a 68y old Male who presents with a chief complaint of diarrhea (13 Mar 2024 11:28)    Primary diagnosis of Neutropenic fever    This morning patient was seen and examined at bedside, resting comfortably in bed. still reports diarrhea with ~ 3 watery BMs per day.    Code Status: Full code    PAST MEDICAL & SURGICAL HISTORY  HTN (hypertension)  Hypothyroidism, unspecified type  Obesity  Other secondary osteoarthritis of right hand  Testicular seminoma  History of eye removal Right eye  H/O melanoma excision  S/P appendectomy 14 yrs old    ALLERGIES:  penicillin G benzathine (Rash)  penicillin (Rash)    MEDICATIONS:  STANDING MEDICATIONS  chlorhexidine 2% Cloths 1 Application(s) Topical <User Schedule>, 03-11-24 @ 06:57  cholecalciferol 1000 Unit(s) Oral daily, 03-05-24 @ 21:45  famotidine    Tablet 40 milliGRAM(s) Oral daily, 03-08-24 @ 15:51  fidaxomicin 200 milliGRAM(s) Oral two times a day, 03-17-24 @ 09:34  levothyroxine 137 MICROGram(s) Oral daily, 03-05-24 @ 21:44  loperamide 2 milliGRAM(s) Oral two times a day, 03-17-24 @ 09:29  magnesium sulfate  IVPB 2 Gram(s) IV Intermittent once, 03-17-24 @ 13:00  multivitamin 1 Tablet(s) Oral daily, 03-05-24 @ 21:45    PRN MEDICATIONS  acetaminophen     Tablet .. 650 milliGRAM(s) Oral every 6 hours, 03-05-24 @ 21:55 PRN  aluminum hydroxide/magnesium hydroxide/simethicone Suspension 30 milliLiter(s) Oral every 4 hours, 03-05-24 @ 21:55 PRN  melatonin 5 milliGRAM(s) Oral at bedtime, 03-05-24 @ 21:55 PRN  ondansetron Injectable 4 milliGRAM(s) IV Push every 8 hours, 03-05-24 @ 21:55 PRN    Diet, DASH/TLC:   Sodium & Cholesterol Restricted  Fiber/Residue Restricted  Lactose Restricted (Milk Sugar Intoler.) (03-07-24 @ 09:29) [Active]          Vital Signs Last 24 Hrs  T(C): 36.1 (17 Mar 2024 05:51), Max: 36.3 (16 Mar 2024 20:42)  T(F): 96.9 (17 Mar 2024 05:51), Max: 97.4 (16 Mar 2024 20:42)  HR: 89 (17 Mar 2024 05:51) (78 - 89)  BP: 132/72 (17 Mar 2024 05:51) (132/72 - 143/74)  BP(mean): 99 (16 Mar 2024 14:13) (99 - 99)  RR: 18 (17 Mar 2024 05:51) (18 - 18)  SpO2: 96% (17 Mar 2024 05:51) (96% - 97%)    Parameters below as of 17 Mar 2024 05:51  Patient On (Oxygen Delivery Method): room air      I&Os:    LABS:                        9.0    21.97 )-----------( 119      ( 17 Mar 2024 07:45 )             26.4     WBC trend: 21.97 <--, 35.30 <--, 42.97 <--, 40.49 <--  Hgb: 9.0 [03-17-24 @ 07:45]<--, 8.5 [03-16-24 @ 08:45]<--, 8.8 [03-15-24 @ 08:45]<--, 8.6 [03-14-24 @ 07:48]<--, 8.8 [03-13-24 @ 06:24]<--, 9.3 [03-12-24 @ 08:37]<--    03-17    138  |  102  |  8<L>  ----------------------------<  89  3.8   |  28  |  0.6<L>    Ca    7.4<L>      17 Mar 2024 07:45  Mg     1.4     03-17    TPro  4.8<L>  /  Alb  2.8<L>  /  TBili  0.4  /  DBili  x   /  AST  27  /  ALT  16  /  AlkPhos  149<H>  03-17    Creatinine trend: 0.6<--, 0.7<--, 0.7<--, 0.7<--, 0.8<--, 0.7<--, 0.8<--  SODIUM TREND: Sodium 138 [03-17 @ 07:45]<--, Sodium 137 [03-16 @ 18:05]<--, Sodium 140 [03-16 @ 08:45]<--, Sodium 139 [03-15 @ 08:45]<--, Sodium 141 [03-14 @ 22:34]<--, Sodium 141 [03-14 @ 07:48]<--      POC Glucose:       Urinalysis Basic - ( 17 Mar 2024 07:45 )    Color: x / Appearance: x / SG: x / pH: x  Gluc: 89 mg/dL / Ketone: x  / Bili: x / Urobili: x   Blood: x / Protein: x / Nitrite: x   Leuk Esterase: x / RBC: x / WBC x   Sq Epi: x / Non Sq Epi: x / Bacteria: x                                              -------------------------------------------------    RADIOLOGY:      PHYSICAL EXAM:  GENERAL: NAD, lying in bed comfortably  HEART: Regular rate and rhythm, normal S1/S2   LUNGS: No acute respiratory distress  ABDOMEN:  soft, non-tender, distented  EXTREMITIES: no rashes, extremities warm/dry, no cyanosis, no edema, ulcerations or ecchymosis  NERVOUS SYSTEM:  A&Ox3, CNII-XII intact, follows commands, answers questions appropriately   SKIN: No rashes or lesions

## 2024-03-17 NOTE — PROGRESS NOTE ADULT - ASSESSMENT
68-year-old male, former smoker with past medical history of HTN, hypothyroidism, melanoma s/p resection, testicular seminoma currently undergoing chemotherapy, presents to the emergency department for diarrhea.     # Pancytopenia 2/2 chemo   # Neutropenic fever 2/2 C diff colitis    - c/w IVF   - c/w fidaxomicin extend for total 14 day course. started on imodium to help with loose stool. ok with ID.  - Isolation precautions   - transfuse platelets to keep above 10k unless bleeding and hgb above 7.5 -> s/p 1 pRBC and 1 platelet transfusion  - Per hem onc team has not answered adequately to the platelet transfusion, approval requested and granted for cross matched platelets from DR. Shipman on 03/09/2024 at 10:55  - Platelets 119k today (trending up), patient got 3 units of regular platelets and 1 unit of cross matched platelets on 03/11  - s/p Neulasta as outpatient   - Patient got 2 doses of Zarxio SC on 03/09 and 03/10  --> Neutropenia resolved ---> followed by developing leukocytosis. now WBC count improving.  - hgb stable.    #Fever on 03/09  - UA and blood cultures sent  - Chest x-ray did not identify any opacities  - Patient started on empiric atb on 03/09 --> stopped on 03/11: blood cultures were negative, patient has been afebrile for 48h and WBC count initially came up to 8  - Afebrile since the 03/09, WBC 36k on 03/13 now trending down    # Testicular cancer on chemo   - Oncology following : - s/p Right chest port placed via right IJ  - s/p cycle 3 of Cisplatin + Etoposide on 2/26 followed by Neulasta onbody injection D5 (3/1/24), initiated on 1.15.2024    # Hypomagnesemia 2/2 diarrhea   # Hypocalcemia 2/2 magnesium deficiency   # Hypokalemia   - Prolonged QTc - resolved (QTc 582 on 03/05 --> 433 on 03/07)  - Electrolytes were being monitored BID, currently daily monitoring --> replaced as required  - K and Mg supplemented daily, repeat level at 4 pm    # HTN  - Hold anti-hypertensive medications      # Hypothyroid  - c/w home med    dvt ppx: SCD due to thrombocytopenia     Pending: diarrhea improvement and electrolyte correction.  Plan of care d/w patient   Dispo: anticipate for tomorrow.

## 2024-03-17 NOTE — PROGRESS NOTE ADULT - SUBJECTIVE AND OBJECTIVE BOX
24H events:    Patient is a 68y old Male who presents with a chief complaint of diarrhea (16 Mar 2024 22:06)    Primary diagnosis of Neutropenic fever    Today is hospital day 12d.  No acute or major events overnight. Hemodynamically stable, loose bowel movements.     Code Status: Full code    PAST MEDICAL & SURGICAL HISTORY  HTN (hypertension)    Hypothyroidism, unspecified type    Obesity    Other secondary osteoarthritis of right hand    Testicular seminoma    History of right eye removal    H/O melanoma excision    S/P appendectomy 14 yrs old      ALLERGIES:  penicillin G benzathine (Rash)  penicillin (Rash)    MEDICATIONS:  STANDING MEDICATIONS  chlorhexidine 2% Cloths 1 Application(s) Topical <User Schedule>  cholecalciferol 1000 Unit(s) Oral daily  enoxaparin Injectable 40 milliGRAM(s) SubCutaneous every 24 hours  famotidine    Tablet 40 milliGRAM(s) Oral daily  levothyroxine 137 MICROGram(s) Oral daily  magnesium oxide 400 milliGRAM(s) Oral three times a day with meals  multivitamin 1 Tablet(s) Oral daily    PRN MEDICATIONS  acetaminophen     Tablet .. 650 milliGRAM(s) Oral every 6 hours PRN  aluminum hydroxide/magnesium hydroxide/simethicone Suspension 30 milliLiter(s) Oral every 4 hours PRN  melatonin 5 milliGRAM(s) Oral at bedtime PRN  ondansetron Injectable 4 milliGRAM(s) IV Push every 8 hours PRN    VITALS:   T(F): 97.4  HR: 87  BP: 134/67  RR: 18  SpO2: 97%    PHYSICAL EXAM:  GENERAL: NAD, lying in bed comfortably  NECK: Supple, no JVD    HEART: Regular rate and rhythm, normal S1/S2   LUNGS: No acute respiratory distress   ABDOMEN:  soft, non-tender, non-distended   EXTREMITIES: no edema, ulcerations or ecchymosis  NERVOUS SYSTEM:  A&Ox3, CNII-XII intact, follows commands, answers questions appropriately   SKIN: No rashes or lesions       [facility] score: 24    LABS:                        8.5    35.30 )-----------( 88       ( 16 Mar 2024 08:45 )             24.2     03-16    137  |  102  |  8<L>  ----------------------------<  99  3.4<L>   |  31  |  0.7    Ca    7.4<L>      16 Mar 2024 18:05  Mg     1.8     03-16    TPro  4.7<L>  /  Alb  2.7<L>  /  TBili  0.4  /  DBili  x   /  AST  28  /  ALT  15  /  AlkPhos  129<H>  03-16    Urinalysis Basic - ( 16 Mar 2024 18:05 )    Color: x / Appearance: x / SG: x / pH: x  Gluc: 99 mg/dL / Ketone: x  / Bili: x / Urobili: x   Blood: x / Protein: x / Nitrite: x   Leuk Esterase: x / RBC: x / WBC x   Sq Epi: x / Non Sq Epi: x / Bacteria: x

## 2024-03-18 ENCOUNTER — APPOINTMENT (OUTPATIENT)
Dept: INFUSION THERAPY | Facility: CLINIC | Age: 69
End: 2024-03-18

## 2024-03-18 LAB
ALBUMIN SERPL ELPH-MCNC: 2.8 G/DL — LOW (ref 3.5–5.2)
ALP SERPL-CCNC: 122 U/L — HIGH (ref 30–115)
ALT FLD-CCNC: 13 U/L — SIGNIFICANT CHANGE UP (ref 0–41)
ANION GAP SERPL CALC-SCNC: 5 MMOL/L — LOW (ref 7–14)
AST SERPL-CCNC: 19 U/L — SIGNIFICANT CHANGE UP (ref 0–41)
BILIRUB SERPL-MCNC: 0.4 MG/DL — SIGNIFICANT CHANGE UP (ref 0.2–1.2)
BUN SERPL-MCNC: 9 MG/DL — LOW (ref 10–20)
CALCIUM SERPL-MCNC: 7.6 MG/DL — LOW (ref 8.4–10.4)
CHLORIDE SERPL-SCNC: 99 MMOL/L — SIGNIFICANT CHANGE UP (ref 98–110)
CO2 SERPL-SCNC: 30 MMOL/L — SIGNIFICANT CHANGE UP (ref 17–32)
CREAT ?TM UR-MCNC: 61 MG/DL — SIGNIFICANT CHANGE UP
CREAT SERPL-MCNC: 0.7 MG/DL — SIGNIFICANT CHANGE UP (ref 0.7–1.5)
EGFR: 99 ML/MIN/1.73M2 — SIGNIFICANT CHANGE UP
GLUCOSE SERPL-MCNC: 73 MG/DL — SIGNIFICANT CHANGE UP (ref 70–99)
MAGNESIUM SERPL-MCNC: 1.5 MG/DL — LOW (ref 1.8–2.4)
MAGNESIUM UR-MCNC: 18.9 MG/DL — SIGNIFICANT CHANGE UP
POTASSIUM SERPL-MCNC: 4.1 MMOL/L — SIGNIFICANT CHANGE UP (ref 3.5–5)
POTASSIUM SERPL-SCNC: 4.1 MMOL/L — SIGNIFICANT CHANGE UP (ref 3.5–5)
PROT SERPL-MCNC: 4.8 G/DL — LOW (ref 6–8)
SODIUM SERPL-SCNC: 134 MMOL/L — LOW (ref 135–146)

## 2024-03-18 PROCEDURE — 93010 ELECTROCARDIOGRAM REPORT: CPT

## 2024-03-18 PROCEDURE — 99232 SBSQ HOSP IP/OBS MODERATE 35: CPT

## 2024-03-18 RX ORDER — POTASSIUM CHLORIDE 20 MEQ
20 PACKET (EA) ORAL ONCE
Refills: 0 | Status: COMPLETED | OUTPATIENT
Start: 2024-03-18 | End: 2024-03-18

## 2024-03-18 RX ORDER — MAGNESIUM SULFATE 500 MG/ML
2 VIAL (ML) INJECTION
Refills: 0 | Status: COMPLETED | OUTPATIENT
Start: 2024-03-18 | End: 2024-03-18

## 2024-03-18 RX ORDER — LOPERAMIDE HCL 2 MG
2 TABLET ORAL THREE TIMES A DAY
Refills: 0 | Status: COMPLETED | OUTPATIENT
Start: 2024-03-18 | End: 2024-03-20

## 2024-03-18 RX ORDER — MAGNESIUM OXIDE 400 MG ORAL TABLET 241.3 MG
400 TABLET ORAL
Refills: 0 | Status: DISCONTINUED | OUTPATIENT
Start: 2024-03-18 | End: 2024-03-20

## 2024-03-18 RX ADMIN — Medication 20 MILLIEQUIVALENT(S): at 18:48

## 2024-03-18 RX ADMIN — Medication 25 GRAM(S): at 14:48

## 2024-03-18 RX ADMIN — Medication 1 TABLET(S): at 11:07

## 2024-03-18 RX ADMIN — Medication 1000 UNIT(S): at 11:07

## 2024-03-18 RX ADMIN — FIDAXOMICIN 200 MILLIGRAM(S): 200 GRANULE, FOR SUSPENSION ORAL at 05:26

## 2024-03-18 RX ADMIN — Medication 2 MILLIGRAM(S): at 22:21

## 2024-03-18 RX ADMIN — FIDAXOMICIN 200 MILLIGRAM(S): 200 GRANULE, FOR SUSPENSION ORAL at 17:33

## 2024-03-18 RX ADMIN — CHLORHEXIDINE GLUCONATE 1 APPLICATION(S): 213 SOLUTION TOPICAL at 05:26

## 2024-03-18 RX ADMIN — Medication 137 MICROGRAM(S): at 05:26

## 2024-03-18 RX ADMIN — Medication 2 MILLIGRAM(S): at 17:34

## 2024-03-18 RX ADMIN — Medication 2 MILLIGRAM(S): at 05:26

## 2024-03-18 RX ADMIN — Medication 2 MILLIGRAM(S): at 18:48

## 2024-03-18 RX ADMIN — Medication 25 GRAM(S): at 12:35

## 2024-03-18 RX ADMIN — MAGNESIUM OXIDE 400 MG ORAL TABLET 400 MILLIGRAM(S): 241.3 TABLET ORAL at 11:08

## 2024-03-18 RX ADMIN — FAMOTIDINE 40 MILLIGRAM(S): 10 INJECTION INTRAVENOUS at 11:07

## 2024-03-18 RX ADMIN — Medication 25 GRAM(S): at 10:55

## 2024-03-18 RX ADMIN — MAGNESIUM OXIDE 400 MG ORAL TABLET 400 MILLIGRAM(S): 241.3 TABLET ORAL at 17:33

## 2024-03-18 RX ADMIN — MAGNESIUM OXIDE 400 MG ORAL TABLET 400 MILLIGRAM(S): 241.3 TABLET ORAL at 08:22

## 2024-03-18 NOTE — PROGRESS NOTE ADULT - ASSESSMENT
68-year-old male, former smoker with past medical history of HTN, hypothyroidism, melanoma s/p resection, testicular seminoma currently undergoing chemotherapy, admitted for C. diff colitis c/b neutropenia    Pancytopenia 2/2 chemo   Neutropenic fever 2/2 Severe C diff colitis    -c/w fidaxomicin extend for total 14 day course. started on imodium to help with loose stool. ok with ID.  -Isolation precautions   -transfuse platelets to keep above 10k unless bleeding and hgb above 7.5 -> s/p 1 pRBC and 1 platelet transfusion  -Per hem onc team has not answered adequately to the platelet transfusion, approval requested and granted for cross matched platelets from DR. Shipman on 03/09/2024 at 10:55  -Platelets 67k today, patient got 3 units of regular platelets and 1 unit of cross matched platelets on 03/11  -s/p Neulasta as outpatient   -Patient got 2 doses of Zarxio SC on 03/09 and 03/10  --> Neutropenia resolved (WBC increased up to 42.97 on 03/15, starting to decrease)    Fever on 03/09  -UA and blood cultures negative  -CXR did not identify any opacities  -patient started on empiric atb on 03/09 --> stopped on 03/11: blood cultures were negative, patient has been afebrile for 48h and WBC count initially came up to 8  -afebrile since the 03/09, WBC 36k on 03/13    Testicular cancer on chemo   -oncology following : - s/p Right chest port placed via right IJ  -s/p cycle 3 of Cisplatin + Etoposide on 2/26 followed by Neulasta onbody injection D5 (3/1/24), initiated on 1.15.2024    Hypomagnesemia 2/2 diarrhea   Hypocalcemia 2/2 magnesium deficiency   Hypokalemia   -prolonged QTc - resolved (QTc 582 on 03/05 --> 433 on 03/07)  -electrolytes were being monitored BID, currently daily monitoring --> replaced as required  -K and Mg supplemented daily, repeat level at 8 pm  -initially supplemented IV, currently receiving PO K and Mg    HTN  -hold anti-hypertensive medications      Hypothyroid  -c/w home med    dvt ppx: SCD due to thrombocytopenia     Pending: diarrhea improvement and electrolyte correction.   68-year-old male, former smoker with past medical history of HTN, hypothyroidism, melanoma s/p resection, testicular seminoma currently undergoing chemotherapy, admitted for C. diff colitis c/b neutropenia    Pancytopenia 2/2 chemo   Neutropenic fever 2/2 Severe C diff colitis    -c/w fidaxomicin extend for total 14 day course. imodium increased to tid to help with loose stool (ok with ID to start on imodium)  -Isolation precautions   -transfuse platelets to keep above 10k unless bleeding and hgb above 7.5 -> s/p 1 pRBC and 1 platelet transfusion  -Per hem onc team has not answered adequately to the platelet transfusion, approval requested and granted for cross matched platelets from DR. Shipman on 03/09/2024 at 10:55  -Platelets 67k today, patient got 3 units of regular platelets and 1 unit of cross matched platelets on 03/11  -s/p Neulasta as outpatient   -Patient got 2 doses of Zarxio SC on 03/09 and 03/10  --> Neutropenia resolved (WBC increased up to 42.97 on 03/15, starting to decrease)    Fever on 03/09  -UA and blood cultures negative  -CXR did not identify any opacities  -patient started on empiric atb on 03/09 --> stopped on 03/11: blood cultures were negative, patient has been afebrile for 48h and WBC count initially came up to 8  -afebrile since the 03/09, WBC 36k on 03/13    Testicular cancer on chemo   -oncology following : - s/p Right chest port placed via right IJ  -s/p cycle 3 of Cisplatin + Etoposide on 2/26 followed by Neulasta onbody injection D5 (3/1/24), initiated on 1.15.2024    Hypomagnesemia 2/2 diarrhea   Hypocalcemia 2/2 magnesium deficiency   Hypokalemia   -prolonged QTc - resolved (QTc 582 on 03/05 --> 433 on 03/07)  -electrolytes were being monitored BID, currently daily monitoring --> replaced as required  -K and Mg supplemented daily, repeat level at 8 pm  -initially supplemented IV, currently receiving PO K and Mg    HTN  -hold anti-hypertensive medications      Hypothyroid  -c/w home med    dvt ppx: SCD due to thrombocytopenia     Pending: diarrhea improvement and electrolyte correction.

## 2024-03-18 NOTE — PROGRESS NOTE ADULT - SUBJECTIVE AND OBJECTIVE BOX
24H events:    Patient is a 68y old Male who presents with a chief complaint of diarrhea (17 Mar 2024 11:48)    Primary diagnosis of Neutropenic fever    This morning patient was seen and examined at bedside, resting comfortably in bed.    No acute or major events overnight.    PAST MEDICAL & SURGICAL HISTORY  HTN (hypertension)    Hypothyroidism, unspecified type    Obesity    Other secondary osteoarthritis of right hand    Testicular seminoma    History of eye removal  Right eye    H/O melanoma excision    S/P appendectomy  14 yrs old        ALLERGIES:  penicillin G benzathine (Rash)  penicillin (Rash)    MEDICATIONS:  STANDING MEDICATIONS  chlorhexidine 2% Cloths 1 Application(s) Topical <User Schedule>  cholecalciferol 1000 Unit(s) Oral daily  famotidine    Tablet 40 milliGRAM(s) Oral daily  fidaxomicin 200 milliGRAM(s) Oral two times a day  levothyroxine 137 MICROGram(s) Oral daily  loperamide 2 milliGRAM(s) Oral two times a day  magnesium oxide 400 milliGRAM(s) Oral three times a day with meals  magnesium sulfate  IVPB 2 Gram(s) IV Intermittent every 2 hours  multivitamin 1 Tablet(s) Oral daily    PRN MEDICATIONS  acetaminophen     Tablet .. 650 milliGRAM(s) Oral every 6 hours PRN  aluminum hydroxide/magnesium hydroxide/simethicone Suspension 30 milliLiter(s) Oral every 4 hours PRN  melatonin 5 milliGRAM(s) Oral at bedtime PRN  ondansetron Injectable 4 milliGRAM(s) IV Push every 8 hours PRN    VITALS:   T(F): 96.3  HR: 90  BP: 126/77  RR: 18  SpO2: 94%        PHYSICAL EXAM:  GENERAL: NAD    HEART: S1, S2 with no murmurs heard on auscultation    LUNGS: bilaterally clear to auscultation with no added sounds    ABDOMEN: soft, lax, NTND    EXTREMITIES: peripheral pulses palpable, no pitting edema    NERVOUS SYSTEM:  AOx3, non-focal    SKIN: no rashes or lesions noted      LABS:                        9.0    21.97 )-----------( 119      ( 17 Mar 2024 07:45 )             26.4     03-18    134<L>  |  99  |  9<L>  ----------------------------<  73  4.1   |  30  |  0.7    Ca    7.6<L>      18 Mar 2024 07:08  Mg     1.5     03-18    TPro  4.8<L>  /  Alb  2.8<L>  /  TBili  0.4  /  DBili  x   /  AST  19  /  ALT  13  /  AlkPhos  122<H>  03-18      Urinalysis Basic - ( 18 Mar 2024 07:08 )    Color: x / Appearance: x / SG: x / pH: x  Gluc: 73 mg/dL / Ketone: x  / Bili: x / Urobili: x   Blood: x / Protein: x / Nitrite: x   Leuk Esterase: x / RBC: x / WBC x   Sq Epi: x / Non Sq Epi: x / Bacteria: x

## 2024-03-19 ENCOUNTER — APPOINTMENT (OUTPATIENT)
Dept: INFUSION THERAPY | Facility: CLINIC | Age: 69
End: 2024-03-19

## 2024-03-19 LAB
ALBUMIN SERPL ELPH-MCNC: 3.1 G/DL — LOW (ref 3.5–5.2)
ALP SERPL-CCNC: 119 U/L — HIGH (ref 30–115)
ALT FLD-CCNC: 13 U/L — SIGNIFICANT CHANGE UP (ref 0–41)
ANION GAP SERPL CALC-SCNC: 9 MMOL/L — SIGNIFICANT CHANGE UP (ref 7–14)
AST SERPL-CCNC: 20 U/L — SIGNIFICANT CHANGE UP (ref 0–41)
BASOPHILS # BLD AUTO: 0.09 K/UL — SIGNIFICANT CHANGE UP (ref 0–0.2)
BASOPHILS NFR BLD AUTO: 0.7 % — SIGNIFICANT CHANGE UP (ref 0–1)
BILIRUB SERPL-MCNC: 0.3 MG/DL — SIGNIFICANT CHANGE UP (ref 0.2–1.2)
BUN SERPL-MCNC: 12 MG/DL — SIGNIFICANT CHANGE UP (ref 10–20)
CALCIUM SERPL-MCNC: 8.1 MG/DL — LOW (ref 8.4–10.5)
CHLORIDE SERPL-SCNC: 101 MMOL/L — SIGNIFICANT CHANGE UP (ref 98–110)
CO2 SERPL-SCNC: 29 MMOL/L — SIGNIFICANT CHANGE UP (ref 17–32)
CREAT SERPL-MCNC: 0.7 MG/DL — SIGNIFICANT CHANGE UP (ref 0.7–1.5)
EGFR: 99 ML/MIN/1.73M2 — SIGNIFICANT CHANGE UP
EOSINOPHIL # BLD AUTO: 0 K/UL — SIGNIFICANT CHANGE UP (ref 0–0.7)
EOSINOPHIL NFR BLD AUTO: 0 % — SIGNIFICANT CHANGE UP (ref 0–8)
GLUCOSE SERPL-MCNC: 91 MG/DL — SIGNIFICANT CHANGE UP (ref 70–99)
HCT VFR BLD CALC: 27.4 % — LOW (ref 42–52)
HGB BLD-MCNC: 9.1 G/DL — LOW (ref 14–18)
IMM GRANULOCYTES NFR BLD AUTO: 7.4 % — HIGH (ref 0.1–0.3)
LYMPHOCYTES # BLD AUTO: 1.37 K/UL — SIGNIFICANT CHANGE UP (ref 1.2–3.4)
LYMPHOCYTES # BLD AUTO: 10.4 % — LOW (ref 20.5–51.1)
MAGNESIUM SERPL-MCNC: 1.7 MG/DL — LOW (ref 1.8–2.4)
MAGNESIUM SERPL-MCNC: 1.8 MG/DL — SIGNIFICANT CHANGE UP (ref 1.8–2.4)
MCHC RBC-ENTMCNC: 29.6 PG — SIGNIFICANT CHANGE UP (ref 27–31)
MCHC RBC-ENTMCNC: 33.2 G/DL — SIGNIFICANT CHANGE UP (ref 32–37)
MCV RBC AUTO: 89.3 FL — SIGNIFICANT CHANGE UP (ref 80–94)
MONOCYTES # BLD AUTO: 1.24 K/UL — HIGH (ref 0.1–0.6)
MONOCYTES NFR BLD AUTO: 9.5 % — HIGH (ref 1.7–9.3)
NEUTROPHILS # BLD AUTO: 9.45 K/UL — HIGH (ref 1.4–6.5)
NEUTROPHILS NFR BLD AUTO: 72 % — SIGNIFICANT CHANGE UP (ref 42.2–75.2)
NRBC # BLD: 0 /100 WBCS — SIGNIFICANT CHANGE UP (ref 0–0)
PLATELET # BLD AUTO: 237 K/UL — SIGNIFICANT CHANGE UP (ref 130–400)
PMV BLD: 10.3 FL — SIGNIFICANT CHANGE UP (ref 7.4–10.4)
POTASSIUM SERPL-MCNC: 4.5 MMOL/L — SIGNIFICANT CHANGE UP (ref 3.5–5)
POTASSIUM SERPL-SCNC: 4.5 MMOL/L — SIGNIFICANT CHANGE UP (ref 3.5–5)
PROT SERPL-MCNC: 5.4 G/DL — LOW (ref 6–8)
RBC # BLD: 3.07 M/UL — LOW (ref 4.7–6.1)
RBC # FLD: 17 % — HIGH (ref 11.5–14.5)
SODIUM SERPL-SCNC: 139 MMOL/L — SIGNIFICANT CHANGE UP (ref 135–146)
WBC # BLD: 13.12 K/UL — HIGH (ref 4.8–10.8)
WBC # FLD AUTO: 13.12 K/UL — HIGH (ref 4.8–10.8)

## 2024-03-19 PROCEDURE — 99232 SBSQ HOSP IP/OBS MODERATE 35: CPT

## 2024-03-19 RX ORDER — ENOXAPARIN SODIUM 100 MG/ML
40 INJECTION SUBCUTANEOUS EVERY 24 HOURS
Refills: 0 | Status: DISCONTINUED | OUTPATIENT
Start: 2024-03-19 | End: 2024-03-20

## 2024-03-19 RX ORDER — MAGNESIUM SULFATE 500 MG/ML
2 VIAL (ML) INJECTION ONCE
Refills: 0 | Status: COMPLETED | OUTPATIENT
Start: 2024-03-19 | End: 2024-03-19

## 2024-03-19 RX ADMIN — Medication 2 MILLIGRAM(S): at 06:02

## 2024-03-19 RX ADMIN — FIDAXOMICIN 200 MILLIGRAM(S): 200 GRANULE, FOR SUSPENSION ORAL at 06:02

## 2024-03-19 RX ADMIN — MAGNESIUM OXIDE 400 MG ORAL TABLET 400 MILLIGRAM(S): 241.3 TABLET ORAL at 12:37

## 2024-03-19 RX ADMIN — FAMOTIDINE 40 MILLIGRAM(S): 10 INJECTION INTRAVENOUS at 12:40

## 2024-03-19 RX ADMIN — Medication 2 MILLIGRAM(S): at 12:37

## 2024-03-19 RX ADMIN — CHLORHEXIDINE GLUCONATE 1 APPLICATION(S): 213 SOLUTION TOPICAL at 06:05

## 2024-03-19 RX ADMIN — FIDAXOMICIN 200 MILLIGRAM(S): 200 GRANULE, FOR SUSPENSION ORAL at 18:03

## 2024-03-19 RX ADMIN — Medication 1 TABLET(S): at 12:37

## 2024-03-19 RX ADMIN — MAGNESIUM OXIDE 400 MG ORAL TABLET 400 MILLIGRAM(S): 241.3 TABLET ORAL at 18:03

## 2024-03-19 RX ADMIN — Medication 25 GRAM(S): at 18:03

## 2024-03-19 RX ADMIN — Medication 1000 UNIT(S): at 12:38

## 2024-03-19 RX ADMIN — Medication 137 MICROGRAM(S): at 06:05

## 2024-03-19 RX ADMIN — Medication 2 MILLIGRAM(S): at 21:33

## 2024-03-19 RX ADMIN — MAGNESIUM OXIDE 400 MG ORAL TABLET 400 MILLIGRAM(S): 241.3 TABLET ORAL at 08:14

## 2024-03-19 NOTE — DIETITIAN INITIAL EVALUATION ADULT - NS FNS DIET ORDER
Diet, DASH/TLC:   Sodium & Cholesterol Restricted  Fiber/Residue Restricted  Lactose Restricted (Milk Sugar Intoler.) (03-07-24 @ 09:29) [Active]

## 2024-03-19 NOTE — PROGRESS NOTE ADULT - ASSESSMENT
68-year-old male, former smoker with past medical history of HTN, hypothyroidism, melanoma s/p resection, testicular seminoma currently undergoing chemotherapy, admitted for C. diff colitis c/b neutropenia    Pancytopenia 2/2 chemo   Neutropenic fever 2/2 Severe C diff colitis    -c/w fidaxomicin extend for total 14 day course. imodium increased to tid to help with loose stool (ok with ID to start on imodium)  -Isolation precautions   -transfuse platelets to keep above 10k unless bleeding and hgb above 7.5 -> s/p 1 pRBC and 1 platelet transfusion  -Per hem onc team has not answered adequately to the platelet transfusion, approval requested and granted for cross matched platelets from DR. Shipman on 03/09/2024 at 10:55  -Platelets 67k today, patient got 3 units of regular platelets and 1 unit of cross matched platelets on 03/11  -s/p Neulasta as outpatient   -Patient got 2 doses of Zarxio SC on 03/09 and 03/10  --> Neutropenia resolved (WBC increased up to 42.97 on 03/15, starting to decrease)    Fever on 03/09  -UA and blood cultures negative  -CXR did not identify any opacities  -patient started on empiric atb on 03/09 --> stopped on 03/11: blood cultures were negative, patient has been afebrile for 48h and WBC count initially came up to 8  -afebrile since the 03/09, WBC 36k on 03/13    Testicular cancer on chemo   -oncology following : - s/p Right chest port placed via right IJ  -s/p cycle 3 of Cisplatin + Etoposide on 2/26 followed by Neulasta onbody injection D5 (3/1/24), initiated on 1.15.2024    Hypomagnesemia 2/2 diarrhea   Hypocalcemia 2/2 magnesium deficiency   Hypokalemia   -prolonged QTc - resolved (QTc 582 on 03/05 --> 433 on 03/07)  -electrolytes were being monitored BID, currently daily monitoring --> replaced as required  -K and Mg supplemented daily, repeat level at 8 pm  -initially supplemented IV, currently receiving PO K and Mg    HTN  -hold anti-hypertensive medications      Hypothyroid  -c/w home med    dvt ppx: enoxaparin    Pending: diarrhea improvement and electrolyte correction.   68-year-old male, former smoker with past medical history of HTN, hypothyroidism, melanoma s/p resection, testicular seminoma currently undergoing chemotherapy, admitted for C. diff colitis c/b neutropenia    Pancytopenia 2/2 chemo   Neutropenic fever 2/2 Severe C diff colitis    -c/w fidaxomicin extend for total 14 day course. imodium increased to tid to help with loose stool (ok with ID to start on imodium)  -Isolation precautions   -transfuse platelets to keep above 10k unless bleeding and hgb above 7.5 -> s/p 1 pRBC and 1 platelet transfusion  -Per hem onc team has not answered adequately to the platelet transfusion, approval requested and granted for cross matched platelets from DR. Shipman on 03/09/2024 at 10:55  -Platelets 67k today, patient got 3 units of regular platelets and 1 unit of cross matched platelets on 03/11  -s/p Neulasta as outpatient   -Patient got 2 doses of Zarxio SC on 03/09 and 03/10  --> Neutropenia resolved (WBC increased up to 42.97 on 03/15, starting to decrease)    Fever on 03/09  -UA and blood cultures negative  -CXR did not identify any opacities  -patient started on empiric atb on 03/09 --> stopped on 03/11: blood cultures were negative, patient has been afebrile for 48h and WBC count initially came up to 8  -afebrile since the 03/09, WBC 36k on 03/13    Testicular cancer on chemo   -oncology following : - s/p Right chest port placed via right IJ  -s/p cycle 3 of Cisplatin + Etoposide on 2/26 followed by Neulasta onbody injection D5 (3/1/24), initiated on 1.15.2024    Hypomagnesemia 2/2 diarrhea   Hypocalcemia 2/2 magnesium deficiency   Hypokalemia   -prolonged QTc - resolved (QTc 582 on 03/05 --> 433 on 03/07)  -electrolytes were being monitored BID, currently daily monitoring --> replaced as required  -K and Mg supplemented daily, repeat level at 8 pm and AM tomorrow.  -initially supplemented IV, currently receiving PO K and Mg    HTN  -hold anti-hypertensive medications      Hypothyroid  -c/w home med    dvt ppx: enoxaparin    Pending: diarrhea improvement and electrolyte correction.  if repeat K and Mg stable 8 pm and AM tomorrow, possible discharge with close out-patient follow up.

## 2024-03-19 NOTE — DIETITIAN INITIAL EVALUATION ADULT - ORAL INTAKE PTA/DIET HISTORY
Pt receiving chemo PTA, reports variable intakes with some weeks being normal and some decreased appetite and diarrhea. NKFA, no cultural or Latter day dietary restrictions. Pt takes Vitamin D and a multivitamin at home.

## 2024-03-19 NOTE — PROGRESS NOTE ADULT - ATTENDING COMMENTS
68-year-old male, former smoker with past medical history of HTN, hypothyroidism, melanoma s/p resection, testicular seminoma currently undergoing chemotherapy, admitted for C. diff colitis c/b neutropenia    Pancytopenia 2/2 chemo   Neutropenic fever 2/2 Severe C diff colitis    -c/w fidaxomicin extend for total 14 day course. imodium increased to tid to help with loose stool (ok with ID to start on imodium)  -Isolation precautions   -transfuse platelets to keep above 10k unless bleeding and hgb above 7.5 -> s/p 1 pRBC and 1 platelet transfusion  -Per hem onc team has not answered adequately to the platelet transfusion, approval requested and granted for cross matched platelets from DR. Shipman on 03/09/2024 at 10:55  -Platelets 67k today, patient got 3 units of regular platelets and 1 unit of cross matched platelets on 03/11  -s/p Neulasta as outpatient   -Patient got 2 doses of Zarxio SC on 03/09 and 03/10  --> Neutropenia resolved (WBC increased up to 42.97 on 03/15, starting to decrease)    Fever on 03/09  -UA and blood cultures negative  -CXR did not identify any opacities  -patient started on empiric atb on 03/09 --> stopped on 03/11: blood cultures were negative, patient has been afebrile for 48h and WBC count initially came up to 8  -afebrile since the 03/09, WBC 36k on 03/13    Testicular cancer on chemo   -oncology following : - s/p Right chest port placed via right IJ  -s/p cycle 3 of Cisplatin + Etoposide on 2/26 followed by Neulasta onbody injection D5 (3/1/24), initiated on 1.15.2024    Hypomagnesemia 2/2 diarrhea   Hypocalcemia 2/2 magnesium deficiency   Hypokalemia   -prolonged QTc - resolved (QTc 582 on 03/05 --> 433 on 03/07)  -electrolytes were being monitored BID, currently daily monitoring --> replaced as required  -K and Mg supplemented daily, repeat level at 8 pm  -initially supplemented IV, currently receiving PO K and Mg  -check fractional excretion of magnesium
68-year-old male, former smoker with past medical history of HTN, hypothyroidism, melanoma s/p resection, testicular seminoma currently undergoing chemotherapy, presents to the emergency department for diarrhea.       # Neutropenic fever 2/2 C diff colitis   # Pancytopenia 2/2 chemo   # Magnesium deficiency 2/2 diarrhea   # Hypocalcemia 2/2 magnesium deficiency   # Hypokalemia   # Prolonged QTc    - c/w fidaxomicin   - Isolation precautions   - Oncology following   - transfuse platelets to keep above 10k and hgb above 8  - started on Zarxio   - CBC BID and BMP with Mg BID     # Testicular cancer on chemo   - Oncology following     # HTN  - Hold anti-hypertensive medications      # Hypothyroid  - c/w home med    DVT ppx: SCD due to thrombocytopenia     Pending:  diarrhea resolution, hypokalemia, hypomagnesemia   Plan of care d/w patient   Dispo: Home
68-year-old male, former smoker with past medical history of HTN, hypothyroidism, melanoma s/p resection, testicular seminoma currently undergoing chemotherapy, presents to the emergency department for diarrhea.       # Neutropenic fever 2/2 C diff colitis   # Pancytopenia 2/2 chemo   # Magnesium deficiency 2/2 diarrhea   # Hypocalcemia 2/2 magnesium deficiency   # Hypokalemia - resolved   # Prolonged QTc  - resolving   - c/w fidaxomicin x 10 days   - Isolation precautions   - Oncology following   - transfuse platelets to keep above 10k and hgb above 8  - s/p Zarxio     # Testicular cancer on chemo   - Oncology following     # HTN  - Hold anti-hypertensive medications      # Hypothyroid  - c/w home med    DVT ppx: SCD due to thrombocytopenia     Pending:  diarrhea resolution   Plan of care d/w patient   Dispo: Home
seen/ examined w /[facility] fellow; note reviewed; case discussed with his sister Wen over the phone; agree w /the plan above
seen/ examined w/[facility] fellow; note reviewed; case discussed; agree w/plan
seen/ examined w/ [facility] fellow ;note reviewed; case discussed; agree w/plan  transfuse platelets  infuse mg IV 4G and replace calcium
seen/ examined w/ [facility] fellow; note reviewed;case discussed:  1. c.diff: FOLLOW ID recommendations from TODAY; due to diarrhea, he has  2. hypomagnesemia; 4g IV today and evaluate tomoorrow: in view of hypomagnesemia:  3. hypocalcemia; replace Calcium and  4. hypokalemia; replace potassium  5. anemia due to chemo and infection and antibiotics; PRBC x1 today and likely tomorrow  6. neutropenia, s/p neulasta after chemo; do not start neupogen yet as long as vital signs are stable and no fever  7. thrombocytopenia; no bleeding ; do not transfuse plats yet; threshold is 10K or mucocutaneous bleed  8. VTE prophylaxis / OOB to CHAIR
68-year-old male, former smoker with past medical history of HTN, hypothyroidism, melanoma s/p resection, testicular seminoma currently undergoing chemotherapy, presents to the emergency department for diarrhea.       # Neutropenic fever 2/2 C diff colitis   # Pancytopenia 2/2 chemo   # Magnesium deficiency 2/2 diarrhea   # Hypocalcemia 2/2 magnesium deficiency   # Hypokalemia   # Prolonged QTc - resolved   - c/w IVF   - c/w fidaxomicin   - Isolation precautions   - Oncology following   - transfuse platelets to keep above 10k and hgb above 7.5   - started on Zarxio     # Testicular cancer on chemo   - Oncology following     # HTN  - Hold anti-hypertensive medications      # Hypothyroid  - c/w home med    dvt ppx: SCD due to thrombocytopenia     Pending: neutropenia improvement, diarrhea resolution, hypokalemia   Plan of care d/w patient   Dispo: Home
68-year-old male, former smoker with past medical history of HTN, hypothyroidism, melanoma s/p resection, testicular seminoma currently undergoing chemotherapy, presents to the emergency department for diarrhea.     # Pancytopenia 2/2 chemo   # Neutropenic fever 2/2 C diff colitis    - c/w IVF   - c/w fidaxomicin   - Isolation precautions   - transfuse platelets to keep above 10k unless bleeding and hgb above 7.5 -> s/p 1 pRBC and 1 platelet transfusion  - Per hem onc team has not answered adequately to the platelet transfusion, approval requested and granted for cross matched platelets from DR. Shipman on 03/09/2024 at 10:55  - Platelets 34k today, patient got 3 units of regular platelets and 1 unit of cross matched platelets on 03/11  - s/p Neulasta as outpatient   - Patient got 2 doses of Zarxio SC on 03/09 and 03/10  --> Neutropenia resolved     #Fever on 03/09  - UA and blood cultures sent  - Chest x-ray did not identify any opacities  - Patient started on empiric atb on 03/09 --> stopped on 03/11: blood cultures were negative, patient has been afebrile for 48h and WBC count initially came up to 8  - Afebrile since the 03/09, WBC 36k on 03/13    # Testicular cancer on chemo   - Oncology following : - s/p Right chest port placed via right IJ  - s/p cycle 3 of Cisplatin + Etoposide on 2/26 followed by Neulasta onbody injection D5 (3/1/24), initiated on 1.15.2024    # Hypomagnesemia 2/2 diarrhea   # Hypocalcemia 2/2 magnesium deficiency   # Hypokalemia   - Prolonged QTc - resolved (QTc 582 on 03/05 --> 433 on 03/07)  - Electrolytes were being monitored BID, currently daily monitoring --> replaced as required  - potassium and Mg supplement ordered.
68-year-old male, former smoker with past medical history of HTN, hypothyroidism, melanoma s/p resection, testicular seminoma currently undergoing chemotherapy, presents to the emergency department for diarrhea.     # Pancytopenia 2/2 chemo   # Neutropenic fever 2/2 C diff colitis    - c/w IVF   - c/w fidaxomicin   - Isolation precautions   - transfuse platelets to keep above 10k unless bleeding and hgb above 7.5 -> s/p 1 pRBC and 1 platelet transfusion  - Per hem onc team has not answered adequately to the platelet transfusion, approval requested and granted for cross matched platelets from DR. Shipman on 03/09/2024 at 10:55  - Platelets 34k today, patient got 3 units of regular platelets and 1 unit of cross matched platelets on 03/11  - s/p Neulasta as outpatient   - Patient got 2 doses of Zarxio SC on 03/09 and 03/10  --> Neutropenia resolved         Rest of the assessment and plan as above, adjusted where appropriate.
68-year-old male, former smoker with past medical history of HTN, hypothyroidism, melanoma s/p resection, testicular seminoma currently undergoing chemotherapy, admitted for C. diff colitis c/b neutropenia    Pancytopenia 2/2 chemo   Neutropenic fever 2/2 Severe C diff colitis    -c/w fidaxomicin extend for total 14 day course. imodium increased to tid to help with loose stool (ok with ID to start on imodium)  -Isolation precautions   -transfuse platelets to keep above 10k unless bleeding and hgb above 7.5 -> s/p 1 pRBC and 1 platelet transfusion  -Per hem onc team has not answered adequately to the platelet transfusion, approval requested and granted for cross matched platelets from DR. Shipman on 03/09/2024 at 10:55  -Platelets 67k today, patient got 3 units of regular platelets and 1 unit of cross matched platelets on 03/11  -s/p Neulasta as outpatient   -Patient got 2 doses of Zarxio SC on 03/09 and 03/10  --> Neutropenia resolved (WBC increased up to 42.97 on 03/15, starting to decrease)    Fever on 03/09  -UA and blood cultures negative  -CXR did not identify any opacities  -patient started on empiric atb on 03/09 --> stopped on 03/11: blood cultures were negative, patient has been afebrile for 48h and WBC count initially came up to 8  -afebrile since the 03/09, WBC 36k on 03/13    Testicular cancer on chemo   -oncology following : - s/p Right chest port placed via right IJ  -s/p cycle 3 of Cisplatin + Etoposide on 2/26 followed by Neulasta onbody injection D5 (3/1/24), initiated on 1.15.2024    Hypomagnesemia 2/2 diarrhea   Hypocalcemia 2/2 magnesium deficiency   Hypokalemia   -prolonged QTc - resolved (QTc 582 on 03/05 --> 433 on 03/07)  -electrolytes were being monitored BID, currently daily monitoring --> replaced as required  -K and Mg supplemented daily, repeat level at 8 pm and AM tomorrow.  -initially supplemented IV, currently receiving PO K and Mg    HTN  -hold anti-hypertensive medications      Hypothyroid  -c/w home med    dvt ppx: enoxaparin    Pending: diarrhea improvement and electrolyte correction.  if repeat K and Mg stable 8 pm and AM tomorrow, possible discharge with close out-patient follow up.

## 2024-03-19 NOTE — DIETITIAN INITIAL EVALUATION ADULT - ADD RECOMMEND
Nutrition intervention: meals/snacks, medical nutrition supplements, nutrition education  Nutrition monitoring: energy intake, body composition, GI/IO, nutrition-related labs, NFPF

## 2024-03-19 NOTE — H&P PST ADULT - PRO TOBACCO CIGARS AMT
Quality 358: Patient-Centered Surgical Risk Assessment And Communication: Documentation of patient-specific risk assessment with a risk calculator based on multi-institutional clinical data, the specific risk calculator used, and communication of risk assessment from risk calculator with the patient or family. Detail Level: Detailed occasional use

## 2024-03-19 NOTE — DIETITIAN INITIAL EVALUATION ADULT - SIGNS/SYMPTOMS
Outpatient Physical Therapy Ortho Initial Evaluation  [facility]     Patient Name: Norma Ye  : 1967  MRN: [id number]  Today's Date: 10/27/2022      Visit Date: 10/27/2022   Patient seen for 1 PT sessions.  Patient reports N/A% of improvement.  Next MD appt: 2022   Recertification: 2022      Therapy Diagnosis: Chronic R hip pain 2/2 primary R hip OA     Barriers to Rehab: Include significant or possible arthritic/degenerative changes that have occurred within the hip, The chronicity of this issue.     Safety Issues: None noted.         Patient Active Problem List   Diagnosis   • Eustachian tube dysfunction   • Arthritis   • Depression   • Vitamin deficiency   • Prediabetes   • Leukocytosis   • Mild peripheral edema   • BMI 34.0-34.9,adult   • Mixed hyperlipidemia   • Obesity (BMI 30.0-34.9)   • Elevated liver enzymes   • Generalized abdominal pain   • Nausea   • Dyspepsia        Past Medical History:   Diagnosis Date   • Acute bronchitis    • Acute maxillary sinusitis     unspecified   • Acute otitis externa    • Acute otitis media    • Acute pharyngitis     strep positive      • Acute sinusitis    • Allergic rhinitis    • Backache    • Conjunctivitis      Left eye      • Depressive disorder    • Disorder of teeth and supporting structures    • Dizziness    • Dizziness and giddiness    • Edema of foot    • Electrocardiogram abnormal    • Examination    • Hip pain      LEFT, OA      • Joint pain    • Knee pain     L, OA      • Low sodium levels 10/17/2018   • Muscle spasm of left lower extremity 2018   • Nausea vomiting and diarrhea    • Otalgia      right ear      • Otitis externa    • Otitis media     right   • Posterior rhinorrhea    • Primary osteoarthritis     Unilateral- left hip      • Upper respiratory infection    • Visit for gynecologic examination         Past Surgical History:   Procedure Laterality Date   • CHOLECYSTECTOMY     • COLONOSCOPY N/A 2022     Procedure: COLONOSCOPY;  Surgeon: Roni Sahu MD;  Location: [facility] ENDOSCOPY;  Service: Gastroenterology;  Laterality: N/A;   • ENDOSCOPY N/A 05/27/2022    Procedure: ESOPHAGOGASTRODUODENOSCOPY;  Surgeon: Roni Sahu MD;  Location: [facility] ENDOSCOPY;  Service: Gastroenterology;  Laterality: N/A;   • INJECTION OF MEDICATION      celestone(2) Pain, knee) , Reason: [id number] exp.-4/14 04/16/2013    • INJECTION OF MEDICATION      kenalog(5) Unilateral primary osteoarthritis, left hip)  07/25/2016    • INJECTION OF MEDICATION  12/11/2014    phenergan (1)  (Nausea) , Reason: [id number] exp.-7/16   • OTHER SURGICAL HISTORY      toradol(2) Dental disorder)  08/06/2013    • TOTAL HIP ARTHROPLASTY Left    • TUBAL ABDOMINAL LIGATION  08/05/1996       Visit Dx:     ICD-10-CM ICD-9-CM   1. Right hip pain  M25.551 719.45          Patient History     Row Name 10/27/22 1100             History    Chief Complaint Pain  -AC      Type of Pain Hip pain  -AC      Date Current Problem(s) Began 08/01/22  2 months  -AC      Brief Description of Current Complaint Pt reports she had a L SARAH in May 3rd 2021. Pt reports R hip pain started about 2 months ago gradually. States she thinks she has been relying more on R LE for daily activities. Pt reports she had been dealing with the L hip pain for over 6 years before SARAH. Pt  reports pain near anterior medial aspect of R hip.  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 PCP  -AC      How has patient tried to help current problem? medication, and steroid shots  -AC      What clinical  tests have you had for this problem? X-ray  -AC      Results of Clinical Tests see EMR  -AC      Related/Recent Hospitalizations No  -AC         Pain     Pain Location Hip  -AC      Pain at Present 0  -AC      Pain at Best 0  -AC      Pain at Worst 8  -AC      Pain Frequency Intermittent  with activity  -AC      What Performance Factors Make the Current Problem(s) WORSE? standing prolonged periods, walking prolonged periods  -AC      What Performance Factors Make the Current Problem(s) BETTER? sitting, medication, stretching  -AC      Tolerance Time- Standing 1-2 hours  -AC      Tolerance Time- Sitting no difficulty  -AC      Tolerance Time- Walking 1-2 hours  -AC      Is your sleep disturbed? Yes  -AC      Is medication used to assist with sleep? Yes  -AC      What position do you sleep in? Left sidelying;Supine;Right sidelying  -AC         Fall Risk Assessment    Any falls in the past year: No  -AC         Services    Prior Rehab/Home Health Experiences Yes  -AC      Are you currently receiving Home Health services No  -AC         Daily Activities    Primary Language English  -AC      Are you able to read Yes  -AC      Are you able to write Yes  -AC      How does patient learn best? Listening;Reading;Demonstration  -AC         Safety    Are you being hurt, hit, or frightened by anyone at home or in your life? No  -AC      Are you being neglected by a caregiver No  -AC      Have you had any of the following issues with N/A  -AC            User Key  (r) = Recorded By, (t) = Taken By, (c) = Cosigned By    Initials Name Provider Type    Julianne Boothe PT Physical Therapist                 PT Ortho     Row Name 10/27/22 1100       Subjective Comments    Subjective Comments see patient history  -AC       Subjective Pain    Able to rate subjective pain? yes  -AC    Pre-Treatment Pain Level 0  -AC    Post-Treatment Pain Level 0  -AC       Posture/Observations    Posture/Observations Comments Pt does not appear to be  in acute distress. Pt with fair postural awareness. In standing pt unloads weight of R LE. Normal sitting position. Increased lumbar lordosis present.  -AC       DTR- Lower Quarter Clearing    Patellar tendon (L2-4) Bilateral:;2- Normal response  -AC    Achilles tendon (S1-2) Bilateral:;2- Normal response  -AC       Sensory Screen for Light Touch- Lower Quarter Clearing    L1 (inguinal area) Bilateral:;Intact  -AC    L2 (anterior mid thigh) Bilateral:;Intact  -AC    L3 (distal anterior thigh) Bilateral:;Intact  -AC    L4 (medial lower leg/foot) Bilateral:;Intact  -AC    L5 (lateral lower leg/great toe) Bilateral:;Intact  -AC    S1 (bottom of foot) Bilateral:;Intact  -AC       Myotomal Screen- Lower Quarter Clearing    Hip flexion (L2) WNL  -AC    Knee extension (L3) WNL  -AC    Ankle DF (L4) WNL  -AC    Great toe extension (L5) WNL  -AC    Ankle PF (S1) WNL  -AC    Knee flexion (S2) WNL  -AC       Lumbar ROM Screen- Lower Quarter Clearing    Lumbar Flexion Normal  -AC    Lumbar Extension Normal  -AC    Lumbar Lateral Flexion Normal  -AC    Lumbar Rotation Normal  -AC       SI/Hip Screen- Lower Quarter Clearing    Mina's/Isaac's test Bilateral:;Negative  -AC       Hip/Thigh Palpation    Gluteus Jose Bilateral:;Tender  -AC    ITB Bilateral:;Tender  -AC       Hip Special Tests    MINA (hip vs SI pathology) Bilateral:;Negative  -AC    Julia’s test (tightness of ITB) Right:;Positive  -AC    Ely’s test (rectus femoris tightness) Right:;Positive  -AC    Hip scour test (labral vs hip pathology) Bilateral:;Negative  -AC    Piriformis test (piriformis syndrome) Bilateral:;Negative  -AC    FAIR test (piriformis syndrome) Bilateral:;Negative  -AC       Leg Length Test    Apparent Unequal  R leg longer than L  -AC       General ROM    GENERAL ROM COMMENTS R hip flexion 110°, L hip flex 105°; All B LE AROM WFL. R hip IR significantly limited. Will formerly assess next visit.  -AC       MMT (Manual Muscle Testing)    General  "MMT Comments All B LE strength 5/5 except R hip flexion 4/5, R hip ABD 3+/5, L hip ABD 3/5, and B hip IR/ER 4/5. Pt able to perform SLR bilaterally but demo slight knee lag on both sides.  -AC       Sensation    Sensation WNL? WNL  -AC    Light Touch No apparent deficits  -AC       Lower Extremity Flexibility    Hamstrings Bilateral:;Moderately limited  -AC    Hip Flexors Bilateral:;Moderately limited  -AC    ITB Bilateral:;Moderately limited  -AC    Hip Internal Rotators Right:;Moderately limited  -AC       Balance Skills Training    Balance Comments Sitting balance WFL. L SLS 3 seconds; R SLS 10 sec hold.  -AC       Transfers    Comment, (Transfers) I with all transfers.  -AC       Gait/Stairs (Locomotion)    Comment, (Gait/Stairs) I with ambulation into clinic with no AD. Pt demo antalgic gait on R LE with less weight bearing present on R. Decreased heel strike on R.  -AC          User Key  (r) = Recorded By, (t) = Taken By, (c) = Cosigned By    Initials Name Provider Type    Julianne Boothe, PT Physical Therapist                            Therapy Education  Given: HEP, Symptoms/condition management, Pain management, Posture/body mechanics  Program: New  How Provided: Verbal, Demonstration, Written  Provided to: Patient  Level of Understanding: Teach back education performed, Verbalized, Demonstrated      PT OP Goals     Row Name 10/27/22 1100          PT Short Term Goals    STG Date to Achieve 11/17/22  -     STG 1 Pt to be I with HEP with additions prior to next recertification.  -     STG 2 Patient to perform 20 STS with 1 UE support with good form and no difficulty.  -     STG 3 Pt to perform 20 bridges with UE \"X\" position without difficulty or increased pain.  -     STG 4 Pt to ambulate >/= 1/4 mile with no antalgic gait present.  -     STG 5 --  -        Long Term Goals    LTG 1 independent with self care management/final HEP to prevent the reoccurence of symptoms in the future.  -AC     " LTG 2 Pt to subjectively report >/= 75% improvement since initial eval.  -AC     LTG 3 B LE strength 5/5.  -AC     LTG 4 Pt to perform SLS bilaterally for >/= 30 seconds with no UE support.  -AC     LTG 5 Improve R hip flexion to >/= 120°  -        Time Calculation    PT Goal Re-Cert Due Date 11/17/22  -           User Key  (r) = Recorded By, (t) = Taken By, (c) = Cosigned By    Initials Name Provider Type     Julianne Barfield, PT Physical Therapist                 PT Assessment/Plan     Row Name 10/27/22 1100          PT Assessment    Functional Limitations Impaired gait;Performance in work activities;Limitations in community activities;Limitations in functional capacity and performance;Performance in leisure activities;Limitation in home management  -     Impairments Balance;Gait;Endurance;Impaired muscle length;Impaired muscle endurance;Impaired flexibility;Pain;Muscle strength;Range of motion;Posture;Joint mobility  -     Assessment Comments Patient is a 55 y.o. female who presents to PT with complaints of chronic R hip pain. Pt had recent L SARAH in May 2021. Pt reports excellent outcomes with surgery. Pt demo decreased R hip AROM, B LE strength, flexibility, gait, and balance deficits. Pt unable to stand or walk for prolonged periods of time without pain. X-ray results indicate osteoarthritic changes to the R hip. LEFS score 66/80. Julia's test and Ely's test positive on R side for ITB and rectus femoris tightness. Pt would benefit from skilled PT to address dysfunctions and to improve to functional mobility and QOL as well as promote preventative measures.  Pt provided with HEP at end of session.  -AC     Please refer to paper survey for additional self-reported information No  -AC     Rehab Potential Good  -AC     Patient/caregiver participated in establishment of treatment plan and goals Yes  -AC     Patient would benefit from skilled therapy intervention Yes  -AC        PT Plan    PT Frequency  "2x/week  -AC     Predicted Duration of Therapy Intervention (PT) 6-8 visits  -AC     Planned CPT's? PT EVAL MOD COMPLELITY: 19510;PT RE-EVAL: 26112;PT THER PROC EA 15 MIN: 63426;PT THER ACT EA 15 MIN: 23124;PT MANUAL THERAPY EA 15 MIN: 73924;PT NEUROMUSC RE-EDUCATION EA 15 MIN: 06724;PT GAIT TRAINING EA 15 MIN: 15912;PT SELF CARE/HOME MGMT/TRAIN EA 15: 50398;PT HOT OR COLD PACK TREAT MCARE  -AC     PT Plan Comments Progress overal B LE strength, hip AROM, B LE flexibility, gait, and balance deficits. Next visit measure B hip IR, add STS, Step ups, SLR, and and seated piriformis stretch.  -AC           User Key  (r) = Recorded By, (t) = Taken By, (c) = Cosigned By    Initials Name Provider Type    AC Julianne Barfield PT Physical Therapist                   OP Exercises     Row Name 10/27/22 1100             Subjective Comments    Subjective Comments see patient history  -AC         Subjective Pain    Able to rate subjective pain? yes  -AC      Pre-Treatment Pain Level 0  -AC      Post-Treatment Pain Level 0  -AC         Exercise 1    Exercise Name 1 Pro II LE bike for endurance, strength, and AROM  -AC      Time 1 5 mins  -AC      Additional Comments L 5.0  -AC         Exercise 2    Exercise Name 2 St. HS stretch  -AC      Sets 2 1  -AC      Time 2 30 sec  -AC      Additional Comments B LE  -AC         Exercise 3    Exercise Name 3 SKTC  -AC      Sets 3 1  -AC      Time 3 30 sec hold  -AC      Additional Comments B LE  -AC         Exercise 4    Exercise Name 4 Clam shells  -AC      Sets 4 1  -AC      Reps 4 10  -AC      Time 4 3\" hold  -AC         Exercise 5    Exercise Name 5 Pt ed on HEP, POC, and symptom management  -AC      Time 5 5 mins  -AC            User Key  (r) = Recorded By, (t) = Taken By, (c) = Cosigned By    Initials Name Provider Type    Julianne Boothe PT Physical Therapist                              Outcome Measure Options: Lower Extremity Functional Scale (LEFS)  Lower Extremity Functional " Index  Any of your usual work, housework or school activities: A little bit of difficulty  Your usual hobbies, recreational or sporting activities: A little bit of difficulty  Getting into or out of the bath: A little bit of difficulty  Walking between rooms: No difficulty  Putting on your shoes or socks: A little bit of difficulty  Squatting: Moderate difficulty  Lifting an object, like a bag of groceries from the floor: No difficulty  Performing light activities around your home: No difficulty  Performing heavy activities around your home: A little bit of difficulty  Getting into or out of a car: A little bit of difficulty  Walking 2 blocks: A little bit of difficulty  Walking a mile: A little bit of difficulty  Going up or down 10 stairs (about 1 flight of stairs): A little bit of difficulty  Standing for 1 hour: No difficulty  Sitting for 1 hour: No difficulty  Running on even ground: A little bit of difficulty  Running on uneven ground: A little bit of difficulty  Making sharp turns while running fast: A little bit of difficulty  Hopping: No difficulty  Rolling over in bed: No difficulty  Total: 66      Time Calculation:     Start Time: 1120  Stop Time: 1217  Time Calculation (min): 57 min     Therapy Charges for Today     Code Description Service Date Service Provider Modifiers Qty    16164145100 HC PT THER SUPP EA 15 MIN 10/27/2022 Julianne Barfield, PT GP 3    18228973906 HC PT EVAL MOD COMPLEXITY 3 10/27/2022 Julianne Barfield, PT GP 1    99912463819 HC PT THER PROC EA 15 MIN 10/27/2022 Julianne Barfield, PT GP 1          PT G-Codes  Outcome Measure Options: Lower Extremity Functional Scale (LEFS)  Total: 66         Julianne Barfield PT DPT 10/27/2022       5% weight loss x 1 month, mild edema

## 2024-03-19 NOTE — PROGRESS NOTE ADULT - SUBJECTIVE AND OBJECTIVE BOX
24H events:    Patient is a 68y old Male who presents with a chief complaint of diarrhea (17 Mar 2024 11:48)    Primary diagnosis of Neutropenic fever    This morning patient was seen and examined at bedside, resting comfortably in bed.    No acute or major events overnight.    PAST MEDICAL & SURGICAL HISTORY  HTN (hypertension)    Hypothyroidism, unspecified type    Obesity    Other secondary osteoarthritis of right hand    Testicular seminoma    History of eye removal  Right eye    H/O melanoma excision    S/P appendectomy  14 yrs old        ALLERGIES:  penicillin G benzathine (Rash)  penicillin (Rash)    MEDICATIONS:  STANDING MEDICATIONS  chlorhexidine 2% Cloths 1 Application(s) Topical <User Schedule>  cholecalciferol 1000 Unit(s) Oral daily  famotidine    Tablet 40 milliGRAM(s) Oral daily  fidaxomicin 200 milliGRAM(s) Oral two times a day  levothyroxine 137 MICROGram(s) Oral daily  loperamide 2 milliGRAM(s) Oral two times a day  magnesium oxide 400 milliGRAM(s) Oral three times a day with meals  magnesium sulfate  IVPB 2 Gram(s) IV Intermittent every 2 hours  multivitamin 1 Tablet(s) Oral daily    PRN MEDICATIONS  acetaminophen     Tablet .. 650 milliGRAM(s) Oral every 6 hours PRN  aluminum hydroxide/magnesium hydroxide/simethicone Suspension 30 milliLiter(s) Oral every 4 hours PRN  melatonin 5 milliGRAM(s) Oral at bedtime PRN  ondansetron Injectable 4 milliGRAM(s) IV Push every 8 hours PRN    VITALS:   T(F): 96.3  HR: 90  BP: 126/77  RR: 18  SpO2: 94%        PHYSICAL EXAM:  GENERAL: NAD    HEART: S1, S2 with no murmurs heard on auscultation    LUNGS: bilaterally clear to auscultation with no added sounds    ABDOMEN: soft, lax, NTND    EXTREMITIES: peripheral pulses palpable, no pitting edema    NERVOUS SYSTEM:  AOx3, non-focal    SKIN: no rashes or lesions noted      LABS:                        9.0    21.97 )-----------( 119      ( 17 Mar 2024 07:45 )             26.4     03-18    134<L>  |  99  |  9<L>  ----------------------------<  73  4.1   |  30  |  0.7    Ca    7.6<L>      18 Mar 2024 07:08  Mg     1.5     03-18    TPro  4.8<L>  /  Alb  2.8<L>  /  TBili  0.4  /  DBili  x   /  AST  19  /  ALT  13  /  AlkPhos  122<H>  03-18      Urinalysis Basic - ( 18 Mar 2024 07:08 )    Color: x / Appearance: x / SG: x / pH: x  Gluc: 73 mg/dL / Ketone: x  / Bili: x / Urobili: x   Blood: x / Protein: x / Nitrite: x   Leuk Esterase: x / RBC: x / WBC x   Sq Epi: x / Non Sq Epi: x / Bacteria: x                       24H events:    Patient is a 68y old Male who presents with a chief complaint of diarrhea (17 Mar 2024 11:48)    Primary diagnosis of Neutropenic fever    This morning patient was seen and examined at bedside, resting comfortably in bed.  still having diarrhea.  No acute or major events overnight.    PAST MEDICAL & SURGICAL HISTORY  HTN (hypertension)    Hypothyroidism, unspecified type    Obesity    Other secondary osteoarthritis of right hand    Testicular seminoma    History of eye removal  Right eye    H/O melanoma excision    S/P appendectomy  14 yrs old        ALLERGIES:  penicillin G benzathine (Rash)  penicillin (Rash)    MEDICATIONS:  STANDING MEDICATIONS  chlorhexidine 2% Cloths 1 Application(s) Topical <User Schedule>  cholecalciferol 1000 Unit(s) Oral daily  famotidine    Tablet 40 milliGRAM(s) Oral daily  fidaxomicin 200 milliGRAM(s) Oral two times a day  levothyroxine 137 MICROGram(s) Oral daily  loperamide 2 milliGRAM(s) Oral two times a day  magnesium oxide 400 milliGRAM(s) Oral three times a day with meals  magnesium sulfate  IVPB 2 Gram(s) IV Intermittent every 2 hours  multivitamin 1 Tablet(s) Oral daily    PRN MEDICATIONS  acetaminophen     Tablet .. 650 milliGRAM(s) Oral every 6 hours PRN  aluminum hydroxide/magnesium hydroxide/simethicone Suspension 30 milliLiter(s) Oral every 4 hours PRN  melatonin 5 milliGRAM(s) Oral at bedtime PRN  ondansetron Injectable 4 milliGRAM(s) IV Push every 8 hours PRN    VITALS:   T(F): 96.3  HR: 90  BP: 126/77  RR: 18  LABS:                        9.1    13.12 )-----------( 237      ( 19 Mar 2024 08:34 )             27.4     WBC trend: 13.12 <--, 21.97 <--, 35.30 <--  Hgb: 9.1 [03-19-24 @ 08:34]<--, 9.0 [03-17-24 @ 07:45]<--    03-19    139  |  101  |  12  ----------------------------<  91  4.5   |  29  |  0.7    Ca    8.1<L>      19 Mar 2024 08:34  Mg     1.7     03-19    TPro  5.4<L>  /  Alb  3.1<L>  /  TBili  0.3  /  DBili  x   /  AST  20  /  ALT  13  /  AlkPhos  119<H>  03-19    Creatinine trend: Creatinine Trend: 0.7<--, 0.7<--, 0.7<--, 0.6<--, 0.7<--, 0.7<--  SODIUM TREND: Sodium 139 [03-19 @ 08:34]<--, Sodium 134 [03-18 @ 07:08]<--, Sodium 138 [03-17 @ 17:57]<--, Sodium 138 [03-17 @ 07:45]<--, Sodium 137 [03-16 @ 18:05]<--, Sodium 140 [03-16 @ 08:45]<--  0.7 [03-19-24 @ 08:34]<--    POC Glucose:        < from: 12 Lead ECG (03.18.24 @ 20:23) >    QTC Calculation(Bazett) 415 ms    Diagnosis Line Normal sinus rhythm  Moderate voltage criteria for LVH, may be normal variant  Borderline ECG    < end of copied text >                            Urinalysis Basic - ( 19 Mar 2024 08:34 )    Color: x / Appearance: x / SG: x / pH: x  Gluc: 91 mg/dL / Ketone: x  / Bili: x / Urobili: x   Blood: x / Protein: x / Nitrite: x   Leuk Esterase: x / RBC: x / WBC x   Sq Epi: x / Non Sq Epi: x / Bacteria: x                                             -------------------------------------------------SpO2: 94%        PHYSICAL EXAM:  GENERAL: NAD    HEART: S1, S2 with no murmurs heard on auscultation    LUNGS: bilaterally clear to auscultation with no added sounds    ABDOMEN: soft, lax, NTND    EXTREMITIES: peripheral pulses palpable, no pitting edema    NERVOUS SYSTEM:  AOx3, non-focal    SKIN: no rashes or lesions noted

## 2024-03-19 NOTE — DIETITIAN INITIAL EVALUATION ADULT - LITERATURE/VIDEOS GIVEN
Provided education on effect of lactose for diarrhea - can try to introduce some into diet  Provided education on banatrol for diarrhea with C. diff and radiation  Provided education on nutrition during cancer treatment and referral to outpatient RD if pt is interested

## 2024-03-19 NOTE — DIETITIAN INITIAL EVALUATION ADULT - ORAL NUTRITION SUPPLEMENTS
Banatrol three times daily (120 kcal total) - to thicken stools, mix with beverages or food with each meal  Ensure Plus HP once daily (350 kcal, 20 g protein) - pt with high needs, tolerating PO intake as not currently on chemotherapy Banatrol three times daily (120 kcal total) - to thicken stools, mix with beverages or food with each meal - team can adjust # of packets per day as needed  Ensure Plus HP once daily (350 kcal, 20 g protein) - pt with high needs, tolerating PO intake as not currently on chemotherapy

## 2024-03-19 NOTE — DIETITIAN INITIAL EVALUATION ADULT - ENERGY INTAKE
Pt with good intake eating >75% of tray. Trying to eat things that bulk stools like rice and not drinking too much milk. However reports he ate greek yogurt with no changes to diarrhea. Adequate (%)

## 2024-03-19 NOTE — DIETITIAN INITIAL EVALUATION ADULT - DIET TYPE
remove low fiber restriction, pt would benefit from fiber to thicken stools  remove lactose free restriction, pt with no hx of lactose intolerance, can have small amounts, discussed this with pt/DASH/TLC (sodium and cholesterol restricted diet)/soft and bite-sized

## 2024-03-19 NOTE — DIETITIAN INITIAL EVALUATION ADULT - PERTINENT MEDS FT
MEDICATIONS  (STANDING):  cholecalciferol 1000 Unit(s) Oral daily  famotidine    Tablet 40 milliGRAM(s) Oral daily  fidaxomicin 200 milliGRAM(s) Oral two times a day  loperamide 2 milliGRAM(s) Oral three times a day  magnesium oxide 400 milliGRAM(s) Oral three times a day with meals  multivitamin 1 Tablet(s) Oral daily    MEDICATIONS  (PRN):  aluminum hydroxide/magnesium hydroxide/simethicone Suspension 30 milliLiter(s) Oral every 4 hours PRN Dyspepsia  melatonin 5 milliGRAM(s) Oral at bedtime PRN Insomnia  ondansetron Injectable 4 milliGRAM(s) IV Push every 8 hours PRN Nausea and/or Vomiting

## 2024-03-19 NOTE — DIETITIAN INITIAL EVALUATION ADULT - OTHER CALCULATIONS
Needs calculated considering malnutrition, cancer, BMI  Kcal: MSJ x 1.1-1.4 = 4547-9387 kcal/d  Protein: 1-1.3 g/kg = 116-150 g/d  fluid: 1 mL/kcal

## 2024-03-19 NOTE — DIETITIAN INITIAL EVALUATION ADULT - OTHER INFO
Pancytopenia 2/2 chemo   Neutropenic fever 2/2 Severe C diff colitis    Hypomagnesemia 2/2 diarrhea   Hypocalcemia 2/2 magnesium deficiency   Hypokalemia   -prolonged QTc - resolved (QTc 582 on 03/05 --> 433 on 03/07)  -electrolytes were being monitored BID, currently daily monitoring --> replaced as required  -K and Mg supplemented daily, repeat level at 8 pm  -initially supplemented IV, currently receiving PO K and Mg  HTN  -hold anti-hypertensive medications

## 2024-03-19 NOTE — DIETITIAN NUTRITION RISK NOTIFICATION - TREATMENT: THE FOLLOWING DIET HAS BEEN RECOMMENDED
Diet, DASH/TLC:   Sodium & Cholesterol Restricted  Free Water Flush Instructions:  pt can mix banatrol with food or drink  Banatrol TF     Qty per Day:  3  Supplement Feeding Modality:  Oral  Ensure Plus High Protein Cans or Servings Per Day:  1       Frequency:  Daily (03-19-24 @ 11:58) [Pending Verification By Attending]

## 2024-03-19 NOTE — DIETITIAN INITIAL EVALUATION ADULT - PERTINENT LABORATORY DATA
03-19    139  |  101  |  12  ----------------------------<  91  4.5   |  29  |  0.7    Ca    8.1<L>      19 Mar 2024 08:34  Mg     1.7     03-19    TPro  5.4<L>  /  Alb  3.1<L>  /  TBili  0.3  /  DBili  x   /  AST  20  /  ALT  13  /  AlkPhos  119<H>  03-19

## 2024-03-20 ENCOUNTER — APPOINTMENT (OUTPATIENT)
Age: 69
End: 2024-03-20

## 2024-03-20 ENCOUNTER — TRANSCRIPTION ENCOUNTER (OUTPATIENT)
Age: 69
End: 2024-03-20

## 2024-03-20 VITALS
TEMPERATURE: 97 F | HEART RATE: 85 BPM | DIASTOLIC BLOOD PRESSURE: 60 MMHG | RESPIRATION RATE: 18 BRPM | SYSTOLIC BLOOD PRESSURE: 124 MMHG

## 2024-03-20 LAB
ALBUMIN SERPL ELPH-MCNC: 2.9 G/DL — LOW (ref 3.5–5.2)
ALBUMIN SERPL ELPH-MCNC: 3.1 G/DL — LOW (ref 3.5–5.2)
ALP SERPL-CCNC: 108 U/L — SIGNIFICANT CHANGE UP (ref 30–115)
ALP SERPL-CCNC: 116 U/L — HIGH (ref 30–115)
ALT FLD-CCNC: 13 U/L — SIGNIFICANT CHANGE UP (ref 0–41)
ALT FLD-CCNC: 15 U/L — SIGNIFICANT CHANGE UP (ref 0–41)
ANION GAP SERPL CALC-SCNC: 11 MMOL/L — SIGNIFICANT CHANGE UP (ref 7–14)
ANION GAP SERPL CALC-SCNC: 7 MMOL/L — SIGNIFICANT CHANGE UP (ref 7–14)
AST SERPL-CCNC: 20 U/L — SIGNIFICANT CHANGE UP (ref 0–41)
AST SERPL-CCNC: 23 U/L — SIGNIFICANT CHANGE UP (ref 0–41)
BASOPHILS # BLD AUTO: 0.05 K/UL — SIGNIFICANT CHANGE UP (ref 0–0.2)
BASOPHILS NFR BLD AUTO: 0.4 % — SIGNIFICANT CHANGE UP (ref 0–1)
BILIRUB SERPL-MCNC: 0.2 MG/DL — SIGNIFICANT CHANGE UP (ref 0.2–1.2)
BILIRUB SERPL-MCNC: 0.4 MG/DL — SIGNIFICANT CHANGE UP (ref 0.2–1.2)
BUN SERPL-MCNC: 11 MG/DL — SIGNIFICANT CHANGE UP (ref 10–20)
BUN SERPL-MCNC: 11 MG/DL — SIGNIFICANT CHANGE UP (ref 10–20)
CALCIUM SERPL-MCNC: 8.2 MG/DL — LOW (ref 8.4–10.5)
CALCIUM SERPL-MCNC: 8.3 MG/DL — LOW (ref 8.4–10.5)
CHLORIDE SERPL-SCNC: 102 MMOL/L — SIGNIFICANT CHANGE UP (ref 98–110)
CHLORIDE SERPL-SCNC: 98 MMOL/L — SIGNIFICANT CHANGE UP (ref 98–110)
CO2 SERPL-SCNC: 28 MMOL/L — SIGNIFICANT CHANGE UP (ref 17–32)
CO2 SERPL-SCNC: 32 MMOL/L — SIGNIFICANT CHANGE UP (ref 17–32)
CREAT SERPL-MCNC: 0.8 MG/DL — SIGNIFICANT CHANGE UP (ref 0.7–1.5)
CREAT SERPL-MCNC: 0.8 MG/DL — SIGNIFICANT CHANGE UP (ref 0.7–1.5)
EGFR: 95 ML/MIN/1.73M2 — SIGNIFICANT CHANGE UP
EGFR: 95 ML/MIN/1.73M2 — SIGNIFICANT CHANGE UP
EOSINOPHIL # BLD AUTO: 0 K/UL — SIGNIFICANT CHANGE UP (ref 0–0.7)
EOSINOPHIL NFR BLD AUTO: 0 % — SIGNIFICANT CHANGE UP (ref 0–8)
GLUCOSE SERPL-MCNC: 82 MG/DL — SIGNIFICANT CHANGE UP (ref 70–99)
GLUCOSE SERPL-MCNC: 85 MG/DL — SIGNIFICANT CHANGE UP (ref 70–99)
HCT VFR BLD CALC: 27 % — LOW (ref 42–52)
HGB BLD-MCNC: 8.9 G/DL — LOW (ref 14–18)
IMM GRANULOCYTES NFR BLD AUTO: 4.9 % — HIGH (ref 0.1–0.3)
LYMPHOCYTES # BLD AUTO: 1.48 K/UL — SIGNIFICANT CHANGE UP (ref 1.2–3.4)
LYMPHOCYTES # BLD AUTO: 11.3 % — LOW (ref 20.5–51.1)
MAGNESIUM SERPL-MCNC: 1.6 MG/DL — LOW (ref 1.8–2.4)
MAGNESIUM SERPL-MCNC: 1.7 MG/DL — LOW (ref 1.8–2.4)
MCHC RBC-ENTMCNC: 29.5 PG — SIGNIFICANT CHANGE UP (ref 27–31)
MCHC RBC-ENTMCNC: 33 G/DL — SIGNIFICANT CHANGE UP (ref 32–37)
MCV RBC AUTO: 89.4 FL — SIGNIFICANT CHANGE UP (ref 80–94)
MONOCYTES # BLD AUTO: 1.09 K/UL — HIGH (ref 0.1–0.6)
MONOCYTES NFR BLD AUTO: 8.3 % — SIGNIFICANT CHANGE UP (ref 1.7–9.3)
NEUTROPHILS # BLD AUTO: 9.83 K/UL — HIGH (ref 1.4–6.5)
NEUTROPHILS NFR BLD AUTO: 75.1 % — SIGNIFICANT CHANGE UP (ref 42.2–75.2)
NRBC # BLD: 0 /100 WBCS — SIGNIFICANT CHANGE UP (ref 0–0)
PLATELET # BLD AUTO: 297 K/UL — SIGNIFICANT CHANGE UP (ref 130–400)
PMV BLD: 10.3 FL — SIGNIFICANT CHANGE UP (ref 7.4–10.4)
POTASSIUM SERPL-MCNC: 4.7 MMOL/L — SIGNIFICANT CHANGE UP (ref 3.5–5)
POTASSIUM SERPL-MCNC: 4.7 MMOL/L — SIGNIFICANT CHANGE UP (ref 3.5–5)
POTASSIUM SERPL-SCNC: 4.7 MMOL/L — SIGNIFICANT CHANGE UP (ref 3.5–5)
POTASSIUM SERPL-SCNC: 4.7 MMOL/L — SIGNIFICANT CHANGE UP (ref 3.5–5)
PROT SERPL-MCNC: 5.1 G/DL — LOW (ref 6–8)
PROT SERPL-MCNC: 5.4 G/DL — LOW (ref 6–8)
RBC # BLD: 3.02 M/UL — LOW (ref 4.7–6.1)
RBC # FLD: 16.6 % — HIGH (ref 11.5–14.5)
SODIUM SERPL-SCNC: 137 MMOL/L — SIGNIFICANT CHANGE UP (ref 135–146)
SODIUM SERPL-SCNC: 141 MMOL/L — SIGNIFICANT CHANGE UP (ref 135–146)
WBC # BLD: 13.09 K/UL — HIGH (ref 4.8–10.8)
WBC # FLD AUTO: 13.09 K/UL — HIGH (ref 4.8–10.8)

## 2024-03-20 PROCEDURE — 99239 HOSP IP/OBS DSCHRG MGMT >30: CPT

## 2024-03-20 RX ORDER — MAGNESIUM SULFATE 500 MG/ML
2 VIAL (ML) INJECTION ONCE
Refills: 0 | Status: DISCONTINUED | OUTPATIENT
Start: 2024-03-20 | End: 2024-03-20

## 2024-03-20 RX ORDER — MAGNESIUM SULFATE 500 MG/ML
2 VIAL (ML) INJECTION
Refills: 0 | Status: COMPLETED | OUTPATIENT
Start: 2024-03-20 | End: 2024-03-20

## 2024-03-20 RX ADMIN — CHLORHEXIDINE GLUCONATE 1 APPLICATION(S): 213 SOLUTION TOPICAL at 05:54

## 2024-03-20 RX ADMIN — Medication 1 TABLET(S): at 11:35

## 2024-03-20 RX ADMIN — FAMOTIDINE 40 MILLIGRAM(S): 10 INJECTION INTRAVENOUS at 11:35

## 2024-03-20 RX ADMIN — Medication 2 MILLIGRAM(S): at 05:52

## 2024-03-20 RX ADMIN — FIDAXOMICIN 200 MILLIGRAM(S): 200 GRANULE, FOR SUSPENSION ORAL at 05:52

## 2024-03-20 RX ADMIN — Medication 137 MICROGRAM(S): at 05:52

## 2024-03-20 RX ADMIN — Medication 2 MILLIGRAM(S): at 11:35

## 2024-03-20 RX ADMIN — Medication 25 GRAM(S): at 11:34

## 2024-03-20 RX ADMIN — MAGNESIUM OXIDE 400 MG ORAL TABLET 400 MILLIGRAM(S): 241.3 TABLET ORAL at 08:32

## 2024-03-20 RX ADMIN — Medication 25 GRAM(S): at 10:34

## 2024-03-20 RX ADMIN — MAGNESIUM OXIDE 400 MG ORAL TABLET 400 MILLIGRAM(S): 241.3 TABLET ORAL at 11:35

## 2024-03-20 RX ADMIN — ENOXAPARIN SODIUM 40 MILLIGRAM(S): 100 INJECTION SUBCUTANEOUS at 05:54

## 2024-03-20 RX ADMIN — Medication 1000 UNIT(S): at 11:35

## 2024-03-20 NOTE — PROGRESS NOTE ADULT - PROVIDER SPECIALTY LIST ADULT
Heme/Onc
Heme/Onc
Infectious Disease
Internal Medicine
Heme/Onc
Internal Medicine
Heme/Onc
Internal Medicine
Hospitalist
Hospitalist
Infectious Disease
Internal Medicine
Hospitalist
Hospitalist

## 2024-03-20 NOTE — DISCHARGE NOTE NURSING/CASE MANAGEMENT/SOCIAL WORK - NSDCPETBCESMAN_GEN_ALL_CORE
If you are a smoker, it is important for your health to stop smoking. Please be aware that second hand smoke is also harmful. ERIK

## 2024-03-20 NOTE — DISCHARGE NOTE NURSING/CASE MANAGEMENT/SOCIAL WORK - PATIENT PORTAL LINK FT
You can access the FollowMyHealth Patient Portal offered by [facility] by registering at the following website: [email or web address]. By joining [facility]’s FollowMyHealth portal, you will also be able to view your health information using other applications (apps) compatible with our system.

## 2024-03-20 NOTE — PROGRESS NOTE ADULT - TIME BILLING
Patient seen at bedside, time spent evaluating and treating the patient's illness as well as time spent reviewing labs, radiology, discussing with patient and/or patient's family and discussing the case with a multidisciplinary team.
Patient seen at bedside, time spent evaluating and treating the patient's illness as well as time spent reviewing labs, radiology, discussing with patient and/or patient's family and discussing the case with a multidisciplinary team.
I have personally seen and examined this patient.    I have reviewed all pertinent clinical information and reviewed all relevant imaging and diagnostic studies personally.   I counseled the patient about diagnostic testing and treatment plan. All questions were answered.   I discussed recommendations with the primary team.
I have personally seen and examined this patient.    I have reviewed all pertinent clinical information and reviewed all relevant imaging and diagnostic studies personally.   I counseled the patient about diagnostic testing and treatment plan. All questions were answered.   I discussed recommendations with the primary team.
Patient seen at bedside, time spent evaluating and treating the patient's illness as well as time spent reviewing labs, radiology, discussing with patient and/or patient's family and discussing the case with a multidisciplinary team.
d/c plan    care coord - spoke w/ ID
Patient seen at bedside, time spent evaluating and treating the patient's illness as well as time spent reviewing labs, radiology, discussing with patient and/or patient's family and discussing the case with a multidisciplinary team.

## 2024-03-20 NOTE — PROGRESS NOTE ADULT - ASSESSMENT
68-year-old man, former smoker with past medical history of HTN, hypothyroidism, melanoma s/p resection, testicular seminoma currently undergoing chemotherapy, admitted for C. diff colitis c/b neutropenia    # Pancytopenia 2/2 chemo; Neutropenic fever 2/2 Severe C diff colitis    SEPSIS (WBC, HR, Temp) present on admission - now resolved  ID eval: we spoke to day  abx: fidaxomicin 200 q12 for 14 days (3/6-3/20) - no more needed at home  WBC rise to 43K was 2/2 zarxio 3/9 and 3/10  afeb  Agree w/ ID that pt can use some imodium at this point for loose BMs  c/w contact Isolation precautions   BRAT diet    # normo anemia of chr dz    # Testicular cancer on chemo   oncology following: s/p Right chest port placed via right IJ  s/p cycle 3 of Cisplatin + Etoposide on 2/26 followed by Neulasta injection D5 (3/1/24),  chemo started on 1.15.2024    # Hypomagnesemia 2/2 diarrhea and cisplat  Mg Ox 400mg po q12  f/u lvl as outpt w/ PMD  QTc nl now    # Hypocalcemia - nl when corrected for albumin    # Hypokalemia - resolved    # HTN  hold anti-hypertensive medications til f/u w/ PMD    # Hypothyroid  c/w levothy 137 q24    # Vit D Def  c/w suppl    # DVT ppx: enoxaparin    Dispo: d/c home today (brother will pick him up)

## 2024-03-20 NOTE — PROGRESS NOTE ADULT - SUBJECTIVE AND OBJECTIVE BOX
MALIA GAUTHIER  68y  Male  ***My note supersedes ALL resident notes that I sign.  My corrections for their notes are in my note.***    I can be reached directly on [place] [address]. My office number is [phone]. My personal cell number is [phone].    INTERVAL EVENTS: Here for f/u of diarrhea. Pt has about 2 loose BMs per day. Pt has no N/V. Pt able to eat/drink and walk. Pt would like to go home today.    T(F): 97.3 (03-20-24 @ 12:09), Max: 98.1 (03-19-24 @ 20:50)  HR: 85 (03-20-24 @ 12:09) (78 - 85)  BP: 124/60 (03-20-24 @ 12:09) (123/72 - 124/60)  RR: 18 (03-20-24 @ 12:09) (18 - 18)  SpO2: 96% (03-19-24 @ 20:50) (96% - 98%)    Gen: NAD  HEENT: has medical eye patch over rt eye; lt eye OK; mouth clr, nose clr  Neck: no nodes, no JVD, thyroid nl  lungs: clr  hrt: s1 s2 rrr no murmur  abd: soft, NT/ND, no HS megaly  ext: no edema, no c/c  neuro: aa ox3, cn intact, can move all 4 ext    LABS:                      8.9     (    89.4   13.09 )-----------( ---------      297      ( 20 Mar 2024 07:10 )             27.0    (    16.6     WBC Count: 13.09 K/uL (03-20-24 @ 07:10) - better  WBC Count: 13.12 K/uL (03-19-24 @ 08:34)  WBC Count: 21.97 K/uL (03-17-24 @ 07:45)  WBC Count: 35.30 K/uL (03-16-24 @ 08:45) - got zarxio 3/9 and 3/10 (480mcg) (peak wbc 43K)    137   (   98   (   85      03-20-24 @ 07:10  ----------------------               4.7   (   28   (   11                             -----                        0.8  Ca  8.3 = trino 9.0  Mg  1.6    P   --     141   (   102   (   82      03-20-24 @ 01:07  ----------------------               4.7   (   32   (   11                             -----                        0.8  Ca  8.2   Mg  1.7    P   --     LFT  5.4  (  0.4  (  23       03-20-24 @ 07:10  -------------------------  3.1  (  116  (  15    Urinalysis Basic - ( 20 Mar 2024 07:10 )    Color: x / Appearance: x / SG: x / pH: x  Gluc: 85 mg/dL / Ketone: x  / Bili: x / Urobili: x   Blood: x / Protein: x / Nitrite: x   Leuk Esterase: x / RBC: x / WBC x   Sq Epi: x / Non Sq Epi: x / Bacteria: x    RADIOLOGY & ADDITIONAL TESTS:    MEDICATIONS:  fidaxomicin 200 milliGRAM(s) Oral two times a day    acetaminophen     Tablet .. 650 milliGRAM(s) Oral every 6 hours PRN  aluminum hydroxide/magnesium hydroxide/simethicone Suspension 30 milliLiter(s) Oral every 4 hours PRN  chlorhexidine 2% Cloths 1 Application(s) Topical <User Schedule>  cholecalciferol 1000 Unit(s) Oral daily  enoxaparin Injectable 40 milliGRAM(s) SubCutaneous every 24 hours  famotidine    Tablet 40 milliGRAM(s) Oral daily  levothyroxine 137 MICROGram(s) Oral daily  magnesium oxide 400 milliGRAM(s) Oral three times a day with meals  melatonin 5 milliGRAM(s) Oral at bedtime PRN  multivitamin 1 Tablet(s) Oral daily  ondansetron Injectable 4 milliGRAM(s) IV Push every 8 hours PRN

## 2024-03-20 NOTE — PROGRESS NOTE ADULT - NUTRITIONAL ASSESSMENT
This patient has been assessed with a concern for Malnutrition and has been determined to have a diagnosis/diagnoses of Moderate protein-calorie malnutrition.    This patient is being managed with:   Diet DASH/TLC-  Sodium & Cholesterol Restricted  Free Water Flush Instructions:  pt can mix banatrol with food or drink  Banatrol TF     Qty per Day:  3  Supplement Feeding Modality:  Oral  Ensure Plus High Protein Cans or Servings Per Day:  1       Frequency:  Daily  Entered: Mar 19 2024 11:57AM

## 2024-03-20 NOTE — PROGRESS NOTE ADULT - REASON FOR ADMISSION
68M admit for Neutropenic Fever; C. diff colitis
diarrhea
Neutropenia complicated with C. diff diarrhea
diarrhea

## 2024-03-21 ENCOUNTER — APPOINTMENT (OUTPATIENT)
Age: 69
End: 2024-03-21

## 2024-03-22 ENCOUNTER — APPOINTMENT (OUTPATIENT)
Age: 69
End: 2024-03-22

## 2024-03-22 PROBLEM — C62.90 MALIGNANT NEOPLASM OF UNSPECIFIED TESTIS, UNSPECIFIED WHETHER DESCENDED OR UNDESCENDED: Chronic | Status: ACTIVE | Noted: 2024-03-05

## 2024-03-25 ENCOUNTER — OUTPATIENT (OUTPATIENT)
Dept: OUTPATIENT SERVICES | Facility: HOSPITAL | Age: 69
LOS: 1 days | End: 2024-03-25
Payer: MEDICARE

## 2024-03-25 ENCOUNTER — APPOINTMENT (OUTPATIENT)
Age: 69
End: 2024-03-25
Payer: MEDICARE

## 2024-03-25 ENCOUNTER — LABORATORY RESULT (OUTPATIENT)
Age: 69
End: 2024-03-25

## 2024-03-25 VITALS
DIASTOLIC BLOOD PRESSURE: 84 MMHG | RESPIRATION RATE: 16 BRPM | TEMPERATURE: 97.6 F | SYSTOLIC BLOOD PRESSURE: 151 MMHG | WEIGHT: 242 LBS | HEART RATE: 101 BPM | BODY MASS INDEX: 32.78 KG/M2 | HEIGHT: 72 IN

## 2024-03-25 DIAGNOSIS — D64.9 ANEMIA, UNSPECIFIED: ICD-10-CM

## 2024-03-25 DIAGNOSIS — Z90.01 ACQUIRED ABSENCE OF EYE: Chronic | ICD-10-CM

## 2024-03-25 DIAGNOSIS — Z90.49 ACQUIRED ABSENCE OF OTHER SPECIFIED PARTS OF DIGESTIVE TRACT: Chronic | ICD-10-CM

## 2024-03-25 DIAGNOSIS — Z98.890 OTHER SPECIFIED POSTPROCEDURAL STATES: Chronic | ICD-10-CM

## 2024-03-25 LAB
ALBUMIN SERPL ELPH-MCNC: 3.9 G/DL
ALP BLD-CCNC: 120 U/L
ALT SERPL-CCNC: 26 U/L
AST SERPL-CCNC: 31 U/L
BILIRUB DIRECT SERPL-MCNC: <0.2 MG/DL
BILIRUB INDIRECT SERPL-MCNC: >0.1 MG/DL
BILIRUB SERPL-MCNC: 0.3 MG/DL
HCG SERPL-MCNC: <1 MIU/ML
HCT VFR BLD CALC: 31.9 %
HGB BLD-MCNC: 10.1 G/DL
LDH SERPL-CCNC: 352 U/L
MAGNESIUM SERPL-MCNC: 2.4 MG/DL
MCHC RBC-ENTMCNC: 29.5 PG
MCHC RBC-ENTMCNC: 31.7 G/DL
MCV RBC AUTO: 93.3 FL
PLATELET # BLD AUTO: 378 K/UL
PMV BLD: 9.1 FL
PROT SERPL-MCNC: 6.7 G/DL
RBC # BLD: 3.42 M/UL
RBC # FLD: 18.6 %
WBC # FLD AUTO: 17.82 K/UL

## 2024-03-25 PROCEDURE — 36415 COLL VENOUS BLD VENIPUNCTURE: CPT

## 2024-03-25 PROCEDURE — 99215 OFFICE O/P EST HI 40 MIN: CPT

## 2024-03-25 PROCEDURE — 80048 BASIC METABOLIC PNL TOTAL CA: CPT

## 2024-03-25 PROCEDURE — 85027 COMPLETE CBC AUTOMATED: CPT

## 2024-03-25 PROCEDURE — 84702 CHORIONIC GONADOTROPIN TEST: CPT

## 2024-03-25 PROCEDURE — 83735 ASSAY OF MAGNESIUM: CPT

## 2024-03-25 PROCEDURE — 80076 HEPATIC FUNCTION PANEL: CPT

## 2024-03-25 PROCEDURE — 83615 LACTATE (LD) (LDH) ENZYME: CPT

## 2024-03-25 NOTE — REVIEW OF SYSTEMS
[Recent Change In Weight] : ~T recent weight change [Diarrhea: Grade 0] : Diarrhea: Grade 0 [Negative] : Allergic/Immunologic [Vomiting] : no vomiting [Easy Bleeding] : no tendency for easy bleeding [FreeTextEntry2] : lost about 8lbs since last visit  [de-identified] : bruising on extremities

## 2024-03-25 NOTE — ASSESSMENT
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  - followed by ENT; baseline audiogram completed 12.28.2023 prior to chemotherapy using Cisplatin - followed by PULM; baseline PFTs completed since we were considering using Bleomycin but opted to avoid  - MR Brain 1.12.2024 no evidence of intracranial metastasis or acute intracranial pathology, paranasal sinus mucosal disease  - s/p Right chest port placed via right IJ  - c/w cycle 4 of Cisplatin + Etoposide on 2/26 followed by Neulasta onbody injection D5, initiated on 1.15.2024  - c/w Emend Tri-Bradley to be taken as follows: Emend 125mg by mouth prior to chemotherapy on Day 1 and 80mg on Days 2 and 3 of each cycle of chemotherapy.  - Labwork today: CBC, BMP STAT, LFTs, Mg, HCG TM level   # Neutropenia, likely due to chemotherapy  - s/p Zarxio 480mcg SQ daily x 2  - will add Neulasta onbody 6mg SQ following each cycle of chemo   # Anemia, likely due to chemotherapy  - s/p 1 unit PRBC on 2/26  - will continue to monitor   # Hypomagnesemia  - will give Magnesium Sulfate 2g twice per week with chemotherapy  - c/w Magnesium 400mg PO three times per day, as per discharge instructions from hospital   # History of Melanoma of skin, right shoulder, dx 10/2018  - requested to obtain records including surgical pathology for our review  - followup with DERM for surveillance skin exams at least every 6-12 months   Labwork weekly: CBC, BMP, LFTs, Mg level   seen/ examined w/ NP Demetra; note reviewed; case discussed; agree w/plan 67 yo man with  Testicular Seminoma, pT3, cN2m,  Stage IIC; ECOG at the initial evaluation 0-1 s/p 3 doses of etoposide / cisplatin (EP regimen) out of 4 planned s/p the 3rd dose admitted inpatient with profound pancytopenia/ severe neutropenia/ sepsis/ C.Diff colitis; severe anemia requiring RBC transfusion and severe thrombocytopenia, requiring platelets transfusion; in addition, he had severe hypomagnesemia, hypokalimia; hypocalcemia; Pt had to stay inpatient over 2 weeks to control C.Diff infection; currently denies diarrhea or abdomen pain; ECOG is still 1-2 as he is recovering from sepsis and stay inpatient; would like to administer the final - forth - cycle of EP in one week, beginning 4/1/24, this should be followed by onbody neulasta; he would need to have a very close f/u with NP Demetra re: CBC BMP MG;  NOTE he develped severe prolonged neutropenia after c3 despite neulasta; requiring neupogen; would keep a low threshold of giving neupogen if within a week , his ANC is not sufficiently high as would be expected after neulasta; if HG/HCT and platelets counts show signs of trending down; would keep a low threshold of transfusing PRBC/ and/or platelets  RTC 1 week following chemotherapy with SMART NP with CBC, BMP, LFTs, Mg level

## 2024-03-25 NOTE — CDI QUERY NOTE - NSCDIOTHERTXTBX_GEN_ALL_CORE_HH
Clinical documentation and/or evidence in the medical record indicates that this patient has sepsis.  In order to ensure accurate coding and accuracy of the clinical record, the documentation in this patient’s medical record requires additional clarification.      Please include more specific documentation as to the type/status of sepsis in your progress note and/or discharge summary.      Please clarify if the patient was found to have:      •	Sepsis ruled in   •	Sepsis ruled out after study    •	Other (specify):        Supporting documentation and/or clinical evidence:  	  3/5 ED Provider Note: presents to ED for diarrhea onset last night; … Progress Note: Neutropenic fever precautions started; Was this patient treated for sepsis? Yes. Bacterial etiology suspicion of sepsis;     3/5 H&P Adult: Neutropenic Fever; Diarrhea; ID consult    3/6 Consult Note Adult-Infectious Disease Attending: Neutropenic Fever, C diff colitis …     3/20 PN Adult-Internal Medicine attending: Neutropenic fever 2/2 severe Cdiff colitis; Sepsis (WBC, HR, Temp) POA – now resolved;    3/20 Discharge Note Provider: admitted for Cdiff colitis c/b neutropenia …     Vital Signs:   3/5: (1335) Temp 100.3, , RR 20, /82, RA, SpO2 95%    Labs:   3/5: WBC 0.11, Neutrophil 0.02, Lactate 2.0  3/5: BC x 2 = no growth  3/6: C Diff = detected    Orders:   3/5: LR 2L IV Bolus STAT; NS 500mL IV Bolus STAT  (3/5) Ciprofloxacin 400mg IV x 1; Azactam 2G IV (ind: Neutropenic fever)  (3/5 – 3/6) Flagyl 500mg IV Q8H (ind: Sepsis); Vancomycin 1750mg IV Q12H (ind: Neutropenic fever)  (3/6 – 3/7) Cefepime 2G IV Q8H (ind: Neutropenic fever)  (3/6 – 3/20) Dificid 200mg PO BID x 18 doses (ind: Cdiff)   (3/9 - 3/11) Vancomycin 1G IV Q12H (ind: Febrile neutropenia); Cefepime 2G IV Q8H (ind: Febrile neutropenia)      Thank you,  Spring JIMÉNEZ, RN, BSN  [phone]

## 2024-03-25 NOTE — HISTORY OF PRESENT ILLNESS
[Therapy: ___] : Therapy: [unfilled] [Cycle: ___] : Cycle: [unfilled] [de-identified] : Mr. MALIA GAUTHIER is a 68 year old male here today for evaluation and management of Testicular Seminoma and history of melanoma.     MALIA is a 68 year old M with PMHx including malignant melanoma of skin, HTN, hypothyroid, OA who presents to clinic to establish care, accompanied by sister at initial visit.  Patient states he went to PCP regarding testicular swelling a few months ago and was subsequently sent for additional workup including MR imaging which noted large right testicular mass.  CT imaging raised suspicion for enlarged lymphadenopathy.  He is presently feeling well with no new complaints.  Patient denies fever, chills, nausea, vomiting, dyspnea, unintentional weight loss or bleeding.  Patient reports family history of malignancy in his father (stomach, skin cancer), paternal uncle (skin cancer), maternal aunt + maternal grandmother (breast cancer).  Smokes cigars occasionally for social use.     RADIOLOGIC WORKUP  CT C/A/P (12.2.2023) IMPRESSION:3 mm left lower lobe pulmonary nodule of indeterminate clinical significance. Otherwise, no CT evidence of thoracic metastatic disease.Intra-abdominal lymphadenopathy concerning for metastatic disease. Consider complete evaluation with a PET/CT.Large right hydrocele noted. MR Pelvis (10.3.2023 - R) IMPRESSION: 1. Large heterogeneous mass, likely centered in the right testicle and extending into the right epididymis and right spermatic cord, highly suspicious for malignancy.  The tumor appears to extend beyond the testicle into the scrotal sac.  Surgical resection is advised.  2.  Large right hydrocele.  3 Limited views of the abdomen without definite evidence for metastatic disease, however, incomplete on the scrotal examination recommend complete characterization with CT chest abdomen and pelvis with contrast. US Scrotal (9.15.2023 - [facility]) IMPRESSION: Enlarged right testes with multiple masses highly suspicious for neoplasm.  Associated large right hydrocele.  On several images the mass in the upper pole of the right testes appears to be extended superiorly beyond the testicle.  Further evaluation with an MRI of the scrotum with and without contrast may be of diagnostic benefit. NM Lymphoscintigraphy (10.19.2018) Impression: 1. Definite demonstration of one sentinel lymph node uptake in  right axilla, by 5 minutes after injection.2. The overlying skin was marked in the  anterior position.3. The patient left the radiology department in good condition without any incident. 4. This is a preoperative marking for sentinel lymph node right axilla for wide excision of melanoma, right shoulder.  PET/CT WB FDG (9.19.2018) IMPRESSION: No evidence of pathologic FDG uptake.  LAB WORKUP (11.24.2023) WBC 5.25, Hgb 14.3, , Cr 0.8, eGFR 95, PSA 0.96, normal LFTs, , AFP 2.9, hCG TM 1   PATHOLOGY (see results section)   HCM Colonoscopy overdue   Upper Endoscopy never done  [FreeTextEntry1] : Started EP (Etoposide, Cisplatin) on 1.15.2023  [de-identified] : 1/12/24 Patient is here for a follow-up visit for Testicular Seminoma and history of melanoma.  He is feeling well with no new complaints.  Reviewed most recent CBC, which is stable with mild anemia, hgb 13.5g/dL.  Patient denies fever, chills, nausea, vomiting, dyspnea or bleeding.  He completed PFTs on Wednesday.  He also completed audiogram recently.  Patient went for MR imaging this morning; not yet resulted.  Patient is s/p Right chest port placed via right IJ access on 1.3.2024.  Reviewed most recent CT imaging which shows increase in size of abdominal pelvic lymph nodes, stable left lung nodule and indeterminate right adrenal gland lesion.  CT C/A/P (1.9.2024) Impression:Stable left lung nodule. No new nodules or lymphadenopathy.Increase in size of abdominal pelvic lymph nodes as described.Stable indeterminate right adrenal gland lesion.  2/2/24 Patient is here for a follow-up visit for Testicular Seminoma and history of melanoma.  He is due for cycle 2 of Cisplatin + Etoposide on 2/5, initiation on 1.15.2024.  He lost about 10lbs since last visit since he has been adjusting portion size.  He reports some hair thinning/loss.  Patient denies fever, chills, nausea, vomiting, dyspnea, worsening of tinnitus (present prior to initiation of chemo) or bleeding.  Reviewed most recent MR imaging which shows no evidence of intracranial metastasis or acute intracranial pathology, paranasal sinus mucosal disease.   MR Head (1.12.2024) IMPRESSION:No evidence of intracranial metastasis or acute intracranial pathology.Mild chronic microvascular ischemic changes.Paranasal sinus mucosal disease.  2/23/24 Patient is here for a follow-up visit for Testicular Seminoma and history of melanoma.  He is due for cycle 3 of Cisplatin + Etoposide on 2/26, initiation on 1.15.2024.  Reviewed most recent CBC which shows neutropenia and worsening anemia with hgb down to 9.2g/dL.  Patient denies fever, chills, nausea, vomiting, dyspnea, worsening of tinnitus (present prior to initiation of chemo) or bleeding.  Patient also has low magnesium.  He is still losing weight.    3/25/24 Patient is here for a follow-up visit for Testicular Seminoma and history of melanoma.  He is due for cycle 4 of Cisplatin + Etoposide on 2/26, initiation on 1.15.2024.  Since last visit, patient was hospitalized due to fever and diarrhea; noted to have neutropenia and C. difficile infection.  He passed 1 formed stool this morning; only 1 bowel movement yesterday.  He completed antibiotics.  Patient presently denies fever, chills, nausea, vomiting, dyspnea, worsening of tinnitus (present prior to initiation of chemo) or bleeding.  He is still losing weight.

## 2024-03-25 NOTE — PHYSICAL EXAM
[Restricted in physically strenuous activity but ambulatory and able to carry out work of a light or sedentary nature] : Status 1- Restricted in physically strenuous activity but ambulatory and able to carry out work of a light or sedentary nature, e.g., light house work, office work [Obese] : obese [Normal] : affect appropriate [de-identified] : wearing glasses ; right eye prosthesis but wearing R eye patch  [de-identified] : tattoos ; s/p Right chest port placed via right IJ access on 1.3.2024

## 2024-03-30 DIAGNOSIS — D64.9 ANEMIA, UNSPECIFIED: ICD-10-CM

## 2024-04-01 ENCOUNTER — LABORATORY RESULT (OUTPATIENT)
Age: 69
End: 2024-04-01

## 2024-04-01 ENCOUNTER — OUTPATIENT (OUTPATIENT)
Dept: OUTPATIENT SERVICES | Facility: HOSPITAL | Age: 69
LOS: 1 days | End: 2024-04-01
Payer: MEDICARE

## 2024-04-01 ENCOUNTER — APPOINTMENT (OUTPATIENT)
Age: 69
End: 2024-04-01

## 2024-04-01 VITALS — WEIGHT: 242.95 LBS | HEIGHT: 72 IN | BODY MASS INDEX: 32.91 KG/M2

## 2024-04-01 VITALS — TEMPERATURE: 98 F | HEART RATE: 85 BPM | SYSTOLIC BLOOD PRESSURE: 141 MMHG | DIASTOLIC BLOOD PRESSURE: 68 MMHG

## 2024-04-01 DIAGNOSIS — R50.81 FEVER PRESENTING WITH CONDITIONS CLASSIFIED ELSEWHERE: ICD-10-CM

## 2024-04-01 DIAGNOSIS — E87.6 HYPOKALEMIA: ICD-10-CM

## 2024-04-01 DIAGNOSIS — Z87.891 PERSONAL HISTORY OF NICOTINE DEPENDENCE: ICD-10-CM

## 2024-04-01 DIAGNOSIS — L03.113 CELLULITIS OF RIGHT UPPER LIMB: ICD-10-CM

## 2024-04-01 DIAGNOSIS — D61.810 ANTINEOPLASTIC CHEMOTHERAPY INDUCED PANCYTOPENIA: ICD-10-CM

## 2024-04-01 DIAGNOSIS — A41.9 SEPSIS, UNSPECIFIED ORGANISM: ICD-10-CM

## 2024-04-01 DIAGNOSIS — D70.3 NEUTROPENIA DUE TO INFECTION: ICD-10-CM

## 2024-04-01 DIAGNOSIS — E44.0 MODERATE PROTEIN-CALORIE MALNUTRITION: ICD-10-CM

## 2024-04-01 DIAGNOSIS — Z88.0 ALLERGY STATUS TO PENICILLIN: ICD-10-CM

## 2024-04-01 DIAGNOSIS — E83.42 HYPOMAGNESEMIA: ICD-10-CM

## 2024-04-01 DIAGNOSIS — E66.9 OBESITY, UNSPECIFIED: ICD-10-CM

## 2024-04-01 DIAGNOSIS — D70.1 AGRANULOCYTOSIS SECONDARY TO CANCER CHEMOTHERAPY: ICD-10-CM

## 2024-04-01 DIAGNOSIS — C43.61 MALIGNANT MELANOMA OF RIGHT UPPER LIMB, INCLUDING SHOULDER: ICD-10-CM

## 2024-04-01 DIAGNOSIS — E03.9 HYPOTHYROIDISM, UNSPECIFIED: ICD-10-CM

## 2024-04-01 DIAGNOSIS — D63.0 ANEMIA IN NEOPLASTIC DISEASE: ICD-10-CM

## 2024-04-01 DIAGNOSIS — Z90.49 ACQUIRED ABSENCE OF OTHER SPECIFIED PARTS OF DIGESTIVE TRACT: Chronic | ICD-10-CM

## 2024-04-01 DIAGNOSIS — I10 ESSENTIAL (PRIMARY) HYPERTENSION: ICD-10-CM

## 2024-04-01 DIAGNOSIS — A04.72 ENTEROCOLITIS DUE TO CLOSTRIDIUM DIFFICILE, NOT SPECIFIED AS RECURRENT: ICD-10-CM

## 2024-04-01 DIAGNOSIS — C62.90 MALIGNANT NEOPLASM OF UNSPECIFIED TESTIS, UNSPECIFIED WHETHER DESCENDED OR UNDESCENDED: ICD-10-CM

## 2024-04-01 DIAGNOSIS — E83.51 HYPOCALCEMIA: ICD-10-CM

## 2024-04-01 LAB
ANION GAP SERPL CALC-SCNC: 10 MMOL/L
ANION GAP SERPL CALC-SCNC: 11 MMOL/L
BUN SERPL-MCNC: 15 MG/DL
BUN SERPL-MCNC: 22 MG/DL
CALCIUM SERPL-MCNC: 9 MG/DL
CALCIUM SERPL-MCNC: 9.3 MG/DL
CHLORIDE SERPL-SCNC: 104 MMOL/L
CHLORIDE SERPL-SCNC: 98 MMOL/L
CO2 SERPL-SCNC: 27 MMOL/L
CO2 SERPL-SCNC: 27 MMOL/L
CREAT SERPL-MCNC: 0.9 MG/DL
CREAT SERPL-MCNC: 0.9 MG/DL
EGFR: 92 ML/MIN/1.73M2
EGFR: 92 ML/MIN/1.73M2
GLUCOSE SERPL-MCNC: 107 MG/DL
GLUCOSE SERPL-MCNC: 118 MG/DL
HCT VFR BLD CALC: 26.2 %
HGB BLD-MCNC: 8.6 G/DL
MAGNESIUM SERPL-MCNC: 2 MG/DL
MCHC RBC-ENTMCNC: 31.5 PG
MCHC RBC-ENTMCNC: 32.8 G/DL
MCV RBC AUTO: 96 FL
PLATELET # BLD AUTO: 161 K/UL
PMV BLD: 9.2 FL
POTASSIUM SERPL-SCNC: 4.8 MMOL/L
POTASSIUM SERPL-SCNC: 5.2 MMOL/L
RBC # BLD: 2.73 M/UL
RBC # FLD: 21.1 %
SODIUM SERPL-SCNC: 136 MMOL/L
SODIUM SERPL-SCNC: 141 MMOL/L
WBC # FLD AUTO: 8.5 K/UL

## 2024-04-01 PROCEDURE — 96413 CHEMO IV INFUSION 1 HR: CPT

## 2024-04-01 PROCEDURE — 36415 COLL VENOUS BLD VENIPUNCTURE: CPT

## 2024-04-01 PROCEDURE — 96375 TX/PRO/DX INJ NEW DRUG ADDON: CPT

## 2024-04-01 PROCEDURE — 83735 ASSAY OF MAGNESIUM: CPT

## 2024-04-01 PROCEDURE — 85027 COMPLETE CBC AUTOMATED: CPT

## 2024-04-01 PROCEDURE — 96361 HYDRATE IV INFUSION ADD-ON: CPT

## 2024-04-01 PROCEDURE — 80048 BASIC METABOLIC PNL TOTAL CA: CPT

## 2024-04-01 PROCEDURE — 96417 CHEMO IV INFUS EACH ADDL SEQ: CPT

## 2024-04-01 RX ORDER — SODIUM CHLORIDE 0.9 % (FLUSH) 0.9 %
1000 SYRINGE (ML) INJECTION
Refills: 0 | Status: DISCONTINUED | OUTPATIENT
Start: 2024-04-01 | End: 2024-07-01

## 2024-04-01 RX ORDER — CISPLATIN 1 MG/ML
45 INJECTION, SOLUTION INTRAVENOUS ONCE
Refills: 0 | Status: COMPLETED | OUTPATIENT
Start: 2024-04-01 | End: 2024-04-01

## 2024-04-01 RX ORDER — DEXAMETHASONE 1 MG/1
12 TABLET ORAL ONCE
Refills: 0 | Status: COMPLETED | OUTPATIENT
Start: 2024-04-01 | End: 2024-04-01

## 2024-04-01 RX ORDER — ONDANSETRON HYDROCHLORIDE 2 MG/ML
16 INJECTION INTRAMUSCULAR; INTRAVENOUS ONCE
Refills: 0 | Status: COMPLETED | OUTPATIENT
Start: 2024-04-01 | End: 2024-04-01

## 2024-04-01 RX ORDER — DIPHENHYDRAMINE HCL 12.5MG/5ML
50 ELIXIR ORAL ONCE
Refills: 0 | Status: COMPLETED | OUTPATIENT
Start: 2024-04-01 | End: 2024-04-01

## 2024-04-01 RX ORDER — ETOPOSIDE 50 MG
230 CAPSULE ORAL ONCE
Refills: 0 | Status: COMPLETED | OUTPATIENT
Start: 2024-04-01 | End: 2024-04-01

## 2024-04-01 RX ORDER — FAMOTIDINE 40 MG
20 TABLET ORAL ONCE
Refills: 0 | Status: COMPLETED | OUTPATIENT
Start: 2024-04-01 | End: 2024-04-01

## 2024-04-01 RX ADMIN — Medication 20 MILLIGRAM(S): at 13:55

## 2024-04-01 RX ADMIN — Medication 500 MILLILITER(S): at 11:41

## 2024-04-01 RX ADMIN — CISPLATIN 45 MILLIGRAM(S): 1 INJECTION, SOLUTION INTRAVENOUS at 15:05

## 2024-04-01 RX ADMIN — Medication 102 MILLIGRAM(S): at 13:55

## 2024-04-01 RX ADMIN — Medication 50 MILLIGRAM(S): at 14:10

## 2024-04-01 RX ADMIN — ONDANSETRON HYDROCHLORIDE 16 MILLIGRAM(S): 2 INJECTION INTRAMUSCULAR; INTRAVENOUS at 13:25

## 2024-04-01 RX ADMIN — Medication 1000 MILLILITER(S): at 13:30

## 2024-04-01 RX ADMIN — DEXAMETHASONE 12 MILLIGRAM(S): 1 TABLET ORAL at 13:40

## 2024-04-01 RX ADMIN — Medication 104 MILLIGRAM(S): at 13:40

## 2024-04-01 RX ADMIN — ONDANSETRON HYDROCHLORIDE 116 MILLIGRAM(S): 2 INJECTION INTRAMUSCULAR; INTRAVENOUS at 13:10

## 2024-04-01 RX ADMIN — DEXAMETHASONE 106 MILLIGRAM(S): 1 TABLET ORAL at 13:25

## 2024-04-01 RX ADMIN — Medication 230 MILLIGRAM(S): at 15:10

## 2024-04-01 RX ADMIN — CISPLATIN 45 MILLIGRAM(S): 1 INJECTION, SOLUTION INTRAVENOUS at 16:10

## 2024-04-01 RX ADMIN — Medication 230 MILLIGRAM(S): at 14:10

## 2024-04-02 ENCOUNTER — APPOINTMENT (OUTPATIENT)
Age: 69
End: 2024-04-02

## 2024-04-02 ENCOUNTER — OUTPATIENT (OUTPATIENT)
Dept: OUTPATIENT SERVICES | Facility: HOSPITAL | Age: 69
LOS: 1 days | End: 2024-04-02
Payer: MEDICARE

## 2024-04-02 DIAGNOSIS — C43.61 MALIGNANT MELANOMA OF RIGHT UPPER LIMB, INCLUDING SHOULDER: ICD-10-CM

## 2024-04-02 DIAGNOSIS — Z90.49 ACQUIRED ABSENCE OF OTHER SPECIFIED PARTS OF DIGESTIVE TRACT: Chronic | ICD-10-CM

## 2024-04-02 DIAGNOSIS — Z90.01 ACQUIRED ABSENCE OF EYE: Chronic | ICD-10-CM

## 2024-04-02 DIAGNOSIS — Z98.890 OTHER SPECIFIED POSTPROCEDURAL STATES: Chronic | ICD-10-CM

## 2024-04-02 PROCEDURE — 96361 HYDRATE IV INFUSION ADD-ON: CPT

## 2024-04-02 PROCEDURE — 96375 TX/PRO/DX INJ NEW DRUG ADDON: CPT

## 2024-04-02 PROCEDURE — 96367 TX/PROPH/DG ADDL SEQ IV INF: CPT

## 2024-04-02 PROCEDURE — 96417 CHEMO IV INFUS EACH ADDL SEQ: CPT

## 2024-04-02 PROCEDURE — 96413 CHEMO IV INFUSION 1 HR: CPT

## 2024-04-02 RX ORDER — DEXAMETHASONE 1 MG/1
12 TABLET ORAL ONCE
Refills: 0 | Status: COMPLETED | OUTPATIENT
Start: 2024-04-02 | End: 2024-04-02

## 2024-04-02 RX ORDER — ETOPOSIDE 50 MG
230 CAPSULE ORAL ONCE
Refills: 0 | Status: COMPLETED | OUTPATIENT
Start: 2024-04-02 | End: 2024-04-02

## 2024-04-02 RX ORDER — CISPLATIN 1 MG/ML
45 INJECTION, SOLUTION INTRAVENOUS ONCE
Refills: 0 | Status: COMPLETED | OUTPATIENT
Start: 2024-04-02 | End: 2024-04-02

## 2024-04-02 RX ORDER — DIPHENHYDRAMINE HCL 12.5MG/5ML
50 ELIXIR ORAL ONCE
Refills: 0 | Status: COMPLETED | OUTPATIENT
Start: 2024-04-02 | End: 2024-04-02

## 2024-04-02 RX ORDER — MAGNESIUM SULFATE 100 %
2 POWDER (GRAM) MISCELLANEOUS ONCE
Refills: 0 | Status: COMPLETED | OUTPATIENT
Start: 2024-04-02 | End: 2024-04-02

## 2024-04-02 RX ORDER — DEXTROSE MONOHYDRATE AND SODIUM CHLORIDE 5; .3 G/100ML; G/100ML
1000 INJECTION, SOLUTION INTRAVENOUS
Refills: 0 | Status: COMPLETED | OUTPATIENT
Start: 2024-04-02 | End: 2024-04-02

## 2024-04-02 RX ORDER — ONDANSETRON HYDROCHLORIDE 2 MG/ML
16 INJECTION INTRAMUSCULAR; INTRAVENOUS ONCE
Refills: 0 | Status: COMPLETED | OUTPATIENT
Start: 2024-04-02 | End: 2024-04-02

## 2024-04-02 RX ORDER — FAMOTIDINE 40 MG
20 TABLET ORAL ONCE
Refills: 0 | Status: COMPLETED | OUTPATIENT
Start: 2024-04-02 | End: 2024-04-02

## 2024-04-02 RX ADMIN — Medication 230 MILLIGRAM(S): at 12:07

## 2024-04-02 RX ADMIN — DEXAMETHASONE 106 MILLIGRAM(S): 1 TABLET ORAL at 10:20

## 2024-04-02 RX ADMIN — Medication 104 MILLIGRAM(S): at 10:19

## 2024-04-02 RX ADMIN — DEXTROSE MONOHYDRATE AND SODIUM CHLORIDE 500 MILLILITER(S): 5; .3 INJECTION, SOLUTION INTRAVENOUS at 10:19

## 2024-04-02 RX ADMIN — ONDANSETRON HYDROCHLORIDE 116 MILLIGRAM(S): 2 INJECTION INTRAMUSCULAR; INTRAVENOUS at 10:20

## 2024-04-02 RX ADMIN — Medication 102 MILLIGRAM(S): at 10:19

## 2024-04-02 RX ADMIN — Medication 25 GRAM(S): at 10:22

## 2024-04-02 RX ADMIN — CISPLATIN 45 MILLIGRAM(S): 1 INJECTION, SOLUTION INTRAVENOUS at 12:07

## 2024-04-03 ENCOUNTER — OUTPATIENT (OUTPATIENT)
Dept: OUTPATIENT SERVICES | Facility: HOSPITAL | Age: 69
LOS: 1 days | End: 2024-04-03
Payer: MEDICARE

## 2024-04-03 ENCOUNTER — APPOINTMENT (OUTPATIENT)
Age: 69
End: 2024-04-03

## 2024-04-03 VITALS — SYSTOLIC BLOOD PRESSURE: 130 MMHG | DIASTOLIC BLOOD PRESSURE: 63 MMHG | TEMPERATURE: 97 F | HEART RATE: 69 BPM

## 2024-04-03 DIAGNOSIS — Z90.49 ACQUIRED ABSENCE OF OTHER SPECIFIED PARTS OF DIGESTIVE TRACT: Chronic | ICD-10-CM

## 2024-04-03 DIAGNOSIS — C43.61 MALIGNANT MELANOMA OF RIGHT UPPER LIMB, INCLUDING SHOULDER: ICD-10-CM

## 2024-04-03 DIAGNOSIS — Z98.890 OTHER SPECIFIED POSTPROCEDURAL STATES: Chronic | ICD-10-CM

## 2024-04-03 DIAGNOSIS — Z90.01 ACQUIRED ABSENCE OF EYE: Chronic | ICD-10-CM

## 2024-04-03 PROCEDURE — 96367 TX/PROPH/DG ADDL SEQ IV INF: CPT

## 2024-04-03 PROCEDURE — 96417 CHEMO IV INFUS EACH ADDL SEQ: CPT

## 2024-04-03 PROCEDURE — 96413 CHEMO IV INFUSION 1 HR: CPT

## 2024-04-03 PROCEDURE — 96361 HYDRATE IV INFUSION ADD-ON: CPT

## 2024-04-03 PROCEDURE — 96375 TX/PRO/DX INJ NEW DRUG ADDON: CPT

## 2024-04-03 RX ORDER — DEXTROSE MONOHYDRATE AND SODIUM CHLORIDE 5; .3 G/100ML; G/100ML
1000 INJECTION, SOLUTION INTRAVENOUS
Refills: 0 | Status: COMPLETED | OUTPATIENT
Start: 2024-04-03 | End: 2024-04-03

## 2024-04-03 RX ORDER — CISPLATIN 1 MG/ML
45 INJECTION, SOLUTION INTRAVENOUS ONCE
Refills: 0 | Status: COMPLETED | OUTPATIENT
Start: 2024-04-03 | End: 2024-04-03

## 2024-04-03 RX ORDER — DEXAMETHASONE 1 MG/1
12 TABLET ORAL ONCE
Refills: 0 | Status: COMPLETED | OUTPATIENT
Start: 2024-04-03 | End: 2024-04-03

## 2024-04-03 RX ORDER — ONDANSETRON HYDROCHLORIDE 2 MG/ML
16 INJECTION INTRAMUSCULAR; INTRAVENOUS ONCE
Refills: 0 | Status: COMPLETED | OUTPATIENT
Start: 2024-04-03 | End: 2024-04-03

## 2024-04-03 RX ORDER — DIPHENHYDRAMINE HCL 12.5MG/5ML
50 ELIXIR ORAL ONCE
Refills: 0 | Status: COMPLETED | OUTPATIENT
Start: 2024-04-03 | End: 2024-04-03

## 2024-04-03 RX ORDER — ETOPOSIDE 50 MG
230 CAPSULE ORAL ONCE
Refills: 0 | Status: COMPLETED | OUTPATIENT
Start: 2024-04-03 | End: 2024-04-03

## 2024-04-03 RX ORDER — FAMOTIDINE 40 MG
20 TABLET ORAL ONCE
Refills: 0 | Status: COMPLETED | OUTPATIENT
Start: 2024-04-03 | End: 2024-04-03

## 2024-04-03 RX ADMIN — DEXTROSE MONOHYDRATE AND SODIUM CHLORIDE 1000 MILLILITER(S): 5; .3 INJECTION, SOLUTION INTRAVENOUS at 11:40

## 2024-04-03 RX ADMIN — ONDANSETRON HYDROCHLORIDE 16 MILLIGRAM(S): 2 INJECTION INTRAMUSCULAR; INTRAVENOUS at 11:25

## 2024-04-03 RX ADMIN — DEXTROSE MONOHYDRATE AND SODIUM CHLORIDE 500 MILLILITER(S): 5; .3 INJECTION, SOLUTION INTRAVENOUS at 09:36

## 2024-04-03 RX ADMIN — DEXTROSE MONOHYDRATE AND SODIUM CHLORIDE 500 MILLILITER(S): 5; .3 INJECTION, SOLUTION INTRAVENOUS at 09:37

## 2024-04-03 RX ADMIN — Medication 104 MILLIGRAM(S): at 11:35

## 2024-04-03 RX ADMIN — CISPLATIN 45 MILLIGRAM(S): 1 INJECTION, SOLUTION INTRAVENOUS at 14:45

## 2024-04-03 RX ADMIN — DEXAMETHASONE 106 MILLIGRAM(S): 1 TABLET ORAL at 11:25

## 2024-04-03 RX ADMIN — Medication 230 MILLIGRAM(S): at 12:35

## 2024-04-03 RX ADMIN — CISPLATIN 45 MILLIGRAM(S): 1 INJECTION, SOLUTION INTRAVENOUS at 13:45

## 2024-04-03 RX ADMIN — DEXAMETHASONE 12 MILLIGRAM(S): 1 TABLET ORAL at 11:35

## 2024-04-03 RX ADMIN — Medication 50 MILLIGRAM(S): at 12:00

## 2024-04-03 RX ADMIN — Medication 102 MILLIGRAM(S): at 11:45

## 2024-04-03 RX ADMIN — Medication 230 MILLIGRAM(S): at 13:35

## 2024-04-03 RX ADMIN — Medication 20 MILLIGRAM(S): at 11:45

## 2024-04-03 RX ADMIN — ONDANSETRON HYDROCHLORIDE 116 MILLIGRAM(S): 2 INJECTION INTRAMUSCULAR; INTRAVENOUS at 11:15

## 2024-04-04 ENCOUNTER — APPOINTMENT (OUTPATIENT)
Age: 69
End: 2024-04-04

## 2024-04-04 ENCOUNTER — OUTPATIENT (OUTPATIENT)
Dept: OUTPATIENT SERVICES | Facility: HOSPITAL | Age: 69
LOS: 1 days | End: 2024-04-04
Payer: MEDICARE

## 2024-04-04 VITALS
RESPIRATION RATE: 13 BRPM | HEART RATE: 71 BPM | DIASTOLIC BLOOD PRESSURE: 73 MMHG | SYSTOLIC BLOOD PRESSURE: 163 MMHG | TEMPERATURE: 98 F

## 2024-04-04 DIAGNOSIS — Z90.49 ACQUIRED ABSENCE OF OTHER SPECIFIED PARTS OF DIGESTIVE TRACT: Chronic | ICD-10-CM

## 2024-04-04 DIAGNOSIS — Z90.01 ACQUIRED ABSENCE OF EYE: Chronic | ICD-10-CM

## 2024-04-04 DIAGNOSIS — C43.61 MALIGNANT MELANOMA OF RIGHT UPPER LIMB, INCLUDING SHOULDER: ICD-10-CM

## 2024-04-04 DIAGNOSIS — Z98.890 OTHER SPECIFIED POSTPROCEDURAL STATES: Chronic | ICD-10-CM

## 2024-04-04 PROCEDURE — 96375 TX/PRO/DX INJ NEW DRUG ADDON: CPT

## 2024-04-04 PROCEDURE — 96367 TX/PROPH/DG ADDL SEQ IV INF: CPT

## 2024-04-04 PROCEDURE — 96361 HYDRATE IV INFUSION ADD-ON: CPT

## 2024-04-04 PROCEDURE — 96413 CHEMO IV INFUSION 1 HR: CPT

## 2024-04-04 PROCEDURE — 96417 CHEMO IV INFUS EACH ADDL SEQ: CPT

## 2024-04-04 RX ORDER — DEXTROSE MONOHYDRATE AND SODIUM CHLORIDE 5; .3 G/100ML; G/100ML
1000 INJECTION, SOLUTION INTRAVENOUS
Refills: 0 | Status: COMPLETED | OUTPATIENT
Start: 2024-04-04 | End: 2024-04-04

## 2024-04-04 RX ORDER — CISPLATIN 1 MG/ML
45 INJECTION, SOLUTION INTRAVENOUS ONCE
Refills: 0 | Status: COMPLETED | OUTPATIENT
Start: 2024-04-04 | End: 2024-04-04

## 2024-04-04 RX ORDER — DEXAMETHASONE 1 MG/1
12 TABLET ORAL ONCE
Refills: 0 | Status: COMPLETED | OUTPATIENT
Start: 2024-04-04 | End: 2024-04-04

## 2024-04-04 RX ORDER — PEGFILGRASTIM-CBQV 6 MG/.6ML
6 INJECTION, SOLUTION SUBCUTANEOUS ONCE
Refills: 0 | Status: DISCONTINUED | OUTPATIENT
Start: 2024-04-04 | End: 2024-04-04

## 2024-04-04 RX ORDER — DEXTROSE MONOHYDRATE AND SODIUM CHLORIDE 5; .3 G/100ML; G/100ML
1000 INJECTION, SOLUTION INTRAVENOUS
Refills: 0 | Status: DISCONTINUED | OUTPATIENT
Start: 2024-04-04 | End: 2024-07-04

## 2024-04-04 RX ORDER — DIPHENHYDRAMINE HCL 12.5MG/5ML
50 ELIXIR ORAL ONCE
Refills: 0 | Status: COMPLETED | OUTPATIENT
Start: 2024-04-04 | End: 2024-04-04

## 2024-04-04 RX ORDER — FAMOTIDINE 40 MG
20 TABLET ORAL ONCE
Refills: 0 | Status: COMPLETED | OUTPATIENT
Start: 2024-04-04 | End: 2024-04-04

## 2024-04-04 RX ORDER — ETOPOSIDE 50 MG
230 CAPSULE ORAL ONCE
Refills: 0 | Status: COMPLETED | OUTPATIENT
Start: 2024-04-04 | End: 2024-04-04

## 2024-04-04 RX ORDER — ONDANSETRON HYDROCHLORIDE 2 MG/ML
16 INJECTION INTRAMUSCULAR; INTRAVENOUS ONCE
Refills: 0 | Status: COMPLETED | OUTPATIENT
Start: 2024-04-04 | End: 2024-04-04

## 2024-04-04 RX ADMIN — Medication 104 MILLIGRAM(S): at 10:56

## 2024-04-04 RX ADMIN — CISPLATIN 45 MILLIGRAM(S): 1 INJECTION, SOLUTION INTRAVENOUS at 14:25

## 2024-04-04 RX ADMIN — DEXTROSE MONOHYDRATE AND SODIUM CHLORIDE 1000 MILLILITER(S): 5; .3 INJECTION, SOLUTION INTRAVENOUS at 15:00

## 2024-04-04 RX ADMIN — Medication 102 MILLIGRAM(S): at 10:57

## 2024-04-04 RX ADMIN — DEXTROSE MONOHYDRATE AND SODIUM CHLORIDE 1000 MILLILITER(S): 5; .3 INJECTION, SOLUTION INTRAVENOUS at 12:00

## 2024-04-04 RX ADMIN — Medication 230 MILLIGRAM(S): at 11:53

## 2024-04-04 RX ADMIN — CISPLATIN 45 MILLIGRAM(S): 1 INJECTION, SOLUTION INTRAVENOUS at 11:46

## 2024-04-04 RX ADMIN — DEXTROSE MONOHYDRATE AND SODIUM CHLORIDE 500 MILLILITER(S): 5; .3 INJECTION, SOLUTION INTRAVENOUS at 10:00

## 2024-04-04 RX ADMIN — DEXAMETHASONE 106 MILLIGRAM(S): 1 TABLET ORAL at 10:56

## 2024-04-04 RX ADMIN — ONDANSETRON HYDROCHLORIDE 16 MILLIGRAM(S): 2 INJECTION INTRAMUSCULAR; INTRAVENOUS at 11:16

## 2024-04-04 RX ADMIN — DEXTROSE MONOHYDRATE AND SODIUM CHLORIDE 500 MILLILITER(S): 5; .3 INJECTION, SOLUTION INTRAVENOUS at 13:00

## 2024-04-04 RX ADMIN — Medication 230 MILLIGRAM(S): at 13:20

## 2024-04-04 RX ADMIN — Medication 20 MILLIGRAM(S): at 11:36

## 2024-04-04 RX ADMIN — DEXAMETHASONE 12 MILLIGRAM(S): 1 TABLET ORAL at 12:16

## 2024-04-04 RX ADMIN — ONDANSETRON HYDROCHLORIDE 116 MILLIGRAM(S): 2 INJECTION INTRAMUSCULAR; INTRAVENOUS at 10:56

## 2024-04-04 RX ADMIN — Medication 50 MILLIGRAM(S): at 11:56

## 2024-04-05 ENCOUNTER — OUTPATIENT (OUTPATIENT)
Dept: OUTPATIENT SERVICES | Facility: HOSPITAL | Age: 69
LOS: 1 days | End: 2024-04-05
Payer: MEDICARE

## 2024-04-05 ENCOUNTER — APPOINTMENT (OUTPATIENT)
Age: 69
End: 2024-04-05

## 2024-04-05 VITALS — SYSTOLIC BLOOD PRESSURE: 146 MMHG | DIASTOLIC BLOOD PRESSURE: 67 MMHG | HEART RATE: 68 BPM | TEMPERATURE: 97 F

## 2024-04-05 DIAGNOSIS — C43.61 MALIGNANT MELANOMA OF RIGHT UPPER LIMB, INCLUDING SHOULDER: ICD-10-CM

## 2024-04-05 DIAGNOSIS — Z90.49 ACQUIRED ABSENCE OF OTHER SPECIFIED PARTS OF DIGESTIVE TRACT: Chronic | ICD-10-CM

## 2024-04-05 DIAGNOSIS — Z90.01 ACQUIRED ABSENCE OF EYE: Chronic | ICD-10-CM

## 2024-04-05 DIAGNOSIS — Z98.890 OTHER SPECIFIED POSTPROCEDURAL STATES: Chronic | ICD-10-CM

## 2024-04-05 PROCEDURE — 96375 TX/PRO/DX INJ NEW DRUG ADDON: CPT

## 2024-04-05 PROCEDURE — 96417 CHEMO IV INFUS EACH ADDL SEQ: CPT

## 2024-04-05 PROCEDURE — 96367 TX/PROPH/DG ADDL SEQ IV INF: CPT

## 2024-04-05 PROCEDURE — 96413 CHEMO IV INFUSION 1 HR: CPT

## 2024-04-05 PROCEDURE — 96377 APPLICATON ON-BODY INJECTOR: CPT

## 2024-04-05 PROCEDURE — 96361 HYDRATE IV INFUSION ADD-ON: CPT

## 2024-04-05 RX ORDER — FAMOTIDINE 40 MG
20 TABLET ORAL ONCE
Refills: 0 | Status: COMPLETED | OUTPATIENT
Start: 2024-04-05 | End: 2024-04-05

## 2024-04-05 RX ORDER — DEXTROSE MONOHYDRATE AND SODIUM CHLORIDE 5; .3 G/100ML; G/100ML
1000 INJECTION, SOLUTION INTRAVENOUS
Refills: 0 | Status: COMPLETED | OUTPATIENT
Start: 2024-04-05 | End: 2024-04-05

## 2024-04-05 RX ORDER — DEXAMETHASONE 1 MG/1
12 TABLET ORAL ONCE
Refills: 0 | Status: COMPLETED | OUTPATIENT
Start: 2024-04-05 | End: 2024-04-05

## 2024-04-05 RX ORDER — ETOPOSIDE 50 MG
230 CAPSULE ORAL ONCE
Refills: 0 | Status: COMPLETED | OUTPATIENT
Start: 2024-04-05 | End: 2024-04-05

## 2024-04-05 RX ORDER — CISPLATIN 1 MG/ML
45 INJECTION, SOLUTION INTRAVENOUS ONCE
Refills: 0 | Status: COMPLETED | OUTPATIENT
Start: 2024-04-05 | End: 2024-04-05

## 2024-04-05 RX ORDER — ONDANSETRON HYDROCHLORIDE 2 MG/ML
16 INJECTION INTRAMUSCULAR; INTRAVENOUS ONCE
Refills: 0 | Status: COMPLETED | OUTPATIENT
Start: 2024-04-05 | End: 2024-04-05

## 2024-04-05 RX ORDER — DIPHENHYDRAMINE HCL 12.5MG/5ML
50 ELIXIR ORAL ONCE
Refills: 0 | Status: DISCONTINUED | OUTPATIENT
Start: 2024-04-05 | End: 2024-07-05

## 2024-04-05 RX ORDER — PEGFILGRASTIM-CBQV 6 MG/.6ML
6 INJECTION, SOLUTION SUBCUTANEOUS ONCE
Refills: 0 | Status: COMPLETED | OUTPATIENT
Start: 2024-04-05 | End: 2024-04-05

## 2024-04-05 RX ADMIN — ONDANSETRON HYDROCHLORIDE 16 MILLIGRAM(S): 2 INJECTION INTRAMUSCULAR; INTRAVENOUS at 12:00

## 2024-04-05 RX ADMIN — PEGFILGRASTIM-CBQV 6 MILLIGRAM(S): 6 INJECTION, SOLUTION SUBCUTANEOUS at 13:40

## 2024-04-05 RX ADMIN — Medication 50 MILLIGRAM(S): at 12:45

## 2024-04-05 RX ADMIN — Medication 230 MILLIGRAM(S): at 13:50

## 2024-04-05 RX ADMIN — DEXTROSE MONOHYDRATE AND SODIUM CHLORIDE 500 MILLILITER(S): 5; .3 INJECTION, SOLUTION INTRAVENOUS at 16:15

## 2024-04-05 RX ADMIN — Medication 20 MILLIGRAM(S): at 12:30

## 2024-04-05 RX ADMIN — DEXTROSE MONOHYDRATE AND SODIUM CHLORIDE 1000 MILLILITER(S): 5; .3 INJECTION, SOLUTION INTRAVENOUS at 12:05

## 2024-04-05 RX ADMIN — ONDANSETRON HYDROCHLORIDE 116 MILLIGRAM(S): 2 INJECTION INTRAMUSCULAR; INTRAVENOUS at 11:45

## 2024-04-05 RX ADMIN — CISPLATIN 45 MILLIGRAM(S): 1 INJECTION, SOLUTION INTRAVENOUS at 14:55

## 2024-04-05 RX ADMIN — DEXAMETHASONE 12 MILLIGRAM(S): 1 TABLET ORAL at 12:15

## 2024-04-05 RX ADMIN — Medication 104 MILLIGRAM(S): at 12:15

## 2024-04-05 RX ADMIN — DEXTROSE MONOHYDRATE AND SODIUM CHLORIDE 1000 MILLILITER(S): 5; .3 INJECTION, SOLUTION INTRAVENOUS at 16:15

## 2024-04-05 RX ADMIN — Medication 102 MILLIGRAM(S): at 12:30

## 2024-04-05 RX ADMIN — CISPLATIN 45 MILLIGRAM(S): 1 INJECTION, SOLUTION INTRAVENOUS at 13:55

## 2024-04-05 RX ADMIN — DEXTROSE MONOHYDRATE AND SODIUM CHLORIDE 500 MILLILITER(S): 5; .3 INJECTION, SOLUTION INTRAVENOUS at 09:59

## 2024-04-05 RX ADMIN — Medication 230 MILLIGRAM(S): at 12:50

## 2024-04-05 RX ADMIN — DEXAMETHASONE 106 MILLIGRAM(S): 1 TABLET ORAL at 12:00

## 2024-04-08 ENCOUNTER — APPOINTMENT (OUTPATIENT)
Age: 69
End: 2024-04-08
Payer: MEDICARE

## 2024-04-08 ENCOUNTER — APPOINTMENT (OUTPATIENT)
Age: 69
End: 2024-04-08

## 2024-04-08 ENCOUNTER — OUTPATIENT (OUTPATIENT)
Dept: OUTPATIENT SERVICES | Facility: HOSPITAL | Age: 69
LOS: 1 days | End: 2024-04-08
Payer: MEDICARE

## 2024-04-08 ENCOUNTER — LABORATORY RESULT (OUTPATIENT)
Age: 69
End: 2024-04-08

## 2024-04-08 DIAGNOSIS — Z90.01 ACQUIRED ABSENCE OF EYE: Chronic | ICD-10-CM

## 2024-04-08 DIAGNOSIS — Z90.49 ACQUIRED ABSENCE OF OTHER SPECIFIED PARTS OF DIGESTIVE TRACT: Chronic | ICD-10-CM

## 2024-04-08 DIAGNOSIS — C43.61 MALIGNANT MELANOMA OF RIGHT UPPER LIMB, INCLUDING SHOULDER: ICD-10-CM

## 2024-04-08 DIAGNOSIS — Z98.890 OTHER SPECIFIED POSTPROCEDURAL STATES: Chronic | ICD-10-CM

## 2024-04-08 LAB
ALBUMIN SERPL ELPH-MCNC: 3.5 G/DL
ALP BLD-CCNC: 94 U/L
ALT SERPL-CCNC: 11 U/L
ANION GAP SERPL CALC-SCNC: 13 MMOL/L
AST SERPL-CCNC: 15 U/L
BILIRUB DIRECT SERPL-MCNC: <0.2 MG/DL
BILIRUB INDIRECT SERPL-MCNC: >0.2 MG/DL
BILIRUB SERPL-MCNC: 0.4 MG/DL
BUN SERPL-MCNC: 29 MG/DL
CALCIUM SERPL-MCNC: 8.4 MG/DL
CHLORIDE SERPL-SCNC: 96 MMOL/L
CO2 SERPL-SCNC: 28 MMOL/L
CREAT SERPL-MCNC: 0.9 MG/DL
EGFR: 92 ML/MIN/1.73M2
GLUCOSE SERPL-MCNC: 114 MG/DL
HCT VFR BLD CALC: 24.8 %
HGB BLD-MCNC: 8.4 G/DL
MAGNESIUM SERPL-MCNC: 1.1 MG/DL
MCHC RBC-ENTMCNC: 31.3 PG
MCHC RBC-ENTMCNC: 33.9 G/DL
MCV RBC AUTO: 92.5 FL
PLATELET # BLD AUTO: 139 K/UL
PMV BLD: 9.5 FL
POTASSIUM SERPL-SCNC: 4.2 MMOL/L
PROT SERPL-MCNC: 5.6 G/DL
RBC # BLD: 2.68 M/UL
RBC # FLD: 19.8 %
SODIUM SERPL-SCNC: 137 MMOL/L
WBC # FLD AUTO: 19.18 K/UL

## 2024-04-08 PROCEDURE — 80048 BASIC METABOLIC PNL TOTAL CA: CPT

## 2024-04-08 PROCEDURE — 99441: CPT | Mod: 93

## 2024-04-08 PROCEDURE — 83735 ASSAY OF MAGNESIUM: CPT

## 2024-04-08 PROCEDURE — 80076 HEPATIC FUNCTION PANEL: CPT

## 2024-04-08 PROCEDURE — 36415 COLL VENOUS BLD VENIPUNCTURE: CPT

## 2024-04-08 PROCEDURE — 85027 COMPLETE CBC AUTOMATED: CPT

## 2024-04-08 NOTE — HISTORY OF PRESENT ILLNESS
[de-identified] : Mr. MALIA GAUTHIER is a 68 year old male here today for evaluation and management of Testicular Seminoma and history of melanoma.     MALIA is a 68 year old M with PMHx including malignant melanoma of skin, HTN, hypothyroid, OA who presents to clinic to establish care, accompanied by sister at initial visit.  Patient states he went to PCP regarding testicular swelling a few months ago and was subsequently sent for additional workup including MR imaging which noted large right testicular mass.  CT imaging raised suspicion for enlarged lymphadenopathy.  He is presently feeling well with no new complaints.  Patient denies fever, chills, nausea, vomiting, dyspnea, unintentional weight loss or bleeding.  Patient reports family history of malignancy in his father (stomach, skin cancer), paternal uncle (skin cancer), maternal aunt + maternal grandmother (breast cancer).  Smokes cigars occasionally for social use.     RADIOLOGIC WORKUP  CT C/A/P (12.2.2023) IMPRESSION:3 mm left lower lobe pulmonary nodule of indeterminate clinical significance. Otherwise, no CT evidence of thoracic metastatic disease.Intra-abdominal lymphadenopathy concerning for metastatic disease. Consider complete evaluation with a PET/CT.Large right hydrocele noted. MR Pelvis (10.3.2023 - [facility]) IMPRESSION: 1. Large heterogeneous mass, likely centered in the right testicle and extending into the right epididymis and right spermatic cord, highly suspicious for malignancy.  The tumor appears to extend beyond the testicle into the scrotal sac.  Surgical resection is advised.  2.  Large right hydrocele.  3 Limited views of the abdomen without definite evidence for metastatic disease, however, incomplete on the scrotal examination recommend complete characterization with CT chest abdomen and pelvis with contrast. US Scrotal (9.15.2023 - [facility]) IMPRESSION: Enlarged right testes with multiple masses highly suspicious for neoplasm.  Associated large right hydrocele.  On several images the mass in the upper pole of the right testes appears to be extended superiorly beyond the testicle.  Further evaluation with an MRI of the scrotum with and without contrast may be of diagnostic benefit. NM Lymphoscintigraphy (10.19.2018) Impression: 1. Definite demonstration of one sentinel lymph node uptake in  right axilla, by 5 minutes after injection.2. The overlying skin was marked in the  anterior position.3. The patient left the radiology department in good condition without any incident. 4. This is a preoperative marking for sentinel lymph node right axilla for wide excision of melanoma, right shoulder.  PET/CT WB FDG (9.19.2018) IMPRESSION: No evidence of pathologic FDG uptake.  LAB WORKUP (11.24.2023) WBC 5.25, Hgb 14.3, , Cr 0.8, eGFR 95, PSA 0.96, normal LFTs, , AFP 2.9, hCG TM 1   PATHOLOGY (see results section)   HCM Colonoscopy overdue   Upper Endoscopy never done  [FreeTextEntry1] : Started EP (Etoposide, Cisplatin) on 1.15.2023 - 4.5.2024  [de-identified] : 1/12/24 Patient is here for a follow-up visit for Testicular Seminoma and history of melanoma.  He is feeling well with no new complaints.  Reviewed most recent CBC, which is stable with mild anemia, hgb 13.5g/dL.  Patient denies fever, chills, nausea, vomiting, dyspnea or bleeding.  He completed PFTs on Wednesday.  He also completed audiogram recently.  Patient went for MR imaging this morning; not yet resulted.  Patient is s/p Right chest port placed via right IJ access on 1.3.2024.  Reviewed most recent CT imaging which shows increase in size of abdominal pelvic lymph nodes, stable left lung nodule and indeterminate right adrenal gland lesion.  CT C/A/P (1.9.2024) Impression:Stable left lung nodule. No new nodules or lymphadenopathy.Increase in size of abdominal pelvic lymph nodes as described.Stable indeterminate right adrenal gland lesion.  2/2/24 Patient is here for a follow-up visit for Testicular Seminoma and history of melanoma.  He is due for cycle 2 of Cisplatin + Etoposide on 2/5, initiation on 1.15.2024.  He lost about 10lbs since last visit since he has been adjusting portion size.  He reports some hair thinning/loss.  Patient denies fever, chills, nausea, vomiting, dyspnea, worsening of tinnitus (present prior to initiation of chemo) or bleeding.  Reviewed most recent MR imaging which shows no evidence of intracranial metastasis or acute intracranial pathology, paranasal sinus mucosal disease.   MR Head (1.12.2024) IMPRESSION:No evidence of intracranial metastasis or acute intracranial pathology.Mild chronic microvascular ischemic changes.Paranasal sinus mucosal disease.  2/23/24 Patient is here for a follow-up visit for Testicular Seminoma and history of melanoma.  He is due for cycle 3 of Cisplatin + Etoposide on 2/26, initiation on 1.15.2024.  Reviewed most recent CBC which shows neutropenia and worsening anemia with hgb down to 9.2g/dL.  Patient denies fever, chills, nausea, vomiting, dyspnea, worsening of tinnitus (present prior to initiation of chemo) or bleeding.  Patient also has low magnesium.  He is still losing weight.    3/25/24 Patient is here for a follow-up visit for Testicular Seminoma and history of melanoma.  He is due for cycle 4 of Cisplatin + Etoposide on 2/26, initiation on 1.15.2024.  Since last visit, patient was hospitalized due to fever and diarrhea; noted to have neutropenia and C. difficile infection.  He passed 1 formed stool this morning; only 1 bowel movement yesterday.  He completed antibiotics.  Patient presently denies fever, chills, nausea, vomiting, dyspnea, worsening of tinnitus (present prior to initiation of chemo) or bleeding.  He is still losing weight.    4/8/24 Patient is here for a follow-up TELEvisit for Testicular Seminoma and history of melanoma.  He is s/p cycle 4 of Cisplatin + Etoposide, initiation on 1.15.2024.  Reviewed most recent CBC which shows mild leukocytosis (likely due to GCSF) and moderate anemia, hgb 8.4g/dL.  Patient presently denies fever, chills, nausea, vomiting, abdominal pain, chest pain, palpitations, dyspnea, worsening of tinnitus (present prior to initiation of chemo) or bleeding.  His diarrhea has resolved.  He has only been taking once daily.

## 2024-04-08 NOTE — ASSESSMENT
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  - followed by ENT; baseline audiogram completed 12.28.2023 prior to chemotherapy using Cisplatin - followed by PULM; baseline PFTs completed since we were considering using Bleomycin but opted to avoid  - MR Brain 1.12.2024 no evidence of intracranial metastasis or acute intracranial pathology, paranasal sinus mucosal disease  - s/p Right chest port placed via right IJ  - completed 4 cycles of Cisplatin + Etoposide as of 4.5.2024 followed by Neulasta onbody injection D5, initiated on 1.15.2024  - Labwork today: CBC, BMP, LFTs, Mg level   # Neutropenia, likely due to chemotherapy  - s/p Zarxio 480mcg SQ daily x 2  - added Neulasta onbody 6mg SQ following each cycle of chemo   # Anemia, likely due to chemotherapy  - s/p 1 unit PRBC on 2/26  - will continue to monitor   # Hypomagnesemia  - s/p Magnesium Sulfate 2g twice per week with chemotherapy  - START Magnesium 400mg PO two times per day OTC ; possible side effects discussed  - will arrange IV Magnesium 2g next available   # History of Melanoma of skin, right shoulder, dx 10/2018  - requested to obtain records including surgical pathology for our review  - followup with [facility] for surveillance skin exams at least every 6-12 months   RTC in 3 weeks with SMART NP with CBC, BMP, LFTs, Mg, HCG TM level prior

## 2024-04-08 NOTE — PHYSICAL EXAM
[de-identified] : right eye prosthesis but wears R eye patch  [de-identified] : s/p Right chest port placed via right IJ access on 1.3.2024

## 2024-04-13 ENCOUNTER — INPATIENT (INPATIENT)
Facility: HOSPITAL | Age: 69
LOS: 5 days | Discharge: HOME | DRG: 684 | End: 2024-04-19
Attending: INTERNAL MEDICINE | Admitting: STUDENT IN AN ORGANIZED HEALTH CARE EDUCATION/TRAINING PROGRAM
Payer: MEDICARE

## 2024-04-13 VITALS
WEIGHT: 244.93 LBS | DIASTOLIC BLOOD PRESSURE: 58 MMHG | OXYGEN SATURATION: 98 % | HEART RATE: 112 BPM | RESPIRATION RATE: 18 BRPM | HEIGHT: 72 IN | SYSTOLIC BLOOD PRESSURE: 110 MMHG | TEMPERATURE: 98 F

## 2024-04-13 DIAGNOSIS — Z98.890 OTHER SPECIFIED POSTPROCEDURAL STATES: Chronic | ICD-10-CM

## 2024-04-13 DIAGNOSIS — N17.9 ACUTE KIDNEY FAILURE, UNSPECIFIED: ICD-10-CM

## 2024-04-13 DIAGNOSIS — Z90.49 ACQUIRED ABSENCE OF OTHER SPECIFIED PARTS OF DIGESTIVE TRACT: Chronic | ICD-10-CM

## 2024-04-13 DIAGNOSIS — Z90.01 ACQUIRED ABSENCE OF EYE: Chronic | ICD-10-CM

## 2024-04-13 LAB
ALBUMIN SERPL ELPH-MCNC: 3.2 G/DL — LOW (ref 3.5–5.2)
ALP SERPL-CCNC: 60 U/L — SIGNIFICANT CHANGE UP (ref 30–115)
ALT FLD-CCNC: 12 U/L — SIGNIFICANT CHANGE UP (ref 0–41)
ANION GAP SERPL CALC-SCNC: 17 MMOL/L — HIGH (ref 7–14)
APTT BLD: 28.8 SEC — SIGNIFICANT CHANGE UP (ref 27–39.2)
AST SERPL-CCNC: 16 U/L — SIGNIFICANT CHANGE UP (ref 0–41)
BILIRUB SERPL-MCNC: 0.7 MG/DL — SIGNIFICANT CHANGE UP (ref 0.2–1.2)
BLD GP AB SCN SERPL QL: SIGNIFICANT CHANGE UP
BUN SERPL-MCNC: 37 MG/DL — HIGH (ref 10–20)
CALCIUM SERPL-MCNC: 7.2 MG/DL — LOW (ref 8.4–10.4)
CHLORIDE SERPL-SCNC: 93 MMOL/L — LOW (ref 98–110)
CO2 SERPL-SCNC: 22 MMOL/L — SIGNIFICANT CHANGE UP (ref 17–32)
CREAT SERPL-MCNC: 1.8 MG/DL — HIGH (ref 0.7–1.5)
EGFR: 40 ML/MIN/1.73M2 — LOW
FLUAV AG NPH QL: SIGNIFICANT CHANGE UP
FLUBV AG NPH QL: SIGNIFICANT CHANGE UP
GLUCOSE SERPL-MCNC: 136 MG/DL — HIGH (ref 70–99)
HCT VFR BLD CALC: 15.2 % — LOW (ref 42–52)
HGB BLD-MCNC: 5.5 G/DL — CRITICAL LOW (ref 14–18)
INR BLD: 1.48 RATIO — HIGH (ref 0.65–1.3)
LACTATE SERPL-SCNC: 1.4 MMOL/L — SIGNIFICANT CHANGE UP (ref 0.7–2)
MANUAL SMEAR VERIFICATION: SIGNIFICANT CHANGE UP
MCHC RBC-ENTMCNC: 33.3 PG — HIGH (ref 27–31)
MCHC RBC-ENTMCNC: 36.2 G/DL — SIGNIFICANT CHANGE UP (ref 32–37)
MCV RBC AUTO: 92.1 FL — SIGNIFICANT CHANGE UP (ref 80–94)
NEUTROPHILS NFR BLD AUTO: 16.7 % — LOW (ref 42.2–75.2)
PLAT MORPH BLD: NORMAL — SIGNIFICANT CHANGE UP
PLATELET # BLD AUTO: 4 K/UL — CRITICAL LOW (ref 130–400)
PMV BLD: SIGNIFICANT CHANGE UP (ref 7.4–10.4)
POTASSIUM SERPL-MCNC: 3.7 MMOL/L — SIGNIFICANT CHANGE UP (ref 3.5–5)
POTASSIUM SERPL-SCNC: 3.7 MMOL/L — SIGNIFICANT CHANGE UP (ref 3.5–5)
PROT SERPL-MCNC: 5.5 G/DL — LOW (ref 6–8)
PROTHROM AB SERPL-ACNC: 16.9 SEC — HIGH (ref 9.95–12.87)
RBC # BLD: 1.65 M/UL — LOW (ref 4.7–6.1)
RBC # FLD: 18 % — HIGH (ref 11.5–14.5)
RBC BLD AUTO: ABNORMAL
RSV RNA NPH QL NAA+NON-PROBE: SIGNIFICANT CHANGE UP
SARS-COV-2 RNA SPEC QL NAA+PROBE: SIGNIFICANT CHANGE UP
SODIUM SERPL-SCNC: 132 MMOL/L — LOW (ref 135–146)
WBC # BLD: 0.12 K/UL — CRITICAL LOW (ref 4.8–10.8)
WBC # FLD AUTO: 0.12 K/UL — CRITICAL LOW (ref 4.8–10.8)

## 2024-04-13 PROCEDURE — 87205 SMEAR GRAM STAIN: CPT

## 2024-04-13 PROCEDURE — P9037: CPT

## 2024-04-13 PROCEDURE — 99285 EMERGENCY DEPT VISIT HI MDM: CPT

## 2024-04-13 PROCEDURE — 93970 EXTREMITY STUDY: CPT

## 2024-04-13 PROCEDURE — 87324 CLOSTRIDIUM AG IA: CPT

## 2024-04-13 PROCEDURE — 80053 COMPREHEN METABOLIC PANEL: CPT

## 2024-04-13 PROCEDURE — 93005 ELECTROCARDIOGRAM TRACING: CPT

## 2024-04-13 PROCEDURE — 36415 COLL VENOUS BLD VENIPUNCTURE: CPT

## 2024-04-13 PROCEDURE — 71045 X-RAY EXAM CHEST 1 VIEW: CPT | Mod: 26

## 2024-04-13 PROCEDURE — 80202 ASSAY OF VANCOMYCIN: CPT

## 2024-04-13 PROCEDURE — 36430 TRANSFUSION BLD/BLD COMPNT: CPT

## 2024-04-13 PROCEDURE — 87177 OVA AND PARASITES SMEARS: CPT

## 2024-04-13 PROCEDURE — 87077 CULTURE AEROBIC IDENTIFY: CPT

## 2024-04-13 PROCEDURE — 86850 RBC ANTIBODY SCREEN: CPT

## 2024-04-13 PROCEDURE — 87075 CULTR BACTERIA EXCEPT BLOOD: CPT

## 2024-04-13 PROCEDURE — 87186 SC STD MICRODIL/AGAR DIL: CPT

## 2024-04-13 PROCEDURE — 87086 URINE CULTURE/COLONY COUNT: CPT

## 2024-04-13 PROCEDURE — 87507 IADNA-DNA/RNA PROBE TQ 12-25: CPT

## 2024-04-13 PROCEDURE — 85025 COMPLETE CBC W/AUTO DIFF WBC: CPT

## 2024-04-13 PROCEDURE — P9016: CPT

## 2024-04-13 PROCEDURE — P9100: CPT

## 2024-04-13 PROCEDURE — 93010 ELECTROCARDIOGRAM REPORT: CPT

## 2024-04-13 PROCEDURE — 93306 TTE W/DOPPLER COMPLETE: CPT

## 2024-04-13 PROCEDURE — 76705 ECHO EXAM OF ABDOMEN: CPT

## 2024-04-13 PROCEDURE — 87449 NOS EACH ORGANISM AG IA: CPT

## 2024-04-13 PROCEDURE — 85027 COMPLETE CBC AUTOMATED: CPT

## 2024-04-13 PROCEDURE — 87070 CULTURE OTHR SPECIMN AEROBIC: CPT

## 2024-04-13 PROCEDURE — 86901 BLOOD TYPING SEROLOGIC RH(D): CPT

## 2024-04-13 PROCEDURE — 74177 CT ABD & PELVIS W/CONTRAST: CPT | Mod: MC

## 2024-04-13 PROCEDURE — 86900 BLOOD TYPING SEROLOGIC ABO: CPT

## 2024-04-13 PROCEDURE — 83735 ASSAY OF MAGNESIUM: CPT

## 2024-04-13 PROCEDURE — 87040 BLOOD CULTURE FOR BACTERIA: CPT

## 2024-04-13 PROCEDURE — 80048 BASIC METABOLIC PNL TOTAL CA: CPT

## 2024-04-13 PROCEDURE — 87493 C DIFF AMPLIFIED PROBE: CPT

## 2024-04-13 PROCEDURE — 81001 URINALYSIS AUTO W/SCOPE: CPT

## 2024-04-13 PROCEDURE — 84100 ASSAY OF PHOSPHORUS: CPT

## 2024-04-13 RX ORDER — CEFEPIME 1 G/1
2000 INJECTION, POWDER, FOR SOLUTION INTRAMUSCULAR; INTRAVENOUS ONCE
Refills: 0 | Status: COMPLETED | OUTPATIENT
Start: 2024-04-13 | End: 2024-04-13

## 2024-04-13 RX ORDER — VANCOMYCIN HCL 1 G
1000 VIAL (EA) INTRAVENOUS ONCE
Refills: 0 | Status: COMPLETED | OUTPATIENT
Start: 2024-04-13 | End: 2024-04-13

## 2024-04-13 RX ORDER — SODIUM CHLORIDE 9 MG/ML
1000 INJECTION, SOLUTION INTRAVENOUS ONCE
Refills: 0 | Status: COMPLETED | OUTPATIENT
Start: 2024-04-13 | End: 2024-04-13

## 2024-04-13 RX ADMIN — SODIUM CHLORIDE 1000 MILLILITER(S): 9 INJECTION, SOLUTION INTRAVENOUS at 20:39

## 2024-04-13 RX ADMIN — Medication 250 MILLIGRAM(S): at 22:45

## 2024-04-13 RX ADMIN — CEFEPIME 100 MILLIGRAM(S): 1 INJECTION, POWDER, FOR SOLUTION INTRAMUSCULAR; INTRAVENOUS at 21:00

## 2024-04-13 NOTE — ED PROVIDER NOTE - CARE PLAN
1 Principal Discharge DX:	Pancytopenia  Secondary Diagnosis:	Febrile neutropenia  Secondary Diagnosis:	LARY (acute kidney injury)  Secondary Diagnosis:	Diarrhea

## 2024-04-13 NOTE — ED PROVIDER NOTE - ATTENDING CONTRIBUTION TO CARE
68-year-old male to ED with weakness.  Patient having fevers at home and diffuse pains and not feeling well.  Is on chemo for for testicular cancer.  Tmax 101 at home and his history of persistent diarrhea and weakness.  In ED patient tachycardic low-grade fever.    exam as noted clear lungs bilaterally conjunctiva pink HEENT normal, regular rate and rhythm abdomen soft nontender and neuro nonfocal.

## 2024-04-13 NOTE — ED PROVIDER NOTE - OBJECTIVE STATEMENT
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 difficile in setting of pancytopenia, presents ED for evaluation of diarrhea today.  Patient reports few episodes of nonbloody, watery diarrhea 3–4 times today.  Feels different than his prior C. difficile.  + Fever with Tmax 101 at home.  He denies cough, congestion, ear pain, throat pain, CP, SOB, abdominal pain, N/V, urinary symptoms, extremity numbness/weakness/paresthesias/swelling.

## 2024-04-13 NOTE — ED ADULT TRIAGE NOTE - CHIEF COMPLAINT QUOTE
BIBEMS from home for general malaise, nausea, diarrhea, denies vomiting. fever at home. Hx of testicular ca finished chemo 1 week ago.

## 2024-04-13 NOTE — ED PROVIDER NOTE - PHYSICAL EXAMINATION
VITAL SIGNS: I have reviewed nursing notes and confirm.  CONSTITUTIONAL: Well-developed; well-nourished; in no acute distress.  SKIN: Skin exam is warm and dry, no acute rash. (+)pallor  HEAD: Normocephalic; atraumatic.  EYES: PERRL, EOM intact; conjunctiva and sclera clear. (+)conjunctival pallor  ENT: mmm  CARD: S1, S2 normal; no murmurs, gallops, or rubs. Regular rate and rhythm.  RESP: Normal respiratory effort, no tachypnea or distress. Lungs CTAB, no wheezes, rales or rhonchi.  ABD: soft, NT/ND.  EXT: Normal ROM. No clubbing, cyanosis or edema.  NEURO: Alert, oriented. Grossly unremarkable. No focal deficits.  PSYCH: Cooperative, appropriate.

## 2024-04-14 LAB
ALBUMIN SERPL ELPH-MCNC: 3.1 G/DL — LOW (ref 3.5–5.2)
ALP SERPL-CCNC: 54 U/L — SIGNIFICANT CHANGE UP (ref 30–115)
ALT FLD-CCNC: 9 U/L — SIGNIFICANT CHANGE UP (ref 0–41)
ANION GAP SERPL CALC-SCNC: 14 MMOL/L — SIGNIFICANT CHANGE UP (ref 7–14)
ANION GAP SERPL CALC-SCNC: 16 MMOL/L — HIGH (ref 7–14)
APPEARANCE UR: ABNORMAL
AST SERPL-CCNC: 8 U/L — SIGNIFICANT CHANGE UP (ref 0–41)
BACTERIA # UR AUTO: NEGATIVE /HPF — SIGNIFICANT CHANGE UP
BASOPHILS # BLD AUTO: 0 K/UL — SIGNIFICANT CHANGE UP (ref 0–0.2)
BASOPHILS NFR BLD AUTO: 0 % — SIGNIFICANT CHANGE UP (ref 0–1)
BILIRUB SERPL-MCNC: 0.5 MG/DL — SIGNIFICANT CHANGE UP (ref 0.2–1.2)
BILIRUB UR-MCNC: NEGATIVE — SIGNIFICANT CHANGE UP
BUN SERPL-MCNC: 41 MG/DL — HIGH (ref 10–20)
BUN SERPL-MCNC: 41 MG/DL — HIGH (ref 10–20)
CALCIUM SERPL-MCNC: 7 MG/DL — LOW (ref 8.4–10.5)
CALCIUM SERPL-MCNC: 7.5 MG/DL — LOW (ref 8.4–10.4)
CAST: 5 /LPF — HIGH (ref 0–4)
CHLORIDE SERPL-SCNC: 95 MMOL/L — LOW (ref 98–110)
CHLORIDE SERPL-SCNC: 96 MMOL/L — LOW (ref 98–110)
CO2 SERPL-SCNC: 20 MMOL/L — SIGNIFICANT CHANGE UP (ref 17–32)
CO2 SERPL-SCNC: 25 MMOL/L — SIGNIFICANT CHANGE UP (ref 17–32)
COLOR SPEC: YELLOW — SIGNIFICANT CHANGE UP
COMMENT - URINE: SIGNIFICANT CHANGE UP
CREAT SERPL-MCNC: 1.5 MG/DL — SIGNIFICANT CHANGE UP (ref 0.7–1.5)
CREAT SERPL-MCNC: 1.8 MG/DL — HIGH (ref 0.7–1.5)
DIFF PNL FLD: NEGATIVE — SIGNIFICANT CHANGE UP
E COLI DNA BLD POS QL NAA+NON-PROBE: SIGNIFICANT CHANGE UP
EGFR: 40 ML/MIN/1.73M2 — LOW
EGFR: 50 ML/MIN/1.73M2 — LOW
EOSINOPHIL # BLD AUTO: 0 K/UL — SIGNIFICANT CHANGE UP (ref 0–0.7)
EOSINOPHIL NFR BLD AUTO: 0 % — SIGNIFICANT CHANGE UP (ref 0–8)
GIANT PLATELETS BLD QL SMEAR: PRESENT — SIGNIFICANT CHANGE UP
GLUCOSE SERPL-MCNC: 112 MG/DL — HIGH (ref 70–99)
GLUCOSE SERPL-MCNC: 122 MG/DL — HIGH (ref 70–99)
GLUCOSE UR QL: NEGATIVE MG/DL — SIGNIFICANT CHANGE UP
GRAM STN FLD: ABNORMAL
HCT VFR BLD CALC: 15.2 % — LOW (ref 42–52)
HCT VFR BLD CALC: 19.4 % — LOW (ref 42–52)
HCT VFR BLD CALC: 20.5 % — LOW (ref 42–52)
HGB BLD-MCNC: 5.2 G/DL — CRITICAL LOW (ref 14–18)
HGB BLD-MCNC: 6.8 G/DL — CRITICAL LOW (ref 14–18)
HGB BLD-MCNC: 7.4 G/DL — LOW (ref 14–18)
KETONES UR-MCNC: NEGATIVE MG/DL — SIGNIFICANT CHANGE UP
LEUKOCYTE ESTERASE UR-ACNC: NEGATIVE — SIGNIFICANT CHANGE UP
LYMPHOCYTES # BLD AUTO: 0.18 K/UL — LOW (ref 1.2–3.4)
LYMPHOCYTES # BLD AUTO: 79.6 % — HIGH (ref 20.5–51.1)
MAGNESIUM SERPL-MCNC: 1 MG/DL — LOW (ref 1.8–2.4)
MAGNESIUM SERPL-MCNC: 2 MG/DL — SIGNIFICANT CHANGE UP (ref 1.8–2.4)
MAGNESIUM SERPL-MCNC: 2.5 MG/DL — HIGH (ref 1.8–2.4)
MANUAL SMEAR VERIFICATION: SIGNIFICANT CHANGE UP
MCHC RBC-ENTMCNC: 31.5 PG — HIGH (ref 27–31)
MCHC RBC-ENTMCNC: 31.8 PG — HIGH (ref 27–31)
MCHC RBC-ENTMCNC: 32.3 PG — HIGH (ref 27–31)
MCHC RBC-ENTMCNC: 34.2 G/DL — SIGNIFICANT CHANGE UP (ref 32–37)
MCHC RBC-ENTMCNC: 35.1 G/DL — SIGNIFICANT CHANGE UP (ref 32–37)
MCHC RBC-ENTMCNC: 36.1 G/DL — SIGNIFICANT CHANGE UP (ref 32–37)
MCV RBC AUTO: 87.2 FL — SIGNIFICANT CHANGE UP (ref 80–94)
MCV RBC AUTO: 90.7 FL — SIGNIFICANT CHANGE UP (ref 80–94)
MCV RBC AUTO: 94.4 FL — HIGH (ref 80–94)
METHOD TYPE: SIGNIFICANT CHANGE UP
MONOCYTES # BLD AUTO: 0.01 K/UL — LOW (ref 0.1–0.6)
MONOCYTES NFR BLD AUTO: 6.1 % — SIGNIFICANT CHANGE UP (ref 1.7–9.3)
NEUTROPHILS # BLD AUTO: 0.03 K/UL — LOW (ref 1.4–6.5)
NEUTROPHILS NFR BLD AUTO: 12.3 % — LOW (ref 42.2–75.2)
NITRITE UR-MCNC: NEGATIVE — SIGNIFICANT CHANGE UP
NRBC # BLD: 0 /100 WBCS — SIGNIFICANT CHANGE UP (ref 0–0)
NRBC # BLD: 0 /100 WBCS — SIGNIFICANT CHANGE UP (ref 0–0)
PH UR: 5 — SIGNIFICANT CHANGE UP (ref 5–8)
PHOSPHATE SERPL-MCNC: 3.9 MG/DL — SIGNIFICANT CHANGE UP (ref 2.1–4.9)
PLAT MORPH BLD: ABNORMAL
PLATELET # BLD AUTO: 20 K/UL — LOW (ref 130–400)
PLATELET # BLD AUTO: 24 K/UL — LOW (ref 130–400)
PLATELET # BLD AUTO: 26 K/UL — LOW (ref 130–400)
PMV BLD: 11.1 FL — HIGH (ref 7.4–10.4)
PMV BLD: 11.3 FL — HIGH (ref 7.4–10.4)
PMV BLD: 11.7 FL — HIGH (ref 7.4–10.4)
POIKILOCYTOSIS BLD QL AUTO: SLIGHT — SIGNIFICANT CHANGE UP
POTASSIUM SERPL-MCNC: 3.3 MMOL/L — LOW (ref 3.5–5)
POTASSIUM SERPL-MCNC: 3.8 MMOL/L — SIGNIFICANT CHANGE UP (ref 3.5–5)
POTASSIUM SERPL-SCNC: 3.3 MMOL/L — LOW (ref 3.5–5)
POTASSIUM SERPL-SCNC: 3.8 MMOL/L — SIGNIFICANT CHANGE UP (ref 3.5–5)
PROT SERPL-MCNC: 4.9 G/DL — LOW (ref 6–8)
PROT UR-MCNC: 30 MG/DL
RBC # BLD: 1.61 M/UL — LOW (ref 4.7–6.1)
RBC # BLD: 2.14 M/UL — LOW (ref 4.7–6.1)
RBC # BLD: 2.35 M/UL — LOW (ref 4.7–6.1)
RBC # FLD: 17.2 % — HIGH (ref 11.5–14.5)
RBC # FLD: 17.4 % — HIGH (ref 11.5–14.5)
RBC # FLD: 18.2 % — HIGH (ref 11.5–14.5)
RBC BLD AUTO: ABNORMAL
RBC CASTS # UR COMP ASSIST: 3 /HPF — SIGNIFICANT CHANGE UP (ref 0–4)
SMUDGE CELLS # BLD: PRESENT — SIGNIFICANT CHANGE UP
SODIUM SERPL-SCNC: 132 MMOL/L — LOW (ref 135–146)
SODIUM SERPL-SCNC: 134 MMOL/L — LOW (ref 135–146)
SP GR SPEC: 1.02 — SIGNIFICANT CHANGE UP (ref 1–1.03)
SPECIMEN SOURCE: SIGNIFICANT CHANGE UP
SQUAMOUS # UR AUTO: 3 /HPF — SIGNIFICANT CHANGE UP (ref 0–5)
UROBILINOGEN FLD QL: 0.2 MG/DL — SIGNIFICANT CHANGE UP (ref 0.2–1)
VARIANT LYMPHS # BLD: 2 % — SIGNIFICANT CHANGE UP (ref 0–5)
WBC # BLD: 0.23 K/UL — CRITICAL LOW (ref 4.8–10.8)
WBC # BLD: 0.3 K/UL — CRITICAL LOW (ref 4.8–10.8)
WBC # BLD: 0.33 K/UL — CRITICAL LOW (ref 4.8–10.8)
WBC # FLD AUTO: 0.23 K/UL — CRITICAL LOW (ref 4.8–10.8)
WBC # FLD AUTO: 0.3 K/UL — CRITICAL LOW (ref 4.8–10.8)
WBC # FLD AUTO: 0.33 K/UL — CRITICAL LOW (ref 4.8–10.8)
WBC UR QL: 1 /HPF — SIGNIFICANT CHANGE UP (ref 0–5)

## 2024-04-14 PROCEDURE — 99223 1ST HOSP IP/OBS HIGH 75: CPT

## 2024-04-14 PROCEDURE — 99291 CRITICAL CARE FIRST HOUR: CPT

## 2024-04-14 PROCEDURE — 93010 ELECTROCARDIOGRAM REPORT: CPT

## 2024-04-14 RX ORDER — ACETAMINOPHEN 500 MG
650 TABLET ORAL EVERY 6 HOURS
Refills: 0 | Status: DISCONTINUED | OUTPATIENT
Start: 2024-04-14 | End: 2024-04-19

## 2024-04-14 RX ORDER — VANCOMYCIN HCL 1 G
VIAL (EA) INTRAVENOUS
Refills: 0 | Status: DISCONTINUED | OUTPATIENT
Start: 2024-04-14 | End: 2024-04-14

## 2024-04-14 RX ORDER — LEVOTHYROXINE SODIUM 125 MCG
137 TABLET ORAL DAILY
Refills: 0 | Status: DISCONTINUED | OUTPATIENT
Start: 2024-04-14 | End: 2024-04-19

## 2024-04-14 RX ORDER — VANCOMYCIN HCL 1 G
1250 VIAL (EA) INTRAVENOUS EVERY 24 HOURS
Refills: 0 | Status: DISCONTINUED | OUTPATIENT
Start: 2024-04-15 | End: 2024-04-15

## 2024-04-14 RX ORDER — CEFEPIME 1 G/1
INJECTION, POWDER, FOR SOLUTION INTRAMUSCULAR; INTRAVENOUS
Refills: 0 | Status: DISCONTINUED | OUTPATIENT
Start: 2024-04-14 | End: 2024-04-15

## 2024-04-14 RX ORDER — FILGRASTIM 480MCG/1.6
480 VIAL (ML) INJECTION EVERY 24 HOURS
Refills: 0 | Status: DISCONTINUED | OUTPATIENT
Start: 2024-04-14 | End: 2024-04-18

## 2024-04-14 RX ORDER — POTASSIUM CHLORIDE 20 MEQ
40 PACKET (EA) ORAL ONCE
Refills: 0 | Status: COMPLETED | OUTPATIENT
Start: 2024-04-14 | End: 2024-04-14

## 2024-04-14 RX ORDER — VANCOMYCIN HCL 1 G
125 VIAL (EA) INTRAVENOUS EVERY 6 HOURS
Refills: 0 | Status: DISCONTINUED | OUTPATIENT
Start: 2024-04-14 | End: 2024-04-14

## 2024-04-14 RX ORDER — VANCOMYCIN HCL 1 G
1500 VIAL (EA) INTRAVENOUS ONCE
Refills: 0 | Status: COMPLETED | OUTPATIENT
Start: 2024-04-14 | End: 2024-04-14

## 2024-04-14 RX ORDER — SODIUM CHLORIDE 9 MG/ML
1000 INJECTION, SOLUTION INTRAVENOUS ONCE
Refills: 0 | Status: COMPLETED | OUTPATIENT
Start: 2024-04-14 | End: 2024-04-14

## 2024-04-14 RX ORDER — CEFEPIME 1 G/1
2000 INJECTION, POWDER, FOR SOLUTION INTRAMUSCULAR; INTRAVENOUS ONCE
Refills: 0 | Status: COMPLETED | OUTPATIENT
Start: 2024-04-14 | End: 2024-04-14

## 2024-04-14 RX ORDER — CEFEPIME 1 G/1
2000 INJECTION, POWDER, FOR SOLUTION INTRAMUSCULAR; INTRAVENOUS EVERY 12 HOURS
Refills: 0 | Status: DISCONTINUED | OUTPATIENT
Start: 2024-04-14 | End: 2024-04-15

## 2024-04-14 RX ORDER — SODIUM CHLORIDE 9 MG/ML
1000 INJECTION, SOLUTION INTRAVENOUS
Refills: 0 | Status: DISCONTINUED | OUTPATIENT
Start: 2024-04-14 | End: 2024-04-15

## 2024-04-14 RX ORDER — MAGNESIUM SULFATE 500 MG/ML
2 VIAL (ML) INJECTION
Refills: 0 | Status: COMPLETED | OUTPATIENT
Start: 2024-04-14 | End: 2024-04-14

## 2024-04-14 RX ORDER — POTASSIUM CHLORIDE 20 MEQ
40 PACKET (EA) ORAL ONCE
Refills: 0 | Status: DISCONTINUED | OUTPATIENT
Start: 2024-04-14 | End: 2024-04-14

## 2024-04-14 RX ADMIN — SODIUM CHLORIDE 1000 MILLILITER(S): 9 INJECTION, SOLUTION INTRAVENOUS at 08:48

## 2024-04-14 RX ADMIN — Medication 480 MICROGRAM(S): at 13:43

## 2024-04-14 RX ADMIN — Medication 25 GRAM(S): at 14:04

## 2024-04-14 RX ADMIN — Medication 125 MILLIGRAM(S): at 06:10

## 2024-04-14 RX ADMIN — CEFEPIME 100 MILLIGRAM(S): 1 INJECTION, POWDER, FOR SOLUTION INTRAMUSCULAR; INTRAVENOUS at 08:21

## 2024-04-14 RX ADMIN — Medication 25 GRAM(S): at 16:15

## 2024-04-14 RX ADMIN — Medication 137 MICROGRAM(S): at 06:10

## 2024-04-14 RX ADMIN — Medication 125 MILLIGRAM(S): at 12:09

## 2024-04-14 RX ADMIN — Medication 25 GRAM(S): at 08:13

## 2024-04-14 RX ADMIN — Medication 300 MILLIGRAM(S): at 10:15

## 2024-04-14 RX ADMIN — SODIUM CHLORIDE 125 MILLILITER(S): 9 INJECTION, SOLUTION INTRAVENOUS at 14:04

## 2024-04-14 RX ADMIN — Medication 40 MILLIEQUIVALENT(S): at 08:49

## 2024-04-14 RX ADMIN — Medication 650 MILLIGRAM(S): at 03:52

## 2024-04-14 RX ADMIN — Medication 25 GRAM(S): at 12:18

## 2024-04-14 RX ADMIN — Medication 25 GRAM(S): at 09:50

## 2024-04-14 RX ADMIN — CEFEPIME 100 MILLIGRAM(S): 1 INJECTION, POWDER, FOR SOLUTION INTRAMUSCULAR; INTRAVENOUS at 18:24

## 2024-04-14 NOTE — CHART NOTE - NSCHARTNOTEFT_GEN_A_CORE
Notified that patient appeared to be in VT on monitor with rate at 170, STAT EKG obtained which showed SINUS tach.    - Mg found to be 1.0 on AM labs  - Cr found to be 1.8 up from 0.8 in March  - Patient spontaneously returned to NSR with rate in 80s on exam    Interventions:  - Started cefepime and vancomycin for neutropenic sepsis likely due to C. diff infection (patient already on oral vanc) dose adjusted per pharmacy recs  - Start Mg STAT, will give 2g over 2 hours for 5 doses  - Give 1L LR bolus over 1hr, then 125 cc/hr  - Will obtain frequent bloodwork for Magnesium and CBC Notified that patient appeared to be in VT on monitor with rate at 170, STAT EKG obtained which showed SINUS tach.    - Mg found to be 1.0 on AM labs  - Cr found to be 1.8 up from 0.8 in March  - Patient spontaneously returned to NSR with rate in 80s on exam    Interventions:  - Started cefepime and vancomycin for neutropenic sepsis likely due to C. diff infection (patient already on oral vanc) dose adjusted per pharmacy recs  - Start Mg STAT, will give 2g over 2 hours for 5 doses  - Give 1L LR bolus over 1hr, then 125 cc/hr  - Will obtain frequent bloodwork for Magnesium and CBC  - Patient has 2u pRBC pending Notified that patient appeared to be in VT on monitor with rate at 170, STAT EKG obtained which showed SVT.    - Mg found to be 1.0 on AM labs  - Cr found to be 1.8 up from 0.8 in March  - Patient spontaneously returned to NSR with rate in 80s on exam    Interventions:  - Started cefepime and vancomycin for neutropenic sepsis likely due to C. diff infection (patient already on oral vanc) dose adjusted per pharmacy recs  - Start Mg STAT, will give 2g over 2 hours for 5 doses  - Give 1L LR bolus over 1hr, then 125 cc/hr  - Will obtain frequent bloodwork for Magnesium and CBC  - Patient has 2u pRBC pending

## 2024-04-14 NOTE — H&P ADULT - ASSESSMENT
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 difficile in setting of pancytopenia, presents ED for evaluation of diarrhea today.          # 68-year-old male with PMH HTN, hypothyroidism, melanoma status post resection, 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 difficile in setting of pancytopenia, presents ED for evaluation of diarrhea today.      Pancytopenia 2/2 chemo   Neutropenia with diarrhea X2 days,   -s/p fidaxomicin 14 day course (admission late march)  -transfuse platelets to keep above 10k unless bleeding and hgb above 7.0 -> s/p 2 pRBC and 1 platelet transfusion      Testicular cancer s/p chemo 4 cycles    -oncology following : - s/p Right chest port placed via right IJ  -s/p cycle 4of Cisplatin + Etoposide  followed by Neulasta onbody injection D5     HTN  -hold anti-hypertensive medications      Hypothyroid  -c/w home med    dvt ppx: scd for now iso thrombocytopenia     68-year-old male with PMH HTN, hypothyroidism, melanoma status post resection, 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 difficile in setting of pancytopenia, presents ED for evaluation of diarrhea today.      Pancytopenia 2/2 chemo   Neutropenia with diarrhea X2 days  -sirs criteria on admission  -s/p fidaxomicin 14 day course (admission late march)  -transfuse platelets to keep above 10k unless bleeding and hgb above 7.0 -> s/p 2 pRBC and 1 platelet transfusion  -f/u cdiff (+ march 6th but no test on this admission yet)        Testicular cancer s/p chemo 4 cycles    -s/p cycle 4of Cisplatin + Etoposide  followed by Neulasta onbody injection D5     HTN  -hold anti-hypertensive medications      Hypothyroid  -c/w home med    dvt ppx: scd for now iso thrombocytopenia     68-year-old male with PMH HTN, hypothyroidism, melanoma status post resection, 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 difficile in setting of pancytopenia, presents ED for evaluation of diarrhea today.      Pancytopenia 2/2 chemo   Neutropenia with diarrhea X2 days  -sirs criteria on admission  -s/p fidaxomicin 14 day course (admission late march)  -transfuse platelets to keep above 10k unless bleeding and hgb above 7.0 -> s/p 2 pRBC and 1 platelet transfusion  -f/u cdiff (+ march 6th but no test on this admission yet)  -f/u id consult      Testicular cancer s/p chemo 4 cycles    -s/p cycle 4of Cisplatin + Etoposide  followed by Neulasta onbody injection D5   -heme onc consult           #LARY   -creatinine 1.8 (baseline normal)  -IV hydration with LR@ 75cc/hr    HTN  -hold anti-hypertensive medications      Hypothyroid  -c/w home med    dvt ppx: scd for now iso thrombocytopenia     68-year-old male with PMH HTN, hypothyroidism, melanoma status post resection, 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 difficile in setting of pancytopenia, presents ED for evaluation of diarrhea today.      Pancytopenia 2/2 chemo   Neutropenia with diarrhea X2 days  -sirs criteria on admission  -s/p fidaxomicin 14 day course (admission late march), vancomycin 125 q6hrs id consult  -transfuse platelets to keep above 10k unless bleeding and hgb above 7.0 -> s/p 2 pRBC and 1 platelet transfusion  -f/u cdiff (+ march 6th but no test on this admission yet)  -f/u id consult      Testicular cancer s/p chemo 4 cycles    completed 4 cycles of Cisplatin + Etoposide as of 4.5.2024 followed by Neulasta   -heme onc consult           #LARY   -creatinine 1.8 (baseline normal)  -IV hydration with LR@ 75cc/hr    HTN  -hold anti-hypertensive medications      Hypothyroid  -c/w home med    dvt ppx: scd for now iso thrombocytopenia     68-year-old male with PMH HTN, hypothyroidism, melanoma status post resection, 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 difficile in setting of pancytopenia, presents ED for evaluation of diarrhea today.      Pancytopenia 2/2 chemo   Neutropenia with diarrhea X2 days  -sirs criteria on admission  -s/p fidaxomicin 14 day course (admission late march), vancomycin 125 q6hrs id consult  -transfuse platelets to keep above 10k unless bleeding and hgb above 7.0 -> s/p 2 pRBC and 1 platelet transfusion  -f/u cdiff (+ march 6th but no test on this admission yet)  -cdiff, gi/pcr stool ova and parasite  -f/u id consult      Testicular cancer s/p chemo 4 cycles    completed 4 cycles of Cisplatin + Etoposide as of 4.5.2024 followed by Neulasta   -heme onc consult (patient follows with Dr. Rankin)          #LARY   -creatinine 1.8 (baseline normal)   -IV hydration with LR@ 100cc/hr avoid overload  -trend    HTN  -hold anti-hypertensive medications      Hypothyroid  -c/w levothyroxine 137mcg    dvt ppx: scd for now iso thrombocytopenia

## 2024-04-14 NOTE — PATIENT PROFILE ADULT - NSPROIMPLANTSMEDDEV_GEN_A_NUR
patient has right upper chest wall port for chemotherapy, right prosthetic eye/Prosthetic device(s)/Vascular access device

## 2024-04-14 NOTE — CONSULT NOTE ADULT - SUBJECTIVE AND OBJECTIVE BOX
Patient is a 68y old  Male who presents with a chief complaint of diarrhea(14 Apr 2024 01:00)    68-year-old male with PMH HTN, hypothyroidism, melanoma status post resection, 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 difficile in setting of pancytopenia, presents ED for evaluation of diarrhea today.  Patient reports few episodes of nonbloody, watery diarrhea 3–4 times today.  Feels different than his prior C. difficile.  + Fever with Tmax 101 at home.  He denies cough, congestion, ear pain, throat pain, CP, SOB, abdominal pain, N/V, urinary symptoms, extremity numbness/weakness/paresthesias/swelling.   In the ED  bp 110/58 hr 112 rr 18 afebrile   SigLabs WBC 0.12 hemoglobin platelet 4. Na Cl 93 AG 17 BUN/SCr 37/1.8 calcium 7.2 last chemo 4/5      PAST MEDICAL & SURGICAL HISTORY:  HTN (hypertension)      Hypothyroidism, unspecified type      Obesity      Other secondary osteoarthritis of right hand      Testicular seminoma      History of eye removal  Right eye      H/O melanoma excision      S/P appendectomy  14 yrs old          SOCIAL HX:   Smoking  -    FAMILY HISTORY:  CAD (coronary artery disease) (Mother)    CAD (coronary artery disease)  Stent        REVIEW OF SYSTEMS see hpi      Allergic/Immunologic:	    Allergies    penicillin (Rash)  penicillin G benzathine (Rash)    Intolerances        acetaminophen     Tablet .. 650 milliGRAM(s) Oral every 6 hours PRN  lactated ringers. 1000 milliLiter(s) IV Continuous <Continuous>  levothyroxine 137 MICROGram(s) Oral daily  vancomycin    Solution 125 milliGRAM(s) Oral every 6 hours  : Home Meds:      PHYSICAL EXAM    ICU Vital Signs Last 24 Hrs  T(C): 38.1 (14 Apr 2024 03:48), Max: 38.5 (14 Apr 2024 01:00)  T(F): 100.5 (14 Apr 2024 03:48), Max: 101.3 (14 Apr 2024 01:00)  HR: 96 (14 Apr 2024 03:15) (96 - 112)  BP: 114/57 (14 Apr 2024 03:15) (104/57 - 131/63)  RR: 19 (14 Apr 2024 03:15) (18 - 20)  SpO2: 96% (14 Apr 2024 01:00) (95% - 98%)    O2 Parameters below as of 14 Apr 2024 03:15  Patient On (Oxygen Delivery Method): room air            General: ill looking, pale on RA  Lungs: DEC BS both bases  Cardiovascular: Regular  Abdomen: Soft, Positive BS  Extremities: No clubbing  Neurological: Non focal         LABS:                          5.5    0.12  )-----------( 4        ( 13 Apr 2024 21:20 )             15.2                                               04-14    134<L>  |  95<L>  |  41<H>  ----------------------------<  122<H>  3.3<L>   |  25  |  1.8<H>    Ca    7.0<L>      14 Apr 2024 04:06  Phos  3.9     04-14  Mg     1.0     04-14    TPro  4.9<L>  /  Alb  3.1<L>  /  TBili  0.5  /  DBili  x   /  AST  8   /  ALT  9   /  AlkPhos  54  04-14      PT/INR - ( 13 Apr 2024 21:20 )   PT: 16.90 sec;   INR: 1.48 ratio         PTT - ( 13 Apr 2024 21:20 )  PTT:28.8 sec                                       Urinalysis Basic - ( 14 Apr 2024 04:06 )    Color: x / Appearance: x / SG: x / pH: x  Gluc: 122 mg/dL / Ketone: x  / Bili: x / Urobili: x   Blood: x / Protein: x / Nitrite: x   Leuk Esterase: x / RBC: x / WBC x   Sq Epi: x / Non Sq Epi: x / Bacteria: x                                                  LIVER FUNCTIONS - ( 14 Apr 2024 04:06 )  Alb: 3.1 g/dL / Pro: 4.9 g/dL / ALK PHOS: 54 U/L / ALT: 9 U/L / AST: 8 U/L / GGT: x                                                                                               MEDICATIONS  (STANDING):  lactated ringers. 1000 milliLiter(s) (100 mL/Hr) IV Continuous <Continuous>  levothyroxine 137 MICROGram(s) Oral daily  vancomycin    Solution 125 milliGRAM(s) Oral every 6 hours    MEDICATIONS  (PRN):  acetaminophen     Tablet .. 650 milliGRAM(s) Oral every 6 hours PRN Temp greater or equal to 38C (100.4F)

## 2024-04-14 NOTE — CONSULT NOTE ADULT - ASSESSMENT
67 y/o M former smoker PMHx HTN, hypothyroidism, melanoma s/p resection, testicular seminoma currently undergoing chemotherapy, presents to the emergency department for diarrhea onset yesterday morning.      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  - MR Brain 1.12.2024 no evidence of intracranial metastasis or acute intracranial pathology, paranasal sinus mucosal disease  - s/p Right chest port placed via right IJ  - s/p cycle 4 of Cisplatin + Etoposide on 4/5/24 followed by Neulasta onbody injection (chemotherapy initiated on 1.15.2024)  - CT abdomen 3.5.24: Aortocaval and periaortic lymphadenopathy, markedly decreased in size comparison to January 9, 2024.    # History of Melanoma of skin, right shoulder, dx 10/2018  - follows with DERM for surveillance skin exams every 6-12 months      Recommendations:  - Pancytopenia secondary to chemotherapy. Neutropenic fevers, patient had received Neulasta on 4.5.24, given that he had similar presentation after cycle 3 and was given zarxio, can start zarxio 480 mcg daily until ANC >1000  - Transfuse Plt if < 10 K or if patient is bleeding. Transfuse Cross-matched Platelets as pt has sub-optimal response to transfusion. Okay to transfuse Regular platelets in interim (Transfuse slowly over 3 hrs)  - Transfuse to keep Hb > 7  - neutropenic precautions: NO RECTAL TEMP or MEDICATIONS  - Outpatient follow up with Dr. Atwood   69 y/o M former smoker PMHx HTN, hypothyroidism, melanoma s/p resection, testicular seminoma currently undergoing chemotherapy, presents to the emergency department for diarrhea onset yesterday morning.      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  - MR Brain 1.12.2024 no evidence of intracranial metastasis or acute intracranial pathology, paranasal sinus mucosal disease  - s/p Right chest port placed via right IJ  - s/p cycle 4 of Cisplatin + Etoposide on 4/5/24 followed by Neulasta onbody injection (chemotherapy initiated on 1.15.2024)  - CT abdomen 3.5.24: Aortocaval and periaortic lymphadenopathy, markedly decreased in size comparison to January 9, 2024.    # History of Melanoma of skin, right shoulder, dx 10/2018  - follows with DERM for surveillance skin exams every 6-12 months      Recommendations:  - Pancytopenia secondary to chemotherapy. Neutropenic fevers, patient had received Neulasta on 4.5.24, given that he had similar presentation after cycle 3 and was given zarxio, can start zarxio 480 mcg daily until ANC >1000  - Transfuse Plt if < 10 K or if patient is bleeding. Transfuse Cross-matched Platelets as pt has sub-optimal response to transfusion. Okay to transfuse Regular platelets in interim (Transfuse slowly over 3 hrs)  - Transfuse to keep Hb > 7  - neutropenic precautions: NO RECTAL TEMP or MEDICATIONS  - Follow up Bcx  - C/w Abx  - rule out C.diff  - Outpatient follow up with Dr. Atwood   69 y/o M former smoker PMHx HTN, hypothyroidism, melanoma s/p resection, testicular seminoma currently undergoing chemotherapy, presents to the emergency department for diarrhea onset yesterday morning.      # Neutropenic fever  # Pancytopenia secondary to chemotherapy  # Diarrhea r/o C. Diff infection  # H/o C. diff in 3.2024 after cycle 3 of chemotherapy  - Diarrhea seems to be improved already.  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  - MR Brain 1.12.2024 no evidence of intracranial metastasis or acute intracranial pathology, paranasal sinus mucosal disease  - s/p Right chest port placed via right IJ  - s/p cycle 4 of Cisplatin + Etoposide on 4/5/24 followed by Neulasta Onbody injection (chemotherapy initiated on 1.15.2024)  - CT abdomen 3.5.24: Aortocaval and periaortic lymphadenopathy, markedly decreased in size comparison to January 9, 2024.    # History of Melanoma of skin, right shoulder, dx 10/2018  - follows with DERM for surveillance skin exams every 6-12 months      Recommendations:  - Pancytopenia secondary to chemotherapy, neutropenic fevers. Patient had received Neulasta on 4.5.24, given that he had similar presentation after cycle 3 and was given zarxio. Start zarxio 480 mcg daily until ANC >1000  - Transfuse Plt if < 10 K or if patient is bleeding. Transfuse cross-matched Platelets as pt has sub-optimal response to transfusion. Okay to transfuse regular platelets in interim (Transfuse slowly over 3 hrs)  - Transfuse to keep Hb > 7  - Neutropenic precautions: NO RECTAL TEMP or MEDICATIONS  - Follow up Bcx  - C/w Abx  - Rule out C.diff  - Outpatient follow up with Dr. Atwood

## 2024-04-14 NOTE — CONSULT NOTE ADULT - SUBJECTIVE AND OBJECTIVE BOX
MALIA GAUTHIER  68y, Male  Allergy: penicillin (Rash)  penicillin G benzathine (Rash)      CHIEF COMPLAINT:   colitis (14 Apr 2024 11:23)      LOS  1d    HPI  HPI:   68-year-old male with PMH HTN, hypothyroidism, melanoma status post resection, 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 difficile in setting of pancytopenia, presents ED for evaluation of diarrhea today.  Patient reports few episodes of nonbloody, watery diarrhea 3–4 times today.  Feels different than his prior C. difficile.  + Fever with Tmax 101 at home.  He denies cough, congestion, ear pain, throat pain, CP, SOB, abdominal pain, N/V, urinary symptoms, extremity numbness/weakness/paresthesias/swelling.   In the ED  bp 110/58 hr 112 rr 18 afebrile   SigLabs WBC 0.12 hemoglobin platelet 4. Na Cl 93 AG 17 BUN/SCr 37/1.8 calcium 7.2    (14 Apr 2024 01:00)      INFECTIOUS DISEASE HISTORY:  ID consulted for neutropenic fever  ANC 30  Pt reports acute onset fever, chills and loose stools    Recent hospital course with +Cdiff d/c to complete fidaxomicin x 14 days  Norovirus was indeterminate      Currently ordered for:  cefepime   IVPB      cefepime   IVPB 2000 milliGRAM(s) IV Intermittent every 12 hours  vancomycin    Solution 125 milliGRAM(s) Oral every 6 hours  vancomycin  IVPB          PMH  PAST MEDICAL & SURGICAL HISTORY:  HTN (hypertension)      Hypothyroidism, unspecified type      Obesity      Other secondary osteoarthritis of right hand      Testicular seminoma      History of eye removal  Right eye      H/O melanoma excision      S/P appendectomy  14 yrs old          FAMILY HISTORY  CAD (coronary artery disease) (Mother)    CAD (coronary artery disease)        SOCIAL HISTORY  Social History:  former smoker (11 Feb 2024 09:25)        ROS  General: Denies rigors, nightsweats  HEENT: Denies headache, rhinorrhea, sore throat, eye pain  CV: Denies CP, palpitations  PULM: Denies wheezing, hemoptysis  GI: as noted above   : Denies discharge, hematuria  MSK: Denies arthralgias, myalgias  SKIN: Denies rash, lesions  NEURO: Denies paresthesias, weakness  PSYCH: Denies depression, anxiety     VITALS:  T(F): 97.8, Max: 101.3 (04-14-24 @ 01:00)  HR: 84  BP: 115/66  RR: 18Vital Signs Last 24 Hrs  T(C): 36.6 (14 Apr 2024 11:33), Max: 38.5 (14 Apr 2024 01:00)  T(F): 97.8 (14 Apr 2024 11:33), Max: 101.3 (14 Apr 2024 01:00)  HR: 84 (14 Apr 2024 08:18) (82 - 112)  BP: 115/66 (14 Apr 2024 11:33) (103/57 - 131/63)  BP(mean): --  RR: 18 (14 Apr 2024 11:33) (18 - 20)  SpO2: 98% (14 Apr 2024 11:33) (95% - 98%)    Parameters below as of 14 Apr 2024 11:33  Patient On (Oxygen Delivery Method): room air        PHYSICAL EXAM:  Gen: NAD, resting in bed  HEENT: Normocephalic, atraumatic R eye patch   Neck: supple, no lymphadenopathy, no exudate   CV: Regular rate & regular rhythm  Lungs: decreased BS at bases, no fremitus  Abdomen: Soft, BS present nontender  Ext: Warm, well perfused  Neuro: non focal, awake  Skin: no rash, no erythema  Lines: no phlebitis     TESTS & MEASUREMENTS:                        6.8    0.30  )-----------( 24       ( 14 Apr 2024 11:26 )             19.4     04-14    134<L>  |  95<L>  |  41<H>  ----------------------------<  122<H>  3.3<L>   |  25  |  1.8<H>    Ca    7.0<L>      14 Apr 2024 04:06  Phos  3.9     04-14  Mg     2.0     04-14    TPro  4.9<L>  /  Alb  3.1<L>  /  TBili  0.5  /  DBili  x   /  AST  8   /  ALT  9   /  AlkPhos  54  04-14      LIVER FUNCTIONS - ( 14 Apr 2024 04:06 )  Alb: 3.1 g/dL / Pro: 4.9 g/dL / ALK PHOS: 54 U/L / ALT: 9 U/L / AST: 8 U/L / GGT: x           Urinalysis Basic - ( 14 Apr 2024 04:06 )    Color: x / Appearance: x / SG: x / pH: x  Gluc: 122 mg/dL / Ketone: x  / Bili: x / Urobili: x   Blood: x / Protein: x / Nitrite: x   Leuk Esterase: x / RBC: x / WBC x   Sq Epi: x / Non Sq Epi: x / Bacteria: x        Culture - Blood (collected 03-10-24 @ 08:36)  Source: .Blood None  Final Report (03-15-24 @ 20:00):    No growth at 5 days    Culture - Blood (collected 03-09-24 @ 14:48)  Source: .Blood Blood  Final Report (03-15-24 @ 01:00):    No growth at 5 days    Culture - Blood (collected 03-05-24 @ 14:05)  Source: .Blood Blood-Peripheral  Final Report (03-10-24 @ 23:00):    No growth at 5 days    Culture - Blood (collected 03-05-24 @ 14:05)  Source: .Blood Blood-Peripheral  Final Report (03-10-24 @ 23:00):    No growth at 5 days    Culture - Other (collected 02-13-24 @ 12:03)  Source: Wound None  Final Report (02-16-24 @ 15:43):    Numerous Methicillin Resistant Staphylococcus aureus  Organism: Methicillin resistant Staphylococcus aureus (02-16-24 @ 15:43)  Organism: Methicillin resistant Staphylococcus aureus (02-16-24 @ 15:43)      -  Clindamycin: S <=0.25      -  Oxacillin: R >2      -  Gentamicin: S <=1 Should not be used as monotherapy      -  Daptomycin: S 0.5      -  Linezolid: S 2      -  Cefazolin: R <=4      -  Vancomycin: S 2      -  Tetracycline: S <=1      Method Type: HANH      -  Ampicillin/Sulbactam: R <=8/4      -  Penicillin: R 2      -  Rifampin: S <=1 Should not be used as monotherapy      -  Erythromycin: S <=0.25      -  Trimethoprim/Sulfamethoxazole: S <=0.5/9.5    Culture - Other (collected 02-11-24 @ 16:00)  Source: Drainage Drainage  Final Report (02-17-24 @ 10:12):    Numerous Methicillin Resistant Staphylococcus aureus    Moderate Staphylococcus epidermidis "Susceptibilities not performed"  Organism: Methicillin resistant Staphylococcus aureus (02-17-24 @ 10:12)  Organism: Methicillin resistant Staphylococcus aureus (02-17-24 @ 10:12)      -  Clindamycin: S <=0.25      -  Oxacillin: R >2      -  Gentamicin: S <=1 Should not be used as monotherapy      -  Daptomycin: S 0.5      -  Linezolid: S 2      -  Cefazolin: R <=4      -  Vancomycin: S 2      -  Tetracycline: S <=1      Method Type: HANH      -  Ampicillin/Sulbactam: R <=8/4      -  Penicillin: R 1      -  Rifampin: S <=1 Should not be used as monotherapy      -  Erythromycin: S <=0.25      -  Trimethoprim/Sulfamethoxazole: S <=0.5/9.5    Culture - Blood (collected 02-11-24 @ 07:30)  Source: .Blood Blood  Final Report (02-16-24 @ 20:00):    No growth at 5 days    Culture - Blood (collected 02-11-24 @ 07:30)  Source: .Blood Blood  Final Report (02-16-24 @ 20:00):    No growth at 5 days    Culture - Urine (collected 11-24-23 @ 12:24)  Source: Clean Catch Clean Catch (Midstream)  Final Report (11-25-23 @ 19:27):    No growth        Lactate, Blood: 1.4 mmol/L (04-13-24 @ 21:20)      INFECTIOUS DISEASES TESTING  MRSA PCR Result.: Positive (02-11-24 @ 16:00)      INFLAMMATORY MARKERS      RADIOLOGY & ADDITIONAL TESTS:  I have personally reviewed the last Chest xray  CXR      CT      CARDIOLOGY TESTING  12 Lead ECG:   Ventricular Rate 167 BPM    Atrial Rate 167 BPM    P-R Interval 150 ms    QRS Duration 102 ms    Q-T Interval 228 ms    QTC Calculation(Bazett) 380 ms    P Axis -11 degrees    R Axis 28 degrees    T Axis 130 degrees    Diagnosis Line Supraventricular tachycardia  Nonspecific ST and T wave abnormality  Abnormal ECG  Confirmed by Ermias Price ([id number]) on 4/14/2024 11:42:09 AM (04-14-24 @ 08:06)      MEDICATIONS  cefepime   IVPB     cefepime   IVPB 2000 IV Intermittent every 12 hours  filgrastim-sndz (ZARXIO) Injectable 480 SubCutaneous every 24 hours  lactated ringers. 1000 IV Continuous <Continuous>  levothyroxine 137 Oral daily  magnesium sulfate  IVPB 2 IV Intermittent every 2 hours  vancomycin    Solution 125 Oral every 6 hours  vancomycin  IVPB           ANTIBIOTICS:  cefepime   IVPB      cefepime   IVPB 2000 milliGRAM(s) IV Intermittent every 12 hours  vancomycin    Solution 125 milliGRAM(s) Oral every 6 hours  vancomycin  IVPB          ALLERGIES:  penicillin (Rash)  penicillin G benzathine (Rash)

## 2024-04-14 NOTE — CHART NOTE - NSCHARTNOTEFT_GEN_A_CORE
Pt seen and examined. Pt evaluated by overnight nocturnist. Pt here with diarrhea and pancytopenia s/p chemotherapy 04/05. Currently being evaluated for neutropenic fevers. Pending C.diff r/o. Transfusing PRBC until hg >7. Plt to be transfused to keep >10k. Oncology recommendations noted, will start zarxio 480mcg qD. f/u BCx.

## 2024-04-14 NOTE — CONSULT NOTE ADULT - ATTENDING COMMENTS
Patient also seen and examined by myself. I agree with the Hem-Onc fellow's note above.  Situation discussed with her and the patient.  All questions answered.

## 2024-04-14 NOTE — ED ADULT NURSE REASSESSMENT NOTE - NS ED NURSE REASSESS COMMENT FT1
Received report from BREA iGll. Pt AO4, no acute distress, respirations even and unlabored on RA. Cardiac monitoring maintained. Pt currently receiving 1u PRBCs. Comfort and safety measures maintained

## 2024-04-14 NOTE — CONSULT NOTE ADULT - ASSESSMENT
IMPRESSION:    Sepsis POA  Diarrhea ro C diff ( sp recent admission)  LARY  Severe pancytopenia sp chemo 4/5  Testicular seminoma    PLAN:    CNS: Avoid CNS depressant    HEENT:  Oral care    PULMONARY:  HOB @ 45 degrees, on RA, aspiration precaution    CARDIOVASCULAR: IVF, LA    GI: GI prophylaxis                                          Feeding PO    RENAL:  F/u  lytes.  Correct as needed. accurate I/O, renal US    INFECTIOUS DISEASE: ABX, C DIFF , PANCX, procal    HEMATOLOGICAL:  DVT prophylaxis. tx plt/ PRBC/Neulasta, hemoc eval    ENDOCRINE:  Follow up FS.  Insulin protocol if needed.    SDU

## 2024-04-14 NOTE — H&P ADULT - NSHPPHYSICALEXAM_GEN_ALL_CORE
LOS: 1d    VITALS:   T(C): 38.1 (04-13-24 @ 23:17), Max: 38.1 (04-13-24 @ 23:17)  HR: 105 (04-13-24 @ 23:17) (105 - 112)  BP: 131/63 (04-13-24 @ 23:17) (110/58 - 131/63)  RR: 18 (04-13-24 @ 23:17) (18 - 18)  SpO2: 95% (04-13-24 @ 23:17) (95% - 98%)    GENERAL: NAD, lying in bed comfortably  HEAD:  Atraumatic, Normocephalic  EYES: EOMI, PERRLA, conjunctiva and sclera clear  ENT: Moist mucous membranes  NECK: Supple, No JVD  CHEST/LUNG: Clear to auscultation bilaterally; No rales, rhonchi, wheezing, or rubs. Unlabored respirations  HEART: Regular rate and rhythm; No murmurs, rubs, or gallops  ABDOMEN: BSx4; Soft, nontender, nondistended  EXTREMITIES:  2+ Peripheral Pulses, brisk capillary refill. No clubbing, cyanosis, or edema  NERVOUS SYSTEM:  A&Ox3, no focal deficits   SKIN: No rashes or lesions LOS: 1d    VITALS:   T(C): 38.1 (04-13-24 @ 23:17), Max: 38.1 (04-13-24 @ 23:17)  HR: 105 (04-13-24 @ 23:17) (105 - 112)  BP: 131/63 (04-13-24 @ 23:17) (110/58 - 131/63)  RR: 18 (04-13-24 @ 23:17) (18 - 18)  SpO2: 95% (04-13-24 @ 23:17) (95% - 98%)    GENERAL: NAD, lying in bed comfortably  HEAD:  Atraumatic, Normocephalic  EYES: EOMI, PERRLA, conjunctiva and sclera clear. R eye is covered with patch (R eye bllindness since childhood)  ENT: Moist mucous membranes  NECK: Supple, No JVD  CHEST/LUNG: Clear to auscultation bilaterally; No rales, rhonchi, wheezing, or rubs. Unlabored respirations. R chest port no fluctuance or erythma  HEART: Regular rate and rhythm; No murmurs, rubs, or gallops  ABDOMEN: BSx4; Soft, nontender, nondistended  EXTREMITIES:  2+ Peripheral Pulses, brisk capillary refill. No clubbing, cyanosis, or edema  NERVOUS SYSTEM:  A&Ox3, no focal deficits   SKIN: No rashes or lesions

## 2024-04-14 NOTE — H&P ADULT - ATTENDING COMMENTS
*In the event of discrepancy, my note supersedes the resident note.  Date seen: 4/14/24   Agree with HPI above. ROS negative except per HPI.   I reviewed and edited the physical exam above.   Pertinent labs and radiology reviewed and as above.    Assessment/Plan:  69yo M w/ pmhx of HTN, hypothyroidism, melanoma s/p resection, testicular seminoma on active chemo presents to ED for diarrhea. Recently discharged 3/20/24 for C. diff colitis, s/p fidaxomicin course. Admitted to SDU for severe pancytopenia and sepsis secondary to suspected C. diff infection.     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 diff infection  #febrile neutropenia   - flu/covid/RSV negative; CXR: no evidence of pneumonia   - f/u bcx; get u/a and ucx; get C. diff ab, GI PCR, stool culture, stool ova/parasites   - vancomycin 150mg q6hrs po   - ID consult   - admitted to SDU, f/u critical care     #severe pancytopenia   #testicular seminoma on active chemo  * last chemo infusion 4/5 after being discharged from [facility] admission   - WBC 0.12, platelet count 4, hgb 5.5   - transfuse 1u platelets and 2u prbc   - trend CBC q8hrs  - heme/onc consult     #LARY   - Cr 1.8 (baseline normal)   - monitor bmp, monitor I/O, get urine lytes  - LR 100cc/hr     #HTN  #hypothyroidism  - c/w levothyroxine 137 q24hrs; hold home anti-HTNsives     DVT ppx: hold  Dispo: from home; ID consult, heme/onc consult, f/u critical-care, f/u cultures, trend CBC, trend Cr

## 2024-04-14 NOTE — H&P ADULT - NSHPLABSRESULTS_GEN_ALL_CORE
.  LABS:                         5.5    0.12  )-----------( 4        ( 13 Apr 2024 21:20 )             15.2     04-13    132<L>  |  93<L>  |  37<H>  ----------------------------<  136<H>  3.7   |  22  |  1.8<H>    Ca    7.2<L>      13 Apr 2024 21:20    TPro  5.5<L>  /  Alb  3.2<L>  /  TBili  0.7  /  DBili  x   /  AST  16  /  ALT  12  /  AlkPhos  60  04-13    PT/INR - ( 13 Apr 2024 21:20 )   PT: 16.90 sec;   INR: 1.48 ratio         PTT - ( 13 Apr 2024 21:20 )  PTT:28.8 sec  Urinalysis Basic - ( 13 Apr 2024 21:20 )    Color: x / Appearance: x / SG: x / pH: x  Gluc: 136 mg/dL / Ketone: x  / Bili: x / Urobili: x   Blood: x / Protein: x / Nitrite: x   Leuk Esterase: x / RBC: x / WBC x   Sq Epi: x / Non Sq Epi: x / Bacteria: x        Lactate, Blood: 1.4 mmol/L (04-13 @ 21:20)      RADIOLOGY, EKG & ADDITIONAL TESTS: Reviewed.

## 2024-04-14 NOTE — CONSULT NOTE ADULT - SUBJECTIVE AND OBJECTIVE BOX
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 difficile in setting of pancytopenia, presents ED for evaluation of diarrhea today.  Patient reports few episodes of nonbloody, watery diarrhea 3–4 times today.  Feels different than his prior C. difficile.  + Fever with Tmax 101 at home.  He denies cough, congestion, ear pain, throat pain, CP, SOB, abdominal pain, N/V, urinary symptoms, extremity numbness/weakness/paresthesias/swelling.   In the ED  bp 110/58 hr 112 rr 18 afebrile   SigLabs WBC 0.12 hemoglobin platelet 4. Na Cl 93 AG 17 BUN/SCr 37/1.8 calcium 7.2    (14 Apr 2024 01:00)      Allergies    penicillin (Rash)  penicillin G benzathine (Rash)    Intolerances        MEDICATIONS  (STANDING):  cefepime   IVPB      cefepime   IVPB 2000 milliGRAM(s) IV Intermittent every 12 hours  lactated ringers. 1000 milliLiter(s) (125 mL/Hr) IV Continuous <Continuous>  levothyroxine 137 MICROGram(s) Oral daily  magnesium sulfate  IVPB 2 Gram(s) IV Intermittent every 2 hours  vancomycin    Solution 125 milliGRAM(s) Oral every 6 hours  vancomycin  IVPB        MEDICATIONS  (PRN):  acetaminophen     Tablet .. 650 milliGRAM(s) Oral every 6 hours PRN Temp greater or equal to 38C (100.4F)      PAST MEDICAL & SURGICAL HISTORY:  HTN (hypertension)      Hypothyroidism, unspecified type      Obesity      Other secondary osteoarthritis of right hand      Testicular seminoma      History of eye removal  Right eye      H/O melanoma excision      S/P appendectomy  14 yrs old          FAMILY HISTORY:  CAD (coronary artery disease) (Mother)    CAD (coronary artery disease)  Stent        SOCIAL HISTORY: No EtOH, no tobacco    REVIEW OF SYSTEMS:    CONSTITUTIONAL: Fevers, chills   RESPIRATORY: No cough, no shortness of breath  CARDIOVASCULAR: No chest pain   GASTROINTESTINAL: No abdominal pain. No diarrhea for the past 24 hrs. No vomiting  GENITOURINARY: No dysuria, frequency or hematuria  NEUROLOGICAL: No numbness or weakness  All other review of systems is negative unless indicated above.    Height (cm): 182.9 (04-13 @ 18:33)  Weight (kg): 111.1 (04-13 @ 18:33)  BMI (kg/m2): 33.2 (04-13 @ 18:33)  BSA (m2): 2.32 (04-13 @ 18:33)    T(F): 98.1 (04-14-24 @ 10:33), Max: 101.3 (04-14-24 @ 01:00)  HR: 84 (04-14-24 @ 08:18)  BP: 115/65 (04-14-24 @ 10:33)  RR: 18 (04-14-24 @ 10:33)  SpO2: 98% (04-14-24 @ 10:33)  Wt(kg): --    GENERAL: NAD, well-developed  CHEST/LUNG: Clear to auscultation bilaterally;  HEART: Regular rate and rhythm;  ABDOMEN: Soft, Nontender, Nondistended;   EXTREMITIES:  No edema  NEUROLOGY: non-focal                            5.2    0.23  )-----------( 26       ( 14 Apr 2024 04:06 )             15.2       04-14    134<L>  |  95<L>  |  41<H>  ----------------------------<  122<H>  3.3<L>   |  25  |  1.8<H>    Ca    7.0<L>      14 Apr 2024 04:06  Phos  3.9     04-14  Mg     1.0     04-14    TPro  4.9<L>  /  Alb  3.1<L>  /  TBili  0.5  /  DBili  x   /  AST  8   /  ALT  9   /  AlkPhos  54  04-14      Phosphorus: 3.9 mg/dL (04-14 @ 04:06)  Magnesium: 1.0 mg/dL (04-14 @ 04:06)       Oncology Consult Note    HPI:   68-year-old male with PMH HTN, hypothyroidism, melanoma status post resection, 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 difficile in setting of pancytopenia, presents to the ED for evaluation of diarrhea today.  Patient reports few episodes of nonbloody, watery diarrhea 3–4 times today.  Feels different than his prior C. difficile.  + Fever with Tmax 101 at home.  He denies cough, congestion, ear pain, throat pain, CP, SOB, abdominal pain, N/V, urinary symptoms, extremity numbness/weakness/paresthesias/swelling.   In the ED  bp 110/58 hr 112 rr 18 afebrile   Sig: Labs WBC 0.12 hemoglobin platelet 4. Na Cl 93 AG 17 BUN/SCr 37/1.8 calcium 7.2    (14 Apr 2024 01:00)      Allergies    penicillin (Rash)  penicillin G benzathine (Rash)    Intolerances        MEDICATIONS  (STANDING):  cefepime   IVPB      cefepime   IVPB 2000 milliGRAM(s) IV Intermittent every 12 hours  lactated ringers. 1000 milliLiter(s) (125 mL/Hr) IV Continuous <Continuous>  levothyroxine 137 MICROGram(s) Oral daily  magnesium sulfate  IVPB 2 Gram(s) IV Intermittent every 2 hours  vancomycin    Solution 125 milliGRAM(s) Oral every 6 hours  vancomycin  IVPB        MEDICATIONS  (PRN):  acetaminophen     Tablet .. 650 milliGRAM(s) Oral every 6 hours PRN Temp greater or equal to 38C (100.4F)      PAST MEDICAL & SURGICAL HISTORY:  HTN (hypertension)      Hypothyroidism, unspecified type      Obesity      Other secondary osteoarthritis of right hand      Testicular seminoma      History of eye removal  Right eye      H/O melanoma excision      S/P appendectomy  14 yrs old      FAMILY HISTORY:  CAD (coronary artery disease) (Mother)    CAD (coronary artery disease)  Stent        SOCIAL HISTORY: No EtOH, no tobacco    REVIEW OF SYSTEMS:    CONSTITUTIONAL: Fevers, chills   RESPIRATORY: No cough, no shortness of breath  CARDIOVASCULAR: No chest pain   GASTROINTESTINAL: No abdominal pain. No diarrhea for the past 24 hrs. No vomiting  GENITOURINARY: No dysuria, frequency or hematuria  NEUROLOGICAL: No numbness or weakness  All other review of systems is negative unless indicated above.    Height (cm): 182.9 (04-13 @ 18:33)  Weight (kg): 111.1 (04-13 @ 18:33)  BMI (kg/m2): 33.2 (04-13 @ 18:33)  BSA (m2): 2.32 (04-13 @ 18:33)    T(F): 98.1 (04-14-24 @ 10:33), Max: 101.3 (04-14-24 @ 01:00)  HR: 84 (04-14-24 @ 08:18)  BP: 115/65 (04-14-24 @ 10:33)  RR: 18 (04-14-24 @ 10:33)  SpO2: 98% (04-14-24 @ 10:33)  Wt(kg): --    GENERAL: NAD, well-developed  CHEST/LUNG: Clear to auscultation bilaterally;  HEART: Regular rate and rhythm;  ABDOMEN: Soft, Nontender, Nondistended;   EXTREMITIES:  No edema  NEUROLOGY: non-focal                            5.2    0.23  )-----------( 26       ( 14 Apr 2024 04:06 )             15.2       04-14    134<L>  |  95<L>  |  41<H>  ----------------------------<  122<H>  3.3<L>   |  25  |  1.8<H>    Ca    7.0<L>      14 Apr 2024 04:06  Phos  3.9     04-14  Mg     1.0     04-14    TPro  4.9<L>  /  Alb  3.1<L>  /  TBili  0.5  /  DBili  x   /  AST  8   /  ALT  9   /  AlkPhos  54  04-14      Phosphorus: 3.9 mg/dL (04-14 @ 04:06)  Magnesium: 1.0 mg/dL (04-14 @ 04:06)

## 2024-04-14 NOTE — H&P ADULT - HISTORY OF PRESENT ILLNESS
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 difficile in setting of pancytopenia, presents ED for evaluation of diarrhea today.  Patient reports few episodes of nonbloody, watery diarrhea 3–4 times today.  Feels different than his prior C. difficile.  + Fever with Tmax 101 at home.  He denies cough, congestion, ear pain, throat pain, CP, SOB, abdominal pain, N/V, urinary symptoms, extremity numbness/weakness/paresthesias/swelling.   In the ED  bp 110/58 hr 112 rr 18 afebrile   SigLabs WBC 0.12 hemoglobin platelet 4. Na Cl 93 AG 17 BUN/SCr 37/1.8 calcium 7.2

## 2024-04-14 NOTE — CONSULT NOTE ADULT - ASSESSMENT
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 difficile in setting of pancytopenia, presents ED for evaluation of diarrhea     IMPRESSION  #Sepsis on admission T>101 P>90 WBC <4 ANC 30 , severe neutropenia  Has a port  CXR no PNA on my read  #Hx Recent CDI  Norovirus indeterminate  s/p 14 days fidaxomicin  #Recent right fore arm abscess s/p I and D 3/14 - MRSA  #Melanoma  #Testicular seminoma on chemo   #Obesity BMI (kg/m2): 33.2  #Abx allergy: penicillin (Rash)  penicillin G benzathine (Rash)    Creatinine: 1.8 (04-14-24 @ 04:06)    Height (cm): 182.9 (04-13-24 @ 18:33)  Weight (kg): 111.1 (04-13-24 @ 18:33)    RECOMMENDATIONS  - f/u BCX  - Cdiff & GI PCR  - Vanc dosing AUC/HANH per clinical pharmacist , D/C if BCX NG  - Cefepime 2g q12h IV   - Hold off Cdiff treatment pending testing  - Heme/Onc      If any questions, please send a message or call on [employer] Teams  Please continue to update ID with any pertinent new laboratory, radiographic findings, or change in clinical status

## 2024-04-15 ENCOUNTER — APPOINTMENT (OUTPATIENT)
Age: 69
End: 2024-04-15

## 2024-04-15 LAB
ALBUMIN SERPL ELPH-MCNC: 3.2 G/DL — LOW (ref 3.5–5.2)
ALP SERPL-CCNC: 65 U/L — SIGNIFICANT CHANGE UP (ref 30–115)
ALT FLD-CCNC: 11 U/L — SIGNIFICANT CHANGE UP (ref 0–41)
ANION GAP SERPL CALC-SCNC: 15 MMOL/L — HIGH (ref 7–14)
AST SERPL-CCNC: 9 U/L — SIGNIFICANT CHANGE UP (ref 0–41)
BASOPHILS # BLD AUTO: 0 K/UL — SIGNIFICANT CHANGE UP (ref 0–0.2)
BASOPHILS NFR BLD AUTO: 0 % — SIGNIFICANT CHANGE UP (ref 0–1)
BILIRUB SERPL-MCNC: 0.8 MG/DL — SIGNIFICANT CHANGE UP (ref 0.2–1.2)
BUN SERPL-MCNC: 41 MG/DL — HIGH (ref 10–20)
CALCIUM SERPL-MCNC: 8 MG/DL — LOW (ref 8.4–10.5)
CHLORIDE SERPL-SCNC: 95 MMOL/L — LOW (ref 98–110)
CO2 SERPL-SCNC: 22 MMOL/L — SIGNIFICANT CHANGE UP (ref 17–32)
CREAT SERPL-MCNC: 1.2 MG/DL — SIGNIFICANT CHANGE UP (ref 0.7–1.5)
EGFR: 65 ML/MIN/1.73M2 — SIGNIFICANT CHANGE UP
EOSINOPHIL # BLD AUTO: 0 K/UL — SIGNIFICANT CHANGE UP (ref 0–0.7)
EOSINOPHIL NFR BLD AUTO: 0 % — SIGNIFICANT CHANGE UP (ref 0–8)
GLUCOSE SERPL-MCNC: 104 MG/DL — HIGH (ref 70–99)
HCT VFR BLD CALC: 22.5 % — LOW (ref 42–52)
HCT VFR BLD CALC: 23.1 % — LOW (ref 42–52)
HGB BLD-MCNC: 7.6 G/DL — LOW (ref 14–18)
HGB BLD-MCNC: 8.1 G/DL — LOW (ref 14–18)
IMM GRANULOCYTES NFR BLD AUTO: 0 % — LOW (ref 0.1–0.3)
LYMPHOCYTES # BLD AUTO: 0.31 K/UL — LOW (ref 1.2–3.4)
LYMPHOCYTES # BLD AUTO: 56.4 % — HIGH (ref 20.5–51.1)
MAGNESIUM SERPL-MCNC: 2.2 MG/DL — SIGNIFICANT CHANGE UP (ref 1.8–2.4)
MCHC RBC-ENTMCNC: 30.8 PG — SIGNIFICANT CHANGE UP (ref 27–31)
MCHC RBC-ENTMCNC: 31.4 PG — HIGH (ref 27–31)
MCHC RBC-ENTMCNC: 33.8 G/DL — SIGNIFICANT CHANGE UP (ref 32–37)
MCHC RBC-ENTMCNC: 35.1 G/DL — SIGNIFICANT CHANGE UP (ref 32–37)
MCV RBC AUTO: 89.5 FL — SIGNIFICANT CHANGE UP (ref 80–94)
MCV RBC AUTO: 91.1 FL — SIGNIFICANT CHANGE UP (ref 80–94)
MONOCYTES # BLD AUTO: 0.06 K/UL — LOW (ref 0.1–0.6)
MONOCYTES NFR BLD AUTO: 10.9 % — HIGH (ref 1.7–9.3)
NEUTROPHILS # BLD AUTO: 0.18 K/UL — LOW (ref 1.4–6.5)
NEUTROPHILS NFR BLD AUTO: 32.7 % — LOW (ref 42.2–75.2)
NRBC # BLD: 0 /100 WBCS — SIGNIFICANT CHANGE UP (ref 0–0)
NRBC # BLD: 0 /100 WBCS — SIGNIFICANT CHANGE UP (ref 0–0)
PHOSPHATE SERPL-MCNC: 2.2 MG/DL — SIGNIFICANT CHANGE UP (ref 2.1–4.9)
PLATELET # BLD AUTO: 15 K/UL — CRITICAL LOW (ref 130–400)
PLATELET # BLD AUTO: 18 K/UL — CRITICAL LOW (ref 130–400)
PMV BLD: 11 FL — HIGH (ref 7.4–10.4)
PMV BLD: 12.4 FL — HIGH (ref 7.4–10.4)
POTASSIUM SERPL-MCNC: 3.4 MMOL/L — LOW (ref 3.5–5)
POTASSIUM SERPL-SCNC: 3.4 MMOL/L — LOW (ref 3.5–5)
PROT SERPL-MCNC: 5.2 G/DL — LOW (ref 6–8)
RBC # BLD: 2.47 M/UL — LOW (ref 4.7–6.1)
RBC # BLD: 2.58 M/UL — LOW (ref 4.7–6.1)
RBC # FLD: 17.4 % — HIGH (ref 11.5–14.5)
RBC # FLD: 17.9 % — HIGH (ref 11.5–14.5)
SODIUM SERPL-SCNC: 132 MMOL/L — LOW (ref 135–146)
VANCOMYCIN FLD-MCNC: 11.1 UG/ML — HIGH (ref 5–10)
WBC # BLD: 0.55 K/UL — CRITICAL LOW (ref 4.8–10.8)
WBC # BLD: 0.73 K/UL — CRITICAL LOW (ref 4.8–10.8)
WBC # FLD AUTO: 0.55 K/UL — CRITICAL LOW (ref 4.8–10.8)
WBC # FLD AUTO: 0.73 K/UL — CRITICAL LOW (ref 4.8–10.8)

## 2024-04-15 PROCEDURE — 99233 SBSQ HOSP IP/OBS HIGH 50: CPT

## 2024-04-15 PROCEDURE — 11043 DBRDMT MUSC&/FSCA 1ST 20/<: CPT

## 2024-04-15 PROCEDURE — 74177 CT ABD & PELVIS W/CONTRAST: CPT | Mod: 26

## 2024-04-15 RX ORDER — SODIUM CHLORIDE 9 MG/ML
1000 INJECTION, SOLUTION INTRAVENOUS
Refills: 0 | Status: DISCONTINUED | OUTPATIENT
Start: 2024-04-15 | End: 2024-04-15

## 2024-04-15 RX ORDER — METRONIDAZOLE 500 MG
500 TABLET ORAL EVERY 8 HOURS
Refills: 0 | Status: DISCONTINUED | OUTPATIENT
Start: 2024-04-15 | End: 2024-04-19

## 2024-04-15 RX ORDER — METOPROLOL TARTRATE 50 MG
12.5 TABLET ORAL
Refills: 0 | Status: DISCONTINUED | OUTPATIENT
Start: 2024-04-15 | End: 2024-04-19

## 2024-04-15 RX ORDER — CEFTRIAXONE 500 MG/1
2000 INJECTION, POWDER, FOR SOLUTION INTRAMUSCULAR; INTRAVENOUS EVERY 24 HOURS
Refills: 0 | Status: DISCONTINUED | OUTPATIENT
Start: 2024-04-15 | End: 2024-04-19

## 2024-04-15 RX ORDER — IOHEXOL 300 MG/ML
30 INJECTION, SOLUTION INTRAVENOUS ONCE
Refills: 0 | Status: COMPLETED | OUTPATIENT
Start: 2024-04-15 | End: 2024-04-15

## 2024-04-15 RX ORDER — POTASSIUM CHLORIDE 20 MEQ
40 PACKET (EA) ORAL ONCE
Refills: 0 | Status: COMPLETED | OUTPATIENT
Start: 2024-04-15 | End: 2024-04-15

## 2024-04-15 RX ORDER — SODIUM CHLORIDE 9 MG/ML
1000 INJECTION, SOLUTION INTRAVENOUS
Refills: 0 | Status: DISCONTINUED | OUTPATIENT
Start: 2024-04-15 | End: 2024-04-18

## 2024-04-15 RX ADMIN — SODIUM CHLORIDE 75 MILLILITER(S): 9 INJECTION, SOLUTION INTRAVENOUS at 11:20

## 2024-04-15 RX ADMIN — SODIUM CHLORIDE 125 MILLILITER(S): 9 INJECTION, SOLUTION INTRAVENOUS at 09:21

## 2024-04-15 RX ADMIN — Medication 40 MILLIEQUIVALENT(S): at 10:51

## 2024-04-15 RX ADMIN — CEFTRIAXONE 100 MILLIGRAM(S): 500 INJECTION, POWDER, FOR SOLUTION INTRAMUSCULAR; INTRAVENOUS at 14:05

## 2024-04-15 RX ADMIN — Medication 12.5 MILLIGRAM(S): at 17:22

## 2024-04-15 RX ADMIN — IOHEXOL 30 MILLILITER(S): 300 INJECTION, SOLUTION INTRAVENOUS at 10:50

## 2024-04-15 RX ADMIN — Medication 137 MICROGRAM(S): at 06:47

## 2024-04-15 RX ADMIN — Medication 500 MILLIGRAM(S): at 14:05

## 2024-04-15 RX ADMIN — Medication 480 MICROGRAM(S): at 11:23

## 2024-04-15 RX ADMIN — SODIUM CHLORIDE 100 MILLILITER(S): 9 INJECTION, SOLUTION INTRAVENOUS at 17:22

## 2024-04-15 RX ADMIN — CEFEPIME 100 MILLIGRAM(S): 1 INJECTION, POWDER, FOR SOLUTION INTRAMUSCULAR; INTRAVENOUS at 05:39

## 2024-04-15 NOTE — CONSULT NOTE ADULT - ASSESSMENT
ASSESSMENT:  Stage  pressure ulcer   Infected    RECOMMENDATION:  Wound care -  Surgical debridement   IV abx as indicated/ per ID  Offloading/ positional changes  Will follow. ASSESSMENT:  Left thigh abscesses     RECOMMENDATION:  S/p bedside I & D today  Wound care -Please wash wound with soap and water, pack with xeroform, wrap with kerlix and ACE twice a day.   IV abx as indicated/ per ID  Wound cultures taken today- f/u results   Pain control    Remainder of care per primary team.     Plan of care discussed with patient. Concerns addressed.

## 2024-04-15 NOTE — PROGRESS NOTE ADULT - ASSESSMENT
IMPRESSION:    Sepsis POA  E coli bacteremia  LARY improving  Severe pancytopenia sp chemo 4/5  Testicular seminoma    PLAN:    CNS: Avoid CNS depressant    HEENT:  Oral care    PULMONARY:  HOB @ 45 degrees, on RA, aspiration precaution    CARDIOVASCULAR: IVF, echo , cardi eval    GI: GI prophylaxis                                          Feeding PO    RENAL:  F/u  lytes.  Correct as needed. accurate I/O, renal US    INFECTIOUS DISEASE:  dc vanco, fup E coli sensitivity    HEMATOLOGICAL:  DVT prophylaxis. trend CBC    ENDOCRINE:  Follow up FS.  Insulin protocol if needed.    tele

## 2024-04-15 NOTE — PROGRESS NOTE ADULT - SUBJECTIVE AND OBJECTIVE BOX
MALIA GAUTHIER  68y, Male  Allergy: penicillin (Rash)  penicillin G benzathine (Rash)    Hospital Day: 2d    Patient seen and examined. No acute events overnight    PMH/PSH:  PAST MEDICAL & SURGICAL HISTORY:  HTN (hypertension)      Hypothyroidism, unspecified type      Obesity      Other secondary osteoarthritis of right hand      Testicular seminoma      History of eye removal  Right eye      H/O melanoma excision      S/P appendectomy  14 yrs old          VITALS:  T(F): 97.8 (04-15-24 @ 07:36), Max: 98.1 (04-14-24 @ 13:35)  HR: 70 (04-15-24 @ 07:36)  BP: 108/59 (04-15-24 @ 07:36) (108/59 - 125/70)  RR: 18 (04-15-24 @ 07:36)  SpO2: 98% (04-15-24 @ 07:36)    TESTS & MEASUREMENTS:  Weight (Kg): 111.1 (04-13-24 @ 18:33)  BMI (kg/m2): 33.2 (04-13)                          8.1    0.55  )-----------( 18       ( 15 Apr 2024 05:51 )             23.1     PT/INR - ( 13 Apr 2024 21:20 )   PT: 16.90 sec;   INR: 1.48 ratio         PTT - ( 13 Apr 2024 21:20 )  PTT:28.8 sec  04-15    132<L>  |  95<L>  |  41<H>  ----------------------------<  104<H>  3.4<L>   |  22  |  1.2    Ca    8.0<L>      15 Apr 2024 05:51  Phos  2.2     04-15  Mg     2.2     04-15    TPro  5.2<L>  /  Alb  3.2<L>  /  TBili  0.8  /  DBili  x   /  AST  9   /  ALT  11  /  AlkPhos  65  04-15    LIVER FUNCTIONS - ( 15 Apr 2024 05:51 )  Alb: 3.2 g/dL / Pro: 5.2 g/dL / ALK PHOS: 65 U/L / ALT: 11 U/L / AST: 9 U/L / GGT: x                 Culture - Blood (collected 04-13-24 @ 21:20)  Source: .Blood Blood-Peripheral  Gram Stain (04-14-24 @ 17:20):    Growth in anaerobic bottle: Gram Negative Rods  Preliminary Report (04-14-24 @ 17:20):    Growth in anaerobic bottle: Gram Negative Rods    Direct identification is available within approximately 3-5    hours either by Blood Panel Multiplexed PCR or Direct    MALDI-TOF. Details: [email or web address]  Organism: Blood Culture PCR (04-14-24 @ 19:42)  Organism: Blood Culture PCR (04-14-24 @ 19:42)      Method Type: PCR      -  Escherichia coli: Detec      Urinalysis Basic - ( 15 Apr 2024 05:51 )    Color: x / Appearance: x / SG: x / pH: x  Gluc: 104 mg/dL / Ketone: x  / Bili: x / Urobili: x   Blood: x / Protein: x / Nitrite: x   Leuk Esterase: x / RBC: x / WBC x   Sq Epi: x / Non Sq Epi: x / Bacteria: x        RADIOLOGY & ADDITIONAL TESTS:    RECENT DIAGNOSTIC ORDERS:  Culture - Abscess with Gram Stain: Routine  Specimen Source: Leg - Left  Addl Info: 2 of 2 (04-15-24 @ 12:18)  Culture - Abscess with Gram Stain: Routine  Specimen Source: Leg - Left  Addl Info: 1 of 2 (04-15-24 @ 12:18)  CT Abdomen and Pelvis w/ Oral Cont and w/ IV Cont: Routine   Indication: Colitis  Transport: Stretcher-Crib (04-15-24 @ 10:28)  Diet, DASH/TLC:   Sodium & Cholesterol Restricted (04-15-24 @ 08:36)  Culture - Blood: 11:00  Specimen Source: Blood (04-15-24 @ 06:57)      MEDICATIONS:  MEDICATIONS  (STANDING):  cefTRIAXone   IVPB 2000 milliGRAM(s) IV Intermittent every 24 hours  filgrastim-sndz (ZARXIO) Injectable 480 MICROGram(s) SubCutaneous every 24 hours  lactated ringers. 1000 milliLiter(s) (75 mL/Hr) IV Continuous <Continuous>  levothyroxine 137 MICROGram(s) Oral daily  metoprolol tartrate 12.5 milliGRAM(s) Oral two times a day  metroNIDAZOLE    Tablet 500 milliGRAM(s) Oral every 8 hours    MEDICATIONS  (PRN):  acetaminophen     Tablet .. 650 milliGRAM(s) Oral every 6 hours PRN Temp greater or equal to 38C (100.4F)      HOME MEDICATIONS:  Levothroid 137 mcg (0.137 mg) oral tablet (02-11)  losartan 50 mg oral tablet (02-11)  Multiple Vitamins oral tablet (02-11)  Vitamin D3 1000 intl units oral tablet (02-11)      REVIEW OF SYSTEMS:  All other review of systems is negative unless indicated above.     PHYSICAL EXAM:  PHYSICAL EXAM:  GENERAL: NAD  HEAD:  Atraumatic, Normocephalic  NECK: Supple, No JVD  CHEST/LUNG: Clear to auscultation bilaterally; No wheeze  HEART: Regular rate and rhythm; No murmurs, rubs, or gallops  ABDOMEN: Soft, Nontender, Nondistended; Bowel sounds present  EXTREMITIES:  2+ Peripheral Pulses, No clubbing, cyanosis, or edema  SKIN: No rashes or lesions; 2 erythematous lesions left thigh, nontender

## 2024-04-15 NOTE — PHARMACOTHERAPY INTERVENTION NOTE - NSPHARMCOMMASP
ASP - Lab/ test recommended
ASP - Dose optimization/Non-Renal dose adjustment
ASP - De-escalation
adult consistency food

## 2024-04-15 NOTE — PROGRESS NOTE ADULT - ASSESSMENT
67yo M w/ pmhx of HTN, hypothyroidism, melanoma s/p resection, testicular seminoma on active chemo presents to ED for diarrhea. Recently discharged 3/20/24 for C. diff colitis, s/p fidaxomicin course. Admitted to SDU for severe pancytopenia and sepsis secondary to suspected C. diff infection.     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 diff infection  #febrile neutropenia   # Suspected LLE abscess  # Enterobacterales bacteremia  BCx 04/13 + Enterobacterales  flu/covid/RSV negative; CXR: no evidence of pneumonia   dw ID  - f/u C. Diff  - Cont IV ceftriaxone 2g q24h and flagyl 500 TID  - Burn eval    #NSVT  Mag was noted to be low, episodes not seen overnight after repletion  - Tele monitoring  - Echo  - Cardio eval if recurrent episodes    #severe pancytopenia   #testicular seminoma on active chemo  last chemo infusion 4/5 after being discharged from [facility] admission   s/p 1u platelets and 3u prbc   - Monitor CBC  - Zarxio 480 mcg SC qD    #LARY   Likely pre-renal  - Cr trendign down with fluids  - monitor bmp, monitor I/O, get urine lytes  - LR 100cc/hr     #HTN  #hypothyroidism  - c/w levothyroxine 137 q24hrs; hold home anti-HTNsives     #Progress Note Handoff  Pending (specify): Cont IV abx, burn eval, c.diff PCR  Family discussion: dw pt regarding eval and tx for infection  Disposition: Home   69yo M w/ pmhx of HTN, hypothyroidism, melanoma s/p resection, testicular seminoma on active chemo presents to ED for diarrhea. Recently discharged 3/20/24 for C. diff colitis, s/p fidaxomicin course. Admitted to SDU for severe pancytopenia and sepsis secondary to suspected C. diff infection.     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 diff infection  #febrile neutropenia   # Suspected LLE abscess  # E. Coli bacteremia  BCx 04/13 + E. Coli  flu/covid/RSV negative; CXR: no evidence of pneumonia   dw ID  - f/u C. Diff  - Cont IV ceftriaxone 2g q24h and flagyl 500 TID  - Burn eval    #NSVT  Mag was noted to be low, episodes not seen overnight after repletion  - Tele monitoring  - Echo  - Cardio eval if recurrent episodes    #severe pancytopenia   #testicular seminoma on active chemo  last chemo infusion 4/5 after being discharged from [facility] admission   s/p 1u platelets and 3u prbc   - Monitor CBC  - Zarxio 480 mcg SC qD    #LARY   Likely pre-renal  - Cr trendign down with fluids  - monitor bmp, monitor I/O, get urine lytes  - LR 100cc/hr     #HTN  #hypothyroidism  - c/w levothyroxine 137 q24hrs; hold home anti-HTNsives     #Progress Note Handoff  Pending (specify): Cont IV abx, burn eval, c.diff PCR  Family discussion: dw pt regarding eval and tx for infection  Disposition: Home

## 2024-04-15 NOTE — PROGRESS NOTE ADULT - SUBJECTIVE AND OBJECTIVE BOX
Over Night Events: events noted, on RA, ID/ hemoc/ blood cx noted, afebrile    PHYSICAL EXAM    ICU Vital Signs Last 24 Hrs  T(C): 36.6 (14 Apr 2024 16:15), Max: 36.7 (14 Apr 2024 07:56)  T(F): 97.8 (14 Apr 2024 16:15), Max: 98.1 (14 Apr 2024 07:56)  HR: 84 (14 Apr 2024 16:15) (73 - 84)  BP: 122/67 (14 Apr 2024 16:15) (103/57 - 122/67)  RR: 18 (14 Apr 2024 16:15) (18 - 19)  SpO2: 98% (14 Apr 2024 16:15) (98% - 98%)    O2 Parameters below as of 14 Apr 2024 16:15  Patient On (Oxygen Delivery Method): room air            General: comfortbale  Lungs: dec bs both bases  Cardiovascular: AMBER 2.6  Abdomen: Soft, Positive BS  Extremities: No clubbing   Skin: Warm  Neurological: Non focal         LABS:                          7.4    0.33  )-----------( 20       ( 14 Apr 2024 20:56 )             20.5                                               04-14    132<L>  |  96<L>  |  41<H>  ----------------------------<  112<H>  3.8   |  20  |  1.5    Ca    7.5<L>      14 Apr 2024 17:01  Phos  3.9     04-14  Mg     2.5     04-14    TPro  4.9<L>  /  Alb  3.1<L>  /  TBili  0.5  /  DBili  x   /  AST  8   /  ALT  9   /  AlkPhos  54  04-14      PT/INR - ( 13 Apr 2024 21:20 )   PT: 16.90 sec;   INR: 1.48 ratio         PTT - ( 13 Apr 2024 21:20 )  PTT:28.8 sec                                       Urinalysis Basic - ( 14 Apr 2024 17:01 )    Color: x / Appearance: x / SG: x / pH: x  Gluc: 112 mg/dL / Ketone: x  / Bili: x / Urobili: x   Blood: x / Protein: x / Nitrite: x   Leuk Esterase: x / RBC: x / WBC x   Sq Epi: x / Non Sq Epi: x / Bacteria: x                                                  LIVER FUNCTIONS - ( 14 Apr 2024 04:06 )  Alb: 3.1 g/dL / Pro: 4.9 g/dL / ALK PHOS: 54 U/L / ALT: 9 U/L / AST: 8 U/L / GGT: x                                                  Culture - Blood (collected 13 Apr 2024 21:20)  Source: .Blood Blood-Peripheral  Gram Stain (14 Apr 2024 17:20):    Growth in anaerobic bottle: Gram Negative Rods  Preliminary Report (14 Apr 2024 17:20):    Growth in anaerobic bottle: Gram Negative Rods    Direct identification is available within approximately 3-5    hours either by Blood Panel Multiplexed PCR or Direct    MALDI-TOF. Details: [email or web address]  Organism: Blood Culture PCR (14 Apr 2024 19:42)  Organism: Blood Culture PCR (14 Apr 2024 19:42)                                                                                           MEDICATIONS  (STANDING):  cefepime   IVPB      cefepime   IVPB 2000 milliGRAM(s) IV Intermittent every 12 hours  filgrastim-sndz (ZARXIO) Injectable 480 MICROGram(s) SubCutaneous every 24 hours  lactated ringers. 1000 milliLiter(s) (125 mL/Hr) IV Continuous <Continuous>  levothyroxine 137 MICROGram(s) Oral daily  vancomycin  IVPB 1250 milliGRAM(s) IV Intermittent every 24 hours    MEDICATIONS  (PRN):  acetaminophen     Tablet .. 650 milliGRAM(s) Oral every 6 hours PRN Temp greater or equal to 38C (100.4F)

## 2024-04-15 NOTE — BRIEF OPERATIVE NOTE - NSICDXBRIEFPROCEDURE_GEN_ALL_CORE_FT
PROCEDURES:  Selective debridement 15-Apr-2024 16:15:19 excisional debridement with scalpel left thigh Da Yap

## 2024-04-15 NOTE — CONSULT NOTE ADULT - SUBJECTIVE AND OBJECTIVE BOX
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 difficile in setting of pancytopenia, presents ED for evaluation of diarrhea today.  Patient reports few episodes of nonbloody, watery diarrhea 3–4 times today.  Feels different than his prior C. difficile.  + Fever with Tmax 101 at home.  He denies cough, congestion, ear pain, throat pain, CP, SOB, abdominal pain, N/V, urinary symptoms, extremity numbness/weakness/paresthesias/swelling.   In the ED  bp 110/58 hr 112 rr 18 afebrile   SigLabs WBC 0.12 hemoglobin platelet 4. Na Cl 93 AG 17 BUN/SCr 37/1.8 calcium 7.2    (14 Apr 2024 01:00)    Allergies    penicillin (Rash)  penicillin G benzathine (Rash)    Intolerances      PAST MEDICAL & SURGICAL HISTORY:  HTN (hypertension)      Hypothyroidism, unspecified type      Obesity      Other secondary osteoarthritis of right hand      Testicular seminoma      History of eye removal  Right eye      H/O melanoma excision      S/P appendectomy  14 yrs old                            8.1    0.55  )-----------( 18       ( 15 Apr 2024 05:51 )             23.1     ICU Vital Signs Last 24 Hrs  T(C): 36.6 (15 Apr 2024 07:36), Max: 36.7 (14 Apr 2024 13:35)  T(F): 97.8 (15 Apr 2024 07:36), Max: 98.1 (14 Apr 2024 13:35)  HR: 70 (15 Apr 2024 07:36) (70 - 84)  BP: 108/59 (15 Apr 2024 07:36) (108/59 - 125/70)  RR: 18 (15 Apr 2024 07:36) (18 - 18)  SpO2: 98% (15 Apr 2024 07:36) (97% - 98%)    O2 Parameters below as of 14 Apr 2024 23:03  Patient On (Oxygen Delivery Method): room air      PHYSICAL EXAM:  GENERAL: NAD, well-developed  HEAD:  Atraumatic, Normocephalic  CHEST/LUNG: No increased work of breathing noted.   HEART: In no acute cardiopulmonary distress.   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 difficile in setting of pancytopenia, presents ED for evaluation of diarrhea today.  Patient reports few episodes of nonbloody, watery diarrhea 3–4 times today.  Feels different than his prior C. difficile.  + Fever with Tmax 101 at home.  He denies cough, congestion, ear pain, throat pain, CP, SOB, abdominal pain, N/V, urinary symptoms, extremity numbness/weakness/paresthesias/swelling.   In the ED  bp 110/58 hr 112 rr 18 afebrile   SigLabs WBC 0.12 hemoglobin platelet 4. Na Cl 93 AG 17 BUN/SCr 37/1.8 calcium 7.2    (14 Apr 2024 01:00)      BURN consulted for left thigh abscesses.      Allergies    penicillin (Rash)  penicillin G benzathine (Rash)    Intolerances      PAST MEDICAL & SURGICAL HISTORY:  HTN (hypertension)      Hypothyroidism, unspecified type      Obesity      Other secondary osteoarthritis of right hand      Testicular seminoma      History of eye removal  Right eye      H/O melanoma excision      S/P appendectomy  14 yrs old                            8.1    0.55  )-----------( 18       ( 15 Apr 2024 05:51 )             23.1     ICU Vital Signs Last 24 Hrs  T(C): 36.6 (15 Apr 2024 07:36), Max: 36.7 (14 Apr 2024 13:35)  T(F): 97.8 (15 Apr 2024 07:36), Max: 98.1 (14 Apr 2024 13:35)  HR: 70 (15 Apr 2024 07:36) (70 - 84)  BP: 108/59 (15 Apr 2024 07:36) (108/59 - 125/70)  RR: 18 (15 Apr 2024 07:36) (18 - 18)  SpO2: 98% (15 Apr 2024 07:36) (97% - 98%)    O2 Parameters below as of 14 Apr 2024 23:03  Patient On (Oxygen Delivery Method): room air      PHYSICAL EXAM:  GENERAL: NAD, well-developed  HEAD:  Atraumatic, Normocephalic  CHEST/LUNG: No increased work of breathing noted.   HEART: In no acute cardiopulmonary distress.   PSYCH: AAOx3  SKIN: Left thigh: two small areas ~2cm x2cm of induration and erythema to anterior and medial aspect. No fluctuance or drainage noted.

## 2024-04-16 LAB
-  AMPICILLIN/SULBACTAM: SIGNIFICANT CHANGE UP
-  AMPICILLIN: SIGNIFICANT CHANGE UP
-  AZTREONAM: SIGNIFICANT CHANGE UP
-  CEFAZOLIN: SIGNIFICANT CHANGE UP
-  CEFEPIME: SIGNIFICANT CHANGE UP
-  CEFOXITIN: SIGNIFICANT CHANGE UP
-  CEFTRIAXONE: SIGNIFICANT CHANGE UP
-  CIPROFLOXACIN: SIGNIFICANT CHANGE UP
-  ERTAPENEM: SIGNIFICANT CHANGE UP
-  GENTAMICIN: SIGNIFICANT CHANGE UP
-  IMIPENEM: SIGNIFICANT CHANGE UP
-  LEVOFLOXACIN: SIGNIFICANT CHANGE UP
-  MEROPENEM: SIGNIFICANT CHANGE UP
-  PIPERACILLIN/TAZOBACTAM: SIGNIFICANT CHANGE UP
-  TOBRAMYCIN: SIGNIFICANT CHANGE UP
-  TRIMETHOPRIM/SULFAMETHOXAZOLE: SIGNIFICANT CHANGE UP
ALBUMIN SERPL ELPH-MCNC: 2.9 G/DL — LOW (ref 3.5–5.2)
ALP SERPL-CCNC: 57 U/L — SIGNIFICANT CHANGE UP (ref 30–115)
ALT FLD-CCNC: 9 U/L — SIGNIFICANT CHANGE UP (ref 0–41)
ANION GAP SERPL CALC-SCNC: 7 MMOL/L — SIGNIFICANT CHANGE UP (ref 7–14)
AST SERPL-CCNC: 9 U/L — SIGNIFICANT CHANGE UP (ref 0–41)
BASOPHILS # BLD AUTO: 0.01 K/UL — SIGNIFICANT CHANGE UP (ref 0–0.2)
BASOPHILS NFR BLD AUTO: 0.6 % — SIGNIFICANT CHANGE UP (ref 0–1)
BILIRUB SERPL-MCNC: 0.4 MG/DL — SIGNIFICANT CHANGE UP (ref 0.2–1.2)
BUN SERPL-MCNC: 32 MG/DL — HIGH (ref 10–20)
C DIFF GDH STL QL: SIGNIFICANT CHANGE UP
C DIFF GDH STL QL: SIGNIFICANT CHANGE UP
CALCIUM SERPL-MCNC: 7.9 MG/DL — LOW (ref 8.4–10.5)
CHLORIDE SERPL-SCNC: 102 MMOL/L — SIGNIFICANT CHANGE UP (ref 98–110)
CO2 SERPL-SCNC: 29 MMOL/L — SIGNIFICANT CHANGE UP (ref 17–32)
CREAT SERPL-MCNC: 0.9 MG/DL — SIGNIFICANT CHANGE UP (ref 0.7–1.5)
CULTURE RESULTS: ABNORMAL
CULTURE RESULTS: NO GROWTH — SIGNIFICANT CHANGE UP
EGFR: 92 ML/MIN/1.73M2 — SIGNIFICANT CHANGE UP
EOSINOPHIL # BLD AUTO: 0 K/UL — SIGNIFICANT CHANGE UP (ref 0–0.7)
EOSINOPHIL NFR BLD AUTO: 0 % — SIGNIFICANT CHANGE UP (ref 0–8)
GI PCR PANEL: SIGNIFICANT CHANGE UP
GLUCOSE SERPL-MCNC: 80 MG/DL — SIGNIFICANT CHANGE UP (ref 70–99)
HCT VFR BLD CALC: 21.1 % — LOW (ref 42–52)
HCT VFR BLD CALC: 21.7 % — LOW (ref 42–52)
HGB BLD-MCNC: 7.1 G/DL — LOW (ref 14–18)
HGB BLD-MCNC: 7.5 G/DL — LOW (ref 14–18)
IMM GRANULOCYTES NFR BLD AUTO: 6.5 % — HIGH (ref 0.1–0.3)
LYMPHOCYTES # BLD AUTO: 0.5 K/UL — LOW (ref 1.2–3.4)
LYMPHOCYTES # BLD AUTO: 32.3 % — SIGNIFICANT CHANGE UP (ref 20.5–51.1)
MAGNESIUM SERPL-MCNC: 1.9 MG/DL — SIGNIFICANT CHANGE UP (ref 1.8–2.4)
MCHC RBC-ENTMCNC: 30.9 PG — SIGNIFICANT CHANGE UP (ref 27–31)
MCHC RBC-ENTMCNC: 31.5 PG — HIGH (ref 27–31)
MCHC RBC-ENTMCNC: 33.6 G/DL — SIGNIFICANT CHANGE UP (ref 32–37)
MCHC RBC-ENTMCNC: 34.6 G/DL — SIGNIFICANT CHANGE UP (ref 32–37)
MCV RBC AUTO: 91.2 FL — SIGNIFICANT CHANGE UP (ref 80–94)
MCV RBC AUTO: 91.7 FL — SIGNIFICANT CHANGE UP (ref 80–94)
METHOD TYPE: SIGNIFICANT CHANGE UP
MONOCYTES # BLD AUTO: 0.27 K/UL — SIGNIFICANT CHANGE UP (ref 0.1–0.6)
MONOCYTES NFR BLD AUTO: 17.4 % — HIGH (ref 1.7–9.3)
NEUTROPHILS # BLD AUTO: 0.67 K/UL — LOW (ref 1.4–6.5)
NEUTROPHILS NFR BLD AUTO: 43.2 % — SIGNIFICANT CHANGE UP (ref 42.2–75.2)
NRBC # BLD: 0 /100 WBCS — SIGNIFICANT CHANGE UP (ref 0–0)
NRBC # BLD: 0 /100 WBCS — SIGNIFICANT CHANGE UP (ref 0–0)
ORGANISM # SPEC MICROSCOPIC CNT: ABNORMAL
ORGANISM # SPEC MICROSCOPIC CNT: ABNORMAL
ORGANISM # SPEC MICROSCOPIC CNT: SIGNIFICANT CHANGE UP
PHOSPHATE SERPL-MCNC: 1.9 MG/DL — LOW (ref 2.1–4.9)
PLATELET # BLD AUTO: 13 K/UL — CRITICAL LOW (ref 130–400)
PLATELET # BLD AUTO: 13 K/UL — CRITICAL LOW (ref 130–400)
PMV BLD: 12.3 FL — HIGH (ref 7.4–10.4)
PMV BLD: 12.3 FL — HIGH (ref 7.4–10.4)
POTASSIUM SERPL-MCNC: 3.7 MMOL/L — SIGNIFICANT CHANGE UP (ref 3.5–5)
POTASSIUM SERPL-SCNC: 3.7 MMOL/L — SIGNIFICANT CHANGE UP (ref 3.5–5)
PROT SERPL-MCNC: 4.6 G/DL — LOW (ref 6–8)
RBC # BLD: 2.3 M/UL — LOW (ref 4.7–6.1)
RBC # BLD: 2.38 M/UL — LOW (ref 4.7–6.1)
RBC # FLD: 17.3 % — HIGH (ref 11.5–14.5)
RBC # FLD: 17.4 % — HIGH (ref 11.5–14.5)
SODIUM SERPL-SCNC: 138 MMOL/L — SIGNIFICANT CHANGE UP (ref 135–146)
SPECIMEN SOURCE: SIGNIFICANT CHANGE UP
SPECIMEN SOURCE: SIGNIFICANT CHANGE UP
WBC # BLD: 1.55 K/UL — LOW (ref 4.8–10.8)
WBC # BLD: 3.01 K/UL — LOW (ref 4.8–10.8)
WBC # FLD AUTO: 1.55 K/UL — LOW (ref 4.8–10.8)
WBC # FLD AUTO: 3.01 K/UL — LOW (ref 4.8–10.8)

## 2024-04-16 PROCEDURE — 76705 ECHO EXAM OF ABDOMEN: CPT | Mod: 26

## 2024-04-16 PROCEDURE — 99231 SBSQ HOSP IP/OBS SF/LOW 25: CPT

## 2024-04-16 PROCEDURE — 99232 SBSQ HOSP IP/OBS MODERATE 35: CPT

## 2024-04-16 RX ORDER — VANCOMYCIN HCL 1 G
125 VIAL (EA) INTRAVENOUS EVERY 6 HOURS
Refills: 0 | Status: DISCONTINUED | OUTPATIENT
Start: 2024-04-16 | End: 2024-04-19

## 2024-04-16 RX ORDER — MUPIROCIN 20 MG/G
1 OINTMENT TOPICAL
Refills: 0 | Status: DISCONTINUED | OUTPATIENT
Start: 2024-04-16 | End: 2024-04-19

## 2024-04-16 RX ORDER — CHLORHEXIDINE GLUCONATE 213 G/1000ML
1 SOLUTION TOPICAL DAILY
Refills: 0 | Status: DISCONTINUED | OUTPATIENT
Start: 2024-04-16 | End: 2024-04-19

## 2024-04-16 RX ADMIN — Medication 500 MILLIGRAM(S): at 14:49

## 2024-04-16 RX ADMIN — Medication 100 MILLIGRAM(S): at 14:50

## 2024-04-16 RX ADMIN — Medication 500 MILLIGRAM(S): at 02:24

## 2024-04-16 RX ADMIN — CEFTRIAXONE 100 MILLIGRAM(S): 500 INJECTION, POWDER, FOR SOLUTION INTRAMUSCULAR; INTRAVENOUS at 14:49

## 2024-04-16 RX ADMIN — Medication 500 MILLIGRAM(S): at 06:42

## 2024-04-16 RX ADMIN — Medication 480 MICROGRAM(S): at 14:49

## 2024-04-16 RX ADMIN — Medication 125 MILLIGRAM(S): at 14:50

## 2024-04-16 RX ADMIN — Medication 12.5 MILLIGRAM(S): at 17:17

## 2024-04-16 RX ADMIN — Medication 137 MICROGRAM(S): at 06:41

## 2024-04-16 RX ADMIN — Medication 12.5 MILLIGRAM(S): at 06:41

## 2024-04-16 RX ADMIN — Medication 125 MILLIGRAM(S): at 21:49

## 2024-04-16 RX ADMIN — SODIUM CHLORIDE 100 MILLILITER(S): 9 INJECTION, SOLUTION INTRAVENOUS at 11:09

## 2024-04-16 RX ADMIN — Medication 500 MILLIGRAM(S): at 23:26

## 2024-04-16 RX ADMIN — MUPIROCIN 1 APPLICATION(S): 20 OINTMENT TOPICAL at 17:17

## 2024-04-16 NOTE — PROGRESS NOTE ADULT - ASSESSMENT
Left thigh abscesses s/p bedside debridment on 4/15  New lower abdominal small abscess     RECOMMENDATION:  Wound care -Please wash wound with soap and water, cover xeroform, wrap with kerlix and ACE twice a day.   Patient found additional small abscess similar to thigh on his lower left abdomen - will follow up if needs debridement . Cover with xeroform BID for now.   IV abx as indicated/ per ID  Wound cultures taken 4/15- f/u results   Pain control

## 2024-04-16 NOTE — PROGRESS NOTE ADULT - ASSESSMENT
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 Recently discharged 3/20/24 for C. diff colitis, s/p fidaxomicin course. Admitted to SDU for severe pancytopenia and sepsis secondary to suspected C. diff infection.     #sepsis present on admission secondary to recurrent severe C. diff infection  #febrile neutropenia   # LLE abscess sp I/D 04/15  # Suspect left abdomen abscess  # E. Coli bacteremia  BCx 04/13 + E. Coli  flu/covid/RSV negative; CXR: no evidence of pneumonia   dw ID, burn  C. 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  last chemo infusion 4/5 after being discharged from [facility] admission   s/p 1u platelets and 3u prbc   WBC counts improving  - Monitor CBC  - Zarxio 480 mcg SC qD  - Plt transfusion to keep >10  - PRBC transfusion to keep >7    #LARY , resolved  Likely pre-renal, improved with IVF  - monitor bmp, monitor I/O, get urine lytes  - Cont IVF for one more day    #HTN  #hypothyroidism  - c/w levothyroxine 137 q24hrs; hold home anti-HTNsives     #Progress Note Handoff  Pending (specify): Cont IV abx, burn eval  Family discussion: dw pt regarding eval and tx for infection  Disposition: Home

## 2024-04-16 NOTE — PROGRESS NOTE ADULT - SUBJECTIVE AND OBJECTIVE BOX
MALIA GAUTHIER  68y, Male  Allergy: penicillin (Rash)  penicillin G benzathine (Rash)      LOS  3d    CHIEF COMPLAINT: colitis (15 Apr 2024 12:33)      INTERVAL EVENTS/HPI  - No acute events overnight  - T(F): , Max: 97.8 (04-15-24 @ 07:36)  - Tolerating medication  - WBC Count: 0.73 (04-15-24 @ 16:26)  WBC Count: 0.55 (04-15-24 @ 05:51)     - Creatinine: 1.2 (04-15-24 @ 05:51)  Creatinine: 1.5 (04-14-24 @ 17:01)       ROS  ***    VITALS:  T(F): 97.7, Max: 97.8 (04-15-24 @ 07:36)  HR: 77  BP: 119/61  RR: 18Vital Signs Last 24 Hrs  T(C): 36.5 (16 Apr 2024 01:00), Max: 36.6 (15 Apr 2024 07:36)  T(F): 97.7 (16 Apr 2024 01:00), Max: 97.8 (15 Apr 2024 07:36)  HR: 77 (16 Apr 2024 01:00) (70 - 79)  BP: 119/61 (16 Apr 2024 01:00) (108/59 - 120/63)  BP(mean): 84 (16 Apr 2024 01:00) (84 - 84)  RR: 18 (16 Apr 2024 01:00) (18 - 18)  SpO2: 98% (16 Apr 2024 01:00) (98% - 98%)    Parameters below as of 16 Apr 2024 01:00  Patient On (Oxygen Delivery Method): room air        PHYSICAL EXAM:  ***    FH: Non-contributory  Social Hx: Non-contributory    TESTS & MEASUREMENTS:                        7.6    0.73  )-----------( 15       ( 15 Apr 2024 16:26 )             22.5     04-15    132<L>  |  95<L>  |  41<H>  ----------------------------<  104<H>  3.4<L>   |  22  |  1.2    Ca    8.0<L>      15 Apr 2024 05:51  Phos  2.2     04-15  Mg     2.2     04-15    TPro  5.2<L>  /  Alb  3.2<L>  /  TBili  0.8  /  DBili  x   /  AST  9   /  ALT  11  /  AlkPhos  65  04-15      LIVER FUNCTIONS - ( 15 Apr 2024 05:51 )  Alb: 3.2 g/dL / Pro: 5.2 g/dL / ALK PHOS: 65 U/L / ALT: 11 U/L / AST: 9 U/L / GGT: x           Urinalysis Basic - ( 15 Apr 2024 05:51 )    Color: x / Appearance: x / SG: x / pH: x  Gluc: 104 mg/dL / Ketone: x  / Bili: x / Urobili: x   Blood: x / Protein: x / Nitrite: x   Leuk Esterase: x / RBC: x / WBC x   Sq Epi: x / Non Sq Epi: x / Bacteria: x        Culture - Blood (collected 04-14-24 @ 11:26)  Source: .Blood None  Preliminary Report (04-15-24 @ 22:02):    No growth at 24 hours    Culture - Blood (collected 04-13-24 @ 21:20)  Source: .Blood Blood-Peripheral  Gram Stain (04-14-24 @ 17:20):    Growth in anaerobic bottle: Gram Negative Rods  Preliminary Report (04-15-24 @ 13:22):    Growth in anaerobic bottle: Escherichia coli    Direct identification is available within approximately 3-5    hours either by Blood Panel Multiplexed PCR or Direct    MALDI-TOF. Details: [email or web address]  Organism: Blood Culture PCR (04-14-24 @ 19:42)  Organism: Blood Culture PCR (04-14-24 @ 19:42)      Method Type: PCR      -  Escherichia coli: Detec        Lactate, Blood: 1.4 mmol/L (04-13-24 @ 21:20)      INFECTIOUS DISEASES TESTING  Vancomycin Level, Random: 11.1 (04-15-24 @ 05:51)  Vancomycin Level, Trough: 7.8 (03-11-24 @ 07:04)  MRSA PCR Result.: Positive (02-11-24 @ 16:00)  strept    INFLAMMATORY MARKERS  C-Reactive Protein, Serum: 92.6 mg/L (02-12-24 @ 06:44)      RADIOLOGY & ADDITIONAL TESTS:  I have personally reviewed the last available Chest xray  CXR  Xray Chest 1 View- PORTABLE-Urgent:   ACC: [id number] EXAM:  XR CHEST PORTABLE URGENT 1V   ORDERED BY: YARA OROZCO     PROCEDURE DATE:  04/13/2024          INTERPRETATION:  Clinical History / Reason for exam: cough, fever    Comparison : Chest radiograph: March 9 2024    Technique/Positioning: Frontal view of the chest    Findings:    Support devices: Stable positioning    Cardiac/mediastinum/hilum: No significant change    Skeleton/soft tissues: No significant change.    Lung parenchyma/Pleura: There is no evidence of focal consolidation,   pleural effusion or pneumothorax.    Impression:  No evidence of focal consolidation, pleural effusion or pneumothorax.            --- End of Report ---            MIHIR DEMARCO MD; Attending Radiologist  This document has been electronically signed. Apr 14 2024  5:07PM (04-13-24 @ 20:26)      CT  CT Abdomen and Pelvis w/ Oral Cont and w/ IV Cont:   ACC: [id number] EXAM:  CT ABDOMEN AND PELVIS OC IC   ORDERED BY: YORDAN OLVERA     PROCEDURE DATE:  04/15/2024          INTERPRETATION:  CLINICAL STATEMENT: Colitis    TECHNIQUE: Contiguous axial CT images were obtained from the lower chest   to thepubic symphysis following administration of intravenous contrast.    100 cc administered of Omnipaque 350 (0 cc discarded).  Oral contrast was   administered.  Reformatted images in the coronal and sagittal planes were   acquired.    COMPARISON CT: March 5, 2024    OTHER STUDIES USED FOR CORRELATION: None.      FINDINGS:    LOWER CHEST: Unremarkable.    HEPATOBILIARY: Mild gallbladder wall thickening and hazy adjacent fat   stranding. Unremarkable liver.    SPLEEN: Unremarkable.    PANCREAS: Unremarkable.    ADRENAL GLANDS: Unchanged 1.6 cm right adrenal gland nodule. Unremarkable   left adrenal gland.    KIDNEYS: Symmetric renal enhancement bilaterally. No hydronephrosis. Left   renal cysts.    ABDOMINOPELVIC NODES: Persistent enlarged retroperitoneal lymph nodes   including a 2.6 x 1.6 cm aortocaval node.    PELVIC ORGANS: Unremarkable.    PERITONEUM/MESENTERY/BOWEL: No bowel obstruction, ascites or   pneumoperitoneum. Limited evaluation of the underdistended sigmoid colon.   Remaining colon appears unremarkable. Appendix not visualized; no focal   pericecal inflammation.    BONES/SOFT TISSUES: Degenerative changes of the spine noted. Subcutaneous   foci of gas in the ventral abdominal wall, likely injection sites.      IMPRESSION:  1.  Since March 5, 2024, new mild gallbladder wall thickening and hazy   adjacent fat stranding. Correlate with symptoms. A right upper quadrant   ultrasound can be obtained for further evaluation as clinically warranted.  2.  Stable enlarged retroperitoneal lymph nodes.  3.  Additional stable findings as above.    --- End of Report ---            SAMANTHA TELLO MD; Attending Radiologist  This document has been electronically signed. Apr 15 2024  4:23PM (04-15-24 @ 15:43)      CARDIOLOGY TESTING  12 Lead ECG:   Ventricular Rate 167 BPM    Atrial Rate 167 BPM    P-R Interval 150 ms    QRS Duration 102 ms    Q-T Interval 228 ms    QTC Calculation(Bazett) 380 ms    P Axis -11 degrees    R Axis 28 degrees    T Axis 130 degrees    Diagnosis Line Supraventricular tachycardia  Nonspecific ST and T wave abnormality  Abnormal ECG  Confirmed by Ermias Price ([id number]) on 4/14/2024 11:42:09 AM (04-14-24 @ 08:06)  12 Lead ECG:   Ventricular Rate 115 BPM    Atrial Rate 115 BPM    P-R Interval 134 ms    QRS Duration 98 ms    Q-T Interval 330 ms    QTC Calculation(Bazett) 456 ms    P Axis 28 degrees    R Axis 14 degrees    T Axis 24 degrees    Diagnosis Line Sinus tachycardiawith Premature atrial complexes  Minimal voltage criteria for LVH, may be normal variant ( R in aVL )  Nonspecific ST abnormality  Abnormal ECG    Confirmed by BRITNEY CASAS MD ([id number]) on 4/15/2024 7:05:53 AM (04-13-24 @ 18:42)      MEDICATIONS  cefTRIAXone   IVPB 2000 IV Intermittent every 24 hours  filgrastim-sndz (ZARXIO) Injectable 480 SubCutaneous every 24 hours  lactated ringers. 1000 IV Continuous <Continuous>  levothyroxine 137 Oral daily  metoprolol tartrate 12.5 Oral two times a day  metroNIDAZOLE    Tablet 500 Oral every 8 hours      WEIGHT  Weight (kg): 111.1 (04-13-24 @ 18:33)      ANTIBIOTICS:  cefTRIAXone   IVPB 2000 milliGRAM(s) IV Intermittent every 24 hours  metroNIDAZOLE    Tablet 500 milliGRAM(s) Oral every 8 hours      All available historical records have been reviewed       MLAIA GAUTHIER  68y, Male  Allergy: penicillin (Rash)  penicillin G benzathine (Rash)      LOS  3d    CHIEF COMPLAINT: colitis (15 Apr 2024 12:33)      INTERVAL EVENTS/HPI  - No acute events overnight  - Reports some loose stools., no real diarrhea, no abd pain  - T(F): , Max: 97.8 (04-15-24 @ 07:36)  - Tolerating medication  - WBC Count: 0.73 (04-15-24 @ 16:26)  WBC Count: 0.55 (04-15-24 @ 05:51)     - Creatinine: 1.2 (04-15-24 @ 05:51)  Creatinine: 1.5 (04-14-24 @ 17:01)       ROS  General: Denies rigors, nightsweats  HEENT: Denies headache, rhinorrhea, sore throat, eye pain  CV: Denies CP, palpitations  PULM: Denies wheezing, hemoptysis  GI: Denies hematemesis, hematochezia, melena  : Denies discharge, hematuria  MSK: Denies arthralgias, myalgias  SKIN: Denies rash, lesions  NEURO: Denies paresthesias, weakness  PSYCH: Denies depression, anxiety     VITALS:  T(F): 97.7, Max: 97.8 (04-15-24 @ 07:36)  HR: 77  BP: 119/61  RR: 18Vital Signs Last 24 Hrs  T(C): 36.5 (16 Apr 2024 01:00), Max: 36.6 (15 Apr 2024 07:36)  T(F): 97.7 (16 Apr 2024 01:00), Max: 97.8 (15 Apr 2024 07:36)  HR: 77 (16 Apr 2024 01:00) (70 - 79)  BP: 119/61 (16 Apr 2024 01:00) (108/59 - 120/63)  BP(mean): 84 (16 Apr 2024 01:00) (84 - 84)  RR: 18 (16 Apr 2024 01:00) (18 - 18)  SpO2: 98% (16 Apr 2024 01:00) (98% - 98%)    Parameters below as of 16 Apr 2024 01:00  Patient On (Oxygen Delivery Method): room air        PHYSICAL EXAM:  Gen: NAD, resting in bed  HEENT: Normocephalic, atraumatic  Neck: supple, no lymphadenopathy  CV: Regular rate & regular rhythm  Lungs: decreased BS at bases, no fremitus  Abdomen: Soft, BS present, no RUQ tenderness to palpation , NEGATIVE owens's sign  Ext: Warm, well perfused  Neuro: non focal, awake  Skin: L thigh open wound- clean, R abd erythema/eschar  Lines: no phlebitis  port clean    FH: Non-contributory  Social Hx: Non-contributory    TESTS & MEASUREMENTS:                        7.6    0.73  )-----------( 15       ( 15 Apr 2024 16:26 )             22.5     04-15    132<L>  |  95<L>  |  41<H>  ----------------------------<  104<H>  3.4<L>   |  22  |  1.2    Ca    8.0<L>      15 Apr 2024 05:51  Phos  2.2     04-15  Mg     2.2     04-15    TPro  5.2<L>  /  Alb  3.2<L>  /  TBili  0.8  /  DBili  x   /  AST  9   /  ALT  11  /  AlkPhos  65  04-15      LIVER FUNCTIONS - ( 15 Apr 2024 05:51 )  Alb: 3.2 g/dL / Pro: 5.2 g/dL / ALK PHOS: 65 U/L / ALT: 11 U/L / AST: 9 U/L / GGT: x           Urinalysis Basic - ( 15 Apr 2024 05:51 )    Color: x / Appearance: x / SG: x / pH: x  Gluc: 104 mg/dL / Ketone: x  / Bili: x / Urobili: x   Blood: x / Protein: x / Nitrite: x   Leuk Esterase: x / RBC: x / WBC x   Sq Epi: x / Non Sq Epi: x / Bacteria: x        Culture - Blood (collected 04-14-24 @ 11:26)  Source: .Blood None  Preliminary Report (04-15-24 @ 22:02):    No growth at 24 hours    Culture - Blood (collected 04-13-24 @ 21:20)  Source: .Blood Blood-Peripheral  Gram Stain (04-14-24 @ 17:20):    Growth in anaerobic bottle: Gram Negative Rods  Preliminary Report (04-15-24 @ 13:22):    Growth in anaerobic bottle: Escherichia coli    Direct identification is available within approximately 3-5    hours either by Blood Panel Multiplexed PCR or Direct    MALDI-TOF. Details: [email or web address]  Organism: Blood Culture PCR (04-14-24 @ 19:42)  Organism: Blood Culture PCR (04-14-24 @ 19:42)      Method Type: PCR      -  Escherichia coli: Detec        Lactate, Blood: 1.4 mmol/L (04-13-24 @ 21:20)      INFECTIOUS DISEASES TESTING  Vancomycin Level, Random: 11.1 (04-15-24 @ 05:51)  Vancomycin Level, Trough: 7.8 (03-11-24 @ 07:04)  MRSA PCR Result.: Positive (02-11-24 @ 16:00)  strept    INFLAMMATORY MARKERS  C-Reactive Protein, Serum: 92.6 mg/L (02-12-24 @ 06:44)      RADIOLOGY & ADDITIONAL TESTS:  I have personally reviewed the last available Chest xray  CXR  Xray Chest 1 View- PORTABLE-Urgent:   ACC: [id number] EXAM:  XR CHEST PORTABLE URGENT 1V   ORDERED BY: YARA OROZCO     PROCEDURE DATE:  04/13/2024          INTERPRETATION:  Clinical History / Reason for exam: cough, fever    Comparison : Chest radiograph: March 9 2024    Technique/Positioning: Frontal view of the chest    Findings:    Support devices: Stable positioning    Cardiac/mediastinum/hilum: No significant change    Skeleton/soft tissues: No significant change.    Lung parenchyma/Pleura: There is no evidence of focal consolidation,   pleural effusion or pneumothorax.    Impression:  No evidence of focal consolidation, pleural effusion or pneumothorax.            --- End of Report ---            MIHIR DEMARCO MD; Attending Radiologist  This document has been electronically signed. Apr 14 2024  5:07PM (04-13-24 @ 20:26)      CT  CT Abdomen and Pelvis w/ Oral Cont and w/ IV Cont:   ACC: [id number] EXAM:  CT ABDOMEN AND PELVIS OC IC   ORDERED BY: YORDAN OLVERA     PROCEDURE DATE:  04/15/2024          INTERPRETATION:  CLINICAL STATEMENT: Colitis    TECHNIQUE: Contiguous axial CT images were obtained from the lower chest   to thepubic symphysis following administration of intravenous contrast.    100 cc administered of Omnipaque 350 (0 cc discarded).  Oral contrast was   administered.  Reformatted images in the coronal and sagittal planes were   acquired.    COMPARISON CT: March 5, 2024    OTHER STUDIES USED FOR CORRELATION: None.      FINDINGS:    LOWER CHEST: Unremarkable.    HEPATOBILIARY: Mild gallbladder wall thickening and hazy adjacent fat   stranding. Unremarkable liver.    SPLEEN: Unremarkable.    PANCREAS: Unremarkable.    ADRENAL GLANDS: Unchanged 1.6 cm right adrenal gland nodule. Unremarkable   left adrenal gland.    KIDNEYS: Symmetric renal enhancement bilaterally. No hydronephrosis. Left   renal cysts.    ABDOMINOPELVIC NODES: Persistent enlarged retroperitoneal lymph nodes   including a 2.6 x 1.6 cm aortocaval node.    PELVIC ORGANS: Unremarkable.    PERITONEUM/MESENTERY/BOWEL: No bowel obstruction, ascites or   pneumoperitoneum. Limited evaluation of the underdistended sigmoid colon.   Remaining colon appears unremarkable. Appendix not visualized; no focal   pericecal inflammation.    BONES/SOFT TISSUES: Degenerative changes of the spine noted. Subcutaneous   foci of gas in the ventral abdominal wall, likely injection sites.      IMPRESSION:  1.  Since March 5, 2024, new mild gallbladder wall thickening and hazy   adjacent fat stranding. Correlate with symptoms. A right upper quadrant   ultrasound can be obtained for further evaluation as clinically warranted.  2.  Stable enlarged retroperitoneal lymph nodes.  3.  Additional stable findings as above.    --- End of Report ---            SAMANTHA TELLO MD; Attending Radiologist  This document has been electronically signed. Apr 15 2024  4:23PM (04-15-24 @ 15:43)      CARDIOLOGY TESTING  12 Lead ECG:   Ventricular Rate 167 BPM    Atrial Rate 167 BPM    P-R Interval 150 ms    QRS Duration 102 ms    Q-T Interval 228 ms    QTC Calculation(Bazett) 380 ms    P Axis -11 degrees    R Axis 28 degrees    T Axis 130 degrees    Diagnosis Line Supraventricular tachycardia  Nonspecific ST and T wave abnormality  Abnormal ECG  Confirmed by Ermias Price ([id number]) on 4/14/2024 11:42:09 AM (04-14-24 @ 08:06)  12 Lead ECG:   Ventricular Rate 115 BPM    Atrial Rate 115 BPM    P-R Interval 134 ms    QRS Duration 98 ms    Q-T Interval 330 ms    QTC Calculation(Bazett) 456 ms    P Axis 28 degrees    R Axis 14 degrees    T Axis 24 degrees    Diagnosis Line Sinus tachycardiawith Premature atrial complexes  Minimal voltage criteria for LVH, may be normal variant ( R in aVL )  Nonspecific ST abnormality  Abnormal ECG    Confirmed by BRITNEY CASAS MD ([id number]) on 4/15/2024 7:05:53 AM (04-13-24 @ 18:42)      MEDICATIONS  cefTRIAXone   IVPB 2000 IV Intermittent every 24 hours  filgrastim-sndz (ZARXIO) Injectable 480 SubCutaneous every 24 hours  lactated ringers. 1000 IV Continuous <Continuous>  levothyroxine 137 Oral daily  metoprolol tartrate 12.5 Oral two times a day  metroNIDAZOLE    Tablet 500 Oral every 8 hours      WEIGHT  Weight (kg): 111.1 (04-13-24 @ 18:33)      ANTIBIOTICS:  cefTRIAXone   IVPB 2000 milliGRAM(s) IV Intermittent every 24 hours  metroNIDAZOLE    Tablet 500 milliGRAM(s) Oral every 8 hours      All available historical records have been reviewed

## 2024-04-16 NOTE — PROGRESS NOTE ADULT - ASSESSMENT
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 difficile in setting of pancytopenia, presents ED for evaluation of diarrhea     IMPRESSION  #Sepsis on admission T>101 P>90 WBC <4 ANC 30 , severe neutropenia due to Ecoli bacteremia suspect GI source    4/14 BCX NGTD     4/13 BCX + Ecoli non-esbl  Has a port  CXR no PNA   CTAP AP   1.  Since March 5, 2024, new mild gallbladder wall thickening and hazy   adjacent fat stranding. Correlate with symptoms. A right upper quadrant   ultrasound can be obtained for further evaluation as clinically warranted.  2.  Stable enlarged retroperitoneal lymph nodes.  3.  Additional stable findings as above.    #Hx Recent CDI  Norovirus indeterminate  s/p 14 days fidaxomicin  #Recent right fore arm abscess s/p I and D 3/14 - MRSA  #Melanoma  #Testicular seminoma on chemo   #Obesity BMI (kg/m2): 33.2  #Abx allergy: penicillin (Rash)  penicillin G benzathine (Rash)    Creatinine: 1.8 (04-14-24 @ 04:06)    Height (cm): 182.9 (04-13-24 @ 18:33)  Weight (kg): 111.1 (04-13-24 @ 18:33)    RECOMMENDATIONS  - RUQ US  - Ceftriaxone 2g q24h IV  - PO flagyl 500mg TID   - Heme/Onc      If any questions, please send a message or call on [employer] Teams  Please continue to update ID with any pertinent new laboratory, radiographic findings, or change in clinical status ASSESSMENT   68-year-old male with PMH HTN, hypothyroidism, melanoma status post resection, 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 difficile in setting of pancytopenia, presents ED for evaluation of diarrhea     IMPRESSION  #Sepsis on admission T>101 P>90 WBC <4 ANC 30 , severe neutropenia due to Ecoli bacteremia suspect GI source  Has L thigh abscess    4/14 BCX NGTD     4/13 BCX + Ecoli non-esbl  Has a port  CXR no PNA   CTAP AP   1.  Since March 5, 2024, new mild gallbladder wall thickening and hazy   adjacent fat stranding. Correlate with symptoms. A right upper quadrant   ultrasound can be obtained for further evaluation as clinically warranted.  2.  Stable enlarged retroperitoneal lymph nodes.  3.  Additional stable findings as above.  #Left thigh: two small areas ~2cm x2cm of induration and erythema to anterior and medial  s/p debridement   #Hx Recent CDI  Norovirus indeterminate  s/p 14 days fidaxomicin  #Recent right fore arm abscess s/p I and D 3/14 - MRSA  #Melanoma  #Testicular seminoma on chemo   #Obesity BMI (kg/m2): 33.2  #Abx allergy: penicillin (Rash)  penicillin G benzathine (Rash)    Creatinine: 1.8 (04-14-24 @ 04:06)    Height (cm): 182.9 (04-13-24 @ 18:33)  Weight (kg): 111.1 (04-13-24 @ 18:33)    RECOMMENDATIONS  - RUQ US  - f/u WCX  - Ceftriaxone 2g q24h IV  - PO flagyl 500mg TID   - Heme/Onc      If any questions, please send a message or call on [employer] Teams  Please continue to update ID with any pertinent new laboratory, radiographic findings, or change in clinical status ASSESSMENT   68-year-old male with PMH HTN, hypothyroidism, melanoma status post resection, 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 difficile in setting of pancytopenia, presents ED for evaluation of diarrhea     IMPRESSION  #Sepsis on admission T>101 P>90 WBC <4 ANC 30 , severe neutropenia due to Ecoli bacteremia suspect GI source  Has L thigh abscess    4/14 BCX NGTD     4/13 BCX + Ecoli non-esbl  Has a port- clean   clinically no evidence of cholecystitis   CXR no PNA   CTAP AP   1.  Since March 5, 2024, new mild gallbladder wall thickening and hazy   adjacent fat stranding. Correlate with symptoms. A right upper quadrant   ultrasound can be obtained for further evaluation as clinically warranted.  2.  Stable enlarged retroperitoneal lymph nodes.  3.  Additional stable findings as above.  #Left thigh: two small areas ~2cm x2cm of induration and erythema to anterior and medial  s/p debridement   #Hx Recent CDI  Norovirus indeterminate  s/p 14 days fidaxomicin  #Recent right fore arm abscess s/p I and D 3/14 - MRSA  #Melanoma  #Testicular seminoma on chemo   #Obesity BMI (kg/m2): 33.2  #Abx allergy: penicillin (Rash)  penicillin G benzathine (Rash)    Creatinine: 1.8 (04-14-24 @ 04:06)    Height (cm): 182.9 (04-13-24 @ 18:33)  Weight (kg): 111.1 (04-13-24 @ 18:33)    RECOMMENDATIONS  - RUQ US, clinically no evidence of cholecystitis   - MRSA PCR, previous +, CHG washes & mupirocin recommended   - Ceftriaxone 2g q24h IV  - ADD PO doxy 100mg BID given folliculitis/abscesses and hx +MRSA PCR   - PO flagyl 500mg TID   - Heme/Onc      If any questions, please send a message or call on [email or web address] Teams  Please continue to update ID with any pertinent new laboratory, radiographic findings, or change in clinical status

## 2024-04-16 NOTE — PHARMACOTHERAPY INTERVENTION NOTE - COMMENTS
Blood culture from 4/13 updated to E. coli with no resistance genes detected. Recommended de-escalating cefepime to ceftriaxone 2g IV q24h. D/W Dr. Castellanos - will also add PO metronidazole and obtain a CTAP since suspected GI source of bacteremia. 
Modified penicillin allergy history to state that patient tolerated ceftriaxone during this admission. 
Recommended to adjust vancomycin dose from 1500 mg IV q24h to 1250 mg IV q24h. As per PrecisePK calculations, the steady-state vancomycin AUC/HANH on the former dose is 684 mg/L*h, which is greater than the therapeutic range of 400 - 600 mg/L*h. Decreasing the dose is predicted to yield an AUC/HANH of 570 mg/L*h.    Thomas Bourgeois, PharmD, [facility]  Clinical Pharmacy Specialist, Infectious Diseases  Tele-Antimicrobial Stewardship Program (Tele-ASP)  Tele-ASP Phone: [phone]  
As per policy, ordered a vancomycin level for 4/15 AM in order to assist with vancomycin pharmacokinetic monitoring.    Thomas Bourgeois, PharmD, BCIDP  Clinical Pharmacy Specialist, Infectious Diseases  Tele-Antimicrobial Stewardship Program (Tele-ASP)  Tele-ASP Phone: [phone]  
I spoke with Dr Solis and recommended to adjust dose of Vancomycin from 1750 mg q12h to 1500 mg q24h as per pk calcualtions and gfr=40
Vancomycin 1500 mg q24h  Peak 28.4  Trough: 15.5

## 2024-04-16 NOTE — PROGRESS NOTE ADULT - SUBJECTIVE AND OBJECTIVE BOX
AM rounds     Burn following patient for left thigh small abscesses x 2 ; s/p bedside debridement on 4/15; patient reports he found another small abscess on his lower abdomen.   Patient also reported overnight the debrided sites bled a little for which nurse changed without issue.         Vital Signs Last 24 Hrs  T(C): 36.2 (16 Apr 2024 07:49), Max: 36.5 (15 Apr 2024 17:14)  T(F): 97.1 (16 Apr 2024 07:49), Max: 97.7 (15 Apr 2024 17:14)  HR: 68 (16 Apr 2024 07:49) (68 - 79)  BP: 122/61 (16 Apr 2024 07:49) (119/61 - 122/61)  BP(mean): 84 (16 Apr 2024 01:00) (84 - 84)  RR: 16 (16 Apr 2024 07:49) (16 - 18)  SpO2: 98% (16 Apr 2024 07:49) (98% - 98%)    Parameters below as of 16 Apr 2024 07:49  Patient On (Oxygen Delivery Method): room air          04-16                        7.1    1.55  )-----------( 13       ( 16 Apr 2024 06:02 )             21.1         EXAM:   Gen: well developed, well nourished appearing male ; overweight, NAD , on RA breathing comfortably  Wound: Left thigh with two ~2cm incisions (on anterior thigh and another medial thigh) , about 1cm deep, tissue appears pink, moist healthy, no purulence no bleeding , erythema improved  Additionally patient found another small abscess on his lower left abdomen ~1cm area of black eschar with surrounding small area of erythema, mild fluctuance no purulence, tender to palpation

## 2024-04-17 LAB
ANION GAP SERPL CALC-SCNC: 13 MMOL/L — SIGNIFICANT CHANGE UP (ref 7–14)
BASOPHILS # BLD AUTO: 0.04 K/UL — SIGNIFICANT CHANGE UP (ref 0–0.2)
BASOPHILS NFR BLD AUTO: 0.6 % — SIGNIFICANT CHANGE UP (ref 0–1)
BUN SERPL-MCNC: 22 MG/DL — HIGH (ref 10–20)
CALCIUM SERPL-MCNC: 8.2 MG/DL — LOW (ref 8.4–10.5)
CHLORIDE SERPL-SCNC: 102 MMOL/L — SIGNIFICANT CHANGE UP (ref 98–110)
CO2 SERPL-SCNC: 26 MMOL/L — SIGNIFICANT CHANGE UP (ref 17–32)
CREAT SERPL-MCNC: 0.8 MG/DL — SIGNIFICANT CHANGE UP (ref 0.7–1.5)
CULTURE RESULTS: SIGNIFICANT CHANGE UP
EGFR: 95 ML/MIN/1.73M2 — SIGNIFICANT CHANGE UP
EOSINOPHIL # BLD AUTO: 0 K/UL — SIGNIFICANT CHANGE UP (ref 0–0.7)
EOSINOPHIL NFR BLD AUTO: 0 % — SIGNIFICANT CHANGE UP (ref 0–8)
GLUCOSE SERPL-MCNC: 83 MG/DL — SIGNIFICANT CHANGE UP (ref 70–99)
HCT VFR BLD CALC: 22.4 % — LOW (ref 42–52)
HGB BLD-MCNC: 7.5 G/DL — LOW (ref 14–18)
IMM GRANULOCYTES NFR BLD AUTO: 7.9 % — HIGH (ref 0.1–0.3)
LYMPHOCYTES # BLD AUTO: 0.81 K/UL — LOW (ref 1.2–3.4)
LYMPHOCYTES # BLD AUTO: 12 % — LOW (ref 20.5–51.1)
MAGNESIUM SERPL-MCNC: 2 MG/DL — SIGNIFICANT CHANGE UP (ref 1.8–2.4)
MCHC RBC-ENTMCNC: 31.1 PG — HIGH (ref 27–31)
MCHC RBC-ENTMCNC: 33.5 G/DL — SIGNIFICANT CHANGE UP (ref 32–37)
MCV RBC AUTO: 92.9 FL — SIGNIFICANT CHANGE UP (ref 80–94)
MONOCYTES # BLD AUTO: 0.97 K/UL — HIGH (ref 0.1–0.6)
MONOCYTES NFR BLD AUTO: 14.4 % — HIGH (ref 1.7–9.3)
NEUTROPHILS # BLD AUTO: 4.4 K/UL — SIGNIFICANT CHANGE UP (ref 1.4–6.5)
NEUTROPHILS NFR BLD AUTO: 65.1 % — SIGNIFICANT CHANGE UP (ref 42.2–75.2)
NRBC # BLD: 0 /100 WBCS — SIGNIFICANT CHANGE UP (ref 0–0)
PLATELET # BLD AUTO: 13 K/UL — CRITICAL LOW (ref 130–400)
PMV BLD: 12.1 FL — HIGH (ref 7.4–10.4)
POTASSIUM SERPL-MCNC: 3.4 MMOL/L — LOW (ref 3.5–5)
POTASSIUM SERPL-SCNC: 3.4 MMOL/L — LOW (ref 3.5–5)
RBC # BLD: 2.41 M/UL — LOW (ref 4.7–6.1)
RBC # FLD: 17.2 % — HIGH (ref 11.5–14.5)
SODIUM SERPL-SCNC: 141 MMOL/L — SIGNIFICANT CHANGE UP (ref 135–146)
SPECIMEN SOURCE: SIGNIFICANT CHANGE UP
WBC # BLD: 6.75 K/UL — SIGNIFICANT CHANGE UP (ref 4.8–10.8)
WBC # FLD AUTO: 6.75 K/UL — SIGNIFICANT CHANGE UP (ref 4.8–10.8)

## 2024-04-17 PROCEDURE — 99233 SBSQ HOSP IP/OBS HIGH 50: CPT

## 2024-04-17 PROCEDURE — 10060 I&D ABSCESS SIMPLE/SINGLE: CPT

## 2024-04-17 RX ORDER — POTASSIUM CHLORIDE 20 MEQ
40 PACKET (EA) ORAL ONCE
Refills: 0 | Status: COMPLETED | OUTPATIENT
Start: 2024-04-17 | End: 2024-04-17

## 2024-04-17 RX ADMIN — SODIUM CHLORIDE 100 MILLILITER(S): 9 INJECTION, SOLUTION INTRAVENOUS at 02:10

## 2024-04-17 RX ADMIN — Medication 500 MILLIGRAM(S): at 15:49

## 2024-04-17 RX ADMIN — SODIUM CHLORIDE 100 MILLILITER(S): 9 INJECTION, SOLUTION INTRAVENOUS at 22:50

## 2024-04-17 RX ADMIN — Medication 40 MILLIEQUIVALENT(S): at 11:16

## 2024-04-17 RX ADMIN — Medication 125 MILLIGRAM(S): at 17:45

## 2024-04-17 RX ADMIN — CEFTRIAXONE 100 MILLIGRAM(S): 500 INJECTION, POWDER, FOR SOLUTION INTRAMUSCULAR; INTRAVENOUS at 15:49

## 2024-04-17 RX ADMIN — CHLORHEXIDINE GLUCONATE 1 APPLICATION(S): 213 SOLUTION TOPICAL at 11:20

## 2024-04-17 RX ADMIN — Medication 125 MILLIGRAM(S): at 11:17

## 2024-04-17 RX ADMIN — MUPIROCIN 1 APPLICATION(S): 20 OINTMENT TOPICAL at 17:47

## 2024-04-17 RX ADMIN — Medication 480 MICROGRAM(S): at 11:16

## 2024-04-17 RX ADMIN — Medication 100 MILLIGRAM(S): at 03:19

## 2024-04-17 RX ADMIN — Medication 12.5 MILLIGRAM(S): at 17:45

## 2024-04-17 RX ADMIN — Medication 500 MILLIGRAM(S): at 22:50

## 2024-04-17 RX ADMIN — Medication 100 MILLIGRAM(S): at 18:00

## 2024-04-17 RX ADMIN — Medication 137 MICROGRAM(S): at 05:33

## 2024-04-17 RX ADMIN — Medication 125 MILLIGRAM(S): at 08:11

## 2024-04-17 RX ADMIN — Medication 12.5 MILLIGRAM(S): at 05:32

## 2024-04-17 RX ADMIN — Medication 125 MILLIGRAM(S): at 23:13

## 2024-04-17 RX ADMIN — Medication 125 MILLIGRAM(S): at 02:10

## 2024-04-17 NOTE — PROGRESS NOTE ADULT - ASSESSMENT
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 difficile in setting of pancytopenia, presents ED for evaluation of diarrhea     IMPRESSION  #Sepsis on admission T>101 P>90 WBC <4 ANC 30 , severe neutropenia due to Ecoli bacteremia suspect GI source, translocation  Has L thigh abscess    4/15 BCX NGTD     4/14 BCX NGTD     4/13 BCX + Ecoli non-esbl  Has a port- clean , high doubt this is the source   clinically no evidence of cholecystitis   CXR no PNA   < from: US Abdomen Upper Quadrant Right (04.16.24 @ 14:23) >  Contracted gallbladder, unchanged from CT scan.  CTAP AP   1.  Since March 5, 2024, new mild gallbladder wall thickening and hazy   adjacent fat stranding. Correlate with symptoms. A right upper quadrant   ultrasound can be obtained for further evaluation as clinically warranted.  2.  Stable enlarged retroperitoneal lymph nodes.  3.  Additional stable findings as above.  #Left thigh: two small areas ~2cm x2cm of induration and erythema to anterior and medial  s/p debridement   #Hx Recent CDI and repeat testing also indeterminate  Norovirus indeterminate  s/p 14 days fidaxomicin  #Recent right fore arm abscess s/p I and D 3/14 - MRSA  #Melanoma  #Testicular seminoma on chemo   #Obesity BMI (kg/m2): 33.2  #Abx allergy: penicillin (Rash)  penicillin G benzathine (Rash)    Creatinine: 1.8 (04-14-24 @ 04:06)    Height (cm): 182.9 (04-13-24 @ 18:33)  Weight (kg): 111.1 (04-13-24 @ 18:33)  QTC Calculation(Bazett) 456 ms    RECOMMENDATIONS  - Difficult to interpret 2 indeterminate Cdiff tests, especially in an immunocompromised patient with diarrhea  Plan for prolonged vanc PO taper:  Vanc 125mg PO four times daily x 14 days,   Vanc 125mg PO BID x 7 days  Vanc 125mg PO daily x 7 days  Vanc 125mg PO q48h x 2-8 weeks   - MRSA PCR, previous +, CHG washes & mupirocin recommended   - Ceftriaxone 2g q24h IV on D/C PO cipro 500mg BID x 14 days (given port) end 4/27   - PO flagyl 500mg TID  end 4/27   - PO doxy 100mg BID given folliculitis/abscesses and hx +MRSA PCR x 7 days end 4/23  - Heme/Onc  - Patient can follow-up with Dr. Mitra Nieves on Wednesdays [phone] ; [facility] for Cdiff and bacteremia     If any questions, please send a message or call on [employer] Teams  Please continue to update ID with any pertinent new laboratory, radiographic findings, or change in clinical status

## 2024-04-17 NOTE — PROGRESS NOTE ADULT - ASSESSMENT
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 diff colitis s/p fidaxomicin course was admitted to SDU for severe pancytopenia and sepsis secondary to suspected C. diff infection. Pt also with E. coli bacteremia and abscesses of left thigh and right side of abdomen s/p I&D by Burn.     1. Sepsis present on admission due to recurrent severe C. difficile infection  febrile neutropenia   Left LE abscess sp I/D 04/15  right abdominal wall abscess s/p I&D 4/17  E. Coli bacteremia  - flu/covid/RSV negative; CXR: no evidence of pneumonia   - repeat blood cultures now negative  - blood culture with pansensitive E. coli  - f/u abscess culture today  - neutropenia now resolved  - pt afebrile  - diarrhea improving  - ID f/u appreciated: prolonged vanc PO taper:  Vanc 125mg PO four times daily x 14 days,   Vanc 125mg PO BID x 7 days  Vanc 125mg PO daily x 7 days  Vanc 125mg PO q48h x 2-8 weeks   - MRSA PCR, previous +, CHG washes & mupirocin recommended   - Ceftriaxone 2g q24h IV and on D/C PO cipro 500mg BID x 14 days (given port) end 4/27   - PO flagyl 500mg TID  end 4/27   - PO doxy 100mg BID given folliculitis/abscesses and hx +MRSA PCR x 7 days end 4/23   wound care per Burn    2. NSVT  Resolved s/p Magnesium repletion  f/u Echo    3. Severe pancytopenia   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  last chemo infusion 4/5 after being discharged from [facility] last admission (s/p cycle 4 of Cisplatin + Etoposide on 4/5/24 followed by Neulasta Onbody injection)  s/p 1u platelets and 3u prbc this admission  HemOnc eval appreciated  Zarxio 480 mcg daily until ANC >1000  WBC counts improving  Monitor CBC  Platelet transfusion to keep >10 or if pt is bleeding  PRBC transfusion to keep >7  keep active T&S    4. LARY , resolved  Likely pre-renal, improved with IVF    5. Hypokalemia repleted    6. HTN on metoprolol    7. Hypothyroidism  on levothyroxine     8. DVT prophylaxis - OOB and ambulate      full code  guarded prognosis - improving       Progress Note Handoff  Pending (specify): continue IV abx, f/u wound culture, labs in AM, Burn f/u  pt informed of the plan of care  Disposition: Home

## 2024-04-17 NOTE — PROGRESS NOTE ADULT - SUBJECTIVE AND OBJECTIVE BOX
MALIA GAUTHIER  68y Male    INTERVAL HPI/OVERNIGHT EVENTS:    pt states stool is getting more formed  no fever, pain, SOB, N/V    T(F): 97.7 (04-17-24 @ 15:26), Max: 98.2 (04-17-24 @ 00:14)  HR: 74 (04-17-24 @ 15:26) (68 - 74)  BP: 131/77 (04-17-24 @ 15:26) (131/77 - 134/73)  RR: 18 (04-17-24 @ 15:26) (18 - 18)  SpO2: 97% (04-17-24 @ 15:26) (97% - 98%) on RA    PHYSICAL EXAM:  GENERAL: NAD  HEAD:  Normocephalic  EYES:  conjunctiva and sclera clear  ENMT: Moist mucous membranes  NERVOUS SYSTEM:  Alert, awake, Good concentration  CHEST/LUNG: CTA b/l  HEART: Regular rate and rhythm  ABDOMEN: Soft, Nontender, Nondistended; Bowel sounds present  area of erythema and small drainage of right lower abdomen  EXTREMITIES:   No edema LE  left LE with dressing over thigh (s/p I&D of abscesses)  SKIN: pale    Consultant(s) Notes Reviewed:  [x ] YES  [ ] NO  Care Discussed with Consultants/Other Providers [ x] YES  [ ] NO    MEDICATIONS  (STANDING):  cefTRIAXone   IVPB 2000 milliGRAM(s) IV Intermittent every 24 hours  chlorhexidine 2% Cloths 1 Application(s) Topical daily  doxycycline monohydrate Capsule 100 milliGRAM(s) Oral every 12 hours  filgrastim-sndz (ZARXIO) Injectable 480 MICROGram(s) SubCutaneous every 24 hours  lactated ringers. 1000 milliLiter(s) (100 mL/Hr) IV Continuous <Continuous>  levothyroxine 137 MICROGram(s) Oral daily  metoprolol tartrate 12.5 milliGRAM(s) Oral two times a day  metroNIDAZOLE    Tablet 500 milliGRAM(s) Oral every 8 hours  mupirocin 2% Nasal 1 Application(s) Both Nostrils two times a day  vancomycin    Solution 125 milliGRAM(s) Oral every 6 hours    MEDICATIONS  (PRN):  acetaminophen     Tablet .. 650 milliGRAM(s) Oral every 6 hours PRN Temp greater or equal to 38C (100.4F)      LABS:                        7.5    6.75  )-----------( 13       ( 17 Apr 2024 07:03 )             22.4     04-17    141  |  102  |  22<H>  ----------------------------<  83  3.4<L>   |  26  |  0.8    Ca    8.2<L>      17 Apr 2024 07:03  Phos  1.9     04-16  Mg     2.0     04-17    TPro  4.6<L>  /  Alb  2.9<L>  /  TBili  0.4  /  DBili  x   /  AST  9   /  ALT  9   /  AlkPhos  57  04-16        Culture - Blood (collected 15 Apr 2024 11:21)  Source: .Blood None  Preliminary Report (16 Apr 2024 22:03):    No growth at 24 hours      Lactate, Blood: 1.4 mmol/L (04-13 @ 21:20)          RADIOLOGY & ADDITIONAL TESTS:    Imaging or report Personally Reviewed:  [x ] YES  [ ] NO    < from: US Abdomen Upper Quadrant Right (04.16.24 @ 14:23) >  IMPRESSION:  Contracted gallbladder, unchanged from CT scan.      < end of copied text >      < from: CT Abdomen and Pelvis w/ Oral Cont and w/ IV Cont (04.15.24 @ 15:43) >  IMPRESSION:  1.  Since March 5, 2024, new mild gallbladder wall thickening and hazy   adjacent fat stranding. Correlate with symptoms. A right upper quadrant   ultrasound can be obtained for further evaluation as clinically warranted.  2.  Stable enlarged retroperitoneal lymph nodes.  3.  Additional stable findings as above.    < end of copied text >      < from: Xray Chest 1 View- PORTABLE-Urgent (04.13.24 @ 20:26) >    Impression:  No evidence of focal consolidation, pleural effusion or pneumothorax.    < end of copied text >              Case discussed with resident and RN today    Care discussed with pt

## 2024-04-17 NOTE — PROGRESS NOTE ADULT - SUBJECTIVE AND OBJECTIVE BOX
MALIA GAUTHIER  68y, Male  Allergy: penicillin (Rash)  penicillin G benzathine (Rash)      LOS  4d    CHIEF COMPLAINT: colitis (16 Apr 2024 12:01)      INTERVAL EVENTS/HPI  - No acute events overnight, decreased diarrhea  - T(F): , Max: 98.2 (04-17-24 @ 00:14)  - Denies any worsening symptoms  - Tolerating medication  - WBC Count: 6.75 (04-17-24 @ 07:03)  WBC Count: 3.01 (04-16-24 @ 16:23)     - Creatinine: 0.8 (04-17-24 @ 07:03)  Creatinine: 0.9 (04-16-24 @ 06:02)       ROS  General: Denies rigors, nightsweats  HEENT: Denies headache, rhinorrhea, sore throat, eye pain  CV: Denies CP, palpitations  PULM: Denies wheezing, hemoptysis  GI: Denies hematemesis, hematochezia, melena  : Denies discharge, hematuria  MSK: Denies arthralgias, myalgias  SKIN: Denies rash, lesions  NEURO: Denies paresthesias, weakness  PSYCH: Denies depression, anxiety    VITALS:  T(F): 97.8, Max: 98.2 (04-17-24 @ 00:14)  HR: 68  BP: 131/77  RR: 18Vital Signs Last 24 Hrs  T(C): 36.6 (17 Apr 2024 08:00), Max: 36.8 (17 Apr 2024 00:14)  T(F): 97.8 (17 Apr 2024 08:00), Max: 98.2 (17 Apr 2024 00:14)  HR: 68 (17 Apr 2024 08:00) (68 - 77)  BP: 131/77 (17 Apr 2024 08:00) (128/- - 141/65)  BP(mean): 64 (16 Apr 2024 15:10) (64 - 64)  RR: 18 (17 Apr 2024 08:00) (18 - 18)  SpO2: 98% (17 Apr 2024 08:00) (95% - 98%)    Parameters below as of 17 Apr 2024 04:49  Patient On (Oxygen Delivery Method): room air        PHYSICAL EXAM:  Gen: NAD, resting in bed  HEENT: Normocephalic, atraumatic  Neck: supple, no lymphadenopathy  CV: Regular rate & regular rhythm  Lungs: decreased BS at bases, no fremitus  Abdomen: Soft, BS present, RLQ eschar erythema, no RUQ tenderness to palpation   Ext: Warm, well perfused  Neuro: non focal, awake  Skin: no rash, L thigh wound open, clean   Lines: no phlebitis port    FH: Non-contributory  Social Hx: Non-contributory    TESTS & MEASUREMENTS:                        7.5    6.75  )-----------( 13       ( 17 Apr 2024 07:03 )             22.4     04-17    141  |  102  |  22<H>  ----------------------------<  83  3.4<L>   |  26  |  0.8    Ca    8.2<L>      17 Apr 2024 07:03  Phos  1.9     04-16  Mg     2.0     04-17    TPro  4.6<L>  /  Alb  2.9<L>  /  TBili  0.4  /  DBili  x   /  AST  9   /  ALT  9   /  AlkPhos  57  04-16      LIVER FUNCTIONS - ( 16 Apr 2024 06:02 )  Alb: 2.9 g/dL / Pro: 4.6 g/dL / ALK PHOS: 57 U/L / ALT: 9 U/L / AST: 9 U/L / GGT: x           Urinalysis Basic - ( 17 Apr 2024 07:03 )    Color: x / Appearance: x / SG: x / pH: x  Gluc: 83 mg/dL / Ketone: x  / Bili: x / Urobili: x   Blood: x / Protein: x / Nitrite: x   Leuk Esterase: x / RBC: x / WBC x   Sq Epi: x / Non Sq Epi: x / Bacteria: x        Culture - Blood (collected 04-15-24 @ 11:21)  Source: .Blood None  Preliminary Report (04-16-24 @ 22:03):    No growth at 24 hours    Culture - Urine (collected 04-14-24 @ 13:03)  Source: Clean Catch Clean Catch (Midstream)  Final Report (04-16-24 @ 09:14):    No growth    Culture - Blood (collected 04-14-24 @ 11:26)  Source: .Blood None  Preliminary Report (04-16-24 @ 22:02):    No growth at 48 Hours    Culture - Blood (collected 04-13-24 @ 21:20)  Source: .Blood Blood-Peripheral  Gram Stain (04-14-24 @ 17:20):    Growth in anaerobic bottle: Gram Negative Rods  Final Report (04-16-24 @ 07:11):    Growth in anaerobic bottle: Escherichia coli    Direct identification is available within approximately 3-5    hours either by Blood Panel Multiplexed PCR or Direct    MALDI-TOF. Details: [email or web address]  Organism: Blood Culture PCR  Escherichia coli (04-16-24 @ 07:11)  Organism: Escherichia coli (04-16-24 @ 07:11)      Method Type: HANH      -  Ampicillin: S <=8 These ampicillin results predict results for amoxicillin      -  Ampicillin/Sulbactam: S <=4/2      -  Aztreonam: S <=4      -  Cefazolin: S <=2      -  Cefepime: S <=2      -  Cefoxitin: S <=8      -  Ceftriaxone: S <=1      -  Ciprofloxacin: S <=0.25      -  Ertapenem: S <=0.5      -  Gentamicin: S <=2      -  Imipenem: S <=1      -  Levofloxacin: S <=0.5      -  Meropenem: S <=1      -  Piperacillin/Tazobactam: S <=8      -  Tobramycin: S <=2      -  Trimethoprim/Sulfamethoxazole: S <=0.5/9.5  Organism: Blood Culture PCR (04-16-24 @ 07:11)      Method Type: PCR      -  Escherichia coli: Detec        Lactate, Blood: 1.4 mmol/L (04-13-24 @ 21:20)      INFECTIOUS DISEASES TESTING  Vancomycin Level, Random: 11.1 (04-15-24 @ 05:51)  Vancomycin Level, Trough: 7.8 (03-11-24 @ 07:04)  Hepatitis C Virus Interpretation Result: Nonreact (02-12-24 @ 06:44)  MRSA PCR Result.: Positive (02-11-24 @ 16:00)      INFLAMMATORY MARKERS  C-Reactive Protein, Serum: 92.6 mg/L (02-12-24 @ 06:44)      RADIOLOGY & ADDITIONAL TESTS:  I have personally reviewed the last available Chest xray  CXR      CT  CT Abdomen and Pelvis w/ Oral Cont and w/ IV Cont:   ACC: [id number] EXAM:  CT ABDOMEN AND PELVIS OC IC   ORDERED BY: YORDAN   MAY     PROCEDURE DATE:  04/15/2024          INTERPRETATION:  CLINICAL STATEMENT: Colitis    TECHNIQUE: Contiguous axial CT images were obtained from the lower chest   to thepubic symphysis following administration of intravenous contrast.    100 cc administered of Omnipaque 350 (0 cc discarded).  Oral contrast was   administered.  Reformatted images in the coronal and sagittal planes were   acquired.    COMPARISON CT: March 5, 2024    OTHER STUDIES USED FOR CORRELATION: None.      FINDINGS:    LOWER CHEST: Unremarkable.    HEPATOBILIARY: Mild gallbladder wall thickening and hazy adjacent fat   stranding. Unremarkable liver.    SPLEEN: Unremarkable.    PANCREAS: Unremarkable.    ADRENAL GLANDS: Unchanged 1.6 cm right adrenal gland nodule. Unremarkable   left adrenal gland.    KIDNEYS: Symmetric renal enhancement bilaterally. No hydronephrosis. Left   renal cysts.    ABDOMINOPELVIC NODES: Persistent enlarged retroperitoneal lymph nodes   including a 2.6 x 1.6 cm aortocaval node.    PELVIC ORGANS: Unremarkable.    PERITONEUM/MESENTERY/BOWEL: No bowel obstruction, ascites or   pneumoperitoneum. Limited evaluation of the underdistended sigmoid colon.   Remaining colon appears unremarkable. Appendix not visualized; no focal   pericecal inflammation.    BONES/SOFT TISSUES: Degenerative changes of the spine noted. Subcutaneous   foci of gas in the ventral abdominal wall, likely injection sites.      IMPRESSION:  1.  Since March 5, 2024, new mild gallbladder wall thickening and hazy   adjacent fat stranding. Correlate with symptoms. A right upper quadrant   ultrasound can be obtained for further evaluation as clinically warranted.  2.  Stable enlarged retroperitoneal lymph nodes.  3.  Additional stable findings as above.    --- End of Report ---            SAMANTHA TELLO MD; Attending Radiologist  This document has been electronically signed. Apr 15 2024  4:23PM (04-15-24 @ 15:43)      CARDIOLOGY TESTING  12 Lead ECG:   Ventricular Rate 167 BPM    Atrial Rate 167 BPM    P-R Interval 150 ms    QRS Duration 102 ms    Q-T Interval 228 ms    QTC Calculation(Bazett) 380 ms    P Axis -11 degrees    R Axis 28 degrees    T Axis 130 degrees    Diagnosis Line Supraventricular tachycardia  Nonspecific ST and T wave abnormality  Abnormal ECG  Confirmed by Ermias Price ([id number]) on 4/14/2024 11:42:09 AM (04-14-24 @ 08:06)  12 Lead ECG:   Ventricular Rate 115 BPM    Atrial Rate 115 BPM    P-R Interval 134 ms    QRS Duration 98 ms    Q-T Interval 330 ms    QTC Calculation(Bazett) 456 ms    P Axis 28 degrees    R Axis 14 degrees    T Axis 24 degrees    Diagnosis Line Sinus tachycardiawith Premature atrial complexes  Minimal voltage criteria for LVH, may be normal variant ( R in aVL )  Nonspecific ST abnormality  Abnormal ECG    Confirmed by BRITNEY CASAS MD ([id number]) on 4/15/2024 7:05:53 AM (04-13-24 @ 18:42)      MEDICATIONS  cefTRIAXone   IVPB 2000 IV Intermittent every 24 hours  chlorhexidine 2% Cloths 1 Topical daily  doxycycline monohydrate Capsule 100 Oral every 12 hours  filgrastim-sndz (ZARXIO) Injectable 480 SubCutaneous every 24 hours  lactated ringers. 1000 IV Continuous <Continuous>  levothyroxine 137 Oral daily  metoprolol tartrate 12.5 Oral two times a day  metroNIDAZOLE    Tablet 500 Oral every 8 hours  mupirocin 2% Nasal 1 Both Nostrils two times a day  vancomycin    Solution 125 Oral every 6 hours      WEIGHT  Weight (kg): 111.1 (04-13-24 @ 18:33)  Creatinine: 0.8 mg/dL (04-17-24 @ 07:03)      ANTIBIOTICS:  cefTRIAXone   IVPB 2000 milliGRAM(s) IV Intermittent every 24 hours  doxycycline monohydrate Capsule 100 milliGRAM(s) Oral every 12 hours  metroNIDAZOLE    Tablet 500 milliGRAM(s) Oral every 8 hours  vancomycin    Solution 125 milliGRAM(s) Oral every 6 hours      All available historical records have been reviewed

## 2024-04-17 NOTE — PROCEDURE NOTE - NSICDXPROCEDURE_GEN_ALL_CORE_FT
PROCEDURES:  Debridement of necrotic tissue of abdominal wall 19-Apr-2024 17:53:44 I & D and excisional debridement of necrotic tissue infercted abdominal wall  site  2 x 1cm - including subcutis Eugenia Mobley

## 2024-04-18 LAB
ANION GAP SERPL CALC-SCNC: 11 MMOL/L — SIGNIFICANT CHANGE UP (ref 7–14)
BASOPHILS # BLD AUTO: 0.2 K/UL — SIGNIFICANT CHANGE UP (ref 0–0.2)
BASOPHILS NFR BLD AUTO: 0.9 % — SIGNIFICANT CHANGE UP (ref 0–1)
BUN SERPL-MCNC: 13 MG/DL — SIGNIFICANT CHANGE UP (ref 10–20)
CALCIUM SERPL-MCNC: 8.3 MG/DL — LOW (ref 8.4–10.5)
CHLORIDE SERPL-SCNC: 102 MMOL/L — SIGNIFICANT CHANGE UP (ref 98–110)
CO2 SERPL-SCNC: 27 MMOL/L — SIGNIFICANT CHANGE UP (ref 17–32)
CREAT SERPL-MCNC: 0.9 MG/DL — SIGNIFICANT CHANGE UP (ref 0.7–1.5)
EGFR: 92 ML/MIN/1.73M2 — SIGNIFICANT CHANGE UP
EOSINOPHIL # BLD AUTO: 0 K/UL — SIGNIFICANT CHANGE UP (ref 0–0.7)
EOSINOPHIL NFR BLD AUTO: 0 % — SIGNIFICANT CHANGE UP (ref 0–8)
GLUCOSE SERPL-MCNC: 91 MG/DL — SIGNIFICANT CHANGE UP (ref 70–99)
GRAM STN FLD: ABNORMAL
GRAM STN FLD: SIGNIFICANT CHANGE UP
HCT VFR BLD CALC: 23.7 % — LOW (ref 42–52)
HGB BLD-MCNC: 7.8 G/DL — LOW (ref 14–18)
LYMPHOCYTES # BLD AUTO: 1.19 K/UL — LOW (ref 1.2–3.4)
LYMPHOCYTES # BLD AUTO: 5.3 % — LOW (ref 20.5–51.1)
MAGNESIUM SERPL-MCNC: 0.9 MG/DL — LOW (ref 1.8–2.4)
MAGNESIUM SERPL-MCNC: 2.1 MG/DL — SIGNIFICANT CHANGE UP (ref 1.8–2.4)
MCHC RBC-ENTMCNC: 31.5 PG — HIGH (ref 27–31)
MCHC RBC-ENTMCNC: 32.9 G/DL — SIGNIFICANT CHANGE UP (ref 32–37)
MCV RBC AUTO: 95.6 FL — HIGH (ref 80–94)
MONOCYTES # BLD AUTO: 1.58 K/UL — HIGH (ref 0.1–0.6)
MONOCYTES NFR BLD AUTO: 7 % — SIGNIFICANT CHANGE UP (ref 1.7–9.3)
NEUTROPHILS # BLD AUTO: 18.16 K/UL — HIGH (ref 1.4–6.5)
NEUTROPHILS NFR BLD AUTO: 76.3 % — HIGH (ref 42.2–75.2)
PLATELET # BLD AUTO: 19 K/UL — CRITICAL LOW (ref 130–400)
PMV BLD: 11.1 FL — HIGH (ref 7.4–10.4)
POTASSIUM SERPL-MCNC: 3.8 MMOL/L — SIGNIFICANT CHANGE UP (ref 3.5–5)
POTASSIUM SERPL-SCNC: 3.8 MMOL/L — SIGNIFICANT CHANGE UP (ref 3.5–5)
RBC # BLD: 2.48 M/UL — LOW (ref 4.7–6.1)
RBC # FLD: 17.2 % — HIGH (ref 11.5–14.5)
SODIUM SERPL-SCNC: 140 MMOL/L — SIGNIFICANT CHANGE UP (ref 135–146)
SPECIMEN SOURCE: SIGNIFICANT CHANGE UP
WBC # BLD: 22.5 K/UL — HIGH (ref 4.8–10.8)
WBC # FLD AUTO: 22.5 K/UL — HIGH (ref 4.8–10.8)

## 2024-04-18 PROCEDURE — 99232 SBSQ HOSP IP/OBS MODERATE 35: CPT

## 2024-04-18 PROCEDURE — 93970 EXTREMITY STUDY: CPT | Mod: 26

## 2024-04-18 PROCEDURE — 99024 POSTOP FOLLOW-UP VISIT: CPT

## 2024-04-18 RX ORDER — MAGNESIUM SULFATE 500 MG/ML
2 VIAL (ML) INJECTION
Refills: 0 | Status: COMPLETED | OUTPATIENT
Start: 2024-04-18 | End: 2024-04-18

## 2024-04-18 RX ADMIN — Medication 25 GRAM(S): at 10:18

## 2024-04-18 RX ADMIN — Medication 137 MICROGRAM(S): at 05:27

## 2024-04-18 RX ADMIN — Medication 500 MILLIGRAM(S): at 05:28

## 2024-04-18 RX ADMIN — Medication 25 GRAM(S): at 11:33

## 2024-04-18 RX ADMIN — Medication 12.5 MILLIGRAM(S): at 05:28

## 2024-04-18 RX ADMIN — Medication 125 MILLIGRAM(S): at 11:15

## 2024-04-18 RX ADMIN — Medication 125 MILLIGRAM(S): at 23:03

## 2024-04-18 RX ADMIN — CEFTRIAXONE 100 MILLIGRAM(S): 500 INJECTION, POWDER, FOR SOLUTION INTRAMUSCULAR; INTRAVENOUS at 13:28

## 2024-04-18 RX ADMIN — MUPIROCIN 1 APPLICATION(S): 20 OINTMENT TOPICAL at 05:28

## 2024-04-18 RX ADMIN — Medication 480 MICROGRAM(S): at 11:33

## 2024-04-18 RX ADMIN — Medication 500 MILLIGRAM(S): at 21:30

## 2024-04-18 RX ADMIN — Medication 25 GRAM(S): at 17:04

## 2024-04-18 RX ADMIN — MUPIROCIN 1 APPLICATION(S): 20 OINTMENT TOPICAL at 17:03

## 2024-04-18 RX ADMIN — Medication 500 MILLIGRAM(S): at 13:28

## 2024-04-18 RX ADMIN — CHLORHEXIDINE GLUCONATE 1 APPLICATION(S): 213 SOLUTION TOPICAL at 11:15

## 2024-04-18 RX ADMIN — Medication 125 MILLIGRAM(S): at 17:02

## 2024-04-18 RX ADMIN — Medication 25 GRAM(S): at 13:28

## 2024-04-18 RX ADMIN — Medication 125 MILLIGRAM(S): at 05:28

## 2024-04-18 RX ADMIN — Medication 12.5 MILLIGRAM(S): at 17:03

## 2024-04-18 RX ADMIN — Medication 100 MILLIGRAM(S): at 05:28

## 2024-04-18 RX ADMIN — Medication 100 MILLIGRAM(S): at 17:03

## 2024-04-18 NOTE — PROGRESS NOTE ADULT - ASSESSMENT
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 diff colitis s/p fidaxomicin course was admitted to SDU for severe pancytopenia and sepsis secondary to suspected C. diff infection. Pt also with E. coli bacteremia and abscesses of left thigh and right side of abdomen s/p I&D by Burn.     1. Sepsis present on admission due to recurrent severe C. difficile infection  febrile neutropenia   Left LE abscess sp I/D 04/15  right abdominal wall abscess s/p I&D 4/17  E. Coli bacteremia  - flu/covid/RSV negative; CXR: no evidence of pneumonia   - repeat blood cultures now negative  - blood culture with pansensitive E. coli  - f/u abscess culture today  - neutropenia now resolved  - pt afebrile  - diarrhea improving  - ID f/u appreciated: prolonged vanc PO taper:  Vanc 125mg PO four times daily x 14 days,   Vanc 125mg PO BID x 7 days  Vanc 125mg PO daily x 7 days  Vanc 125mg PO q48h x 2-8 weeks   - MRSA PCR, previous +, CHG washes & mupirocin recommended   - Ceftriaxone 2g q24h IV and on D/C PO cipro 500mg BID x 14 days (given port) end 4/27   - PO flagyl 500mg TID  end 4/27   - PO doxy 100mg BID given folliculitis/abscesses and hx +MRSA PCR x 7 days end 4/23   wound care per Burn    2. NSVT  Resolved s/p Magnesium repletion  f/u Echo    3. Severe pancytopenia   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  last chemo infusion 4/5 after being discharged from [facility] last admission (s/p cycle 4 of Cisplatin + Etoposide on 4/5/24 followed by Neulasta Onbody injection)  s/p 1u platelets and 3u prbc this admission  HemOnc eval appreciated  Zarxio 480 mcg daily until ANC >1000  WBC counts improving  Monitor CBC  Platelet transfusion to keep >10 or if pt is bleeding  PRBC transfusion to keep >7  keep active T&S    # LARY , resolved  Likely pre-renal  - s/p  IVF    #Hypokalemia   -repleted    # HTN  - on metoprolol    #Hypothyroidism  on levothyroxine     # DVT prophylaxis   - OOB and ambulate  - none due to low platelets        Progress Note Handoff  Pending (specify): continue IV abx, f/u wound culture, labs in AM, Burn f/u  pt informed of the plan of care  Disposition: Home   67 y/o man with PMH of HTN, hypothyroidism, melanoma s/p resection, 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 diff colitis s/p fidaxomicin course was admitted to SDU for severe pancytopenia and sepsis secondary to suspected C. diff infection. Pt also with E. coli bacteremia and abscesses of left thigh and right side of abdomen s/p I&D by Burn.     #Sepsis present on admission due to recurrent severe C. difficile infection  #febrile neutropenia   #Left LE abscess sp I/D 04/15  #right abdominal wall abscess s/p I&D 4/17  #E. Coli bacteremia  - flu/covid/RSV negative; CXR: no evidence of pneumonia   - repeat blood cultures now negative  - blood culture with pansensitive E. coli  - abscess culture no organisms seen   - stool cx neg for parasites and ova  - C. diff toxins indeterminate and C. diff PCR positive n  - neutropenia now resolved  - diarrhea improving  - ID f/u appreciated: prolonged vanc PO taper:  Vanc 125mg PO four times daily x 14 days,   Vanc 125mg PO BID x 7 days  Vanc 125mg PO daily x 7 days  Vanc 125mg PO q48h x 2-8 weeks   - MRSA PCR, previous +, CHG washes & mupirocin recommended   - Ceftriaxone 2g q24h IV and on D/C PO cipro 500mg BID x 14 days (given port) end 4/27   - PO flagyl 500mg TID  end 4/27   - PO doxy 100mg BID given folliculitis/abscesses and hx +MRSA PCR x 7 days end 4/23   wound care per Burn    # NSVT  - Resolved s/p Magnesium repletion  - f/u Echo    #Severe pancytopenia   #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  - last chemo infusion 4/5 after being discharged from [facility] last admission (s/p cycle 4 of Cisplatin + Etoposide on 4/5/24 followed by Neulasta Onbody injection)  - s/p 1u platelets and 3u prbc this admission  - HemOnc eval appreciated  - s/p Zarxio since ANC >1000   - WBC counts improving  - Monitor CBC  - Platelet transfusion to keep >10 or if pt is bleeding  - PRBC transfusion to keep >7  - keep active T&S    # LARY , resolved  Likely pre-renal  - s/p  IVF    #RLE swelling  - ordered [facility] duplex to r/o DVT    #Hypokalemia   -repleted    # HTN  - on metoprolol    #Hypothyroidism  on levothyroxine     # DVT prophylaxis   - OOB and ambulate  - none due to low platelets        Progress Note Handoff  Pending (specify): continue IV abx, f/u wound culture, labs in AM, Burn f/u  pt informed of the plan of care  Disposition: Home

## 2024-04-18 NOTE — PROGRESS NOTE ADULT - SUBJECTIVE AND OBJECTIVE BOX
AM rounds with attending.   No acute events overnight. Doing well. Dressing changed.     Events past 24 hrs***  Vital Signs Last 24 Hrs  T(C): 35.8 (18 Apr 2024 05:00), Max: 36.9 (18 Apr 2024 00:29)  T(F): 96.5 (18 Apr 2024 05:00), Max: 98.5 (18 Apr 2024 00:29)  HR: 66 (18 Apr 2024 07:46) (65 - 75)  BP: 123/63 (18 Apr 2024 05:00) (123/63 - 137/67)  BP(mean): 97 (17 Apr 2024 15:26) (97 - 97)  RR: 18 (18 Apr 2024 05:00) (18 - 18)  SpO2: 97% (18 Apr 2024 07:46) (96% - 98%)    Parameters below as of 18 Apr 2024 07:46  Patient On (Oxygen Delivery Method): room air      I&O's Detail    18 Apr 2024 07:01  -  18 Apr 2024 10:44  --------------------------------------------------------  IN:    Lactated Ringers: 300 mL  Total IN: 300 mL    OUT:  Total OUT: 0 mL    Total NET: 300 mL      MEDICATIONS  (STANDING):  cefTRIAXone   IVPB 2000 milliGRAM(s) IV Intermittent every 24 hours  chlorhexidine 2% Cloths 1 Application(s) Topical daily  doxycycline monohydrate Capsule 100 milliGRAM(s) Oral every 12 hours  filgrastim-sndz (ZARXIO) Injectable 480 MICROGram(s) SubCutaneous every 24 hours  levothyroxine 137 MICROGram(s) Oral daily  magnesium sulfate  IVPB 2 Gram(s) IV Intermittent every 2 hours  metoprolol tartrate 12.5 milliGRAM(s) Oral two times a day  metroNIDAZOLE    Tablet 500 milliGRAM(s) Oral every 8 hours  mupirocin 2% Nasal 1 Application(s) Both Nostrils two times a day  vancomycin    Solution 125 milliGRAM(s) Oral every 6 hours    MEDICATIONS  (PRN):  acetaminophen     Tablet .. 650 milliGRAM(s) Oral every 6 hours PRN Temp greater or equal to 38C (100.4F)    Allergies    penicillin (Rash)  penicillin G benzathine (Rash)      Lab Results:                        7.8    22.50 )-----------( 19       ( 18 Apr 2024 07:23 )             23.7         PHYSICAL EXAM:   Gen: well developed, well nourished appearing male ; overweight, NAD , on RA breathing comfortably  Wound: Left thigh with two ~2cm incisions (on anterior thigh and another medial thigh) , about 1cm deep, tissue appears pink, moist healthy, no purulence no bleeding, erythema improved.  Also another small abscess on his lower left abdomen ~1cm area with surrounding small area of erythema, mildly tender to palpation, no fluctuance, no purulence.

## 2024-04-18 NOTE — PROGRESS NOTE ADULT - ATTENDING COMMENTS
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 diff colitis s/p fidaxomicin course was admitted to SDU for severe pancytopenia and sepsis secondary to suspected C. diff infection. Pt also with E. coli bacteremia and abscesses of left thigh and right side of abdomen s/p I&D by Burn.     1. Sepsis present on admission due to recurrent severe C. difficile infection  febrile neutropenia   Left LE abscess sp I/D 04/15  right abdominal wall abscess s/p I&D 4/17  E. Coli bacteremia  - flu/covid/RSV negative; CXR: no evidence of pneumonia   - repeat blood cultures now negative  - blood culture with pansensitive E. coli  - f/u abscess culture today  - neutropenia now resolved  - pt afebrile  - diarrhea improving  - ID f/u appreciated: prolonged vanc PO taper:  Vanc 125mg PO four times daily x 14 days,   Vanc 125mg PO BID x 7 days  Vanc 125mg PO daily x 7 days  Vanc 125mg PO q48h x 2-8 weeks   - MRSA PCR, previous +, CHG washes & mupirocin recommended   - Ceftriaxone 2g q24h IV and on D/C PO cipro 500mg BID x 14 days (given port) end 4/27   - PO flagyl 500mg TID  end 4/27   - PO doxy 100mg BID given folliculitis/abscesses and hx +MRSA PCR x 7 days end 4/23   wound care per Burn    2. NSVT  Resolved s/p Magnesium repletion  f/u Echo **    3. Severe pancytopenia   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  last chemo infusion 4/5 after being discharged from [facility] last admission (s/p cycle 4 of Cisplatin + Etoposide on 4/5/24 followed by Neulasta Onbody injection)  s/p 1u platelets and 3u prbc this admission  HemOnc eval appreciated  Zarxio 480 mcg daily until ANC >1000. D/evelyn Zarxio on 4/18 (WBC=22)  WBC counts improving  Monitor CBC  Platelet transfusion to keep >10 or if pt is bleeding  PRBC transfusion to keep >7  keep active T&S    4. LARY , resolved  Likely pre-renal, improved with IVF    5. Hypokalemia repleted    6. HTN on metoprolol    7. Hypothyroidism  on levothyroxine     #R>L LE edema: check b/l LE duplex.     DVT prophylaxis - OOB and ambulate  full code  guarded prognosis - improving       Progress Note Handoff  PT eval. likely d/c home on 4/19

## 2024-04-18 NOTE — PROGRESS NOTE ADULT - SUBJECTIVE AND OBJECTIVE BOX
24H events:    Patient is a 68y old Male who presents with a chief complaint of colitis (18 Apr 2024 10:43)    Primary diagnosis of Pancytopenia      Today is hospital day 5d. This morning patient was seen and examined at bedside, resting comfortably in bed.    No acute or major events overnight. Hemodynamically stable, tolerating oral diet, voiding appropriately with appropriate bowel movements.     Code Status: full code      PAST MEDICAL & SURGICAL HISTORY  HTN (hypertension)    Hypothyroidism, unspecified type    Obesity    Other secondary osteoarthritis of right hand    Testicular seminoma    History of eye removal  Right eye    H/O melanoma excision    S/P appendectomy  14 yrs old      SOCIAL HISTORY:  Social History:      ALLERGIES:  penicillin (Rash)  penicillin G benzathine (Rash)    MEDICATIONS:  STANDING MEDICATIONS  cefTRIAXone   IVPB 2000 milliGRAM(s) IV Intermittent every 24 hours  chlorhexidine 2% Cloths 1 Application(s) Topical daily  doxycycline monohydrate Capsule 100 milliGRAM(s) Oral every 12 hours  levothyroxine 137 MICROGram(s) Oral daily  magnesium sulfate  IVPB 2 Gram(s) IV Intermittent every 2 hours  metoprolol tartrate 12.5 milliGRAM(s) Oral two times a day  metroNIDAZOLE    Tablet 500 milliGRAM(s) Oral every 8 hours  mupirocin 2% Nasal 1 Application(s) Both Nostrils two times a day  vancomycin    Solution 125 milliGRAM(s) Oral every 6 hours    PRN MEDICATIONS  acetaminophen     Tablet .. 650 milliGRAM(s) Oral every 6 hours PRN    VITALS:   T(F): 97.1  HR: 70  BP: 133/71  RR: 18  SpO2: 97%    PHYSICAL EXAM:  GENERAL: NAD,   HEAD: NCAD,   NECK: Supple,  HEART: Regular rate and rhythm, normal S1/S2, no murmurs, heaves, thrills  LUNGS: No acute respiratory distress, clear b/l breath sounds,   ABDOMEN:  soft, non-tender, non-distented, + BS, abdominal dressing intact  EXTREMITIES: no rashes, extremities warm/dry, RLE edema >LLE  NERVOUS SYSTEM:  A&Ox3,  follows commands, answers questions appropriately   SKin: LT thigh dressing intact         LABS:                        7.8    22.50 )-----------( 19       ( 18 Apr 2024 07:23 )             23.7     04-18    140  |  102  |  13  ----------------------------<  91  3.8   |  27  |  0.9    Ca    8.3<L>      18 Apr 2024 07:23  Mg     0.9     04-18        Urinalysis Basic - ( 18 Apr 2024 07:23 )    Color: x / Appearance: x / SG: x / pH: x  Gluc: 91 mg/dL / Ketone: x  / Bili: x / Urobili: x   Blood: x / Protein: x / Nitrite: x   Leuk Esterase: x / RBC: x / WBC x   Sq Epi: x / Non Sq Epi: x / Bacteria: x            Culture - Abscess with Gram Stain (collected 17 Apr 2024 14:17)  Source: .Abscess abdominal abscess  Gram Stain (18 Apr 2024 02:02):    Moderate polymorphonuclear leukocytes seen per low power field    No organisms seen per oil power field          RADIOLOGY:    RADIOLOGY

## 2024-04-18 NOTE — PROGRESS NOTE ADULT - ASSESSMENT
Left thigh abscesses (x2) s/p bedside debridement on 4/15  Left lower abdominal small abscess s/p bedside incision and drainage on 4/17    RECOMMENDATION:  - Wound care for all three wounds - Please wash wound with soap and water, cover with xeroform, then add dry gauze and tegaderm on top twice a day.   - IV abx as indicated / per ID  - Wound cultures taken 4/15 - f/u results   - Pain control

## 2024-04-19 ENCOUNTER — TRANSCRIPTION ENCOUNTER (OUTPATIENT)
Age: 69
End: 2024-04-19

## 2024-04-19 ENCOUNTER — RESULT REVIEW (OUTPATIENT)
Age: 69
End: 2024-04-19

## 2024-04-19 VITALS — DIASTOLIC BLOOD PRESSURE: 70 MMHG | SYSTOLIC BLOOD PRESSURE: 145 MMHG | HEART RATE: 73 BPM

## 2024-04-19 LAB
-  CLINDAMYCIN: SIGNIFICANT CHANGE UP
-  DAPTOMYCIN: SIGNIFICANT CHANGE UP
-  ERYTHROMYCIN: SIGNIFICANT CHANGE UP
-  GENTAMICIN: SIGNIFICANT CHANGE UP
-  LINEZOLID: SIGNIFICANT CHANGE UP
-  OXACILLIN: SIGNIFICANT CHANGE UP
-  PENICILLIN: SIGNIFICANT CHANGE UP
-  RIFAMPIN: SIGNIFICANT CHANGE UP
-  TETRACYCLINE: SIGNIFICANT CHANGE UP
-  TRIMETHOPRIM/SULFAMETHOXAZOLE: SIGNIFICANT CHANGE UP
-  VANCOMYCIN: SIGNIFICANT CHANGE UP
ANION GAP SERPL CALC-SCNC: 11 MMOL/L — SIGNIFICANT CHANGE UP (ref 7–14)
BUN SERPL-MCNC: 9 MG/DL — LOW (ref 10–20)
CALCIUM SERPL-MCNC: 8.5 MG/DL — SIGNIFICANT CHANGE UP (ref 8.4–10.5)
CHLORIDE SERPL-SCNC: 99 MMOL/L — SIGNIFICANT CHANGE UP (ref 98–110)
CO2 SERPL-SCNC: 27 MMOL/L — SIGNIFICANT CHANGE UP (ref 17–32)
CREAT SERPL-MCNC: 0.9 MG/DL — SIGNIFICANT CHANGE UP (ref 0.7–1.5)
CULTURE RESULTS: SIGNIFICANT CHANGE UP
EGFR: 92 ML/MIN/1.73M2 — SIGNIFICANT CHANGE UP
GLUCOSE SERPL-MCNC: 102 MG/DL — HIGH (ref 70–99)
HCT VFR BLD CALC: 24.7 % — LOW (ref 42–52)
HGB BLD-MCNC: 8.3 G/DL — LOW (ref 14–18)
MAGNESIUM SERPL-MCNC: 1.6 MG/DL — LOW (ref 1.8–2.4)
MCHC RBC-ENTMCNC: 31.9 PG — HIGH (ref 27–31)
MCHC RBC-ENTMCNC: 33.6 G/DL — SIGNIFICANT CHANGE UP (ref 32–37)
MCV RBC AUTO: 95 FL — HIGH (ref 80–94)
METHOD TYPE: SIGNIFICANT CHANGE UP
PLATELET # BLD AUTO: 26 K/UL — LOW (ref 130–400)
PMV BLD: 11.2 FL — HIGH (ref 7.4–10.4)
POTASSIUM SERPL-MCNC: 3.1 MMOL/L — LOW (ref 3.5–5)
POTASSIUM SERPL-SCNC: 3.1 MMOL/L — LOW (ref 3.5–5)
RBC # BLD: 2.6 M/UL — LOW (ref 4.7–6.1)
RBC # FLD: 17.4 % — HIGH (ref 11.5–14.5)
SODIUM SERPL-SCNC: 137 MMOL/L — SIGNIFICANT CHANGE UP (ref 135–146)
SPECIMEN SOURCE: SIGNIFICANT CHANGE UP
WBC # BLD: 34.42 K/UL — HIGH (ref 4.8–10.8)
WBC # FLD AUTO: 34.42 K/UL — HIGH (ref 4.8–10.8)

## 2024-04-19 PROCEDURE — 99239 HOSP IP/OBS DSCHRG MGMT >30: CPT

## 2024-04-19 PROCEDURE — 93306 TTE W/DOPPLER COMPLETE: CPT | Mod: 26

## 2024-04-19 RX ORDER — MAGNESIUM SULFATE 500 MG/ML
2 VIAL (ML) INJECTION
Refills: 0 | Status: DISCONTINUED | OUTPATIENT
Start: 2024-04-19 | End: 2024-04-19

## 2024-04-19 RX ORDER — MAGNESIUM OXIDE 400 MG ORAL TABLET 241.3 MG
400 TABLET ORAL
Refills: 0 | Status: DISCONTINUED | OUTPATIENT
Start: 2024-04-19 | End: 2024-04-19

## 2024-04-19 RX ORDER — POTASSIUM CHLORIDE 20 MEQ
20 PACKET (EA) ORAL
Refills: 0 | Status: COMPLETED | OUTPATIENT
Start: 2024-04-19 | End: 2024-04-19

## 2024-04-19 RX ORDER — METOPROLOL TARTRATE 50 MG
1 TABLET ORAL
Qty: 30 | Refills: 1
Start: 2024-04-19 | End: 2024-06-17

## 2024-04-19 RX ORDER — VANCOMYCIN HCL 1 G
1 VIAL (EA) INTRAVENOUS
Qty: 75 | Refills: 0
Start: 2024-04-19

## 2024-04-19 RX ORDER — METRONIDAZOLE 500 MG
1 TABLET ORAL
Qty: 24 | Refills: 0
Start: 2024-04-19 | End: 2024-04-26

## 2024-04-19 RX ORDER — CIPROFLOXACIN LACTATE 400MG/40ML
1 VIAL (ML) INTRAVENOUS
Qty: 16 | Refills: 0
Start: 2024-04-19 | End: 2024-04-26

## 2024-04-19 RX ADMIN — Medication 12.5 MILLIGRAM(S): at 05:22

## 2024-04-19 RX ADMIN — Medication 20 MILLIEQUIVALENT(S): at 12:51

## 2024-04-19 RX ADMIN — Medication 100 MILLIGRAM(S): at 05:22

## 2024-04-19 RX ADMIN — MUPIROCIN 1 APPLICATION(S): 20 OINTMENT TOPICAL at 05:22

## 2024-04-19 RX ADMIN — Medication 500 MILLIGRAM(S): at 05:22

## 2024-04-19 RX ADMIN — Medication 100 MILLIGRAM(S): at 17:30

## 2024-04-19 RX ADMIN — Medication 500 MILLIGRAM(S): at 14:18

## 2024-04-19 RX ADMIN — Medication 12.5 MILLIGRAM(S): at 17:32

## 2024-04-19 RX ADMIN — Medication 25 GRAM(S): at 12:52

## 2024-04-19 RX ADMIN — Medication 20 MILLIEQUIVALENT(S): at 15:20

## 2024-04-19 RX ADMIN — MAGNESIUM OXIDE 400 MG ORAL TABLET 400 MILLIGRAM(S): 241.3 TABLET ORAL at 17:30

## 2024-04-19 RX ADMIN — CEFTRIAXONE 100 MILLIGRAM(S): 500 INJECTION, POWDER, FOR SOLUTION INTRAMUSCULAR; INTRAVENOUS at 14:18

## 2024-04-19 RX ADMIN — Medication 137 MICROGRAM(S): at 05:22

## 2024-04-19 RX ADMIN — Medication 125 MILLIGRAM(S): at 17:29

## 2024-04-19 RX ADMIN — MAGNESIUM OXIDE 400 MG ORAL TABLET 400 MILLIGRAM(S): 241.3 TABLET ORAL at 12:51

## 2024-04-19 RX ADMIN — Medication 125 MILLIGRAM(S): at 05:22

## 2024-04-19 RX ADMIN — CHLORHEXIDINE GLUCONATE 1 APPLICATION(S): 213 SOLUTION TOPICAL at 12:56

## 2024-04-19 RX ADMIN — MUPIROCIN 1 APPLICATION(S): 20 OINTMENT TOPICAL at 17:32

## 2024-04-19 RX ADMIN — Medication 125 MILLIGRAM(S): at 11:26

## 2024-04-19 RX ADMIN — Medication 20 MILLIEQUIVALENT(S): at 17:32

## 2024-04-19 NOTE — DISCHARGE NOTE PROVIDER - PROVIDER TOKENS
PROVIDER:[TOKEN:[[id number]],FOLLOWUP:[1 week]],PROVIDER:[TOKEN:[[id number]],FOLLOWUP:[1 week]]

## 2024-04-19 NOTE — PROGRESS NOTE ADULT - TIME BILLING
I have personally seen and examined this patient.  I have reviewed all pertinent clinical information and reviewed all relevant imaging and diagnostic studies personally.   If possible, I counseled the patient about diagnostic testing and treatment plan.   I discussed my recommendations with the primary team and any family members at bedside.

## 2024-04-19 NOTE — DISCHARGE NOTE NURSING/CASE MANAGEMENT/SOCIAL WORK - NSDCPEFALRISK_GEN_ALL_CORE
For information on Fall & Injury Prevention, visit: [email or web address] OR  [email or web address] OR  https://www.cdc.gov/steadi/patient.html

## 2024-04-19 NOTE — DISCHARGE NOTE PROVIDER - HOSPITAL COURSE
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 difficile in setting of pancytopenia, presents ED for evaluation of diarrhea today.  Patient reports few episodes of nonbloody, watery diarrhea 3–4 times today.  Feels different than his prior C. difficile.  + Fever with Tmax 101 at home.  He denies cough, congestion, ear pain, throat pain, CP, SOB, abdominal pain, N/V, urinary symptoms, extremity numbness/weakness/paresthesias/swelling.   In the ED  bp 110/58 hr 112 rr 18 afebrile   SigLabs WBC 0.12 hemoglobin platelet 4. 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 diff colitis s/p fidaxomicin course was admitted to SDU for severe pancytopenia and sepsis secondary to suspected C. diff infection. Pt also with E. coli bacteremia and abscesses of left thigh and right side of abdomen s/p I&D by Burn.     1. Sepsis present on admission due to recurrent severe C. difficile infection- resolved  febrile neutropenia   Left LE abscess sp I/D 04/15  right abdominal wall abscess s/p I&D 4/17  E. Coli bacteremia  - flu/covid/RSV negative; CXR: no evidence of pneumonia   - initial blood cultures grew pansensitive E. coli; repeat blood cultures now negative  - abscess cultures: Staph aureus  - neutropenia now resolved, pt remained afebrile, diarrhea resolved  - s/p burn I/D of LLE abscess and right abdominal wall abscess    - ID recs appreciated: prolonged vanc PO taper:  Vanc 125mg PO four times daily x 14 days,   Vanc 125mg PO BID x 7 days  Vanc 125mg PO daily x 7 days  Vanc 125mg PO q48h x 2-8 weeks   - Ceftriaxone 2g q24h IV and on D/C PO cipro 500mg BID x 14 days (given port) end 4/27   - PO flagyl 500mg TID  end 4/27   - PO doxy 100mg BID given folliculitis/abscesses and hx +MRSA PCR x 7 days end 4/23      2. NSVT- resolved  Resolved s/p Magnesium repletion  Echo: EF 67%, no major valvulopathies  no further events  started on metoprolol during this admission    3. Severe pancytopenia - improved  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  last chemo infusion 4/5 after being discharged from [facility] last admission (s/p cycle 4 of Cisplatin + Etoposide on 4/5/24 followed by Neulasta Onbody injection)  s/p 1u platelets and 3u prbc this admission  HemOnc eval appreciated  Zarxio 480 mcg daily until ANC >1000. D/evelyn Zarxio on 4/18 (WBC=22)    4. LARY , resolved  Likely pre-renal, improved with IVF  restart home ACEi for discharge    5. Hypokalemia repleted    6. HTN on metoprolol    7. Hypothyroidism  on levothyroxine     8. R>L LE karla  VA duplex negative   68-year-old male with PMH HTN, hypothyroidism, melanoma status post resection, 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 difficile in setting of pancytopenia, presents ED for evaluation of diarrhea today.  Patient reports few episodes of nonbloody, watery diarrhea 3–4 times today.  Feels different than his prior C. difficile.  + Fever with Tmax 101 at home.  He denies cough, congestion, ear pain, throat pain, CP, SOB, abdominal pain, N/V, urinary symptoms, extremity numbness/weakness/paresthesias/swelling.   In the ED  bp 110/58 hr 112 rr 18 afebrile   SigLabs WBC 0.12 hemoglobin platelet 4. 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 diff colitis s/p fidaxomicin course was admitted to SDU for severe pancytopenia and sepsis secondary to suspected C. diff infection. Pt also with E. coli bacteremia and abscesses of left thigh and right side of abdomen s/p I&D by Burn.     1. Sepsis present on admission due to recurrent severe C. difficile infection- resolved  febrile neutropenia   Left LE abscess sp I/D 04/15  right abdominal wall abscess s/p I&D 4/17  E. Coli bacteremia  - flu/covid/RSV negative; CXR: no evidence of pneumonia   - initial blood cultures grew pansensitive E. coli; repeat blood cultures now negative  - abscess cultures: Staph aureus  - neutropenia now resolved, pt remained afebrile, diarrhea resolved  - s/p burn I/D of LLE abscess and right abdominal wall abscess    - ID recs appreciated: prolonged vanc PO taper:  Vanc 125mg PO four times daily x 14 days,   Vanc 125mg PO BID x 7 days  Vanc 125mg PO daily x 7 days  Vanc 125mg PO q48h x 2-8 weeks   - Ceftriaxone 2g q24h IV and on D/C PO cipro 500mg BID x 14 days (given port) end 4/27   - PO flagyl 500mg TID  end 4/27   - PO doxy 100mg BID given folliculitis/abscesses and hx +MRSA PCR x 7 days end 4/23      2. NSVT- resolved  Resolved s/p Magnesium repletion  Echo: EF 67%, no major valvulopathies  no further events  started on metoprolol during this admission    3. Severe pancytopenia - improved  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  last chemo infusion 4/5 after being discharged from [facility] last admission (s/p cycle 4 of Cisplatin + Etoposide on 4/5/24 followed by Neulasta Onbody injection)  s/p 1u platelets and 3u prbc this admission  HemOnc eval appreciated  Zarxio 480 mcg daily until ANC >1000. D/evelyn Zarxio on 4/18 (WBC=22)    4. LARY , resolved  Likely pre-renal, improved with IVF  restart home ACEi for discharge    5. Hypokalemia repleted    6. HTN on metoprolol    7. Hypothyroidism  on levothyroxine     8. R>L LE karla  VA duplex negative    Pt medically stable to be discharged home today. 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 difficile in setting of pancytopenia, presents ED for evaluation of diarrhea today.  Patient reports few episodes of nonbloody, watery diarrhea 3–4 times today.  Feels different than his prior C. difficile.  + Fever with Tmax 101 at home.  He denies cough, congestion, ear pain, throat pain, CP, SOB, abdominal pain, N/V, urinary symptoms, extremity numbness/weakness/paresthesias/swelling.   In the ED  bp 110/58 hr 112 rr 18 afebrile   SigLabs WBC 0.12 hemoglobin platelet 4. 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 diff colitis s/p fidaxomicin course was admitted to SDU for severe pancytopenia and sepsis secondary to suspected C. diff infection. Pt also with E. coli bacteremia and abscesses of left thigh and right side of abdomen s/p I&D by Burn.     1. Sepsis present on admission due to recurrent severe C. difficile infection- resolved  febrile neutropenia   Left LE abscess sp I/D 04/15  right abdominal wall abscess s/p I&D 4/17  E. Coli bacteremia  - flu/covid/RSV negative; CXR: no evidence of pneumonia   - initial blood cultures grew pansensitive E. coli; repeat blood cultures now negative  - abscess cultures: Staph aureus  - neutropenia now resolved, pt remained afebrile, diarrhea resolved  - s/p burn I/D of LLE abscess and right abdominal wall abscess    - ID recs appreciated: prolonged vanc PO taper:  Vanc 125mg PO four times daily x 14 days,   Vanc 125mg PO BID x 7 days  Vanc 125mg PO daily x 7 days  Vanc 125mg PO q48h x 2-8 weeks   - Ceftriaxone 2g q24h IV and on D/C PO cipro 500mg BID x 14 days (given port) end 4/27   - PO flagyl 500mg TID  end 4/27   - PO doxy 100mg BID given folliculitis/abscesses and hx +MRSA PCR x 7 days end 4/23      2. NSVT- resolved  Resolved s/p Magnesium repletion  Echo: EF 67%, no major valvulopathies  no further events  started on metoprolol during this admission    3. Severe pancytopenia - improved  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  last chemo infusion 4/5 after being discharged from [facility] last admission (s/p cycle 4 of Cisplatin + Etoposide on 4/5/24 followed by Neulasta Onbody injection)  s/p 1u platelets and 3u prbc this admission  HemOnc eval appreciated  Zarxio 480 mcg daily until ANC >1000. D/evelyn Zarxio on 4/18 (WBC=22)    4. LARY , resolved  Likely pre-renal, improved with IVF  restart home ACEi for discharge    5. Hypokalemia repleted    6. HTN on metoprolol    7. Hypothyroidism  on levothyroxine     8. R>L LE karla  VA duplex negative    Pt medically stable to be discharged home today.   Attending NOte:  Patient seen and examined independently. I personally had a face-to-face encounter with the patient, examined the patient myself, personally reviewed labs & Radiology images,  and reviewed the plan of care with the housestaff. Agree with resident's note but my note supersedes that of the resident in the matters hereby listed.   D/c to home

## 2024-04-19 NOTE — DISCHARGE NOTE PROVIDER - CARE PROVIDERS DIRECT ADDRESSES
[email or web address]

## 2024-04-19 NOTE — DISCHARGE NOTE PROVIDER - CARE PROVIDER_API CALL
Mitra Nieves  Infectious Disease  [address], [address]  [place] [address]  Phone: [phone]  Fax: [phone]  Follow Up Time: 1 week    Demetrio Atwood  Medical Oncology  [address] [place] [address]  Phone: [phone]  Fax: [phone]  Follow Up Time: 1 week

## 2024-04-19 NOTE — DISCHARGE NOTE NURSING/CASE MANAGEMENT/SOCIAL WORK - PATIENT PORTAL LINK FT
You can access the FollowMyHealth Patient Portal offered by [facility] by registering at the following website: [email or web address]. By joining [email or web address]’s FollowMyHealth portal, you will also be able to view your health information using other applications (apps) compatible with our system.

## 2024-04-19 NOTE — PROGRESS NOTE ADULT - SUBJECTIVE AND OBJECTIVE BOX
MALIA GAUTHIER  68y, Male  Allergy: penicillin (Rash)  penicillin G benzathine (Rash)      LOS  6d    CHIEF COMPLAINT: colitis (18 Apr 2024 14:43)      INTERVAL EVENTS/HPI  - No acute events overnight  - T(F): , Max: 97.5 (04-19-24 @ 05:00)  - Tolerating medication  - WBC Count: 22.50 (04-18-24 @ 07:23) uptrending   WBC Count: 6.75 (04-17-24 @ 07:03)     - Creatinine: 0.9 (04-18-24 @ 07:23)       ROS  ***    VITALS:  T(F): 97.5, Max: 97.5 (04-19-24 @ 05:00)  HR: 67  BP: 132/69  RR: 18Vital Signs Last 24 Hrs  T(C): 36.4 (19 Apr 2024 05:00), Max: 36.4 (19 Apr 2024 05:00)  T(F): 97.5 (19 Apr 2024 05:00), Max: 97.5 (19 Apr 2024 05:00)  HR: 67 (19 Apr 2024 05:00) (67 - 70)  BP: 132/69 (19 Apr 2024 05:00) (130/65 - 133/71)  BP(mean): --  RR: 18 (19 Apr 2024 05:00) (18 - 18)  SpO2: 93% (18 Apr 2024 21:00) (93% - 97%)    Parameters below as of 18 Apr 2024 21:00  Patient On (Oxygen Delivery Method): room air        PHYSICAL EXAM:  ***    FH: Non-contributory  Social Hx: Non-contributory    TESTS & MEASUREMENTS:                        7.8    22.50 )-----------( 19       ( 18 Apr 2024 07:23 )             23.7     04-18    140  |  102  |  13  ----------------------------<  91  3.8   |  27  |  0.9    Ca    8.3<L>      18 Apr 2024 07:23  Mg     2.1     04-18          Urinalysis Basic - ( 18 Apr 2024 07:23 )    Color: x / Appearance: x / SG: x / pH: x  Gluc: 91 mg/dL / Ketone: x  / Bili: x / Urobili: x   Blood: x / Protein: x / Nitrite: x   Leuk Esterase: x / RBC: x / WBC x   Sq Epi: x / Non Sq Epi: x / Bacteria: x        Culture - Abscess with Gram Stain (collected 04-17-24 @ 14:17)  Source: .Abscess abdominal abscess  Gram Stain (04-18-24 @ 22:38):    Moderate polymorphonuclear leukocytes seen per low power field    No organisms seen per oil power field  Preliminary Report (04-18-24 @ 22:38):    Few Staphylococcus aureus    Culture - Blood (collected 04-15-24 @ 11:21)  Source: .Blood None  Preliminary Report (04-18-24 @ 22:01):    No growth at 72 Hours    Culture - Urine (collected 04-14-24 @ 13:03)  Source: Clean Catch Clean Catch (Midstream)  Final Report (04-16-24 @ 09:14):    No growth    Culture - Blood (collected 04-14-24 @ 11:26)  Source: .Blood None  Preliminary Report (04-18-24 @ 22:01):    No growth at 4 days    Culture - Blood (collected 04-13-24 @ 21:20)  Source: .Blood Blood-Peripheral  Gram Stain (04-14-24 @ 17:20):    Growth in anaerobic bottle: Gram Negative Rods  Final Report (04-16-24 @ 07:11):    Growth in anaerobic bottle: Escherichia coli    Direct identification is available within approximately 3-5    hours either by Blood Panel Multiplexed PCR or Direct    MALDI-TOF. Details: [email or web address]  Organism: Blood Culture PCR  Escherichia coli (04-16-24 @ 07:11)  Organism: Escherichia coli (04-16-24 @ 07:11)      Method Type: HANH      -  Ampicillin: S <=8 These ampicillin results predict results for amoxicillin      -  Ampicillin/Sulbactam: S <=4/2      -  Aztreonam: S <=4      -  Cefazolin: S <=2      -  Cefepime: S <=2      -  Cefoxitin: S <=8      -  Ceftriaxone: S <=1      -  Ciprofloxacin: S <=0.25      -  Ertapenem: S <=0.5      -  Gentamicin: S <=2      -  Imipenem: S <=1      -  Levofloxacin: S <=0.5      -  Meropenem: S <=1      -  Piperacillin/Tazobactam: S <=8      -  Tobramycin: S <=2      -  Trimethoprim/Sulfamethoxazole: S <=0.5/9.5  Organism: Blood Culture PCR (04-16-24 @ 07:11)      Method Type: PCR      -  Escherichia coli: Detec            INFECTIOUS DISEASES TESTING  Vancomycin Level, Random: 11.1 (04-15-24 @ 05:51)  Vancomycin Level, Trough: 7.8 (03-11-24 @ 07:04)  MRSA PCR Result.: Positive (02-11-24 @ 16:00)  strept    INFLAMMATORY MARKERS  C-Reactive Protein, Serum: 92.6 mg/L (02-12-24 @ 06:44)      RADIOLOGY & ADDITIONAL TESTS:  I have personally reviewed the last available Chest xray  CXR      CT      CARDIOLOGY TESTING  12 Lead ECG:   Ventricular Rate 167 BPM    Atrial Rate 167 BPM    P-R Interval 150 ms    QRS Duration 102 ms    Q-T Interval 228 ms    QTC Calculation(Bazett) 380 ms    P Axis -11 degrees    R Axis 28 degrees    T Axis 130 degrees    Diagnosis Line Supraventricular tachycardia  Nonspecific ST and T wave abnormality  Abnormal ECG  Confirmed by Ermias Price ([id number]) on 4/14/2024 11:42:09 AM (04-14-24 @ 08:06)  12 Lead ECG:   Ventricular Rate 115 BPM    Atrial Rate 115 BPM    P-R Interval 134 ms    QRS Duration 98 ms    Q-T Interval 330 ms    QTC Calculation(Bazett) 456 ms    P Axis 28 degrees    R Axis 14 degrees    T Axis 24 degrees    Diagnosis Line Sinus tachycardiawith Premature atrial complexes  Minimal voltage criteria for LVH, may be normal variant ( R in aVL )  Nonspecific ST abnormality  Abnormal ECG    Confirmed by BRITNEY CASAS MD ([id number]) on 4/15/2024 7:05:53 AM (04-13-24 @ 18:42)      MEDICATIONS  cefTRIAXone   IVPB 2000 IV Intermittent every 24 hours  chlorhexidine 2% Cloths 1 Topical daily  doxycycline monohydrate Capsule 100 Oral every 12 hours  levothyroxine 137 Oral daily  metoprolol tartrate 12.5 Oral two times a day  metroNIDAZOLE    Tablet 500 Oral every 8 hours  mupirocin 2% Nasal 1 Both Nostrils two times a day  vancomycin    Solution 125 Oral every 6 hours      WEIGHT  Weight (kg): 101.605 (04-18-24 @ 05:00)      ANTIBIOTICS:  cefTRIAXone   IVPB 2000 milliGRAM(s) IV Intermittent every 24 hours  doxycycline monohydrate Capsule 100 milliGRAM(s) Oral every 12 hours  metroNIDAZOLE    Tablet 500 milliGRAM(s) Oral every 8 hours  vancomycin    Solution 125 milliGRAM(s) Oral every 6 hours      All available historical records have been reviewed       MALIA GAUTHIER  68y, Male  Allergy: penicillin (Rash)  penicillin G benzathine (Rash)      LOS  6d    CHIEF COMPLAINT: colitis (18 Apr 2024 14:43)      INTERVAL EVENTS/HPI  - No acute events overnight, diarrhea improved  - T(F): , Max: 97.5 (04-19-24 @ 05:00)  - Tolerating medication  - WBC Count: 22.50 (04-18-24 @ 07:23) uptrending   WBC Count: 6.75 (04-17-24 @ 07:03)     - Creatinine: 0.9 (04-18-24 @ 07:23)       ROS  General: Denies rigors, nightsweats  HEENT: Denies headache, rhinorrhea, sore throat, eye pain  CV: Denies CP, palpitations  PULM: Denies wheezing, hemoptysis  GI: Denies hematemesis, hematochezia, melena  : Denies discharge, hematuria  MSK: Denies arthralgias, myalgias  SKIN: Denies rash, lesions  NEURO: Denies paresthesias, weakness  PSYCH: Denies depression, anxiety     VITALS:  T(F): 97.5, Max: 97.5 (04-19-24 @ 05:00)  HR: 67  BP: 132/69  RR: 18Vital Signs Last 24 Hrs  T(C): 36.4 (19 Apr 2024 05:00), Max: 36.4 (19 Apr 2024 05:00)  T(F): 97.5 (19 Apr 2024 05:00), Max: 97.5 (19 Apr 2024 05:00)  HR: 67 (19 Apr 2024 05:00) (67 - 70)  BP: 132/69 (19 Apr 2024 05:00) (130/65 - 133/71)  BP(mean): --  RR: 18 (19 Apr 2024 05:00) (18 - 18)  SpO2: 93% (18 Apr 2024 21:00) (93% - 97%)    Parameters below as of 18 Apr 2024 21:00  Patient On (Oxygen Delivery Method): room air        PHYSICAL EXAM:  Gen: NAD, resting in bed  HEENT: Normocephalic, atraumatic R eye patch  Neck: supple, no lymphadenopathy  CV: Regular rate & regular rhythm  Lungs: decreased BS at bases, no fremitus  Abdomen: Soft, BS present  Ext: Warm, well perfused  Neuro: non focal, awake  Skin: no rash, no erythema  Lines: no phlebitis     FH: Non-contributory  Social Hx: Non-contributory    TESTS & MEASUREMENTS:                        7.8    22.50 )-----------( 19       ( 18 Apr 2024 07:23 )             23.7     04-18    140  |  102  |  13  ----------------------------<  91  3.8   |  27  |  0.9    Ca    8.3<L>      18 Apr 2024 07:23  Mg     2.1     04-18          Urinalysis Basic - ( 18 Apr 2024 07:23 )    Color: x / Appearance: x / SG: x / pH: x  Gluc: 91 mg/dL / Ketone: x  / Bili: x / Urobili: x   Blood: x / Protein: x / Nitrite: x   Leuk Esterase: x / RBC: x / WBC x   Sq Epi: x / Non Sq Epi: x / Bacteria: x        Culture - Abscess with Gram Stain (collected 04-17-24 @ 14:17)  Source: .Abscess abdominal abscess  Gram Stain (04-18-24 @ 22:38):    Moderate polymorphonuclear leukocytes seen per low power field    No organisms seen per oil power field  Preliminary Report (04-18-24 @ 22:38):    Few Staphylococcus aureus    Culture - Blood (collected 04-15-24 @ 11:21)  Source: .Blood None  Preliminary Report (04-18-24 @ 22:01):    No growth at 72 Hours    Culture - Urine (collected 04-14-24 @ 13:03)  Source: Clean Catch Clean Catch (Midstream)  Final Report (04-16-24 @ 09:14):    No growth    Culture - Blood (collected 04-14-24 @ 11:26)  Source: .Blood None  Preliminary Report (04-18-24 @ 22:01):    No growth at 4 days    Culture - Blood (collected 04-13-24 @ 21:20)  Source: .Blood Blood-Peripheral  Gram Stain (04-14-24 @ 17:20):    Growth in anaerobic bottle: Gram Negative Rods  Final Report (04-16-24 @ 07:11):    Growth in anaerobic bottle: Escherichia coli    Direct identification is available within approximately 3-5    hours either by Blood Panel Multiplexed PCR or Direct    MALDI-TOF. Details: [email or web address]  Organism: Blood Culture PCR  Escherichia coli (04-16-24 @ 07:11)  Organism: Escherichia coli (04-16-24 @ 07:11)      Method Type: HANH      -  Ampicillin: S <=8 These ampicillin results predict results for amoxicillin      -  Ampicillin/Sulbactam: S <=4/2      -  Aztreonam: S <=4      -  Cefazolin: S <=2      -  Cefepime: S <=2      -  Cefoxitin: S <=8      -  Ceftriaxone: S <=1      -  Ciprofloxacin: S <=0.25      -  Ertapenem: S <=0.5      -  Gentamicin: S <=2      -  Imipenem: S <=1      -  Levofloxacin: S <=0.5      -  Meropenem: S <=1      -  Piperacillin/Tazobactam: S <=8      -  Tobramycin: S <=2      -  Trimethoprim/Sulfamethoxazole: S <=0.5/9.5  Organism: Blood Culture PCR (04-16-24 @ 07:11)      Method Type: PCR      -  Escherichia coli: Detec            INFECTIOUS DISEASES TESTING  Vancomycin Level, Random: 11.1 (04-15-24 @ 05:51)  Vancomycin Level, Trough: 7.8 (03-11-24 @ 07:04)  MRSA PCR Result.: Positive (02-11-24 @ 16:00)  strept    INFLAMMATORY MARKERS  C-Reactive Protein, Serum: 92.6 mg/L (02-12-24 @ 06:44)      RADIOLOGY & ADDITIONAL TESTS:  I have personally reviewed the last available Chest xray  CXR      CT      CARDIOLOGY TESTING  12 Lead ECG:   Ventricular Rate 167 BPM    Atrial Rate 167 BPM    P-R Interval 150 ms    QRS Duration 102 ms    Q-T Interval 228 ms    QTC Calculation(Bazett) 380 ms    P Axis -11 degrees    R Axis 28 degrees    T Axis 130 degrees    Diagnosis Line Supraventricular tachycardia  Nonspecific ST and T wave abnormality  Abnormal ECG  Confirmed by Ermias Price ([id number]) on 4/14/2024 11:42:09 AM (04-14-24 @ 08:06)  12 Lead ECG:   Ventricular Rate 115 BPM    Atrial Rate 115 BPM    P-R Interval 134 ms    QRS Duration 98 ms    Q-T Interval 330 ms    QTC Calculation(Bazett) 456 ms    P Axis 28 degrees    R Axis 14 degrees    T Axis 24 degrees    Diagnosis Line Sinus tachycardiawith Premature atrial complexes  Minimal voltage criteria for LVH, may be normal variant ( R in aVL )  Nonspecific ST abnormality  Abnormal ECG    Confirmed by BRITNEY CASAS MD ([id number]) on 4/15/2024 7:05:53 AM (04-13-24 @ 18:42)      MEDICATIONS  cefTRIAXone   IVPB 2000 IV Intermittent every 24 hours  chlorhexidine 2% Cloths 1 Topical daily  doxycycline monohydrate Capsule 100 Oral every 12 hours  levothyroxine 137 Oral daily  metoprolol tartrate 12.5 Oral two times a day  metroNIDAZOLE    Tablet 500 Oral every 8 hours  mupirocin 2% Nasal 1 Both Nostrils two times a day  vancomycin    Solution 125 Oral every 6 hours      WEIGHT  Weight (kg): 101.605 (04-18-24 @ 05:00)      ANTIBIOTICS:  cefTRIAXone   IVPB 2000 milliGRAM(s) IV Intermittent every 24 hours  doxycycline monohydrate Capsule 100 milliGRAM(s) Oral every 12 hours  metroNIDAZOLE    Tablet 500 milliGRAM(s) Oral every 8 hours  vancomycin    Solution 125 milliGRAM(s) Oral every 6 hours      All available historical records have been reviewed

## 2024-04-19 NOTE — DISCHARGE NOTE PROVIDER - NSDCMRMEDTOKEN_GEN_ALL_CORE_FT
ciprofloxacin 500 mg oral tablet: 1 tab(s) orally 2 times a day until 4/27  doxycycline monohydrate 100 mg oral tablet: 1 tab(s) orally 2 times a day until 4/23  famotidine 40 mg oral tablet: 1 tab(s) orally once a day  Levothroid 137 mcg (0.137 mg) oral tablet: 1 tab(s) orally once a day  losartan 50 mg oral tablet: 1 tab(s) orally once a day (at bedtime)  magnesium oxide 400 mg oral tablet: 1 tab(s) orally 3 times a day (with meals)  metoprolol succinate 25 mg oral tablet, extended release: 1 tab(s) orally once a day  metroNIDAZOLE 500 mg oral tablet: 1 tab(s) orally 3 times a day until 4/27  Multiple Vitamins oral tablet: 1 tab(s) orally once a day  vancomycin 125 mg oral capsule: 1 cap(s) orally every 6 hours Take 1 tablet 4 times a day x 10 days (4/19-4/28), then take 1 tablet twice a day x 7 days (from 4/29-5/5), then take 1 tablet daily x 7 days (5/6-5/12), then take 1 tablet every 48 hours x 4 weeks (5/13- 6/10)  Vitamin D3 1000 intl units oral tablet: 1 tab(s) orally once a day

## 2024-04-19 NOTE — PHYSICAL THERAPY INITIAL EVALUATION ADULT - SPECIFY REASON(S)
9:35. As per IDR rounds with Dr. Ward pt does not need PT, D/C PT . Pt is ambulatory on unit. [facility] score 24.

## 2024-04-19 NOTE — PROGRESS NOTE ADULT - PROVIDER SPECIALTY LIST ADULT
Hospitalist
Infectious Disease
Critical Care
Hospitalist
Infectious Disease
Internal Medicine
Burn
Burn
Hospitalist
Infectious Disease
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
## 2024-04-19 NOTE — PROGRESS NOTE ADULT - ASSESSMENT
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 difficile in setting of pancytopenia, presents ED for evaluation of diarrhea     IMPRESSION  #Sepsis on admission T>101 P>90 WBC <4 ANC 30 , severe neutropenia due to Ecoli bacteremia suspect GI source, translocation    4/15 BCX NGTD     4/14 BCX NGTD     4/13 BCX + Ecoli non-esbl  Has a port- clean , high doubt this is the source   clinically no evidence of cholecystitis   CXR no PNA   < from: US Abdomen Upper Quadrant Right (04.16.24 @ 14:23) >  Contracted gallbladder, unchanged from CT scan.  CTAP AP   1.  Since March 5, 2024, new mild gallbladder wall thickening and hazy   adjacent fat stranding. Correlate with symptoms. A right upper quadrant   ultrasound can be obtained for further evaluation as clinically warranted.  2.  Stable enlarged retroperitoneal lymph nodes.  3.  Additional stable findings as above.  #Left thigh: two small areas ~2cm x2cm of induration and erythema to anterior and medial  s/p debridement   WCX   Few Staphylococcus aureus  #Hx Recent CDI and repeat testing also indeterminate  Norovirus indeterminate  s/p 14 days fidaxomicin  #Recent right fore arm abscess s/p I and D 3/14 - MRSA  #Melanoma  #Testicular seminoma on chemo   #Obesity BMI (kg/m2): 33.2  #Abx allergy: penicillin (Rash)  penicillin G benzathine (Rash)    Creatinine: 1.8 (04-14-24 @ 04:06)    Height (cm): 182.9 (04-13-24 @ 18:33)  Weight (kg): 111.1 (04-13-24 @ 18:33)  QTC Calculation(Bazett) 456 ms    RECOMMENDATIONS  - Difficult to interpret 2 indeterminate Cdiff tests, especially in an immunocompromised patient with diarrhea  Plan for prolonged vanc PO taper:  Vanc 125mg PO four times daily x 14 days,   Vanc 125mg PO BID x 7 days  Vanc 125mg PO daily x 7 days  Vanc 125mg PO q48h x 2-8 weeks   - MRSA PCR, previous +, CHG washes & mupirocin recommended   - Ceftriaxone 2g q24h IV on D/C PO cipro 500mg BID x 14 days (given port) end 4/27   - PO flagyl 500mg TID  end 4/27   - PO doxy 100mg BID given folliculitis/abscesses and hx +MRSA PCR x 7 days end 4/23 (f.u staph aureus sensitivities )  - Heme/Onc  - Patient can follow-up with Dr. Mitra Nieves on Wednesdays [phone] ; 11 [facility] for Cdiff and bacteremia     I will be away from 4/24-4/28.  If any questions, please text or call Dr. Freeman or Dr. Snyder on [employer] Teams  Please continue to update ID with any pertinent new clinical, laboratory or radiographic findings

## 2024-04-19 NOTE — DISCHARGE NOTE PROVIDER - NSDCFUSCHEDAPPT_GEN_ALL_CORE_FT
Demetrio Atwood  [facility] PreAdmits  Scheduled Appointment: 04/29/2024    [facility] Physician [facility]  [address]  [address]  Scheduled Appointment: 04/29/2024    Venancio Richard  [facility]  [address]  [address]  Scheduled Appointment: 05/01/2024    Demetrio Atwood  [facility] PreAdmits  Scheduled Appointment: 05/01/2024    [facility]ician [facility]  AMBCHEMO  [address]  Scheduled Appointment: 05/01/2024    Jeremy Dixon  [facility] Physician [facility]  UROLOGY [address]  Scheduled Appointment: 06/20/2024

## 2024-04-20 LAB
CULTURE RESULTS: SIGNIFICANT CHANGE UP
SPECIMEN SOURCE: SIGNIFICANT CHANGE UP

## 2024-04-23 LAB — MANUAL DIF COMMENT BLD-IMP: SIGNIFICANT CHANGE UP

## 2024-04-26 DIAGNOSIS — L73.9 FOLLICULAR DISORDER, UNSPECIFIED: ICD-10-CM

## 2024-04-26 DIAGNOSIS — B95.62 METHICILLIN RESISTANT STAPHYLOCOCCUS AUREUS INFECTION AS THE CAUSE OF DISEASES CLASSIFIED ELSEWHERE: ICD-10-CM

## 2024-04-26 DIAGNOSIS — E03.9 HYPOTHYROIDISM, UNSPECIFIED: ICD-10-CM

## 2024-04-26 DIAGNOSIS — E83.42 HYPOMAGNESEMIA: ICD-10-CM

## 2024-04-26 DIAGNOSIS — Z79.890 HORMONE REPLACEMENT THERAPY: ICD-10-CM

## 2024-04-26 DIAGNOSIS — C62.90 MALIGNANT NEOPLASM OF UNSPECIFIED TESTIS, UNSPECIFIED WHETHER DESCENDED OR UNDESCENDED: ICD-10-CM

## 2024-04-26 DIAGNOSIS — Z79.899 OTHER LONG TERM (CURRENT) DRUG THERAPY: ICD-10-CM

## 2024-04-26 DIAGNOSIS — R65.20 SEVERE SEPSIS WITHOUT SEPTIC SHOCK: ICD-10-CM

## 2024-04-26 DIAGNOSIS — A41.51 SEPSIS DUE TO ESCHERICHIA COLI [E. COLI]: ICD-10-CM

## 2024-04-26 DIAGNOSIS — R91.1 SOLITARY PULMONARY NODULE: ICD-10-CM

## 2024-04-26 DIAGNOSIS — Z88.0 ALLERGY STATUS TO PENICILLIN: ICD-10-CM

## 2024-04-26 DIAGNOSIS — L02.211 CUTANEOUS ABSCESS OF ABDOMINAL WALL: ICD-10-CM

## 2024-04-26 DIAGNOSIS — E66.9 OBESITY, UNSPECIFIED: ICD-10-CM

## 2024-04-26 DIAGNOSIS — Z90.79 ACQUIRED ABSENCE OF OTHER GENITAL ORGAN(S): ICD-10-CM

## 2024-04-26 DIAGNOSIS — I10 ESSENTIAL (PRIMARY) HYPERTENSION: ICD-10-CM

## 2024-04-26 DIAGNOSIS — I47.20 VENTRICULAR TACHYCARDIA, UNSPECIFIED: ICD-10-CM

## 2024-04-26 DIAGNOSIS — D61.811 OTHER DRUG-INDUCED PANCYTOPENIA: ICD-10-CM

## 2024-04-26 DIAGNOSIS — D84.821 IMMUNODEFICIENCY DUE TO DRUGS: ICD-10-CM

## 2024-04-26 DIAGNOSIS — E87.6 HYPOKALEMIA: ICD-10-CM

## 2024-04-26 DIAGNOSIS — N17.9 ACUTE KIDNEY FAILURE, UNSPECIFIED: ICD-10-CM
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
## 2024-04-26 DIAGNOSIS — Z85.820 PERSONAL HISTORY OF MALIGNANT MELANOMA OF SKIN: ICD-10-CM

## 2024-04-26 DIAGNOSIS — A04.71 ENTEROCOLITIS DUE TO CLOSTRIDIUM DIFFICILE, RECURRENT: ICD-10-CM

## 2024-04-26 DIAGNOSIS — L02.416 CUTANEOUS ABSCESS OF LEFT LOWER LIMB: ICD-10-CM

## 2024-04-26 DIAGNOSIS — T45.1X5A ADVERSE EFFECT OF ANTINEOPLASTIC AND IMMUNOSUPPRESSIVE DRUGS, INITIAL ENCOUNTER: ICD-10-CM

## 2024-04-29 ENCOUNTER — LABORATORY RESULT (OUTPATIENT)
Age: 69
End: 2024-04-29

## 2024-04-29 ENCOUNTER — APPOINTMENT (OUTPATIENT)
Age: 69
End: 2024-04-29

## 2024-04-29 ENCOUNTER — OUTPATIENT (OUTPATIENT)
Dept: OUTPATIENT SERVICES | Facility: HOSPITAL | Age: 69
LOS: 1 days | End: 2024-04-29
Payer: MEDICARE

## 2024-04-29 DIAGNOSIS — Z98.890 OTHER SPECIFIED POSTPROCEDURAL STATES: Chronic | ICD-10-CM

## 2024-04-29 DIAGNOSIS — C43.61 MALIGNANT MELANOMA OF RIGHT UPPER LIMB, INCLUDING SHOULDER: ICD-10-CM

## 2024-04-29 DIAGNOSIS — Z90.01 ACQUIRED ABSENCE OF EYE: Chronic | ICD-10-CM

## 2024-04-29 DIAGNOSIS — Z90.49 ACQUIRED ABSENCE OF OTHER SPECIFIED PARTS OF DIGESTIVE TRACT: Chronic | ICD-10-CM

## 2024-04-29 PROCEDURE — 85027 COMPLETE CBC AUTOMATED: CPT

## 2024-04-29 PROCEDURE — 36415 COLL VENOUS BLD VENIPUNCTURE: CPT

## 2024-04-29 PROCEDURE — 83735 ASSAY OF MAGNESIUM: CPT

## 2024-04-29 PROCEDURE — 80076 HEPATIC FUNCTION PANEL: CPT

## 2024-04-29 PROCEDURE — 80048 BASIC METABOLIC PNL TOTAL CA: CPT

## 2024-04-30 LAB
ALBUMIN SERPL ELPH-MCNC: 3.6 G/DL
ALP BLD-CCNC: 78 U/L
ALT SERPL-CCNC: 9 U/L
ANION GAP SERPL CALC-SCNC: 10 MMOL/L
AST SERPL-CCNC: 17 U/L
BILIRUB DIRECT SERPL-MCNC: <0.2 MG/DL
BILIRUB INDIRECT SERPL-MCNC: >0 MG/DL
BILIRUB SERPL-MCNC: 0.2 MG/DL
BUN SERPL-MCNC: 20 MG/DL
CALCIUM SERPL-MCNC: 9.3 MG/DL
CHLORIDE SERPL-SCNC: 101 MMOL/L
CO2 SERPL-SCNC: 28 MMOL/L
CREAT SERPL-MCNC: 0.9 MG/DL
EGFR: 92 ML/MIN/1.73M2
GLUCOSE SERPL-MCNC: 115 MG/DL
HCT VFR BLD CALC: 26 %
HGB BLD-MCNC: 8.2 G/DL
MAGNESIUM SERPL-MCNC: 1.9 MG/DL
MCHC RBC-ENTMCNC: 31.5 G/DL
MCHC RBC-ENTMCNC: 31.8 PG
MCV RBC AUTO: 100.8 FL
PLATELET # BLD AUTO: 291 K/UL
PMV BLD: 9 FL
POTASSIUM SERPL-SCNC: 5.8 MMOL/L
PROT SERPL-MCNC: 6 G/DL
RBC # BLD: 2.58 M/UL
RBC # FLD: 19.2 %
SODIUM SERPL-SCNC: 139 MMOL/L
WBC # FLD AUTO: 7.72 K/UL

## 2024-05-01 ENCOUNTER — APPOINTMENT (OUTPATIENT)
Age: 69
End: 2024-05-01
Payer: MEDICARE

## 2024-05-01 ENCOUNTER — OUTPATIENT (OUTPATIENT)
Dept: OUTPATIENT SERVICES | Facility: HOSPITAL | Age: 69
LOS: 1 days | End: 2024-05-01
Payer: MEDICARE

## 2024-05-01 VITALS
DIASTOLIC BLOOD PRESSURE: 81 MMHG | BODY MASS INDEX: 33 KG/M2 | RESPIRATION RATE: 16 BRPM | HEART RATE: 76 BPM | SYSTOLIC BLOOD PRESSURE: 142 MMHG | WEIGHT: 249 LBS | HEIGHT: 73 IN | TEMPERATURE: 97.8 F

## 2024-05-01 DIAGNOSIS — D70.2 OTHER DRUG-INDUCED AGRANULOCYTOSIS: ICD-10-CM

## 2024-05-01 DIAGNOSIS — Z98.890 OTHER SPECIFIED POSTPROCEDURAL STATES: Chronic | ICD-10-CM

## 2024-05-01 DIAGNOSIS — A04.72 ENTEROCOLITIS DUE TO CLOSTRIDIUM DIFFICILE, NOT SPECIFIED AS RECURRENT: ICD-10-CM

## 2024-05-01 DIAGNOSIS — C43.61 MALIGNANT MELANOMA OF RIGHT UPPER LIMB, INCLUDING SHOULDER: ICD-10-CM

## 2024-05-01 DIAGNOSIS — Z90.01 ACQUIRED ABSENCE OF EYE: Chronic | ICD-10-CM

## 2024-05-01 DIAGNOSIS — E83.42 HYPOMAGNESEMIA: ICD-10-CM

## 2024-05-01 LAB — LDH SERPL-CCNC: 172 U/L

## 2024-05-01 PROCEDURE — 99214 OFFICE O/P EST MOD 30 MIN: CPT

## 2024-05-01 PROCEDURE — 82105 ALPHA-FETOPROTEIN SERUM: CPT

## 2024-05-01 PROCEDURE — 84704 HCG FREE BETACHAIN TEST: CPT

## 2024-05-01 PROCEDURE — 83615 LACTATE (LD) (LDH) ENZYME: CPT

## 2024-05-01 PROCEDURE — 36415 COLL VENOUS BLD VENIPUNCTURE: CPT

## 2024-05-01 NOTE — HISTORY OF PRESENT ILLNESS
[de-identified] : Mr. MALIA GAUTHIER is a 68 year old male here today for evaluation and management of Testicular Seminoma and history of melanoma.     MALIA is a 68 year old M with PMHx including malignant melanoma of skin, HTN, hypothyroid, OA who presents to clinic to establish care, accompanied by sister at initial visit.  Patient states he went to PCP regarding testicular swelling a few months ago and was subsequently sent for additional workup including MR imaging which noted large right testicular mass.  CT imaging raised suspicion for enlarged lymphadenopathy.  He is presently feeling well with no new complaints.  Patient denies fever, chills, nausea, vomiting, dyspnea, unintentional weight loss or bleeding.  Patient reports family history of malignancy in his father (stomach, skin cancer), paternal uncle (skin cancer), maternal aunt + maternal grandmother (breast cancer).  Smokes cigars occasionally for social use.     RADIOLOGIC WORKUP  CT C/A/P (12.2.2023) IMPRESSION:3 mm left lower lobe pulmonary nodule of indeterminate clinical significance. Otherwise, no CT evidence of thoracic metastatic disease.Intra-abdominal lymphadenopathy concerning for metastatic disease. Consider complete evaluation with a PET/CT.Large right hydrocele noted. MR Pelvis (10.3.2023 - R) IMPRESSION: 1. Large heterogeneous mass, likely centered in the right testicle and extending into the right epididymis and right spermatic cord, highly suspicious for malignancy.  The tumor appears to extend beyond the testicle into the scrotal sac.  Surgical resection is advised.  2.  Large right hydrocele.  3 Limited views of the abdomen without definite evidence for metastatic disease, however, incomplete on the scrotal examination recommend complete characterization with CT chest abdomen and pelvis with contrast. US Scrotal (9.15.2023 - [facility]) IMPRESSION: Enlarged right testes with multiple masses highly suspicious for neoplasm.  Associated large right hydrocele.  On several images the mass in the upper pole of the right testes appears to be extended superiorly beyond the testicle.  Further evaluation with an MRI of the scrotum with and without contrast may be of diagnostic benefit. NM Lymphoscintigraphy (10.19.2018) Impression: 1. Definite demonstration of one sentinel lymph node uptake in  right axilla, by 5 minutes after injection.2. The overlying skin was marked in the  anterior position.3. The patient left the radiology department in good condition without any incident. 4. This is a preoperative marking for sentinel lymph node right axilla for wide excision of melanoma, right shoulder.  PET/CT WB FDG (9.19.2018) IMPRESSION: No evidence of pathologic FDG uptake.  LAB WORKUP (11.24.2023) WBC 5.25, Hgb 14.3, , Cr 0.8, eGFR 95, PSA 0.96, normal LFTs, , AFP 2.9, hCG TM 1   PATHOLOGY (see results section)   HCM Colonoscopy overdue   Upper Endoscopy never done  [FreeTextEntry1] : Completed 4 cycles of EP (Etoposide, Cisplatin) on 1.15.2023 - 4.5.2024  [de-identified] : 1/12/24 Patient is here for a follow-up visit for Testicular Seminoma and history of melanoma.  He is feeling well with no new complaints.  Reviewed most recent CBC, which is stable with mild anemia, hgb 13.5g/dL.  Patient denies fever, chills, nausea, vomiting, dyspnea or bleeding.  He completed PFTs on Wednesday.  He also completed audiogram recently.  Patient went for MR imaging this morning; not yet resulted.  Patient is s/p Right chest port placed via right IJ access on 1.3.2024.  Reviewed most recent CT imaging which shows increase in size of abdominal pelvic lymph nodes, stable left lung nodule and indeterminate right adrenal gland lesion.  CT C/A/P (1.9.2024) Impression:Stable left lung nodule. No new nodules or lymphadenopathy.Increase in size of abdominal pelvic lymph nodes as described.Stable indeterminate right adrenal gland lesion.  2/2/24 Patient is here for a follow-up visit for Testicular Seminoma and history of melanoma.  He is due for cycle 2 of Cisplatin + Etoposide on 2/5, initiation on 1.15.2024.  He lost about 10lbs since last visit since he has been adjusting portion size.  He reports some hair thinning/loss.  Patient denies fever, chills, nausea, vomiting, dyspnea, worsening of tinnitus (present prior to initiation of chemo) or bleeding.  Reviewed most recent MR imaging which shows no evidence of intracranial metastasis or acute intracranial pathology, paranasal sinus mucosal disease.   MR Head (1.12.2024) IMPRESSION:No evidence of intracranial metastasis or acute intracranial pathology.Mild chronic microvascular ischemic changes.Paranasal sinus mucosal disease.  2/23/24 Patient is here for a follow-up visit for Testicular Seminoma and history of melanoma.  He is due for cycle 3 of Cisplatin + Etoposide on 2/26, initiation on 1.15.2024.  Reviewed most recent CBC which shows neutropenia and worsening anemia with hgb down to 9.2g/dL.  Patient denies fever, chills, nausea, vomiting, dyspnea, worsening of tinnitus (present prior to initiation of chemo) or bleeding.  Patient also has low magnesium.  He is still losing weight.    3/25/24 Patient is here for a follow-up visit for Testicular Seminoma and history of melanoma.  He is due for cycle 4 of Cisplatin + Etoposide on 2/26, initiation on 1.15.2024.  Since last visit, patient was hospitalized due to fever and diarrhea; noted to have neutropenia and C. difficile infection.  He passed 1 formed stool this morning; only 1 bowel movement yesterday.  He completed antibiotics.  Patient presently denies fever, chills, nausea, vomiting, dyspnea, worsening of tinnitus (present prior to initiation of chemo) or bleeding.  He is still losing weight.    4/8/24 Patient is here for a follow-up TELEvisit for Testicular Seminoma and history of melanoma.  He is s/p cycle 4 of Cisplatin + Etoposide, initiation on 1.15.2024.  Reviewed most recent CBC which shows mild leukocytosis (likely due to GCSF) and moderate anemia, hgb 8.4g/dL.  Patient presently denies fever, chills, nausea, vomiting, abdominal pain, chest pain, palpitations, dyspnea, worsening of tinnitus (present prior to initiation of chemo) or bleeding.  His diarrhea has resolved.  He has only been taking once daily.    5/1/24 Patient is here for a follow-up visit for Testicular Seminoma and history of melanoma.  He completed 4 cycle of Cisplatin + Etoposide 1.15.2024 - 4.5.2024.  Reviewed most recent CBC which shows moderate anemia, hgb 8.2g/dL. per quadrant ultrasound can be obtained for further evaluation as clinically warranted.2.  Stable enlarged retroperitoneal lymph nodes.3.  Additional stable findings as above. Patient presently denies fever, chills, nausea, vomiting, abdominal pain, chest pain, palpitations, dyspnea, worsening of tinnitus (present prior to initiation of chemo) or bleeding.  He had diarrhea which was the reason why he was hospitalized and completed abx for C. Diff and bacteremia.  Reviewed most recent CT imaging from the hospital which shows stable enlarged retroperitoneal lymph nodes which is slightly smaller compared to pre-treatment.  CT A/P (4.15.2024) IMPRESSION:1.  Since March 5, 2024, new mild gallbladder wall thickening and hazy adjacent fat stranding. Correlate with symptoms. A right upper quadrant ultrasound can be obtained for further evaluation as clinically warranted.2.  Stable enlarged retroperitoneal lymph nodes.3.  Additional stable findings as above. US Bi LE (4.18.2024) IMPRESSION:No evidence of deep venous thrombosis in either lower extremity.

## 2024-05-01 NOTE — PHYSICAL EXAM
[Restricted in physically strenuous activity but ambulatory and able to carry out work of a light or sedentary nature] : Status 1- Restricted in physically strenuous activity but ambulatory and able to carry out work of a light or sedentary nature, e.g., light house work, office work [Normal] : normoactive bowel sounds, soft and nontender, no hepatosplenomegaly or masses appreciated [de-identified] : right eye prosthesis but wears R eye patch  [de-identified] : +1/2 bilateral lower extremity edema  [de-identified] : ambulating with assistance of single-point cane  [de-identified] : s/p Right chest port placed via right IJ access on 1.3.2024

## 2024-05-01 NOTE — REVIEW OF SYSTEMS
[Diarrhea: Grade 0] : Diarrhea: Grade 0 [Negative] : Allergic/Immunologic [Lower Ext Edema] : lower extremity edema [Vomiting] : no vomiting [Easy Bleeding] : no tendency for easy bleeding

## 2024-05-01 NOTE — ASSESSMENT
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  - followed by ENT; baseline audiogram completed 12.28.2023 prior to chemotherapy using Cisplatin - followed by PULM; baseline PFTs completed since we were considering using Bleomycin but opted to avoid  - MR Brain 1.12.2024 no evidence of intracranial metastasis or acute intracranial pathology, paranasal sinus mucosal disease  - s/p Right chest port placed via right IJ  - completed 4 cycles of Cisplatin + Etoposide as of 4.5.2024 followed by Neulasta onbody injection D5, 1.15.2024 - 4.5.2024 - CT A/P 4.15.2024 shows stable enlarged retroperitoneal lymph nodes which is slightly smaller compared to pre-treatment.  Plan to order repeat CT C/A/P to be done in 3 months (07/2024).    # Neutropenia, likely due to chemotherapy  - s/p Zarxio 480mcg SQ daily x 2  - added Neulasta onbody 6mg SQ following each cycle of chemo   # Anemia, likely due to chemotherapy  - s/p 1 unit PRBC on 2/26  - will continue to monitor   # Hypomagnesemia  - s/p Magnesium Sulfate 2g twice per week with chemotherapy  - c/w Magnesium 400mg PO two times per day OTC ; possible side effects discussed   # C.diff bacteremia, dx 04/2024  - followup with Infectious Disease, Dr. Nieves, as recommended for monitoring of vanco + symptoms; tasked [facility] Care Team for assistance with scheduling + Rx given  # History of Melanoma of skin, right shoulder, dx 10/2018  - requested to obtain records including surgical pathology for our review  - followup with DERM for surveillance skin exams at least every 6-12 months   RTC in 3 weeks with SMART NP with CBC, BMP, LFTs, Mg, HCG TM level prior  RTC in 7 weeks with Dr. Skaradinskiy with CBC, BMP, LFTs, Mg, HCG TM level prior   seen/ examined w/ NP Demetra;note reviewed; case discussed; agree w/ plan

## 2024-05-02 DIAGNOSIS — C43.61 MALIGNANT MELANOMA OF RIGHT UPPER LIMB, INCLUDING SHOULDER: ICD-10-CM

## 2024-05-02 LAB
AFP-TM SERPL-MCNC: 21 NG/ML
HCG-TM SERPL-MCNC: <1 MIU/ML

## 2024-05-20 ENCOUNTER — APPOINTMENT (OUTPATIENT)
Age: 69
End: 2024-05-20

## 2024-05-20 ENCOUNTER — LABORATORY RESULT (OUTPATIENT)
Age: 69
End: 2024-05-20

## 2024-05-20 ENCOUNTER — OUTPATIENT (OUTPATIENT)
Dept: OUTPATIENT SERVICES | Facility: HOSPITAL | Age: 69
LOS: 1 days | End: 2024-05-20
Payer: MEDICARE

## 2024-05-20 DIAGNOSIS — C43.61 MALIGNANT MELANOMA OF RIGHT UPPER LIMB, INCLUDING SHOULDER: ICD-10-CM

## 2024-05-20 DIAGNOSIS — Z98.890 OTHER SPECIFIED POSTPROCEDURAL STATES: Chronic | ICD-10-CM

## 2024-05-20 DIAGNOSIS — Z90.01 ACQUIRED ABSENCE OF EYE: Chronic | ICD-10-CM

## 2024-05-20 DIAGNOSIS — Z90.49 ACQUIRED ABSENCE OF OTHER SPECIFIED PARTS OF DIGESTIVE TRACT: Chronic | ICD-10-CM

## 2024-05-20 LAB
ALBUMIN SERPL ELPH-MCNC: 3.9 G/DL
ALP BLD-CCNC: 71 U/L
ALT SERPL-CCNC: 9 U/L
ANION GAP SERPL CALC-SCNC: 12 MMOL/L
AST SERPL-CCNC: 16 U/L
BILIRUB DIRECT SERPL-MCNC: <0.2 MG/DL
BILIRUB INDIRECT SERPL-MCNC: >0 MG/DL
BILIRUB SERPL-MCNC: 0.2 MG/DL
BUN SERPL-MCNC: 27 MG/DL
CALCIUM SERPL-MCNC: 9.6 MG/DL
CHLORIDE SERPL-SCNC: 104 MMOL/L
CO2 SERPL-SCNC: 26 MMOL/L
CREAT SERPL-MCNC: 0.9 MG/DL
EGFR: 91 ML/MIN/1.73M2
GLUCOSE SERPL-MCNC: 95 MG/DL
HCT VFR BLD CALC: 28.1 %
HGB BLD-MCNC: 9.2 G/DL
MAGNESIUM SERPL-MCNC: 1.9 MG/DL
MCHC RBC-ENTMCNC: 32.7 G/DL
MCHC RBC-ENTMCNC: 33 PG
MCV RBC AUTO: 100.7 FL
PLATELET # BLD AUTO: 139 K/UL
PMV BLD: 9.2 FL
POTASSIUM SERPL-SCNC: 5.2 MMOL/L
PROT SERPL-MCNC: 6.2 G/DL
RBC # BLD: 2.79 M/UL
RBC # FLD: 15.2 %
SODIUM SERPL-SCNC: 142 MMOL/L
WBC # FLD AUTO: 6.57 K/UL

## 2024-05-20 PROCEDURE — 85027 COMPLETE CBC AUTOMATED: CPT

## 2024-05-20 PROCEDURE — 80048 BASIC METABOLIC PNL TOTAL CA: CPT

## 2024-05-20 PROCEDURE — 36415 COLL VENOUS BLD VENIPUNCTURE: CPT

## 2024-05-20 PROCEDURE — 80076 HEPATIC FUNCTION PANEL: CPT

## 2024-05-20 PROCEDURE — 83003 ASSAY GROWTH HORMONE (HGH): CPT

## 2024-05-20 PROCEDURE — 82105 ALPHA-FETOPROTEIN SERUM: CPT

## 2024-05-20 PROCEDURE — 83735 ASSAY OF MAGNESIUM: CPT

## 2024-05-21 LAB
AFP-TM SERPL-MCNC: 5.4 NG/ML
GH SERPL-MCNC: 3.3 NG/ML

## 2024-05-22 ENCOUNTER — OUTPATIENT (OUTPATIENT)
Dept: OUTPATIENT SERVICES | Facility: HOSPITAL | Age: 69
LOS: 1 days | End: 2024-05-22
Payer: MEDICARE

## 2024-05-22 ENCOUNTER — APPOINTMENT (OUTPATIENT)
Age: 69
End: 2024-05-22
Payer: MEDICARE

## 2024-05-22 VITALS
SYSTOLIC BLOOD PRESSURE: 157 MMHG | WEIGHT: 253 LBS | HEIGHT: 73 IN | OXYGEN SATURATION: 98 % | RESPIRATION RATE: 16 BRPM | DIASTOLIC BLOOD PRESSURE: 77 MMHG | HEART RATE: 73 BPM | BODY MASS INDEX: 33.53 KG/M2

## 2024-05-22 VITALS — DIASTOLIC BLOOD PRESSURE: 77 MMHG | TEMPERATURE: 97 F | HEART RATE: 73 BPM | SYSTOLIC BLOOD PRESSURE: 157 MMHG

## 2024-05-22 DIAGNOSIS — Z98.890 OTHER SPECIFIED POSTPROCEDURAL STATES: Chronic | ICD-10-CM

## 2024-05-22 DIAGNOSIS — Z90.01 ACQUIRED ABSENCE OF EYE: Chronic | ICD-10-CM

## 2024-05-22 DIAGNOSIS — Z90.49 ACQUIRED ABSENCE OF OTHER SPECIFIED PARTS OF DIGESTIVE TRACT: Chronic | ICD-10-CM

## 2024-05-22 DIAGNOSIS — C43.61 MALIGNANT MELANOMA OF RIGHT UPPER LIMB, INCLUDING SHOULDER: ICD-10-CM

## 2024-05-22 DIAGNOSIS — D63.0 ANEMIA IN NEOPLASTIC DISEASE: ICD-10-CM

## 2024-05-22 DIAGNOSIS — Z45.2 ENCOUNTER FOR ADJUSTMENT AND MANAGEMENT OF VASCULAR ACCESS DEVICE: ICD-10-CM

## 2024-05-22 PROCEDURE — 99214 OFFICE O/P EST MOD 30 MIN: CPT

## 2024-05-22 NOTE — HISTORY OF PRESENT ILLNESS
[de-identified] : Mr. MALIA GAUTHIER is a 68 year old male here today for evaluation and management of Testicular Seminoma and history of melanoma.     MALIA is a 68 year old M with PMHx including malignant melanoma of skin, HTN, hypothyroid, OA who presents to clinic to establish care, accompanied by sister at initial visit.  Patient states he went to PCP regarding testicular swelling a few months ago and was subsequently sent for additional workup including MR imaging which noted large right testicular mass.  CT imaging raised suspicion for enlarged lymphadenopathy.  He is presently feeling well with no new complaints.  Patient denies fever, chills, nausea, vomiting, dyspnea, unintentional weight loss or bleeding.  Patient reports family history of malignancy in his father (stomach, skin cancer), paternal uncle (skin cancer), maternal aunt + maternal grandmother (breast cancer).  Smokes cigars occasionally for social use.     RADIOLOGIC WORKUP  CT C/A/P (12.2.2023) IMPRESSION:3 mm left lower lobe pulmonary nodule of indeterminate clinical significance. Otherwise, no CT evidence of thoracic metastatic disease.Intra-abdominal lymphadenopathy concerning for metastatic disease. Consider complete evaluation with a PET/CT.Large right hydrocele noted. MR Pelvis (10.3.2023 - R) IMPRESSION: 1. Large heterogeneous mass, likely centered in the right testicle and extending into the right epididymis and right spermatic cord, highly suspicious for malignancy.  The tumor appears to extend beyond the testicle into the scrotal sac.  Surgical resection is advised.  2.  Large right hydrocele.  3 Limited views of the abdomen without definite evidence for metastatic disease, however, incomplete on the scrotal examination recommend complete characterization with CT chest abdomen and pelvis with contrast. US Scrotal (9.15.2023 - [facility]) IMPRESSION: Enlarged right testes with multiple masses highly suspicious for neoplasm.  Associated large right hydrocele.  On several images the mass in the upper pole of the right testes appears to be extended superiorly beyond the testicle.  Further evaluation with an MRI of the scrotum with and without contrast may be of diagnostic benefit. NM Lymphoscintigraphy (10.19.2018) Impression: 1. Definite demonstration of one sentinel lymph node uptake in  right axilla, by 5 minutes after injection.2. The overlying skin was marked in the  anterior position.3. The patient left the radiology department in good condition without any incident. 4. This is a preoperative marking for sentinel lymph node right axilla for wide excision of melanoma, right shoulder.  PET/CT WB FDG (9.19.2018) IMPRESSION: No evidence of pathologic FDG uptake.  LAB WORKUP (11.24.2023) WBC 5.25, Hgb 14.3, , Cr 0.8, eGFR 95, PSA 0.96, normal LFTs, , AFP 2.9, hCG TM 1   PATHOLOGY (see results section)   HCM Colonoscopy overdue   Upper Endoscopy never done  [FreeTextEntry1] : Completed 4 cycles of EP (Etoposide, Cisplatin) on 1.15.2023 - 4.5.2024  [de-identified] : 1/12/24 Patient is here for a follow-up visit for Testicular Seminoma and history of melanoma.  He is feeling well with no new complaints.  Reviewed most recent CBC, which is stable with mild anemia, hgb 13.5g/dL.  Patient denies fever, chills, nausea, vomiting, dyspnea or bleeding.  He completed PFTs on Wednesday.  He also completed audiogram recently.  Patient went for MR imaging this morning; not yet resulted.  Patient is s/p Right chest port placed via right IJ access on 1.3.2024.  Reviewed most recent CT imaging which shows increase in size of abdominal pelvic lymph nodes, stable left lung nodule and indeterminate right adrenal gland lesion.  CT C/A/P (1.9.2024) Impression:Stable left lung nodule. No new nodules or lymphadenopathy.Increase in size of abdominal pelvic lymph nodes as described.Stable indeterminate right adrenal gland lesion.  2/2/24 Patient is here for a follow-up visit for Testicular Seminoma and history of melanoma.  He is due for cycle 2 of Cisplatin + Etoposide on 2/5, initiation on 1.15.2024.  He lost about 10lbs since last visit since he has been adjusting portion size.  He reports some hair thinning/loss.  Patient denies fever, chills, nausea, vomiting, dyspnea, worsening of tinnitus (present prior to initiation of chemo) or bleeding.  Reviewed most recent MR imaging which shows no evidence of intracranial metastasis or acute intracranial pathology, paranasal sinus mucosal disease.   MR Head (1.12.2024) IMPRESSION:No evidence of intracranial metastasis or acute intracranial pathology.Mild chronic microvascular ischemic changes.Paranasal sinus mucosal disease.  2/23/24 Patient is here for a follow-up visit for Testicular Seminoma and history of melanoma.  He is due for cycle 3 of Cisplatin + Etoposide on 2/26, initiation on 1.15.2024.  Reviewed most recent CBC which shows neutropenia and worsening anemia with hgb down to 9.2g/dL.  Patient denies fever, chills, nausea, vomiting, dyspnea, worsening of tinnitus (present prior to initiation of chemo) or bleeding.  Patient also has low magnesium.  He is still losing weight.    3/25/24 Patient is here for a follow-up visit for Testicular Seminoma and history of melanoma.  He is due for cycle 4 of Cisplatin + Etoposide on 2/26, initiation on 1.15.2024.  Since last visit, patient was hospitalized due to fever and diarrhea; noted to have neutropenia and C. difficile infection.  He passed 1 formed stool this morning; only 1 bowel movement yesterday.  He completed antibiotics.  Patient presently denies fever, chills, nausea, vomiting, dyspnea, worsening of tinnitus (present prior to initiation of chemo) or bleeding.  He is still losing weight.    4/8/24 Patient is here for a follow-up TELEvisit for Testicular Seminoma and history of melanoma.  He is s/p cycle 4 of Cisplatin + Etoposide, initiation on 1.15.2024.  Reviewed most recent CBC which shows mild leukocytosis (likely due to GCSF) and moderate anemia, hgb 8.4g/dL.  Patient presently denies fever, chills, nausea, vomiting, abdominal pain, chest pain, palpitations, dyspnea, worsening of tinnitus (present prior to initiation of chemo) or bleeding.  His diarrhea has resolved.  He has only been taking once daily.    5/1/24 Patient is here for a follow-up visit for Testicular Seminoma and history of melanoma.  He completed 4 cycle of Cisplatin + Etoposide 1.15.2024 - 4.5.2024.  Reviewed most recent CBC which shows moderate anemia, hgb 8.2g/dL. per quadrant ultrasound can be obtained for further evaluation as clinically warranted.2.  Stable enlarged retroperitoneal lymph nodes.3.  Additional stable findings as above. Patient presently denies fever, chills, nausea, vomiting, abdominal pain, chest pain, palpitations, dyspnea, worsening of tinnitus (present prior to initiation of chemo) or bleeding.  He had diarrhea which was the reason why he was hospitalized and completed abx for C. Diff and bacteremia.  Reviewed most recent CT imaging from the hospital which shows stable enlarged retroperitoneal lymph nodes which is slightly smaller compared to pre-treatment.  CT A/P (4.15.2024) IMPRESSION:1.  Since March 5, 2024, new mild gallbladder wall thickening and hazy adjacent fat stranding. Correlate with symptoms. A right upper quadrant ultrasound can be obtained for further evaluation as clinically warranted.2.  Stable enlarged retroperitoneal lymph nodes.3.  Additional stable findings as above. US Bi LE (4.18.2024) IMPRESSION:No evidence of deep venous thrombosis in either lower extremity.  5/22/24 Patient is here for a follow-up visit for Testicular Seminoma and history of melanoma.  He completed 4 cycle of Cisplatin + Etoposide 1.15.2024 - 4.5.2024.  Reviewed most recent CBC which shows moderate macrocytic anemia with hgb 9.2g/dL.  His diarrhea has resolved, but he is still completing abx for C. Diff and bacteremia until around 6/10.  He has followup with ID at the end of this month and is actively looking to schedule Dermatology appointment.  Patient presently denies fever, chills, nausea, vomiting, abdominal pain, chest pain, palpitations, dyspnea, worsening of tinnitus (present prior to initiation of chemo) or bleeding.

## 2024-05-22 NOTE — PHYSICAL EXAM
[Restricted in physically strenuous activity but ambulatory and able to carry out work of a light or sedentary nature] : Status 1- Restricted in physically strenuous activity but ambulatory and able to carry out work of a light or sedentary nature, e.g., light house work, office work [Normal] : normoactive bowel sounds, soft and nontender, no hepatosplenomegaly or masses appreciated [de-identified] : right eye prosthesis but wears R eye patch  [de-identified] : +1/2 bilateral lower extremity edema  [de-identified] : ambulating with assistance of single-point cane  [de-identified] : s/p Right chest port placed via right IJ access on 1.3.2024

## 2024-05-22 NOTE — ASSESSMENT
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  - followed by ENT; baseline audiogram completed 12.28.2023 prior to chemotherapy using Cisplatin - followed by PULM; baseline PFTs completed since we were considering using Bleomycin but opted to avoid  - MR Brain 1.12.2024 no evidence of intracranial metastasis or acute intracranial pathology, paranasal sinus mucosal disease  - s/p Right chest port placed via right IJ  - completed 4 cycles of Cisplatin + Etoposide as of 4.5.2024 followed by Neulasta onbody injection D5, 1.15.2024 - 4.5.2024 - CT A/P 4.15.2024 shows stable enlarged retroperitoneal lymph nodes which is slightly smaller compared to pre-treatment.   - CT C/A/P ordered to be done at 3 month claudio prior to next visit (07/2024), Rx given   # Neutropenia, likely due to chemotherapy  - s/p Zarxio 480mcg SQ daily x 2  - added Neulasta onbody 6mg SQ following each cycle of chemo   # Anemia, likely due to chemotherapy  - s/p 1 unit PRBC on 2/26  - will continue to monitor , gradually improving   # Hypomagnesemia  - s/p Magnesium Sulfate 2g twice per week with chemotherapy  - c/w Magnesium 400mg PO once per day OTC as tolerated   # C.diff bacteremia, dx 04/2024  - followup with Infectious Disease, Dr. Nieves, as recommended for monitoring of vanco + symptoms   # History of Melanoma of skin, right shoulder, dx 10/2018  - requested to obtain records including surgical pathology for our review  - followup with DERM for surveillance skin exams at least every 6-12 months   Encouraged to keep up to date with age appropriate screenings including but not limited to colonoscopy and upper endoscopy.  RTC in 2 months (post imaging) with Dr. Atwood with CBC, BMP, LFTs, Mg, HCG TM, AFP TM level prior

## 2024-05-22 NOTE — REVIEW OF SYSTEMS
[Lower Ext Edema] : lower extremity edema [Diarrhea: Grade 0] : Diarrhea: Grade 0 [Negative] : Allergic/Immunologic [Recent Change In Weight] : ~T recent weight change [Vomiting] : no vomiting [Easy Bleeding] : no tendency for easy bleeding [FreeTextEntry2] : gaining weight  [FreeTextEntry5] : reports mild pedal edema

## 2024-05-30 ENCOUNTER — NON-APPOINTMENT (OUTPATIENT)
Age: 69
End: 2024-05-30

## 2024-06-12 NOTE — PRE-ANESTHESIA EVALUATION ADULT - BP NONINVASIVE DIASTOLIC (MM HG)
History of Present Illness:  83 year-old female here for followup.  She is accompanied by family.  She has been doing well, using a cane for short distances and a walker for long distances.  No recent falls.  No new chest pain, dyspnea, PND, orthopnea or edema.      Impression:   Subcutaneous loop recorder 09/2022 without any events, some occasional undersense.    History of frequent falls, mobility issues and balance issues, using a cane, but stable.    Remote atrial fibrillation by report, but not documented.  None seen on event monitor.    Chronic left bundle branch block.    History of mild bradycardia.    History of COPD.    Remote stroke on aspirin with easy bruising.    Chronic diastolic heart failure with echocardiogram 01/2023 with normal function.   Hypertension.    Dyslipidemia.      Plan:  Her device does not show any prolonged events, but has some occasional undersense.  Blood pressure is a little high today and we talked about monitoring.  She remains on Losartan.  She is also on a statin, as well as aspirin for remote stroke.  We talked about seeing back in six months and potentially removing the device at that point, although it is not bothering her.          Wt Readings from Last 3 Encounters:   06/12/24 74.4 kg (164 lb)   02/13/24 74.9 kg (165 lb 3.2 oz)   12/06/23 73.9 kg (163 lb)     Past Medical History:   Diagnosis Date    Allergic rhinitis     Anemia     Anxiety disorder     Chronic obstructive pulmonary disease (HCC)     CVA (cerebral vascular accident) (HCC) 1/16/2022    Dyslipidemia     Folic acid deficiency     Hypertension     Neurologic disorder     resting tremor       Current Outpatient Medications   Medication Sig Dispense Refill    losartan (COZAAR) 25 MG tablet TAKE 1 TABLET BY MOUTH 2 TIMES DAILY 180 tablet 0    pantoprazole (PROTONIX) 20 MG tablet Take 1 tablet by mouth every morning (before breakfast) 90 tablet 0    fluticasone (FLONASE) 50 MCG/ACT nasal spray 2 sprays by Each  68

## 2024-06-17 ENCOUNTER — APPOINTMENT (OUTPATIENT)
Dept: UROLOGY | Facility: CLINIC | Age: 69
End: 2024-06-17
Payer: MEDICARE

## 2024-06-17 VITALS
BODY MASS INDEX: 33.13 KG/M2 | DIASTOLIC BLOOD PRESSURE: 85 MMHG | WEIGHT: 250 LBS | TEMPERATURE: 98 F | HEIGHT: 73 IN | HEART RATE: 71 BPM | OXYGEN SATURATION: 97 % | SYSTOLIC BLOOD PRESSURE: 134 MMHG

## 2024-06-17 DIAGNOSIS — C62.90 MALIGNANT NEOPLASM OF UNSPECIFIED TESTIS, UNSPECIFIED WHETHER DESCENDED OR UNDESCENDED: ICD-10-CM

## 2024-06-17 PROCEDURE — 99214 OFFICE O/P EST MOD 30 MIN: CPT | Mod: 25

## 2024-06-17 PROCEDURE — 99406 BEHAV CHNG SMOKING 3-10 MIN: CPT

## 2024-06-17 NOTE — HISTORY OF PRESENT ILLNESS
[FreeTextEntry1] : 68 yo s/p radical orchiectomy 12/7/2023  Final Diagnosis Right testicle, right radical orchiectomy: -   Seminoma,  multifocal, 6 cm in maximum dimension, with additional 2 cm nodule. -  Tumor infiltrates tunica albuginea with extension to the surface of  inner leaflet of tunica vaginalis. -  Tumor infiltrates rete testis, epididymis and extends into the root of spermatic cord. -  Rare focus of vascular invasion identified (block 1C).  completed 4 cycles of Cisplatin + Etoposide as of 4.5.2024 followed by Neulasta onbody injection D5, 1.15.2024 - 4.5.2024  offers no post operative complaints feels well   CT scan 12/3/2023  COMPARISON CT: None. Correlation with PET/CT from 9/19/2018.  FINDINGS:  LOWER CHEST: Dictated separately. HEPATOBILIARY: Unremarkable. SPLEEN: Unremarkable. PANCREAS: Unremarkable. ADRENAL GLANDS: Right 1.5 cm adrenal nodule was present on the PET/CT however not directly compared due to artifacts and differences in slice thickness. KIDNEYS: Symmetric enhancement. No hydronephrosis. Bilateral cysts. Additional subcentimeter hypodensities, too small to characterize. ABDOMINOPELVIC NODES: Para-aortic and pericaval lymphadenopathy with largest conglomerate measuring approximately 5.3 x 3.4 cm on series 307 image 285. PELVIC ORGANS: Right scrotal fluid/hydrocele. Testicles not definitely well evaluated on CT. PERITONEUM/MESENTERY/BOWEL: No bowel obstruction, pneumatosis, pneumoperitoneum, or ascites. BONES/SOFT TISSUES: No acute osseous abnormality. OTHER: Normal caliber aorta.   IMPRESSION:  Intra-abdominal lymphadenopathy concerning for metastatic disease. Consider complete evaluation with a PET/CT. Large right hydrocele noted.  CT chest 12/3/2023  IMPRESSION:  3 mm left lower lobe pulmonary nodule of indeterminate clinical significance. Otherwise, no CT evidence of thoracic metastatic disease.   [Urinary Retention] : no urinary retention [Urinary Urgency] : no urinary urgency [Urinary Frequency] : no urinary frequency [Nocturia] : no nocturia [Straining] : no straining [Weak Stream] : no weak stream [Dysuria] : no dysuria [Hematuria - Gross] : no gross hematuria

## 2024-06-17 NOTE — ASSESSMENT
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 - followed by ENT; baseline audiogram completed 12.28.2023 prior to chemotherapy using Cisplatin - followed by PULM; baseline PFTs completed since we were considering using Bleomycin but opted to avoid - MR Brain 1.12.2024 no evidence of intracranial metastasis or acute intracranial pathology, paranasal sinus mucosal disease - s/p Right chest port placed via right IJ - completed 4 cycles of Cisplatin + Etoposide as of 4.5.2024 followed by Neulasta onbody injection D5, 1.15.2024 - 4.5.2024 - CT A/P 4.15.2024 shows stable enlarged retroperitoneal lymph nodes which is slightly smaller compared to pre-treatment. Plan to order repeat CT C/A/P to be done in 3 months (07/2024).  the patient can f/u with me as needed no urinary complaints no flank pain

## 2024-06-17 NOTE — PHYSICAL EXAM
[Normal Appearance] : normal appearance [Well Groomed] : well groomed [General Appearance - In No Acute Distress] : no acute distress [Edema] : no peripheral edema [Respiration, Rhythm And Depth] : normal respiratory rhythm and effort [Exaggerated Use Of Accessory Muscles For Inspiration] : no accessory muscle use [Abdomen Soft] : soft [Abdomen Tenderness] : non-tender [Costovertebral Angle Tenderness] : no ~M costovertebral angle tenderness [Urinary Bladder Findings] : the bladder was normal on palpation [Normal Station and Gait] : the gait and station were normal for the patient's age [] : no rash [No Focal Deficits] : no focal deficits [Oriented To Time, Place, And Person] : oriented to person, place, and time [Affect] : the affect was normal [Mood] : the mood was normal [No Palpable Adenopathy] : no palpable adenopathy [de-identified] : right inguinal incision - well healed

## 2024-06-19 ENCOUNTER — LABORATORY RESULT (OUTPATIENT)
Age: 69
End: 2024-06-19

## 2024-06-19 ENCOUNTER — APPOINTMENT (OUTPATIENT)
Age: 69
End: 2024-06-19

## 2024-06-19 ENCOUNTER — OUTPATIENT (OUTPATIENT)
Dept: OUTPATIENT SERVICES | Facility: HOSPITAL | Age: 69
LOS: 1 days | End: 2024-06-19
Payer: MEDICARE

## 2024-06-19 DIAGNOSIS — Z98.890 OTHER SPECIFIED POSTPROCEDURAL STATES: Chronic | ICD-10-CM

## 2024-06-19 DIAGNOSIS — Z90.49 ACQUIRED ABSENCE OF OTHER SPECIFIED PARTS OF DIGESTIVE TRACT: Chronic | ICD-10-CM

## 2024-06-19 DIAGNOSIS — Z90.01 ACQUIRED ABSENCE OF EYE: Chronic | ICD-10-CM

## 2024-06-19 DIAGNOSIS — C43.61 MALIGNANT MELANOMA OF RIGHT UPPER LIMB, INCLUDING SHOULDER: ICD-10-CM

## 2024-06-19 LAB
ALBUMIN SERPL ELPH-MCNC: 3.9 G/DL
ALP BLD-CCNC: 68 U/L
ALT SERPL-CCNC: 15 U/L
ANION GAP SERPL CALC-SCNC: 12 MMOL/L
AST SERPL-CCNC: 20 U/L
BILIRUB DIRECT SERPL-MCNC: <0.2 MG/DL
BILIRUB INDIRECT SERPL-MCNC: >0.1 MG/DL
BILIRUB SERPL-MCNC: 0.3 MG/DL
BUN SERPL-MCNC: 24 MG/DL
CALCIUM SERPL-MCNC: 9.3 MG/DL
CHLORIDE SERPL-SCNC: 104 MMOL/L
CO2 SERPL-SCNC: 25 MMOL/L
CREAT SERPL-MCNC: 1 MG/DL
EGFR: 80 ML/MIN/1.73M2
GLUCOSE SERPL-MCNC: 95 MG/DL
HCT VFR BLD CALC: 31.5 %
HGB BLD-MCNC: 10.6 G/DL
MCHC RBC-ENTMCNC: 31.7 PG
MCHC RBC-ENTMCNC: 33.7 G/DL
MCV RBC AUTO: 94.3 FL
PLATELET # BLD AUTO: 154 K/UL
PMV BLD: 9.1 FL
POTASSIUM SERPL-SCNC: 4.8 MMOL/L
PROT SERPL-MCNC: 6.4 G/DL
RBC # BLD: 3.34 M/UL
RBC # FLD: 12.5 %
SODIUM SERPL-SCNC: 141 MMOL/L
WBC # FLD AUTO: 4.91 K/UL

## 2024-06-19 PROCEDURE — 36415 COLL VENOUS BLD VENIPUNCTURE: CPT

## 2024-06-19 PROCEDURE — 80048 BASIC METABOLIC PNL TOTAL CA: CPT

## 2024-06-19 PROCEDURE — 80076 HEPATIC FUNCTION PANEL: CPT

## 2024-06-19 PROCEDURE — 85027 COMPLETE CBC AUTOMATED: CPT

## 2024-06-20 DIAGNOSIS — C43.61 MALIGNANT MELANOMA OF RIGHT UPPER LIMB, INCLUDING SHOULDER: ICD-10-CM

## 2024-06-25 NOTE — PATIENT PROFILE ADULT - MONEY FOR FOOD
The CTA of the aorta done in March 2024 with runoff was reviewed again    On the left side, which is the symptomatic side, patient has 70% plus stenosis of the left external iliac artery, occlusion of the superficial femoral artery above the knee with reconstitution of the popliteal artery with trifurcation disease as well    On the right side, patient has approximately 30 to 40%, iliac artery stenosis, 60% stenosis of the right external iliac artery, 60% of the popliteal artery behind the knee and some trifurcation disease    Bilaterally patient is significant hypogastric artery stenosis, left side, subtotal occlusion right side, 60 to 70% stenosis    The ankle-brachial index done in May 2024, 0.67 on the right, on the symptomatic side 0.45 with markedly diminished pulse volume recording over the left foot metatarsal and flat pulse 1 recordings of the toes      Discussed the patient again, as patient having symptoms of rest pain of the left foot especially in nighttime, patient recommend angiography, hopefully consider percutaneous intervention    Risk benefits were explained, all his questions were answered    He understand the procedure will be done by Dr. Sujata dee       no

## 2024-07-15 ENCOUNTER — LABORATORY RESULT (OUTPATIENT)
Age: 69
End: 2024-07-15

## 2024-07-15 ENCOUNTER — OUTPATIENT (OUTPATIENT)
Dept: OUTPATIENT SERVICES | Facility: HOSPITAL | Age: 69
LOS: 1 days | End: 2024-07-15
Payer: MEDICARE

## 2024-07-15 ENCOUNTER — APPOINTMENT (OUTPATIENT)
Age: 69
End: 2024-07-15

## 2024-07-15 DIAGNOSIS — Z90.01 ACQUIRED ABSENCE OF EYE: Chronic | ICD-10-CM

## 2024-07-15 DIAGNOSIS — C43.61 MALIGNANT MELANOMA OF RIGHT UPPER LIMB, INCLUDING SHOULDER: ICD-10-CM

## 2024-07-15 DIAGNOSIS — Z98.890 OTHER SPECIFIED POSTPROCEDURAL STATES: Chronic | ICD-10-CM

## 2024-07-15 LAB
ALBUMIN SERPL ELPH-MCNC: 3.9 G/DL
ALP BLD-CCNC: 72 U/L
ALT SERPL-CCNC: 16 U/L
ANION GAP SERPL CALC-SCNC: 10 MMOL/L
AST SERPL-CCNC: 21 U/L
BILIRUB DIRECT SERPL-MCNC: <0.2 MG/DL
BILIRUB INDIRECT SERPL-MCNC: >0.2 MG/DL
BILIRUB SERPL-MCNC: 0.4 MG/DL
BUN SERPL-MCNC: 24 MG/DL
CALCIUM SERPL-MCNC: 9.6 MG/DL
CHLORIDE SERPL-SCNC: 103 MMOL/L
CO2 SERPL-SCNC: 28 MMOL/L
CREAT SERPL-MCNC: 1 MG/DL
EGFR: 80 ML/MIN/1.73M2
GLUCOSE SERPL-MCNC: 104 MG/DL
HCG SERPL-MCNC: <1 MIU/ML
HCT VFR BLD CALC: 32.5 %
HGB BLD-MCNC: 10.9 G/DL
MAGNESIUM SERPL-MCNC: 1.9 MG/DL
MCHC RBC-ENTMCNC: 31.7 PG
MCHC RBC-ENTMCNC: 33.5 G/DL
MCV RBC AUTO: 94.5 FL
PLATELET # BLD AUTO: 139 K/UL
PMV BLD: 9 FL
POTASSIUM SERPL-SCNC: 4.6 MMOL/L
PROT SERPL-MCNC: 6.3 G/DL
RBC # BLD: 3.44 M/UL
RBC # FLD: 12.2 %
SODIUM SERPL-SCNC: 141 MMOL/L
WBC # FLD AUTO: 4.32 K/UL

## 2024-07-15 PROCEDURE — 83735 ASSAY OF MAGNESIUM: CPT

## 2024-07-15 PROCEDURE — 82105 ALPHA-FETOPROTEIN SERUM: CPT

## 2024-07-15 PROCEDURE — 84702 CHORIONIC GONADOTROPIN TEST: CPT

## 2024-07-15 PROCEDURE — 80076 HEPATIC FUNCTION PANEL: CPT

## 2024-07-15 PROCEDURE — 36415 COLL VENOUS BLD VENIPUNCTURE: CPT

## 2024-07-15 PROCEDURE — 80048 BASIC METABOLIC PNL TOTAL CA: CPT

## 2024-07-15 PROCEDURE — 85027 COMPLETE CBC AUTOMATED: CPT

## 2024-07-16 DIAGNOSIS — C43.61 MALIGNANT MELANOMA OF RIGHT UPPER LIMB, INCLUDING SHOULDER: ICD-10-CM

## 2024-07-16 LAB — AFP-TM SERPL-MCNC: 2.4 NG/ML

## 2024-07-17 ENCOUNTER — APPOINTMENT (OUTPATIENT)
Age: 69
End: 2024-07-17
Payer: MEDICARE

## 2024-07-17 ENCOUNTER — OUTPATIENT (OUTPATIENT)
Dept: OUTPATIENT SERVICES | Facility: HOSPITAL | Age: 69
LOS: 1 days | End: 2024-07-17
Payer: MEDICARE

## 2024-07-17 VITALS — DIASTOLIC BLOOD PRESSURE: 80 MMHG | HEART RATE: 84 BPM | SYSTOLIC BLOOD PRESSURE: 117 MMHG | TEMPERATURE: 98 F

## 2024-07-17 VITALS
BODY MASS INDEX: 34.19 KG/M2 | TEMPERATURE: 98.2 F | WEIGHT: 258 LBS | HEIGHT: 73 IN | HEART RATE: 84 BPM | SYSTOLIC BLOOD PRESSURE: 117 MMHG | RESPIRATION RATE: 16 BRPM | DIASTOLIC BLOOD PRESSURE: 80 MMHG

## 2024-07-17 DIAGNOSIS — C62.90 MALIGNANT NEOPLASM OF UNSPECIFIED TESTIS, UNSPECIFIED WHETHER DESCENDED OR UNDESCENDED: ICD-10-CM

## 2024-07-17 DIAGNOSIS — Z90.49 ACQUIRED ABSENCE OF OTHER SPECIFIED PARTS OF DIGESTIVE TRACT: Chronic | ICD-10-CM

## 2024-07-17 DIAGNOSIS — Z90.01 ACQUIRED ABSENCE OF EYE: Chronic | ICD-10-CM

## 2024-07-17 DIAGNOSIS — Z98.890 OTHER SPECIFIED POSTPROCEDURAL STATES: Chronic | ICD-10-CM

## 2024-07-17 DIAGNOSIS — C43.61 MALIGNANT MELANOMA OF RIGHT UPPER LIMB, INCLUDING SHOULDER: ICD-10-CM

## 2024-07-17 PROCEDURE — 99214 OFFICE O/P EST MOD 30 MIN: CPT

## 2024-07-17 PROCEDURE — G2211 COMPLEX E/M VISIT ADD ON: CPT

## 2024-08-01 ENCOUNTER — RESULT REVIEW (OUTPATIENT)
Age: 69
End: 2024-08-01

## 2024-08-01 ENCOUNTER — OUTPATIENT (OUTPATIENT)
Dept: OUTPATIENT SERVICES | Facility: HOSPITAL | Age: 69
LOS: 1 days | End: 2024-08-01
Payer: MEDICARE

## 2024-08-01 DIAGNOSIS — Z90.01 ACQUIRED ABSENCE OF EYE: Chronic | ICD-10-CM

## 2024-08-01 DIAGNOSIS — C62.90 MALIGNANT NEOPLASM OF UNSPECIFIED TESTIS, UNSPECIFIED WHETHER DESCENDED OR UNDESCENDED: ICD-10-CM

## 2024-08-01 DIAGNOSIS — Z90.49 ACQUIRED ABSENCE OF OTHER SPECIFIED PARTS OF DIGESTIVE TRACT: Chronic | ICD-10-CM

## 2024-08-01 DIAGNOSIS — Z98.890 OTHER SPECIFIED POSTPROCEDURAL STATES: Chronic | ICD-10-CM

## 2024-08-01 PROCEDURE — 74177 CT ABD & PELVIS W/CONTRAST: CPT

## 2024-08-01 PROCEDURE — 71260 CT THORAX DX C+: CPT

## 2024-08-01 PROCEDURE — 71260 CT THORAX DX C+: CPT | Mod: 26

## 2024-08-01 PROCEDURE — 74177 CT ABD & PELVIS W/CONTRAST: CPT | Mod: 26

## 2024-08-02 DIAGNOSIS — C62.90 MALIGNANT NEOPLASM OF UNSPECIFIED TESTIS, UNSPECIFIED WHETHER DESCENDED OR UNDESCENDED: ICD-10-CM

## 2024-08-05 ENCOUNTER — APPOINTMENT (OUTPATIENT)
Age: 69
End: 2024-08-05

## 2024-08-05 ENCOUNTER — OUTPATIENT (OUTPATIENT)
Dept: OUTPATIENT SERVICES | Facility: HOSPITAL | Age: 69
LOS: 1 days | End: 2024-08-05
Payer: MEDICARE

## 2024-08-05 DIAGNOSIS — Z90.01 ACQUIRED ABSENCE OF EYE: Chronic | ICD-10-CM

## 2024-08-05 DIAGNOSIS — C43.61 MALIGNANT MELANOMA OF RIGHT UPPER LIMB, INCLUDING SHOULDER: ICD-10-CM

## 2024-08-05 PROCEDURE — G2211 COMPLEX E/M VISIT ADD ON: CPT

## 2024-08-05 PROCEDURE — 99214 OFFICE O/P EST MOD 30 MIN: CPT

## 2024-08-05 NOTE — ASSESSMENT
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  - followed by ENT; baseline audiogram completed 12.28.2023 prior to chemotherapy using Cisplatin - followed by PULM; baseline PFTs completed since we were considering using Bleomycin but opted to avoid  - MR Brain 1.12.2024 no evidence of intracranial metastasis or acute intracranial pathology, paranasal sinus mucosal disease  - s/p Right chest port placed via right IJ  - completed 4 cycles of Cisplatin + Etoposide as of 4.5.2024 followed by Neulasta onbody injection D5, 1.15.2024 - 4.5.2024 - CT A/P 4.15.2024 shows stable enlarged retroperitoneal lymph nodes which is slightly smaller compared to pre-treatment.   - CT C/A/P 08/2024: reviewed: Interval decreased prominent retroperitoneal lymph nodes, with aortocaval lymph node measuring up to 1.1 cm short axis, previously 1.5 cm  # Neutropenia, likely due to chemotherapy  - s/p Zarxio 480mcg SQ daily x 2  - added Neulasta onbody 6mg SQ following each cycle of chemo   # Anemia, likely due to chemotherapy  - s/p 1 unit PRBC on 2/26  - will continue to monitor , gradually improving   # Hypomagnesemia  - s/p Magnesium Sulfate 2g twice per week with chemotherapy  - c/w Magnesium 400mg PO once per day OTC as tolerated   # C.diff bacteremia, dx 04/2024  - followup with Infectious Disease, Dr. Nieves, as recommended for monitoring of vanco + symptoms   # History of Melanoma of skin, right shoulder, dx 10/2018  - requested to obtain records including surgical pathology for our review  - followup with [facility] for surveillance skin exams at least every 6-12 months   Encouraged to keep up to date with age appropriate screenings including but not limited to colonoscopy and upper endoscopy.  pending imaging

## 2024-08-05 NOTE — REVIEW OF SYSTEMS
[Recent Change In Weight] : ~T recent weight change [Lower Ext Edema] : lower extremity edema [Diarrhea: Grade 0] : Diarrhea: Grade 0 [Negative] : Allergic/Immunologic [Vomiting] : no vomiting [Easy Bleeding] : no tendency for easy bleeding [FreeTextEntry2] : gaining weight  [FreeTextEntry5] : reports mild pedal edema

## 2024-08-05 NOTE — HISTORY OF PRESENT ILLNESS
[de-identified] : Mr. MALIA GAUTHIER is a 68 year old male here today for evaluation and management of Testicular Seminoma and history of melanoma.     MALIA is a 68 year old M with PMHx including malignant melanoma of skin, HTN, hypothyroid, OA who presents to clinic to establish care, accompanied by sister at initial visit.  Patient states he went to PCP regarding testicular swelling a few months ago and was subsequently sent for additional workup including MR imaging which noted large right testicular mass.  CT imaging raised suspicion for enlarged lymphadenopathy.  He is presently feeling well with no new complaints.  Patient denies fever, chills, nausea, vomiting, dyspnea, unintentional weight loss or bleeding.  Patient reports family history of malignancy in his father (stomach, skin cancer), paternal uncle (skin cancer), maternal aunt + maternal grandmother (breast cancer).  Smokes cigars occasionally for social use.     RADIOLOGIC WORKUP  CT C/A/P (12.2.2023) IMPRESSION:3 mm left lower lobe pulmonary nodule of indeterminate clinical significance. Otherwise, no CT evidence of thoracic metastatic disease.Intra-abdominal lymphadenopathy concerning for metastatic disease. Consider complete evaluation with a PET/CT.Large right hydrocele noted. MR Pelvis (10.3.2023 - R) IMPRESSION: 1. Large heterogeneous mass, likely centered in the right testicle and extending into the right epididymis and right spermatic cord, highly suspicious for malignancy.  The tumor appears to extend beyond the testicle into the scrotal sac.  Surgical resection is advised.  2.  Large right hydrocele.  3 Limited views of the abdomen without definite evidence for metastatic disease, however, incomplete on the scrotal examination recommend complete characterization with CT chest abdomen and pelvis with contrast. US Scrotal (9.15.2023 - [facility]) IMPRESSION: Enlarged right testes with multiple masses highly suspicious for neoplasm.  Associated large right hydrocele.  On several images the mass in the upper pole of the right testes appears to be extended superiorly beyond the testicle.  Further evaluation with an MRI of the scrotum with and without contrast may be of diagnostic benefit. NM Lymphoscintigraphy (10.19.2018) Impression: 1. Definite demonstration of one sentinel lymph node uptake in  right axilla, by 5 minutes after injection.2. The overlying skin was marked in the  anterior position.3. The patient left the radiology department in good condition without any incident. 4. This is a preoperative marking for sentinel lymph node right axilla for wide excision of melanoma, right shoulder.  PET/CT WB FDG (9.19.2018) IMPRESSION: No evidence of pathologic FDG uptake.  LAB WORKUP (11.24.2023) WBC 5.25, Hgb 14.3, , Cr 0.8, eGFR 95, PSA 0.96, normal LFTs, , AFP 2.9, hCG TM 1   PATHOLOGY (see results section)   HCM Colonoscopy overdue   Upper Endoscopy never done  [FreeTextEntry1] : Completed 4 cycles of EP (Etoposide, Cisplatin) on 1.15.2023 - 4.5.2024  [de-identified] : 1/12/24 Patient is here for a follow-up visit for Testicular Seminoma and history of melanoma.  He is feeling well with no new complaints.  Reviewed most recent CBC, which is stable with mild anemia, hgb 13.5g/dL.  Patient denies fever, chills, nausea, vomiting, dyspnea or bleeding.  He completed PFTs on Wednesday.  He also completed audiogram recently.  Patient went for MR imaging this morning; not yet resulted.  Patient is s/p Right chest port placed via right IJ access on 1.3.2024.  Reviewed most recent CT imaging which shows increase in size of abdominal pelvic lymph nodes, stable left lung nodule and indeterminate right adrenal gland lesion.  CT C/A/P (1.9.2024) Impression:Stable left lung nodule. No new nodules or lymphadenopathy.Increase in size of abdominal pelvic lymph nodes as described.Stable indeterminate right adrenal gland lesion.  2/2/24 Patient is here for a follow-up visit for Testicular Seminoma and history of melanoma.  He is due for cycle 2 of Cisplatin + Etoposide on 2/5, initiation on 1.15.2024.  He lost about 10lbs since last visit since he has been adjusting portion size.  He reports some hair thinning/loss.  Patient denies fever, chills, nausea, vomiting, dyspnea, worsening of tinnitus (present prior to initiation of chemo) or bleeding.  Reviewed most recent MR imaging which shows no evidence of intracranial metastasis or acute intracranial pathology, paranasal sinus mucosal disease.   MR Head (1.12.2024) IMPRESSION:No evidence of intracranial metastasis or acute intracranial pathology.Mild chronic microvascular ischemic changes.Paranasal sinus mucosal disease.  2/23/24 Patient is here for a follow-up visit for Testicular Seminoma and history of melanoma.  He is due for cycle 3 of Cisplatin + Etoposide on 2/26, initiation on 1.15.2024.  Reviewed most recent CBC which shows neutropenia and worsening anemia with hgb down to 9.2g/dL.  Patient denies fever, chills, nausea, vomiting, dyspnea, worsening of tinnitus (present prior to initiation of chemo) or bleeding.  Patient also has low magnesium.  He is still losing weight.    3/25/24 Patient is here for a follow-up visit for Testicular Seminoma and history of melanoma.  He is due for cycle 4 of Cisplatin + Etoposide on 2/26, initiation on 1.15.2024.  Since last visit, patient was hospitalized due to fever and diarrhea; noted to have neutropenia and C. difficile infection.  He passed 1 formed stool this morning; only 1 bowel movement yesterday.  He completed antibiotics.  Patient presently denies fever, chills, nausea, vomiting, dyspnea, worsening of tinnitus (present prior to initiation of chemo) or bleeding.  He is still losing weight.    4/8/24 Patient is here for a follow-up TELEvisit for Testicular Seminoma and history of melanoma.  He is s/p cycle 4 of Cisplatin + Etoposide, initiation on 1.15.2024.  Reviewed most recent CBC which shows mild leukocytosis (likely due to GCSF) and moderate anemia, hgb 8.4g/dL.  Patient presently denies fever, chills, nausea, vomiting, abdominal pain, chest pain, palpitations, dyspnea, worsening of tinnitus (present prior to initiation of chemo) or bleeding.  His diarrhea has resolved.  He has only been taking once daily.    5/1/24 Patient is here for a follow-up visit for Testicular Seminoma and history of melanoma.  He completed 4 cycle of Cisplatin + Etoposide 1.15.2024 - 4.5.2024.  Reviewed most recent CBC which shows moderate anemia, hgb 8.2g/dL. per quadrant ultrasound can be obtained for further evaluation as clinically warranted.2.  Stable enlarged retroperitoneal lymph nodes.3.  Additional stable findings as above. Patient presently denies fever, chills, nausea, vomiting, abdominal pain, chest pain, palpitations, dyspnea, worsening of tinnitus (present prior to initiation of chemo) or bleeding.  He had diarrhea which was the reason why he was hospitalized and completed abx for C. Diff and bacteremia.  Reviewed most recent CT imaging from the hospital which shows stable enlarged retroperitoneal lymph nodes which is slightly smaller compared to pre-treatment.  CT A/P (4.15.2024) IMPRESSION:1.  Since March 5, 2024, new mild gallbladder wall thickening and hazy adjacent fat stranding. Correlate with symptoms. A right upper quadrant ultrasound can be obtained for further evaluation as clinically warranted.2.  Stable enlarged retroperitoneal lymph nodes.3.  Additional stable findings as above. US Bi LE (4.18.2024) IMPRESSION:No evidence of deep venous thrombosis in either lower extremity.  5/22/24 Patient is here for a follow-up visit for Testicular Seminoma and history of melanoma.  He completed 4 cycle of Cisplatin + Etoposide 1.15.2024 - 4.5.2024.  Reviewed most recent CBC which shows moderate macrocytic anemia with hgb 9.2g/dL.  His diarrhea has resolved, but he is still completing abx for C. Diff and bacteremia until around 6/10.  He has followup with ID at the end of this month and is actively looking to schedule Dermatology appointment.  Patient presently denies fever, chills, nausea, vomiting, abdominal pain, chest pain, palpitations, dyspnea, worsening of tinnitus (present prior to initiation of chemo) or bleeding.

## 2024-08-05 NOTE — PHYSICAL EXAM
[Restricted in physically strenuous activity but ambulatory and able to carry out work of a light or sedentary nature] : Status 1- Restricted in physically strenuous activity but ambulatory and able to carry out work of a light or sedentary nature, e.g., light house work, office work [Normal] : normoactive bowel sounds, soft and nontender, no hepatosplenomegaly or masses appreciated [de-identified] : right eye prosthesis but wears R eye patch  [de-identified] : +1/2 bilateral lower extremity edema  [de-identified] : ambulating with assistance of single-point cane  [de-identified] : s/p Right chest port placed via right IJ access on 1.3.2024

## 2024-08-05 NOTE — PHYSICAL EXAM
[Restricted in physically strenuous activity but ambulatory and able to carry out work of a light or sedentary nature] : Status 1- Restricted in physically strenuous activity but ambulatory and able to carry out work of a light or sedentary nature, e.g., light house work, office work [Normal] : normoactive bowel sounds, soft and nontender, no hepatosplenomegaly or masses appreciated [de-identified] : right eye prosthesis but wears R eye patch  [de-identified] : +1/2 bilateral lower extremity edema  [de-identified] : ambulating with assistance of single-point cane  [de-identified] : s/p Right chest port placed via right IJ access on 1.3.2024

## 2024-08-05 NOTE — HISTORY OF PRESENT ILLNESS
[de-identified] : Mr. MALIA GAUTHIER is a 68 year old male here today for evaluation and management of Testicular Seminoma and history of melanoma.     MALIA is a 68 year old M with PMHx including malignant melanoma of skin, HTN, hypothyroid, OA who presents to clinic to establish care, accompanied by sister at initial visit.  Patient states he went to PCP regarding testicular swelling a few months ago and was subsequently sent for additional workup including MR imaging which noted large right testicular mass.  CT imaging raised suspicion for enlarged lymphadenopathy.  He is presently feeling well with no new complaints.  Patient denies fever, chills, nausea, vomiting, dyspnea, unintentional weight loss or bleeding.  Patient reports family history of malignancy in his father (stomach, skin cancer), paternal uncle (skin cancer), maternal aunt + maternal grandmother (breast cancer).  Smokes cigars occasionally for social use.     RADIOLOGIC WORKUP  CT C/A/P (12.2.2023) IMPRESSION:3 mm left lower lobe pulmonary nodule of indeterminate clinical significance. Otherwise, no CT evidence of thoracic metastatic disease.Intra-abdominal lymphadenopathy concerning for metastatic disease. Consider complete evaluation with a PET/CT.Large right hydrocele noted. MR Pelvis (10.3.2023 - R) IMPRESSION: 1. Large heterogeneous mass, likely centered in the right testicle and extending into the right epididymis and right spermatic cord, highly suspicious for malignancy.  The tumor appears to extend beyond the testicle into the scrotal sac.  Surgical resection is advised.  2.  Large right hydrocele.  3 Limited views of the abdomen without definite evidence for metastatic disease, however, incomplete on the scrotal examination recommend complete characterization with CT chest abdomen and pelvis with contrast. US Scrotal (9.15.2023 - [facility]) IMPRESSION: Enlarged right testes with multiple masses highly suspicious for neoplasm.  Associated large right hydrocele.  On several images the mass in the upper pole of the right testes appears to be extended superiorly beyond the testicle.  Further evaluation with an MRI of the scrotum with and without contrast may be of diagnostic benefit. NM Lymphoscintigraphy (10.19.2018) Impression: 1. Definite demonstration of one sentinel lymph node uptake in  right axilla, by 5 minutes after injection.2. The overlying skin was marked in the  anterior position.3. The patient left the radiology department in good condition without any incident. 4. This is a preoperative marking for sentinel lymph node right axilla for wide excision of melanoma, right shoulder.  PET/CT WB FDG (9.19.2018) IMPRESSION: No evidence of pathologic FDG uptake.  LAB WORKUP (11.24.2023) WBC 5.25, Hgb 14.3, , Cr 0.8, eGFR 95, PSA 0.96, normal LFTs, , AFP 2.9, hCG TM 1   PATHOLOGY (see results section)   HCM Colonoscopy overdue   Upper Endoscopy never done  [FreeTextEntry1] : Completed 4 cycles of EP (Etoposide, Cisplatin) on 1.15.2023 - 4.5.2024  [de-identified] : 1/12/24 Patient is here for a follow-up visit for Testicular Seminoma and history of melanoma.  He is feeling well with no new complaints.  Reviewed most recent CBC, which is stable with mild anemia, hgb 13.5g/dL.  Patient denies fever, chills, nausea, vomiting, dyspnea or bleeding.  He completed PFTs on Wednesday.  He also completed audiogram recently.  Patient went for MR imaging this morning; not yet resulted.  Patient is s/p Right chest port placed via right IJ access on 1.3.2024.  Reviewed most recent CT imaging which shows increase in size of abdominal pelvic lymph nodes, stable left lung nodule and indeterminate right adrenal gland lesion.  CT C/A/P (1.9.2024) Impression:Stable left lung nodule. No new nodules or lymphadenopathy.Increase in size of abdominal pelvic lymph nodes as described.Stable indeterminate right adrenal gland lesion.  2/2/24 Patient is here for a follow-up visit for Testicular Seminoma and history of melanoma.  He is due for cycle 2 of Cisplatin + Etoposide on 2/5, initiation on 1.15.2024.  He lost about 10lbs since last visit since he has been adjusting portion size.  He reports some hair thinning/loss.  Patient denies fever, chills, nausea, vomiting, dyspnea, worsening of tinnitus (present prior to initiation of chemo) or bleeding.  Reviewed most recent MR imaging which shows no evidence of intracranial metastasis or acute intracranial pathology, paranasal sinus mucosal disease.   MR Head (1.12.2024) IMPRESSION:No evidence of intracranial metastasis or acute intracranial pathology.Mild chronic microvascular ischemic changes.Paranasal sinus mucosal disease.  2/23/24 Patient is here for a follow-up visit for Testicular Seminoma and history of melanoma.  He is due for cycle 3 of Cisplatin + Etoposide on 2/26, initiation on 1.15.2024.  Reviewed most recent CBC which shows neutropenia and worsening anemia with hgb down to 9.2g/dL.  Patient denies fever, chills, nausea, vomiting, dyspnea, worsening of tinnitus (present prior to initiation of chemo) or bleeding.  Patient also has low magnesium.  He is still losing weight.    3/25/24 Patient is here for a follow-up visit for Testicular Seminoma and history of melanoma.  He is due for cycle 4 of Cisplatin + Etoposide on 2/26, initiation on 1.15.2024.  Since last visit, patient was hospitalized due to fever and diarrhea; noted to have neutropenia and C. difficile infection.  He passed 1 formed stool this morning; only 1 bowel movement yesterday.  He completed antibiotics.  Patient presently denies fever, chills, nausea, vomiting, dyspnea, worsening of tinnitus (present prior to initiation of chemo) or bleeding.  He is still losing weight.    4/8/24 Patient is here for a follow-up TELEvisit for Testicular Seminoma and history of melanoma.  He is s/p cycle 4 of Cisplatin + Etoposide, initiation on 1.15.2024.  Reviewed most recent CBC which shows mild leukocytosis (likely due to GCSF) and moderate anemia, hgb 8.4g/dL.  Patient presently denies fever, chills, nausea, vomiting, abdominal pain, chest pain, palpitations, dyspnea, worsening of tinnitus (present prior to initiation of chemo) or bleeding.  His diarrhea has resolved.  He has only been taking once daily.    5/1/24 Patient is here for a follow-up visit for Testicular Seminoma and history of melanoma.  He completed 4 cycle of Cisplatin + Etoposide 1.15.2024 - 4.5.2024.  Reviewed most recent CBC which shows moderate anemia, hgb 8.2g/dL. per quadrant ultrasound can be obtained for further evaluation as clinically warranted.2.  Stable enlarged retroperitoneal lymph nodes.3.  Additional stable findings as above. Patient presently denies fever, chills, nausea, vomiting, abdominal pain, chest pain, palpitations, dyspnea, worsening of tinnitus (present prior to initiation of chemo) or bleeding.  He had diarrhea which was the reason why he was hospitalized and completed abx for C. Diff and bacteremia.  Reviewed most recent CT imaging from the hospital which shows stable enlarged retroperitoneal lymph nodes which is slightly smaller compared to pre-treatment.  CT A/P (4.15.2024) IMPRESSION:1.  Since March 5, 2024, new mild gallbladder wall thickening and hazy adjacent fat stranding. Correlate with symptoms. A right upper quadrant ultrasound can be obtained for further evaluation as clinically warranted.2.  Stable enlarged retroperitoneal lymph nodes.3.  Additional stable findings as above. US Bi LE (4.18.2024) IMPRESSION:No evidence of deep venous thrombosis in either lower extremity.  5/22/24 Patient is here for a follow-up visit for Testicular Seminoma and history of melanoma.  He completed 4 cycle of Cisplatin + Etoposide 1.15.2024 - 4.5.2024.  Reviewed most recent CBC which shows moderate macrocytic anemia with hgb 9.2g/dL.  His diarrhea has resolved, but he is still completing abx for C. Diff and bacteremia until around 6/10.  He has followup with ID at the end of this month and is actively looking to schedule Dermatology appointment.  Patient presently denies fever, chills, nausea, vomiting, abdominal pain, chest pain, palpitations, dyspnea, worsening of tinnitus (present prior to initiation of chemo) or bleeding.

## 2024-08-06 DIAGNOSIS — C43.61 MALIGNANT MELANOMA OF RIGHT UPPER LIMB, INCLUDING SHOULDER: ICD-10-CM

## 2024-09-07 ENCOUNTER — APPOINTMENT (OUTPATIENT)
Age: 69
End: 2024-09-07

## 2024-09-07 ENCOUNTER — OUTPATIENT (OUTPATIENT)
Dept: OUTPATIENT SERVICES | Facility: HOSPITAL | Age: 69
LOS: 1 days | End: 2024-09-07
Payer: MEDICARE

## 2024-09-07 VITALS
OXYGEN SATURATION: 96 % | RESPIRATION RATE: 16 BRPM | DIASTOLIC BLOOD PRESSURE: 85 MMHG | SYSTOLIC BLOOD PRESSURE: 144 MMHG | TEMPERATURE: 98.4 F | HEART RATE: 70 BPM

## 2024-09-07 VITALS — HEART RATE: 70 BPM | TEMPERATURE: 98 F | SYSTOLIC BLOOD PRESSURE: 144 MMHG | DIASTOLIC BLOOD PRESSURE: 88 MMHG

## 2024-09-07 DIAGNOSIS — Z90.49 ACQUIRED ABSENCE OF OTHER SPECIFIED PARTS OF DIGESTIVE TRACT: Chronic | ICD-10-CM

## 2024-09-07 DIAGNOSIS — Z90.01 ACQUIRED ABSENCE OF EYE: Chronic | ICD-10-CM

## 2024-09-07 DIAGNOSIS — C43.61 MALIGNANT MELANOMA OF RIGHT UPPER LIMB, INCLUDING SHOULDER: ICD-10-CM

## 2024-09-07 DIAGNOSIS — Z98.890 OTHER SPECIFIED POSTPROCEDURAL STATES: Chronic | ICD-10-CM

## 2024-09-07 PROCEDURE — 96523 IRRIG DRUG DELIVERY DEVICE: CPT

## 2024-09-08 DIAGNOSIS — C43.61 MALIGNANT MELANOMA OF RIGHT UPPER LIMB, INCLUDING SHOULDER: ICD-10-CM

## 2024-09-17 NOTE — ED ADULT TRIAGE NOTE - NS ED NURSE BANDS TYPE
Name band; [No Acute Distress] : no acute distress [Well Nourished] : well nourished [Well Developed] : well developed [Well-Appearing] : well-appearing [Normal Sclera/Conjunctiva] : normal sclera/conjunctiva [PERRL] : pupils equal round and reactive to light [EOMI] : extraocular movements intact [Normal Outer Ear/Nose] : the outer ears and nose were normal in appearance [Normal Oropharynx] : the oropharynx was normal [No JVD] : no jugular venous distention [No Lymphadenopathy] : no lymphadenopathy [Supple] : supple [Thyroid Normal, No Nodules] : the thyroid was normal and there were no nodules present [No Respiratory Distress] : no respiratory distress  [No Accessory Muscle Use] : no accessory muscle use [Clear to Auscultation] : lungs were clear to auscultation bilaterally [Normal Rate] : normal rate  [Regular Rhythm] : with a regular rhythm [Normal S1, S2] : normal S1 and S2 [No Murmur] : no murmur heard [No Carotid Bruits] : no carotid bruits [No Abdominal Bruit] : a ~M bruit was not heard ~T in the abdomen [No Varicosities] : no varicosities [Pedal Pulses Present] : the pedal pulses are present [No Edema] : there was no peripheral edema [No Palpable Aorta] : no palpable aorta [No Extremity Clubbing/Cyanosis] : no extremity clubbing/cyanosis [Soft] : abdomen soft [Non Tender] : non-tender [Non-distended] : non-distended [No Masses] : no abdominal mass palpated [No HSM] : no HSM [Normal Bowel Sounds] : normal bowel sounds [Normal Posterior Cervical Nodes] : no posterior cervical lymphadenopathy [Normal Anterior Cervical Nodes] : no anterior cervical lymphadenopathy [No CVA Tenderness] : no CVA  tenderness [No Spinal Tenderness] : no spinal tenderness [No Joint Swelling] : no joint swelling [Grossly Normal Strength/Tone] : grossly normal strength/tone [No Rash] : no rash [Coordination Grossly Intact] : coordination grossly intact [No Focal Deficits] : no focal deficits [Normal Gait] : normal gait [Deep Tendon Reflexes (DTR)] : deep tendon reflexes were 2+ and symmetric [Normal Affect] : the affect was normal [Normal Insight/Judgement] : insight and judgment were intact

## 2024-10-03 ENCOUNTER — OUTPATIENT (OUTPATIENT)
Dept: OUTPATIENT SERVICES | Facility: HOSPITAL | Age: 69
LOS: 1 days | End: 2024-10-03
Payer: MEDICARE

## 2024-10-03 ENCOUNTER — APPOINTMENT (OUTPATIENT)
Age: 69
End: 2024-10-03

## 2024-10-03 ENCOUNTER — LABORATORY RESULT (OUTPATIENT)
Age: 69
End: 2024-10-03

## 2024-10-03 DIAGNOSIS — Z90.01 ACQUIRED ABSENCE OF EYE: Chronic | ICD-10-CM

## 2024-10-03 DIAGNOSIS — Z98.890 OTHER SPECIFIED POSTPROCEDURAL STATES: Chronic | ICD-10-CM

## 2024-10-03 DIAGNOSIS — C43.61 MALIGNANT MELANOMA OF RIGHT UPPER LIMB, INCLUDING SHOULDER: ICD-10-CM

## 2024-10-03 DIAGNOSIS — Z90.49 ACQUIRED ABSENCE OF OTHER SPECIFIED PARTS OF DIGESTIVE TRACT: Chronic | ICD-10-CM

## 2024-10-03 PROCEDURE — 36415 COLL VENOUS BLD VENIPUNCTURE: CPT

## 2024-10-03 PROCEDURE — 80048 BASIC METABOLIC PNL TOTAL CA: CPT

## 2024-10-03 PROCEDURE — 85610 PROTHROMBIN TIME: CPT

## 2024-10-03 PROCEDURE — 83615 LACTATE (LD) (LDH) ENZYME: CPT

## 2024-10-03 PROCEDURE — 84704 HCG FREE BETACHAIN TEST: CPT

## 2024-10-03 PROCEDURE — 83735 ASSAY OF MAGNESIUM: CPT

## 2024-10-03 PROCEDURE — 85730 THROMBOPLASTIN TIME PARTIAL: CPT

## 2024-10-03 PROCEDURE — 82105 ALPHA-FETOPROTEIN SERUM: CPT

## 2024-10-03 PROCEDURE — 80076 HEPATIC FUNCTION PANEL: CPT

## 2024-10-03 PROCEDURE — 85027 COMPLETE CBC AUTOMATED: CPT

## 2024-10-04 DIAGNOSIS — C43.61 MALIGNANT MELANOMA OF RIGHT UPPER LIMB, INCLUDING SHOULDER: ICD-10-CM

## 2024-10-04 LAB
AFP-TM SERPL-MCNC: 3 NG/ML
ALBUMIN SERPL ELPH-MCNC: 4.2 G/DL
ALP BLD-CCNC: 87 U/L
ALT SERPL-CCNC: 13 U/L
ANION GAP SERPL CALC-SCNC: 10 MMOL/L
APTT BLD: 33.6 SEC
AST SERPL-CCNC: 17 U/L
BILIRUB DIRECT SERPL-MCNC: <0.2 MG/DL
BILIRUB INDIRECT SERPL-MCNC: >0.2 MG/DL
BILIRUB SERPL-MCNC: 0.4 MG/DL
BUN SERPL-MCNC: 22 MG/DL
CALCIUM SERPL-MCNC: 9.3 MG/DL
CHLORIDE SERPL-SCNC: 105 MMOL/L
CO2 SERPL-SCNC: 27 MMOL/L
CREAT SERPL-MCNC: 1 MG/DL
EGFR: 80 ML/MIN/1.73M2
GLUCOSE SERPL-MCNC: 95 MG/DL
HCG-TM SERPL-MCNC: 1 MIU/ML
HCT VFR BLD CALC: 36.1 %
HGB BLD-MCNC: 12.4 G/DL
INR PPP: 0.9 RATIO
LDH SERPL-CCNC: 135 U/L
MAGNESIUM SERPL-MCNC: 1.9 MG/DL
MCHC RBC-ENTMCNC: 31.3 PG
MCHC RBC-ENTMCNC: 34.3 G/DL
MCV RBC AUTO: 91.2 FL
PLATELET # BLD AUTO: 149 K/UL
PMV BLD: 9 FL
POTASSIUM SERPL-SCNC: 5 MMOL/L
PROT SERPL-MCNC: 6.6 G/DL
PT BLD: 10.2 SEC
RBC # BLD: 3.96 M/UL
RBC # FLD: 13.6 %
SODIUM SERPL-SCNC: 142 MMOL/L
WBC # FLD AUTO: 4.66 K/UL

## 2024-10-07 ENCOUNTER — APPOINTMENT (OUTPATIENT)
Age: 69
End: 2024-10-07
Payer: MEDICARE

## 2024-10-07 ENCOUNTER — OUTPATIENT (OUTPATIENT)
Dept: OUTPATIENT SERVICES | Facility: HOSPITAL | Age: 69
LOS: 1 days | End: 2024-10-07
Payer: MEDICARE

## 2024-10-07 VITALS
BODY MASS INDEX: 36.16 KG/M2 | SYSTOLIC BLOOD PRESSURE: 139 MMHG | TEMPERATURE: 98.1 F | DIASTOLIC BLOOD PRESSURE: 81 MMHG | HEART RATE: 58 BPM | HEIGHT: 72 IN | WEIGHT: 267 LBS | RESPIRATION RATE: 16 BRPM | OXYGEN SATURATION: 99 %

## 2024-10-07 DIAGNOSIS — D63.0 ANEMIA IN NEOPLASTIC DISEASE: ICD-10-CM

## 2024-10-07 DIAGNOSIS — C43.61 MALIGNANT MELANOMA OF RIGHT UPPER LIMB, INCLUDING SHOULDER: ICD-10-CM

## 2024-10-07 DIAGNOSIS — Z98.890 OTHER SPECIFIED POSTPROCEDURAL STATES: Chronic | ICD-10-CM

## 2024-10-07 DIAGNOSIS — Z90.01 ACQUIRED ABSENCE OF EYE: Chronic | ICD-10-CM

## 2024-10-07 DIAGNOSIS — C62.90 MALIGNANT NEOPLASM OF UNSPECIFIED TESTIS, UNSPECIFIED WHETHER DESCENDED OR UNDESCENDED: ICD-10-CM

## 2024-10-07 DIAGNOSIS — Z90.49 ACQUIRED ABSENCE OF OTHER SPECIFIED PARTS OF DIGESTIVE TRACT: Chronic | ICD-10-CM

## 2024-10-07 PROCEDURE — 99214 OFFICE O/P EST MOD 30 MIN: CPT

## 2024-10-14 ENCOUNTER — OUTPATIENT (OUTPATIENT)
Dept: OUTPATIENT SERVICES | Facility: HOSPITAL | Age: 69
LOS: 1 days | End: 2024-10-14
Payer: MEDICARE

## 2024-10-14 VITALS
DIASTOLIC BLOOD PRESSURE: 82 MMHG | HEIGHT: 72 IN | WEIGHT: 271.17 LBS | TEMPERATURE: 98 F | SYSTOLIC BLOOD PRESSURE: 133 MMHG | HEART RATE: 70 BPM | RESPIRATION RATE: 18 BRPM | OXYGEN SATURATION: 97 %

## 2024-10-14 DIAGNOSIS — C62.90 MALIGNANT NEOPLASM OF UNSPECIFIED TESTIS, UNSPECIFIED WHETHER DESCENDED OR UNDESCENDED: ICD-10-CM

## 2024-10-14 DIAGNOSIS — Z98.890 OTHER SPECIFIED POSTPROCEDURAL STATES: Chronic | ICD-10-CM

## 2024-10-14 DIAGNOSIS — Z95.828 PRESENCE OF OTHER VASCULAR IMPLANTS AND GRAFTS: Chronic | ICD-10-CM

## 2024-10-14 DIAGNOSIS — Z90.49 ACQUIRED ABSENCE OF OTHER SPECIFIED PARTS OF DIGESTIVE TRACT: Chronic | ICD-10-CM

## 2024-10-14 DIAGNOSIS — Z90.01 ACQUIRED ABSENCE OF EYE: Chronic | ICD-10-CM

## 2024-10-14 LAB
ALBUMIN SERPL ELPH-MCNC: 4.1 G/DL — SIGNIFICANT CHANGE UP (ref 3.5–5.2)
ALP SERPL-CCNC: 86 U/L — SIGNIFICANT CHANGE UP (ref 30–115)
ALT FLD-CCNC: 15 U/L — SIGNIFICANT CHANGE UP (ref 0–41)
ANION GAP SERPL CALC-SCNC: 9 MMOL/L — SIGNIFICANT CHANGE UP (ref 7–14)
APTT BLD: 31.5 SEC — SIGNIFICANT CHANGE UP (ref 27–39.2)
AST SERPL-CCNC: 18 U/L — SIGNIFICANT CHANGE UP (ref 0–41)
BASOPHILS # BLD AUTO: 0.03 K/UL — SIGNIFICANT CHANGE UP (ref 0–0.2)
BASOPHILS NFR BLD AUTO: 0.6 % — SIGNIFICANT CHANGE UP (ref 0–1)
BILIRUB SERPL-MCNC: 0.3 MG/DL — SIGNIFICANT CHANGE UP (ref 0.2–1.2)
BUN SERPL-MCNC: 26 MG/DL — HIGH (ref 10–20)
CALCIUM SERPL-MCNC: 9.4 MG/DL — SIGNIFICANT CHANGE UP (ref 8.4–10.5)
CHLORIDE SERPL-SCNC: 105 MMOL/L — SIGNIFICANT CHANGE UP (ref 98–110)
CO2 SERPL-SCNC: 28 MMOL/L — SIGNIFICANT CHANGE UP (ref 17–32)
CREAT SERPL-MCNC: 1.1 MG/DL — SIGNIFICANT CHANGE UP (ref 0.7–1.5)
EGFR: 72 ML/MIN/1.73M2 — SIGNIFICANT CHANGE UP
EOSINOPHIL # BLD AUTO: 0.07 K/UL — SIGNIFICANT CHANGE UP (ref 0–0.7)
EOSINOPHIL NFR BLD AUTO: 1.3 % — SIGNIFICANT CHANGE UP (ref 0–8)
GLUCOSE SERPL-MCNC: 112 MG/DL — HIGH (ref 70–99)
HCT VFR BLD CALC: 38.4 % — LOW (ref 42–52)
HGB BLD-MCNC: 12.7 G/DL — LOW (ref 14–18)
IMM GRANULOCYTES NFR BLD AUTO: 0.6 % — HIGH (ref 0.1–0.3)
INR BLD: 0.92 RATIO — SIGNIFICANT CHANGE UP (ref 0.65–1.3)
LYMPHOCYTES # BLD AUTO: 1.09 K/UL — LOW (ref 1.2–3.4)
LYMPHOCYTES # BLD AUTO: 21 % — SIGNIFICANT CHANGE UP (ref 20.5–51.1)
MCHC RBC-ENTMCNC: 31.1 PG — HIGH (ref 27–31)
MCHC RBC-ENTMCNC: 33.1 G/DL — SIGNIFICANT CHANGE UP (ref 32–37)
MCV RBC AUTO: 93.9 FL — SIGNIFICANT CHANGE UP (ref 80–94)
MONOCYTES # BLD AUTO: 0.43 K/UL — SIGNIFICANT CHANGE UP (ref 0.1–0.6)
MONOCYTES NFR BLD AUTO: 8.3 % — SIGNIFICANT CHANGE UP (ref 1.7–9.3)
NEUTROPHILS # BLD AUTO: 3.55 K/UL — SIGNIFICANT CHANGE UP (ref 1.4–6.5)
NEUTROPHILS NFR BLD AUTO: 68.2 % — SIGNIFICANT CHANGE UP (ref 42.2–75.2)
NRBC # BLD: 0 /100 WBCS — SIGNIFICANT CHANGE UP (ref 0–0)
PLATELET # BLD AUTO: 174 K/UL — SIGNIFICANT CHANGE UP (ref 130–400)
PMV BLD: 9.7 FL — SIGNIFICANT CHANGE UP (ref 7.4–10.4)
POTASSIUM SERPL-MCNC: 5 MMOL/L — SIGNIFICANT CHANGE UP (ref 3.5–5)
POTASSIUM SERPL-SCNC: 5 MMOL/L — SIGNIFICANT CHANGE UP (ref 3.5–5)
PROT SERPL-MCNC: 6.5 G/DL — SIGNIFICANT CHANGE UP (ref 6–8)
PROTHROM AB SERPL-ACNC: 10.5 SEC — SIGNIFICANT CHANGE UP (ref 9.95–12.87)
RBC # BLD: 4.09 M/UL — LOW (ref 4.7–6.1)
RBC # FLD: 13.5 % — SIGNIFICANT CHANGE UP (ref 11.5–14.5)
SODIUM SERPL-SCNC: 142 MMOL/L — SIGNIFICANT CHANGE UP (ref 135–146)
WBC # BLD: 5.2 K/UL — SIGNIFICANT CHANGE UP (ref 4.8–10.8)
WBC # FLD AUTO: 5.2 K/UL — SIGNIFICANT CHANGE UP (ref 4.8–10.8)

## 2024-10-14 PROCEDURE — 99214 OFFICE O/P EST MOD 30 MIN: CPT | Mod: 25

## 2024-10-14 PROCEDURE — 36415 COLL VENOUS BLD VENIPUNCTURE: CPT

## 2024-10-14 PROCEDURE — 93010 ELECTROCARDIOGRAM REPORT: CPT

## 2024-10-14 PROCEDURE — 85610 PROTHROMBIN TIME: CPT

## 2024-10-14 PROCEDURE — 93005 ELECTROCARDIOGRAM TRACING: CPT

## 2024-10-14 PROCEDURE — 85730 THROMBOPLASTIN TIME PARTIAL: CPT

## 2024-10-14 PROCEDURE — 80053 COMPREHEN METABOLIC PANEL: CPT

## 2024-10-14 PROCEDURE — 85025 COMPLETE CBC W/AUTO DIFF WBC: CPT

## 2024-10-14 NOTE — H&P PST ADULT - NSICDXPASTSURGICALHX_GEN_ALL_CORE_FT
PAST SURGICAL HISTORY:  H/O melanoma excision     History of eye removal Right eye    Port-A-Cath in place     S/P appendectomy 14 yrs old

## 2024-10-14 NOTE — H&P PST ADULT - ADDITIONAL PE
chest wall port in place  right eye prosthetic chest wall port in place  right eye prosthetic  NO OPEN WOUNDS PRESENT

## 2024-10-14 NOTE — H&P PST ADULT - MUSCULOSKELETAL
Hospitalist Progress Note     Admit Date:  2019  9:07 AM   Name:  Kervin Milian   Age:  53 y.o.  :  1975   MRN:  [id number]   PCP:  Danii Ramos MD  Treatment Team: Attending Provider: Brandee Cortes DO; Care Manager: Mel Rubio RN; Care Manager: Richy Martinez LMSW; Utilization Review: Afia Tolliver RN; Nurse Practitioner: Gloria Ho NP; Charge Nurse: Joel Zuleta    Subjective:   HPI and or CC:  69-year-old hypertensive diabetic male presented to the ER with left-sided weakness left facial droop. Acute infarct left cerebellar hemisphere and deep frontoparietal white matter abnormalities as well as right paramedian yariel abnormalities. Old lacunar infarcts noted. No large vessel occlusion on CTA. He has been hospitalized for 87 days awaiting difficult rehab placement. Receiving physical occupational therapy and speech therapy. Tolerating high-dose statin atorvastatin and antiplatelet. PFO noted on ISMAEL 4-week event monitor planned as outpatient and if no dysrhythmia outpatient PFO closure recommended. Patient has no new complaints except awaiting placement    2020:    Magnesium found to be 1.4. IV and oral supplementation ordered and follow-up level ordered. Potassium greater than 3.5. We will continue to follow. Patient without any new complaints. Remains frustrated about persistent hospital stay awaiting rehab placement.   Awaiting disability        Objective:     Patient Vitals for the past 24 hrs:   Temp Pulse Resp BP SpO2   20 0800 97.4 °F (36.3 °C) 71 17 134/83 98 %   20 0400 98 °F (36.7 °C) 67 18 126/84 97 %   20 0000 97.8 °F (36.6 °C) 67 18 126/84 97 %   20 2000 97.7 °F (36.5 °C) 70 18 125/78 99 %   20 1600 98.1 °F (36.7 °C) 75 18 111/61 99 %   20 1200 98.6 °F (37 °C) 86 18 134/78 99 %     Oxygen Therapy  O2 Sat (%): 98 % (20 0800)  Pulse via Oximetry: 66 beats per minute (20 0000)  O2 Device: Room air (02/01/20 0000)    Intake/Output Summary (Last 24 hours) at 3/8/2020 1146  Last data filed at 3/7/2020 1420  Gross per 24 hour   Intake 200 ml   Output    Net 200 ml         REVIEW OF SYSTEMS: Comprehensive ROS performed and negative except as stated in HPI. Physical Examination:  General:    Alert and oriented x3, no new complaints  Head:  No facial asymmetry, oral mucous membranes moist  Eyes:  No roving eye movements or nystagmus  CV:   No lower extremity edema, no S3 gallop no gross JVD  Lungs:   Clear to auscultation palpation and percussion with symmetric excursion  Abdomen:   No distention, bowel sounds in all 4 quadrants  Extremities: Left hemiparesis, no gross deformity otherwise  Skin:     No petechia or purpura  Neuro:  Left hemiparesis, no tremor, speech is intelligible but slowed    Data Review:  I have reviewed all labs, meds, telemetry events, and studies from the last 24 hours.     Recent Results (from the past 24 hour(s))   METABOLIC PANEL, BASIC    Collection Time: 03/08/20  5:46 AM   Result Value Ref Range    Sodium 143 136 - 145 mmol/L    Potassium 3.7 3.5 - 5.1 mmol/L    Chloride 109 (H) 98 - 107 mmol/L    CO2 27 21 - 32 mmol/L    Anion gap 7 7 - 16 mmol/L    Glucose 77 65 - 100 mg/dL    BUN 14 6 - 23 MG/DL    Creatinine 0.91 0.8 - 1.5 MG/DL    GFR est AA >60 >60 ml/min/1.73m2    GFR est non-AA >60 >60 ml/min/1.73m2    Calcium 9.5 8.3 - 10.4 MG/DL   MAGNESIUM    Collection Time: 03/08/20  5:46 AM   Result Value Ref Range    Magnesium 1.4 (LL) 1.8 - 2.4 mg/dL        All Micro Results     None          Current Meds:  Current Facility-Administered Medications   Medication Dose Route Frequency    magnesium oxide (MAG-OX) tablet 400 mg  400 mg Oral DAILY    metFORMIN (GLUCOPHAGE) tablet 500 mg  500 mg Oral BID WITH MEALS    acetaminophen (TYLENOL) tablet 650 mg  650 mg Oral Q4H PRN    diphenhydrAMINE (BENADRYL) capsule 25 mg  25 mg Oral Q6H PRN    acetaminophen (TYLENOL) tablet 650 mg  650 mg Oral Q8H    lip protectant (BLISTEX) ointment 1 Each  1 Each Topical PRN    metoprolol succinate (TOPROL-XL) XL tablet 25 mg  25 mg Oral DAILY    polyethylene glycol (MIRALAX) packet 17 g  17 g Oral DAILY PRN    guaiFENesin (ROBITUSSIN) 100 mg/5 mL oral liquid 100 mg  100 mg Oral Q4H PRN    hydrALAZINE (APRESOLINE) 20 mg/mL injection 20 mg  20 mg IntraVENous Q4H PRN    atorvastatin (LIPITOR) tablet 80 mg  80 mg Oral QHS    lisinopril (PRINIVIL, ZESTRIL) tablet 40 mg  40 mg Oral DAILY    sodium chloride (NS) flush 5-40 mL  5-40 mL IntraVENous PRN    ondansetron (ZOFRAN) injection 4 mg  4 mg IntraVENous Q4H PRN    aspirin chewable tablet 81 mg  81 mg Oral DAILY    enoxaparin (LOVENOX) injection 40 mg  40 mg SubCUTAneous Q24H    dextrose 40% (GLUTOSE) oral gel 1 Tube  15 g Oral PRN    glucagon (GLUCAGEN) injection 1 mg  1 mg IntraMUSCular PRN    dextrose (D50W) injection syrg 12.5-25 g  25-50 mL IntraVENous PRN       Diet:  DIET NUTRITIONAL SUPPLEMENTS  DIET DIABETIC CONSISTENT CARB    Other Studies (last 24 hours):  No results found.     Assessment and Plan:     Hospital Problems as of 3/8/2020 Date Reviewed: 3/3/2017          Codes Class Noted - Resolved POA    Hypomagnesemia ICD-10-CM: E83.42  ICD-9-CM: 275.2  3/7/2020 - Present Unknown        PFO (patent foramen ovale) ICD-10-CM: Q21.1  ICD-9-CM: 745.5  12/17/2019 - Present Yes        Arrhythmia ICD-10-CM: I49.9  ICD-9-CM: 427.9  12/15/2019 - Present Yes        Hyperlipemia ICD-10-CM: E78.5  ICD-9-CM: 272.4  12/15/2019 - Present Yes        * (Principal) [facility] BQZ-29-OH: I63.9  ICD-9-CM: 434.11  12/12/2019 - Present Yes        Hypertension (Chronic) ICD-10-CM: I10  ICD-9-CM: 401.9  Unknown - Present Yes        Type 2 diabetes mellitus (HCC) (Chronic) ICD-10-CM: E11.9  ICD-9-CM: 250.00  Unknown - Present Yes              A/P:    Acute embolic stroke  -MRI with acute infarcts in L cerebellar hemisphere, deep frontoparietal white matter, and R paramedian yariel. old lacunar infarcts. Continue aspirin and high-dose atorvastatin  -ISMAEL: 3 mm PFO, Cardiology stated he needs an evaluation for closure PFO with high risk features including significant (3 mm) separation of septum secundum and primum as well as large shunt. They recommend outpatient event monitoring 4 weeks and then schedule shunt closure if no dysrhythmia. This is unchanged. Hypomagnesemia   IV and oral supplementation and follow-up level in the a.m.     Hypertension:   Continue metoprolol and ACE inhibitor     Type 2 diabetes mellitus:    Not yet any better with lower dose metformin--still borderline hypoglycemia       Left upper extremity pain  Continue acetaminophen as needed this is unchanged     Allergies  As needed antihistamine diphenhydramine this is also unchanged      Copied from prior note:DISPO: waiting for pt to be deemed \"disabled\" which in neurologic cases have to allow for 6 months showing chronicity.  Then he can get medicaid after that. Medically stable for discharge.      DVT ppx:  lovenox     Signed:  Christine BEAVERS normal/ROM intact/normal gait/strength 5/5 bilateral upper extremities/strength 5/5 bilateral lower extremities

## 2024-10-14 NOTE — H&P PST ADULT - HISTORY OF PRESENT ILLNESS
PATIENT CURRENTLY DENIES CHEST PAIN  SHORTNESS OF BREATH  PALPITATIONS,  DYSURIA, OR UPPER RESPIRATORY INFECTION IN PAST 2 WEEKS  denies travel outside the [place] in the past 30 days  Denies fevers, chills or any symptoms of recent UTI's or any rashes.   Denies near syncope or syncope episodes.       scheduled for 10/21/24 port removal in IR  history of testicular cancer  last treatment 5/5/2024    Anesthesia Alert  yes--Difficult Airway IV  NO--History of neck surgery or radiation  NO--Limited ROM of neck  NO--History of Malignant hyperthermia  NO--No personal or family history of Pseudocholinesterase deficiency.  NO--Prior Anesthesia Complication  NO--Latex Allergy  NO--Loose teeth  NO--History of Rheumatoid Arthritis  NO--Bleeding risk  NO--RAJI  NO--Other_____    Duke Activity Status Index (DASI) from [employer]  on 10/14/2024  ** All calculations should be rechecked by clinician prior to use **    RESULT SUMMARY:  38.2 points  The higher the score (maximum 58.2), the higher the functional status.    7.44 METs        INPUTS:  Take care of self —> 2.75 = Yes  Walk indoors —> 1.75 = Yes  Walk 1&ndash;2 blocks on level ground —> 2.75 = Yes  Climb a flight of stairs or walk up a hill —> 5.5 = Yes  Run a short distance —> 0 = No  Do light work around the house —> 2.7 = Yes  Do moderate work around the house —> 3.5 = Yes  Do heavy work around the house —> 8 = Yes  Do yardwork —> 0 = No  Have sexual relations —> 5.25 = Yes  Participate in moderate recreational activities —> 6 = Yes  Participate in strenuous sports —> 0 = No      Revised Cardiac Risk Index for Pre-Operative Risk from [employer]  on 10/14/2024  ** All calculations should be rechecked by clinician prior to use **    RESULT SUMMARY:  0 points  Class I Risk    3.9 %  30-day risk of death, MI, or cardiac arrest    From Duceppe 2017. These numbers are higher than those from the original study (Gaston 1999). See Evidence for details.      INPUTS:  Elevated-risk surgery —> 0 = No  History of ischemic heart disease —> 0 = No  History of congestive heart failure —> 0 = No  History of cerebrovascular disease —> 0 = No  Pre-operative treatment with insulin —> 0 = No  Pre-operative creatinine >2 mg/dL / 176.8 µmol/L —> 0 = No        PT DENIES ANY RASHES, ABRASION, OR OPEN WOUNDS OR CUTS    AS PER THE PT, THIS IS HIS/HER COMPLETE MEDICAL AND SURGICAL HX, INCLUDING MEDICATIONS PRESCRIBED AND OVER THE COUNTER    Patient verbalized understanding of instructions and was given the opportunity to ask questions and have them answered.    pt denies any suicidal ideation or thoughts, pt states not a threat to self or others   PATIENT CURRENTLY DENIES CHEST PAIN  SHORTNESS OF BREATH  PALPITATIONS,  DYSURIA, OR UPPER RESPIRATORY INFECTION IN PAST 2 WEEKS  denies travel outside the [place] in the past 30 days  Denies fevers, chills or any symptoms of recent UTI's or any rashes.   Denies near syncope or syncope episodes.       scheduled for 10/21/24 port removal in IR  patient to remain on contact precautions For  MRSA  ARM AND ABDOMEN PER INFECTION CONTROL.   CLEARED CDIFF PER INFECTION CONTROL TODAY  ANESTHESIA ALERT NOTED   history of testicular cancer  last treatment 5/5/2024    Anesthesia Alert  yes--Difficult Airway IV  NO--History of neck surgery or radiation  NO--Limited ROM of neck  NO--History of Malignant hyperthermia  NO--No personal or family history of Pseudocholinesterase deficiency.  NO--Prior Anesthesia Complication  NO--Latex Allergy  NO--Loose teeth  NO--History of Rheumatoid Arthritis  NO--Bleeding risk  NO--RAJI  NO--Other_____    Duke Activity Status Index (DASI) from [employer]  on 10/14/2024  ** All calculations should be rechecked by clinician prior to use **    RESULT SUMMARY:  38.2 points  The higher the score (maximum 58.2), the higher the functional status.    7.44 METs        INPUTS:  Take care of self —> 2.75 = Yes  Walk indoors —> 1.75 = Yes  Walk 1&ndash;2 blocks on level ground —> 2.75 = Yes  Climb a flight of stairs or walk up a hill —> 5.5 = Yes  Run a short distance —> 0 = No  Do light work around the house —> 2.7 = Yes  Do moderate work around the house —> 3.5 = Yes  Do heavy work around the house —> 8 = Yes  Do yardwork —> 0 = No  Have sexual relations —> 5.25 = Yes  Participate in moderate recreational activities —> 6 = Yes  Participate in strenuous sports —> 0 = No      Revised Cardiac Risk Index for Pre-Operative Risk from [employer]  on 10/14/2024  ** All calculations should be rechecked by clinician prior to use **    RESULT SUMMARY:  0 points  Class I Risk    3.9 %  30-day risk of death, MI, or cardiac arrest    From Duceppe 2017. These numbers are higher than those from the original study (Gaston 1999). See Evidence for details.      INPUTS:  Elevated-risk surgery —> 0 = No  History of ischemic heart disease —> 0 = No  History of congestive heart failure —> 0 = No  History of cerebrovascular disease —> 0 = No  Pre-operative treatment with insulin —> 0 = No  Pre-operative creatinine >2 mg/dL / 176.8 µmol/L —> 0 = No        PT DENIES ANY RASHES, ABRASION, OR OPEN WOUNDS OR CUTS    AS PER THE PT, THIS IS HIS/HER COMPLETE MEDICAL AND SURGICAL HX, INCLUDING MEDICATIONS PRESCRIBED AND OVER THE COUNTER    Patient verbalized understanding of instructions and was given the opportunity to ask questions and have them answered.    pt denies any suicidal ideation or thoughts, pt states not a threat to self or others   PATIENT CURRENTLY DENIES CHEST PAIN  SHORTNESS OF BREATH  PALPITATIONS,  DYSURIA, OR UPPER RESPIRATORY INFECTION IN PAST 2 WEEKS  denies travel outside the [place] in the past 30 days  Denies fevers, chills or any symptoms of recent UTI's or any rashes.   Denies near syncope or syncope episodes.       scheduled for 10/21/24 port removal in IR  patient to remain on contact precautions For  MRSA OF   ARM AND ABDOMEN PER INFECTION CONTROL.   no open wounds present today  CLEARED CDIFF PER INFECTION CONTROL TODAY  ANESTHESIA ALERT NOTED  EMAIL SENT TO ALERT    history of testicular cancer  last treatment 5/5/2024    Anesthesia Alert  yes--Difficult Airway IV  NO--History of neck surgery or radiation  NO--Limited ROM of neck  NO--History of Malignant hyperthermia  NO--No personal or family history of Pseudocholinesterase deficiency.  NO--Prior Anesthesia Complication  NO--Latex Allergy  NO--Loose teeth  NO--History of Rheumatoid Arthritis  NO--Bleeding risk  NO--RAJI  NO--Other_____    Duke Activity Status Index (DASI) from [employer]  on 10/14/2024  ** All calculations should be rechecked by clinician prior to use **    RESULT SUMMARY:  38.2 points  The higher the score (maximum 58.2), the higher the functional status.    7.44 METs        INPUTS:  Take care of self —> 2.75 = Yes  Walk indoors —> 1.75 = Yes  Walk 1&ndash;2 blocks on level ground —> 2.75 = Yes  Climb a flight of stairs or walk up a hill —> 5.5 = Yes  Run a short distance —> 0 = No  Do light work around the house —> 2.7 = Yes  Do moderate work around the house —> 3.5 = Yes  Do heavy work around the house —> 8 = Yes  Do yardwork —> 0 = No  Have sexual relations —> 5.25 = Yes  Participate in moderate recreational activities —> 6 = Yes  Participate in strenuous sports —> 0 = No      Revised Cardiac Risk Index for Pre-Operative Risk from [employer]  on 10/14/2024  ** All calculations should be rechecked by clinician prior to use **    RESULT SUMMARY:  0 points  Class I Risk    3.9 %  30-day risk of death, MI, or cardiac arrest    From Duceppe 2017. These numbers are higher than those from the original study (Gaston 1999). See Evidence for details.      INPUTS:  Elevated-risk surgery —> 0 = No  History of ischemic heart disease —> 0 = No  History of congestive heart failure —> 0 = No  History of cerebrovascular disease —> 0 = No  Pre-operative treatment with insulin —> 0 = No  Pre-operative creatinine >2 mg/dL / 176.8 µmol/L —> 0 = No        PT DENIES ANY RASHES, ABRASION, OR OPEN WOUNDS OR CUTS    AS PER THE PT, THIS IS HIS/HER COMPLETE MEDICAL AND SURGICAL HX, INCLUDING MEDICATIONS PRESCRIBED AND OVER THE COUNTER    Patient verbalized understanding of instructions and was given the opportunity to ask questions and have them answered.    pt denies any suicidal ideation or thoughts, pt states not a threat to self or others

## 2024-10-14 NOTE — H&P PST ADULT - NSICDXPASTMEDICALHX_GEN_ALL_CORE_FT
PAST MEDICAL HISTORY:  HTN (hypertension)     Hypothyroidism, unspecified type     Obesity     Other secondary osteoarthritis of right hand     Testicular seminoma      PAST MEDICAL HISTORY:  HTN (hypertension)     Hypothyroidism, unspecified type     Infection requiring contact isolation precautions     Obesity     Other secondary osteoarthritis of right hand     Testicular seminoma

## 2024-10-15 DIAGNOSIS — C62.90 MALIGNANT NEOPLASM OF UNSPECIFIED TESTIS, UNSPECIFIED WHETHER DESCENDED OR UNDESCENDED: ICD-10-CM

## 2024-10-21 ENCOUNTER — OUTPATIENT (OUTPATIENT)
Dept: OUTPATIENT SERVICES | Facility: HOSPITAL | Age: 69
LOS: 1 days | Discharge: HOME | End: 2024-10-21
Payer: MEDICARE

## 2024-10-21 ENCOUNTER — TRANSCRIPTION ENCOUNTER (OUTPATIENT)
Age: 69
End: 2024-10-21

## 2024-10-21 VITALS
OXYGEN SATURATION: 98 % | RESPIRATION RATE: 15 BRPM | HEART RATE: 71 BPM | SYSTOLIC BLOOD PRESSURE: 118 MMHG | DIASTOLIC BLOOD PRESSURE: 61 MMHG

## 2024-10-21 VITALS
SYSTOLIC BLOOD PRESSURE: 146 MMHG | TEMPERATURE: 98 F | RESPIRATION RATE: 18 BRPM | HEIGHT: 72 IN | HEART RATE: 73 BPM | WEIGHT: 270.07 LBS | DIASTOLIC BLOOD PRESSURE: 66 MMHG | OXYGEN SATURATION: 96 %

## 2024-10-21 DIAGNOSIS — C62.90 MALIGNANT NEOPLASM OF UNSPECIFIED TESTIS, UNSPECIFIED WHETHER DESCENDED OR UNDESCENDED: ICD-10-CM

## 2024-10-21 DIAGNOSIS — Z90.01 ACQUIRED ABSENCE OF EYE: Chronic | ICD-10-CM

## 2024-10-21 DIAGNOSIS — Z90.49 ACQUIRED ABSENCE OF OTHER SPECIFIED PARTS OF DIGESTIVE TRACT: Chronic | ICD-10-CM

## 2024-10-21 DIAGNOSIS — Z95.828 PRESENCE OF OTHER VASCULAR IMPLANTS AND GRAFTS: Chronic | ICD-10-CM

## 2024-10-21 DIAGNOSIS — Z98.890 OTHER SPECIFIED POSTPROCEDURAL STATES: Chronic | ICD-10-CM

## 2024-10-21 PROCEDURE — 36590 REMOVAL TUNNELED CV CATH: CPT | Mod: RT

## 2024-10-21 RX ORDER — LOSARTAN POTASSIUM 100 MG/1
1 TABLET, FILM COATED ORAL
Refills: 0 | DISCHARGE

## 2024-10-21 RX ORDER — VANCOMYCIN HCL-SODIUM CHLORIDE IV SOLN 1.5 GM/250ML-0.9% 1.5-0.9/25 GM/ML-%
1000 SOLUTION INTRAVENOUS ONCE
Refills: 0 | Status: COMPLETED | OUTPATIENT
Start: 2024-10-21 | End: 2024-10-21

## 2024-10-21 RX ADMIN — VANCOMYCIN HCL-SODIUM CHLORIDE IV SOLN 1.5 GM/250ML-0.9% 250 MILLIGRAM(S): 1.5-0.9/25 SOLUTION at 09:40

## 2024-10-21 NOTE — ASU PATIENT PROFILE, ADULT - NSICDXPASTSURGICALHX_GEN_ALL_CORE_FT
Daniel Hackett is an 6-year-old male with past medical history significant for Allergic Rhinitis and RAD with wheezing on occasion here today with his mother for a full physical exam.     Patient has no complaints.  Patient's mother has no concerns.  He has a good diet.  He gets plenty of exercise.    Immunization History   Administered Date(s) Administered    COVID Pfizer 5-11Y 11/17/2021, 12/22/2021    DTaP 01/16/2018    DTaP/Hep B/IPV 2016, 2016, 01/12/2017    DTaP/IPV 08/17/2020    HIB, Unspecified Formulation 2016, 2016, 02/13/2017, 07/12/2017    Hep A, ped/adol, 2 dose 07/11/2018, 08/15/2019    Hep B, adolescent or pediatric 2016    Hib (PRP-T) 2016, 2016, 02/13/2017    Influenza, split virus, quadrivalent 01/12/2017, 02/13/2017    Influenza, split virus, quadrivalent, PF 10/10/2017    Influenza, unspecified formulation 10/10/2017    MMR 10/10/2017    Measles Mumps Rubella Varicella 08/25/2021    Pneumococcal conjugate PCV 13 2016, 2016, 02/13/2017, 07/12/2017    Rotavirus, Unspecified Formulation 2016, 2016    Varicella 01/16/2018     I spent greater than 30 minutes reviewing patients labs, performing an exam and evaluation, counseling him on his medical conditions, ordering tests and meds and documenting clinical information in the electronic medical record.     ALLERGIES:  Amoxicillin  Current Outpatient Medications   Medication Sig Dispense Refill    EPINEPHrine (EPIPEN JR) 0.15 MG/0.3ML auto-injector Inject 0.3 mLs into the muscle 1 time as needed for Anaphylaxis. 2 each 1    albuterol 108 (90 Base) MCG/ACT inhaler Inhale 2 puffs into the lungs every 4 hours as needed for Shortness of Breath. 1 each 0    fluticasone (FLONASE) 50 MCG/ACT nasal spray Spray in each nostril as needed.      Pediatric Multiple Vit-C-FA (CHILDRENS MULTIVITAMIN PO) Take 1 tablet by mouth daily. (Patient not taking: Reported on 9/4/2024)      Cetirizine HCl (ZYRTEC  CHILDRENS ALLERGY PO) Take by mouth daily.       No current facility-administered medications for this visit.     Surgical history:   None    Social History:  Lives with mom, dad and one sister, one sister living in [place]  Going into 3rd grade at [employer]. In [place]  Good grades    Family History:  Father, alive, 47, hyperthyroidism, Retinitis Pigmentosa  Mother, alive, 45, mass cell activation disorder  PGM with coronary artery disease s/p cabg, ++ cancer (gastric vs. Lung)  No known family history of diabetes  MGM with strokes  MGGM with strokes    ROS:  GENERAL: no recent weight changes, no fatigue, no fevers, no chills, no heat or cold intolerances  HEENT: no headache, no dizziness, no visual problems, no ear pain, no difficulty swallowing, no sore throat, no nasal or sinus congestion, no cough  PULMONARY: no sob, no wheezing, no pain with deep inspiration  CARDIAC: no chest pain, no palpitations  GI: no abdominal pain, no changes in bowel habits, no diarrhea, no constipation, no hematochezia, no hemmorhoids  SKIN: no rashes, no ulcers, no itching  : no changes in bladder function, no dysuria, no frequency, no incontinence, no discharge  EXTREM: no peripheral edema    P/E:  BP 92/60   Pulse 66   Temp 98.4 °F (36.9 °C) (Temporal)   Ht 4' 3.5\" (1.308 m)   Wt 24.5 kg (54 lb)   BMI 14.31 kg/m²   BSA 0.96 m²   General: A&Ox3, nad, healthy appearing  Heent: perrla, eomi, tm's wnl, no nasal congestion, op clear, no lad, neck supple, no thyromegaly, no jvd, no bruits  Lungs: cta b, no r/r/w, no dullness to percussion, good air movement  CV: rrr, no murmurs, S1 and S2 wnl, no S3, no S4, non-displaced PMI  ABD: soft, nt, nd, +bs, no masses, no hsm, no guarding, no rebound  EXTREM: no c/c/e, positive large wart on dorsal surface of right fifth toe.    Pulses full and equal, no calf tenderness    A/P:  Routine medical exam  (primary encounter diagnosis)  Healthy 6-year-old male here today for  full physical exam.  Patient's exam is normal.  Patient is up-to-date with his immunizations.  Patient is eating well and is getting plenty of exercise.  Mother has no concerns.  Follow-up in 1 year.    Electronically signed by: Romero Dove MD  9/4/2024       PAST SURGICAL HISTORY:  H/O melanoma excision     History of eye removal Right eye    Port-A-Cath in place     S/P appendectomy 14 yrs old

## 2024-10-21 NOTE — ASU DISCHARGE PLAN (ADULT/PEDIATRIC) - NS MD DC FALL RISK RISK
For information on Fall & Injury Prevention, visit: [email or web address] OR  [email or web address] OR  https://www.cdc.gov/steadi/patient.html

## 2024-10-21 NOTE — CHART NOTE - NSCHARTNOTEFT_GEN_A_CORE
PACU ANESTHESIA ADMISSION NOTE      Procedure: Port Removal  Post op diagnosis:  Testicular Cancer      __x__  Patent Airway    ____  Full return of protective reflexes    ____  Full recovery from anesthesia / back to baseline     Vitals:   T:     97.2 F      R:    15              BP:  120/65                Sat:    100%               P: 75      Mental Status:  __x__ Awake   _____ Alert   _____ Drowsy   _____ Sedated    Nausea/Vomiting:  ____ NO  ______Yes,   See Post - Op Orders          Pain Scale (0-10):  _____    Treatment: ____ None    ___x_ See Post - Op/PCA Orders    Post - Operative Fluids:   ____ Oral   __x__ See Post - Op Orders    Plan: Discharge:   ___x_Home       _____Floor     _____Critical Care    _____  Other:_________________    Comments: Pt tolerated procedure well, no anesthesia related complications. Care of pt endorsed to PACU, report given to PACU RN. Discharge when criteria are met.

## 2024-10-21 NOTE — ASU PATIENT PROFILE, ADULT - NSICDXPASTMEDICALHX_GEN_ALL_CORE_FT
PAST MEDICAL HISTORY:  HTN (hypertension)     Hypothyroidism, unspecified type     Infection requiring contact isolation precautions     Obesity     Other secondary osteoarthritis of right hand     Testicular seminoma

## 2024-10-21 NOTE — ASU DISCHARGE PLAN (ADULT/PEDIATRIC) - FINANCIAL ASSISTANCE
[facility] provides services at a reduced cost to those who are determined to be eligible through [facility]’s financial assistance program. Information regarding [facility]’s financial assistance program can be found by going to [email or web address] or by calling [phone].

## 2024-11-08 PROBLEM — B99.9 UNSPECIFIED INFECTIOUS DISEASE: Chronic | Status: ACTIVE | Noted: 2024-10-14

## 2024-11-19 ENCOUNTER — OUTPATIENT (OUTPATIENT)
Dept: OUTPATIENT SERVICES | Facility: HOSPITAL | Age: 69
LOS: 1 days | End: 2024-11-19
Payer: MEDICARE

## 2024-11-19 ENCOUNTER — LABORATORY RESULT (OUTPATIENT)
Age: 69
End: 2024-11-19

## 2024-11-19 ENCOUNTER — APPOINTMENT (OUTPATIENT)
Age: 69
End: 2024-11-19

## 2024-11-19 DIAGNOSIS — Z95.828 PRESENCE OF OTHER VASCULAR IMPLANTS AND GRAFTS: Chronic | ICD-10-CM

## 2024-11-19 DIAGNOSIS — Z98.890 OTHER SPECIFIED POSTPROCEDURAL STATES: Chronic | ICD-10-CM

## 2024-11-19 DIAGNOSIS — Z90.01 ACQUIRED ABSENCE OF EYE: Chronic | ICD-10-CM

## 2024-11-19 DIAGNOSIS — Z90.49 ACQUIRED ABSENCE OF OTHER SPECIFIED PARTS OF DIGESTIVE TRACT: Chronic | ICD-10-CM

## 2024-11-19 DIAGNOSIS — C43.61 MALIGNANT MELANOMA OF RIGHT UPPER LIMB, INCLUDING SHOULDER: ICD-10-CM

## 2024-11-19 PROCEDURE — 85027 COMPLETE CBC AUTOMATED: CPT

## 2024-11-19 PROCEDURE — 36415 COLL VENOUS BLD VENIPUNCTURE: CPT

## 2024-11-20 DIAGNOSIS — C43.61 MALIGNANT MELANOMA OF RIGHT UPPER LIMB, INCLUDING SHOULDER: ICD-10-CM

## 2024-11-22 ENCOUNTER — OUTPATIENT (OUTPATIENT)
Dept: OUTPATIENT SERVICES | Facility: HOSPITAL | Age: 69
LOS: 1 days | End: 2024-11-22
Payer: MEDICARE

## 2024-11-22 ENCOUNTER — LABORATORY RESULT (OUTPATIENT)
Age: 69
End: 2024-11-22

## 2024-11-22 ENCOUNTER — APPOINTMENT (OUTPATIENT)
Age: 69
End: 2024-11-22

## 2024-11-22 DIAGNOSIS — Z90.49 ACQUIRED ABSENCE OF OTHER SPECIFIED PARTS OF DIGESTIVE TRACT: Chronic | ICD-10-CM

## 2024-11-22 DIAGNOSIS — Z98.890 OTHER SPECIFIED POSTPROCEDURAL STATES: Chronic | ICD-10-CM

## 2024-11-22 DIAGNOSIS — Z95.828 PRESENCE OF OTHER VASCULAR IMPLANTS AND GRAFTS: Chronic | ICD-10-CM

## 2024-11-22 DIAGNOSIS — C43.61 MALIGNANT MELANOMA OF RIGHT UPPER LIMB, INCLUDING SHOULDER: ICD-10-CM

## 2024-11-22 DIAGNOSIS — Z90.01 ACQUIRED ABSENCE OF EYE: Chronic | ICD-10-CM

## 2024-11-22 LAB
ALBUMIN SERPL ELPH-MCNC: 4.1 G/DL
ALP BLD-CCNC: 93 U/L
ALT SERPL-CCNC: 15 U/L
ANION GAP SERPL CALC-SCNC: 9 MMOL/L
AST SERPL-CCNC: 18 U/L
BILIRUB SERPL-MCNC: 0.3 MG/DL
BUN SERPL-MCNC: 23 MG/DL
CALCIUM SERPL-MCNC: 9.3 MG/DL
CHLORIDE SERPL-SCNC: 103 MMOL/L
CO2 SERPL-SCNC: 28 MMOL/L
CREAT SERPL-MCNC: 1 MG/DL
EGFR: 80 ML/MIN/1.73M2
GLUCOSE SERPL-MCNC: 116 MG/DL
HCG SERPL-MCNC: <1 MIU/ML
POTASSIUM SERPL-SCNC: 5.3 MMOL/L
PROT SERPL-MCNC: 6.3 G/DL
SODIUM SERPL-SCNC: 140 MMOL/L

## 2024-11-22 PROCEDURE — 82248 BILIRUBIN DIRECT: CPT

## 2024-11-22 PROCEDURE — 36415 COLL VENOUS BLD VENIPUNCTURE: CPT

## 2024-11-22 PROCEDURE — 84702 CHORIONIC GONADOTROPIN TEST: CPT

## 2024-11-22 PROCEDURE — 82105 ALPHA-FETOPROTEIN SERUM: CPT

## 2024-11-22 PROCEDURE — 80053 COMPREHEN METABOLIC PANEL: CPT

## 2024-11-25 LAB — AFP-TM SERPL-MCNC: 3.5 NG/ML

## 2024-12-08 ENCOUNTER — OUTPATIENT (OUTPATIENT)
Dept: OUTPATIENT SERVICES | Facility: HOSPITAL | Age: 69
LOS: 1 days | End: 2024-12-08
Payer: MEDICARE

## 2024-12-08 DIAGNOSIS — Z90.01 ACQUIRED ABSENCE OF EYE: Chronic | ICD-10-CM

## 2024-12-08 DIAGNOSIS — Z90.49 ACQUIRED ABSENCE OF OTHER SPECIFIED PARTS OF DIGESTIVE TRACT: Chronic | ICD-10-CM

## 2024-12-08 DIAGNOSIS — Z95.828 PRESENCE OF OTHER VASCULAR IMPLANTS AND GRAFTS: Chronic | ICD-10-CM

## 2024-12-08 DIAGNOSIS — C62.90 MALIGNANT NEOPLASM OF UNSPECIFIED TESTIS, UNSPECIFIED WHETHER DESCENDED OR UNDESCENDED: ICD-10-CM

## 2024-12-08 DIAGNOSIS — Z98.890 OTHER SPECIFIED POSTPROCEDURAL STATES: Chronic | ICD-10-CM

## 2024-12-08 PROCEDURE — 74177 CT ABD & PELVIS W/CONTRAST: CPT

## 2024-12-08 PROCEDURE — 71260 CT THORAX DX C+: CPT

## 2024-12-08 PROCEDURE — 71260 CT THORAX DX C+: CPT | Mod: 26

## 2024-12-08 PROCEDURE — 74177 CT ABD & PELVIS W/CONTRAST: CPT | Mod: 26

## 2024-12-09 DIAGNOSIS — C62.90 MALIGNANT NEOPLASM OF UNSPECIFIED TESTIS, UNSPECIFIED WHETHER DESCENDED OR UNDESCENDED: ICD-10-CM

## 2025-01-08 ENCOUNTER — OUTPATIENT (OUTPATIENT)
Dept: OUTPATIENT SERVICES | Facility: HOSPITAL | Age: 70
LOS: 1 days | End: 2025-01-08
Payer: MEDICARE

## 2025-01-08 ENCOUNTER — LABORATORY RESULT (OUTPATIENT)
Age: 70
End: 2025-01-08

## 2025-01-08 ENCOUNTER — APPOINTMENT (OUTPATIENT)
Age: 70
End: 2025-01-08
Payer: MEDICARE

## 2025-01-08 VITALS
BODY MASS INDEX: 38.6 KG/M2 | SYSTOLIC BLOOD PRESSURE: 139 MMHG | DIASTOLIC BLOOD PRESSURE: 71 MMHG | WEIGHT: 285 LBS | HEART RATE: 97 BPM | HEIGHT: 72 IN | OXYGEN SATURATION: 97 % | TEMPERATURE: 98 F

## 2025-01-08 DIAGNOSIS — Z45.2 ENCOUNTER FOR ADJUSTMENT AND MANAGEMENT OF VASCULAR ACCESS DEVICE: ICD-10-CM

## 2025-01-08 DIAGNOSIS — C43.61 MALIGNANT MELANOMA OF RIGHT UPPER LIMB, INCLUDING SHOULDER: ICD-10-CM

## 2025-01-08 DIAGNOSIS — Z95.828 PRESENCE OF OTHER VASCULAR IMPLANTS AND GRAFTS: Chronic | ICD-10-CM

## 2025-01-08 DIAGNOSIS — D63.0 ANEMIA IN NEOPLASTIC DISEASE: ICD-10-CM

## 2025-01-08 DIAGNOSIS — C62.90 MALIGNANT NEOPLASM OF UNSPECIFIED TESTIS, UNSPECIFIED WHETHER DESCENDED OR UNDESCENDED: ICD-10-CM

## 2025-01-08 DIAGNOSIS — Z90.01 ACQUIRED ABSENCE OF EYE: Chronic | ICD-10-CM

## 2025-01-08 DIAGNOSIS — Z98.890 OTHER SPECIFIED POSTPROCEDURAL STATES: Chronic | ICD-10-CM

## 2025-01-08 DIAGNOSIS — Z90.49 ACQUIRED ABSENCE OF OTHER SPECIFIED PARTS OF DIGESTIVE TRACT: Chronic | ICD-10-CM

## 2025-01-08 LAB
ALBUMIN SERPL ELPH-MCNC: 4.5 G/DL
ALP BLD-CCNC: 118 U/L
ALT SERPL-CCNC: 16 U/L
ANION GAP SERPL CALC-SCNC: 13 MMOL/L
AST SERPL-CCNC: 20 U/L
BILIRUB DIRECT SERPL-MCNC: <0.2 MG/DL
BILIRUB INDIRECT SERPL-MCNC: >0.3 MG/DL
BILIRUB SERPL-MCNC: 0.5 MG/DL
BUN SERPL-MCNC: 22 MG/DL
CALCIUM SERPL-MCNC: 9.4 MG/DL
CHLORIDE SERPL-SCNC: 103 MMOL/L
CO2 SERPL-SCNC: 26 MMOL/L
CREAT SERPL-MCNC: 1 MG/DL
EGFR: 80 ML/MIN/1.73M2
GLUCOSE SERPL-MCNC: 108 MG/DL
HCT VFR BLD CALC: 40.3 %
HGB BLD-MCNC: 13.6 G/DL
LDH SERPL-CCNC: 191 U/L
MAGNESIUM SERPL-MCNC: 2 MG/DL
MCHC RBC-ENTMCNC: 31.2 PG
MCHC RBC-ENTMCNC: 33.7 G/DL
MCV RBC AUTO: 92.4 FL
PLATELET # BLD AUTO: 178 K/UL
PMV BLD: 8.7 FL
POTASSIUM SERPL-SCNC: 5.4 MMOL/L
PROT SERPL-MCNC: 6.9 G/DL
RBC # BLD: 4.36 M/UL
RBC # FLD: 13.2 %
SODIUM SERPL-SCNC: 142 MMOL/L
WBC # FLD AUTO: 6.71 K/UL

## 2025-01-08 PROCEDURE — 82105 ALPHA-FETOPROTEIN SERUM: CPT

## 2025-01-08 PROCEDURE — 83735 ASSAY OF MAGNESIUM: CPT

## 2025-01-08 PROCEDURE — 80048 BASIC METABOLIC PNL TOTAL CA: CPT

## 2025-01-08 PROCEDURE — 83615 LACTATE (LD) (LDH) ENZYME: CPT

## 2025-01-08 PROCEDURE — 80076 HEPATIC FUNCTION PANEL: CPT

## 2025-01-08 PROCEDURE — 85027 COMPLETE CBC AUTOMATED: CPT

## 2025-01-08 PROCEDURE — 99214 OFFICE O/P EST MOD 30 MIN: CPT

## 2025-01-08 PROCEDURE — 84704 HCG FREE BETACHAIN TEST: CPT

## 2025-01-09 DIAGNOSIS — C43.61 MALIGNANT MELANOMA OF RIGHT UPPER LIMB, INCLUDING SHOULDER: ICD-10-CM

## 2025-01-09 LAB
AFP-TM SERPL-MCNC: 3.3 NG/ML
HCG-TM SERPL-MCNC: <1 MIU/ML

## 2025-03-10 ENCOUNTER — NON-APPOINTMENT (OUTPATIENT)
Age: 70
End: 2025-03-10

## 2025-03-26 NOTE — BRIEF OPERATIVE NOTE - PRIMARY SURGEON
Patient complains of the following symptoms:   extremity weak, trouble breathing out of his nose, congestion.    How long have the symptoms occurred:   1 week ago    Have you or someone you have had contact with traveled outside the [place] in the past 14 days? no   If so what country? n/a    Was there an injury or accident: no   If so when   n/a    Pharmacy:   [phone]    Patient phone number verified and updated in EPIC: yes     Florentino Dixon MD

## 2025-04-04 ENCOUNTER — APPOINTMENT (OUTPATIENT)
Age: 70
End: 2025-04-04

## 2025-04-04 ENCOUNTER — OUTPATIENT (OUTPATIENT)
Dept: OUTPATIENT SERVICES | Facility: HOSPITAL | Age: 70
LOS: 1 days | End: 2025-04-04
Payer: MEDICARE

## 2025-04-04 DIAGNOSIS — Z90.49 ACQUIRED ABSENCE OF OTHER SPECIFIED PARTS OF DIGESTIVE TRACT: Chronic | ICD-10-CM

## 2025-04-04 DIAGNOSIS — C43.61 MALIGNANT MELANOMA OF RIGHT UPPER LIMB, INCLUDING SHOULDER: ICD-10-CM

## 2025-04-04 DIAGNOSIS — Z90.01 ACQUIRED ABSENCE OF EYE: Chronic | ICD-10-CM

## 2025-04-04 DIAGNOSIS — Z95.828 PRESENCE OF OTHER VASCULAR IMPLANTS AND GRAFTS: Chronic | ICD-10-CM

## 2025-04-04 LAB
ALBUMIN SERPL ELPH-MCNC: 3.8 G/DL
ALP BLD-CCNC: 94 U/L
ALT SERPL-CCNC: 12 U/L
ANION GAP SERPL CALC-SCNC: 10 MMOL/L
AST SERPL-CCNC: 19 U/L
AUTO BASOPHILS #: 0.03 K/UL
AUTO BASOPHILS %: 0.6 %
AUTO EOSINOPHILS #: 0.08 K/UL
AUTO EOSINOPHILS %: 1.6 %
AUTO IMMATURE GRANULOCYTES #: 0.02 K/UL
AUTO LYMPHOCYTES #: 1.17 K/UL
AUTO LYMPHOCYTES %: 24 %
AUTO MONOCYTES #: 0.52 K/UL
AUTO MONOCYTES %: 10.7 %
AUTO NEUTROPHILS #: 3.05 K/UL
AUTO NEUTROPHILS %: 62.7 %
AUTO NRBC #: 0 K/UL
BILIRUB DIRECT SERPL-MCNC: <0.2 MG/DL
BILIRUB INDIRECT SERPL-MCNC: >0.1 MG/DL
BILIRUB SERPL-MCNC: 0.3 MG/DL
BUN SERPL-MCNC: 24 MG/DL
CALCIUM SERPL-MCNC: 8.7 MG/DL
CHLORIDE SERPL-SCNC: 107 MMOL/L
CO2 SERPL-SCNC: 22 MMOL/L
CREAT SERPL-MCNC: 1.1 MG/DL
EGFRCR SERPLBLD CKD-EPI 2021: 72 ML/MIN/1.73M2
GLUCOSE SERPL-MCNC: 95 MG/DL
HCG SERPL-MCNC: <1 MIU/ML
HCT VFR BLD CALC: 38 %
HGB BLD-MCNC: 12.9 G/DL
IMM GRANULOCYTES NFR BLD AUTO: 0.4 %
MAN DIFF?: NORMAL
MCHC RBC-ENTMCNC: 30.9 PG
MCHC RBC-ENTMCNC: 33.9 G/DL
MCV RBC AUTO: 90.9 FL
PLATELET # BLD AUTO: 130 K/UL
PMV BLD AUTO: 0 /100 WBCS
PMV BLD: 9.4 FL
POTASSIUM SERPL-SCNC: 5.1 MMOL/L
PROT SERPL-MCNC: 6.5 G/DL
RBC # BLD: 4.18 M/UL
RBC # FLD: 13 %
SODIUM SERPL-SCNC: 139 MMOL/L
WBC # FLD AUTO: 4.87 K/UL

## 2025-04-04 PROCEDURE — 85025 COMPLETE CBC W/AUTO DIFF WBC: CPT

## 2025-04-04 PROCEDURE — 80076 HEPATIC FUNCTION PANEL: CPT

## 2025-04-04 PROCEDURE — 84702 CHORIONIC GONADOTROPIN TEST: CPT

## 2025-04-04 PROCEDURE — 80048 BASIC METABOLIC PNL TOTAL CA: CPT

## 2025-04-04 PROCEDURE — 82105 ALPHA-FETOPROTEIN SERUM: CPT

## 2025-04-04 PROCEDURE — 36415 COLL VENOUS BLD VENIPUNCTURE: CPT

## 2025-04-05 DIAGNOSIS — C43.61 MALIGNANT MELANOMA OF RIGHT UPPER LIMB, INCLUDING SHOULDER: ICD-10-CM

## 2025-04-05 LAB — AFP-TM SERPL-MCNC: 3.2 NG/ML

## 2025-04-08 ENCOUNTER — OUTPATIENT (OUTPATIENT)
Dept: OUTPATIENT SERVICES | Facility: HOSPITAL | Age: 70
LOS: 1 days | End: 2025-04-08
Payer: MEDICARE

## 2025-04-08 ENCOUNTER — APPOINTMENT (OUTPATIENT)
Age: 70
End: 2025-04-08
Payer: MEDICARE

## 2025-04-08 VITALS
WEIGHT: 288 LBS | HEIGHT: 72 IN | DIASTOLIC BLOOD PRESSURE: 85 MMHG | BODY MASS INDEX: 39.01 KG/M2 | TEMPERATURE: 98.1 F | OXYGEN SATURATION: 95 % | SYSTOLIC BLOOD PRESSURE: 152 MMHG | RESPIRATION RATE: 16 BRPM | HEART RATE: 66 BPM

## 2025-04-08 DIAGNOSIS — C43.61 MALIGNANT MELANOMA OF RIGHT UPPER LIMB, INCLUDING SHOULDER: ICD-10-CM

## 2025-04-08 DIAGNOSIS — Z90.01 ACQUIRED ABSENCE OF EYE: Chronic | ICD-10-CM

## 2025-04-08 DIAGNOSIS — Z98.890 OTHER SPECIFIED POSTPROCEDURAL STATES: Chronic | ICD-10-CM

## 2025-04-08 DIAGNOSIS — C62.90 MALIGNANT NEOPLASM OF UNSPECIFIED TESTIS, UNSPECIFIED WHETHER DESCENDED OR UNDESCENDED: ICD-10-CM

## 2025-04-08 DIAGNOSIS — Z95.828 PRESENCE OF OTHER VASCULAR IMPLANTS AND GRAFTS: Chronic | ICD-10-CM

## 2025-04-08 DIAGNOSIS — D63.0 ANEMIA IN NEOPLASTIC DISEASE: ICD-10-CM

## 2025-04-08 DIAGNOSIS — Z90.49 ACQUIRED ABSENCE OF OTHER SPECIFIED PARTS OF DIGESTIVE TRACT: Chronic | ICD-10-CM

## 2025-04-08 PROCEDURE — G2211 COMPLEX E/M VISIT ADD ON: CPT

## 2025-04-08 PROCEDURE — 99214 OFFICE O/P EST MOD 30 MIN: CPT

## 2025-05-21 ENCOUNTER — APPOINTMENT (OUTPATIENT)
Age: 70
End: 2025-05-21

## 2025-05-21 ENCOUNTER — OUTPATIENT (OUTPATIENT)
Dept: OUTPATIENT SERVICES | Facility: HOSPITAL | Age: 70
LOS: 1 days | End: 2025-05-21
Payer: MEDICARE

## 2025-05-21 DIAGNOSIS — C43.61 MALIGNANT MELANOMA OF RIGHT UPPER LIMB, INCLUDING SHOULDER: ICD-10-CM

## 2025-05-21 DIAGNOSIS — Z90.01 ACQUIRED ABSENCE OF EYE: Chronic | ICD-10-CM

## 2025-05-21 DIAGNOSIS — Z90.49 ACQUIRED ABSENCE OF OTHER SPECIFIED PARTS OF DIGESTIVE TRACT: Chronic | ICD-10-CM

## 2025-05-21 DIAGNOSIS — Z98.890 OTHER SPECIFIED POSTPROCEDURAL STATES: Chronic | ICD-10-CM

## 2025-05-21 DIAGNOSIS — Z95.828 PRESENCE OF OTHER VASCULAR IMPLANTS AND GRAFTS: Chronic | ICD-10-CM

## 2025-05-21 LAB
ANION GAP SERPL CALC-SCNC: 14 MMOL/L
AUTO BASOPHILS #: 0.03 K/UL
AUTO BASOPHILS %: 0.6 %
AUTO EOSINOPHILS #: 0.07 K/UL
AUTO EOSINOPHILS %: 1.4 %
AUTO IMMATURE GRANULOCYTES #: 0.01 K/UL
AUTO LYMPHOCYTES #: 1.12 K/UL
AUTO LYMPHOCYTES %: 21.7 %
AUTO MONOCYTES #: 0.45 K/UL
AUTO MONOCYTES %: 8.7 %
AUTO NEUTROPHILS #: 3.47 K/UL
AUTO NEUTROPHILS %: 67.4 %
AUTO NRBC #: 0 K/UL
BUN SERPL-MCNC: 19 MG/DL
CALCIUM SERPL-MCNC: 9.4 MG/DL
CHLORIDE SERPL-SCNC: 103 MMOL/L
CO2 SERPL-SCNC: 23 MMOL/L
CREAT SERPL-MCNC: 1.1 MG/DL
EGFRCR SERPLBLD CKD-EPI 2021: 71 ML/MIN/1.73M2
GLUCOSE SERPL-MCNC: 112 MG/DL
HCT VFR BLD CALC: 40.3 %
HGB BLD-MCNC: 13.4 G/DL
IMM GRANULOCYTES NFR BLD AUTO: 0.2 %
MAN DIFF?: NORMAL
MCHC RBC-ENTMCNC: 31.3 PG
MCHC RBC-ENTMCNC: 33.3 G/DL
MCV RBC AUTO: 94.2 FL
PLATELET # BLD AUTO: 158 K/UL
PMV BLD AUTO: 0 /100 WBCS
PMV BLD: 9.4 FL
POTASSIUM SERPL-SCNC: 5.3 MMOL/L
RBC # BLD: 4.28 M/UL
RBC # FLD: 13.3 %
SODIUM SERPL-SCNC: 140 MMOL/L
WBC # FLD AUTO: 5.15 K/UL

## 2025-05-21 PROCEDURE — 85025 COMPLETE CBC W/AUTO DIFF WBC: CPT

## 2025-05-21 PROCEDURE — 36415 COLL VENOUS BLD VENIPUNCTURE: CPT

## 2025-05-21 PROCEDURE — 80048 BASIC METABOLIC PNL TOTAL CA: CPT

## 2025-05-22 DIAGNOSIS — C43.61 MALIGNANT MELANOMA OF RIGHT UPPER LIMB, INCLUDING SHOULDER: ICD-10-CM

## 2025-06-19 ENCOUNTER — RESULT REVIEW (OUTPATIENT)
Age: 70
End: 2025-06-19

## 2025-06-19 ENCOUNTER — OUTPATIENT (OUTPATIENT)
Dept: OUTPATIENT SERVICES | Facility: HOSPITAL | Age: 70
LOS: 1 days | End: 2025-06-19
Payer: MEDICARE

## 2025-06-19 DIAGNOSIS — Z90.49 ACQUIRED ABSENCE OF OTHER SPECIFIED PARTS OF DIGESTIVE TRACT: Chronic | ICD-10-CM

## 2025-06-19 DIAGNOSIS — Z90.01 ACQUIRED ABSENCE OF EYE: Chronic | ICD-10-CM

## 2025-06-19 DIAGNOSIS — Z98.890 OTHER SPECIFIED POSTPROCEDURAL STATES: Chronic | ICD-10-CM

## 2025-06-19 DIAGNOSIS — Z95.828 PRESENCE OF OTHER VASCULAR IMPLANTS AND GRAFTS: Chronic | ICD-10-CM

## 2025-06-19 DIAGNOSIS — Z00.8 ENCOUNTER FOR OTHER GENERAL EXAMINATION: ICD-10-CM

## 2025-06-19 DIAGNOSIS — C82.90 FOLLICULAR LYMPHOMA, UNSPECIFIED, UNSPECIFIED SITE: ICD-10-CM

## 2025-06-19 PROCEDURE — 71260 CT THORAX DX C+: CPT | Mod: 26

## 2025-06-19 PROCEDURE — 74177 CT ABD & PELVIS W/CONTRAST: CPT | Mod: 26

## 2025-06-19 PROCEDURE — 74177 CT ABD & PELVIS W/CONTRAST: CPT

## 2025-06-19 PROCEDURE — 71260 CT THORAX DX C+: CPT

## 2025-06-20 DIAGNOSIS — C82.90 FOLLICULAR LYMPHOMA, UNSPECIFIED, UNSPECIFIED SITE: ICD-10-CM

## 2025-06-26 ENCOUNTER — APPOINTMENT (OUTPATIENT)
Age: 70
End: 2025-06-26
Payer: MEDICARE

## 2025-06-26 VITALS
RESPIRATION RATE: 16 BRPM | BODY MASS INDEX: 38.87 KG/M2 | WEIGHT: 287 LBS | SYSTOLIC BLOOD PRESSURE: 149 MMHG | DIASTOLIC BLOOD PRESSURE: 81 MMHG | HEART RATE: 73 BPM | HEIGHT: 72 IN | TEMPERATURE: 98.1 F | OXYGEN SATURATION: 95 %

## 2025-06-26 PROBLEM — E83.42 HYPOMAGNESEMIA: Status: RESOLVED | Noted: 2024-02-23 | Resolved: 2025-06-26

## 2025-06-26 LAB
ALBUMIN SERPL ELPH-MCNC: 4.5 G/DL
ALP BLD-CCNC: 93 U/L
ALT SERPL-CCNC: 10 U/L
ANION GAP SERPL CALC-SCNC: 13 MMOL/L
AST SERPL-CCNC: 16 U/L
AUTO BASOPHILS #: 0.03 K/UL
AUTO BASOPHILS %: 0.6 %
AUTO EOSINOPHILS #: 0.05 K/UL
AUTO EOSINOPHILS %: 1 %
AUTO IMMATURE GRANULOCYTES #: 0.01 K/UL
AUTO LYMPHOCYTES #: 1.11 K/UL
AUTO LYMPHOCYTES %: 21.6 %
AUTO MONOCYTES #: 0.4 K/UL
AUTO MONOCYTES %: 7.8 %
AUTO NEUTROPHILS #: 3.55 K/UL
AUTO NEUTROPHILS %: 68.8 %
AUTO NRBC #: 0 K/UL
BILIRUB DIRECT SERPL-MCNC: <0.2 MG/DL
BILIRUB INDIRECT SERPL-MCNC: >0.2 MG/DL
BILIRUB SERPL-MCNC: 0.4 MG/DL
BUN SERPL-MCNC: 19 MG/DL
CALCIUM SERPL-MCNC: 9.4 MG/DL
CHLORIDE SERPL-SCNC: 108 MMOL/L
CO2 SERPL-SCNC: 23 MMOL/L
CREAT SERPL-MCNC: 1.1 MG/DL
EGFRCR SERPLBLD CKD-EPI 2021: 71 ML/MIN/1.73M2
GLUCOSE SERPL-MCNC: 112 MG/DL
HCG SERPL-MCNC: <1 MIU/ML
HCT VFR BLD CALC: 41.2 %
HGB BLD-MCNC: 13.6 G/DL
IMM GRANULOCYTES NFR BLD AUTO: 0.2 %
LDH SERPL-CCNC: 152 U/L
MAN DIFF?: NORMAL
MCHC RBC-ENTMCNC: 31.1 PG
MCHC RBC-ENTMCNC: 33 G/DL
MCV RBC AUTO: 94.3 FL
PLATELET # BLD AUTO: 174 K/UL
PMV BLD AUTO: 0 /100 WBCS
PMV BLD: 9.8 FL
POTASSIUM SERPL-SCNC: 4.8 MMOL/L
PROT SERPL-MCNC: 6.9 G/DL
RBC # BLD: 4.37 M/UL
RBC # FLD: 13.1 %
SODIUM SERPL-SCNC: 144 MMOL/L
WBC # FLD AUTO: 5.15 K/UL

## 2025-06-26 PROCEDURE — 99214 OFFICE O/P EST MOD 30 MIN: CPT

## 2025-06-26 PROCEDURE — G2211 COMPLEX E/M VISIT ADD ON: CPT

## 2025-06-30 LAB — AFP-TM SERPL-MCNC: 3.1 NG/ML

## 2025-09-04 ENCOUNTER — APPOINTMENT (OUTPATIENT)
Age: 70
End: 2025-09-04